# Patient Record
Sex: MALE | Race: WHITE | NOT HISPANIC OR LATINO | Employment: OTHER | URBAN - METROPOLITAN AREA
[De-identification: names, ages, dates, MRNs, and addresses within clinical notes are randomized per-mention and may not be internally consistent; named-entity substitution may affect disease eponyms.]

---

## 2024-05-07 ENCOUNTER — HOSPITAL ENCOUNTER (INPATIENT)
Facility: HOSPITAL | Age: 24
LOS: 3 days | Discharge: OTHER INSTITUTION | DRG: 099 | End: 2024-05-10
Attending: EMERGENCY MEDICINE | Admitting: INTERNAL MEDICINE
Payer: COMMERCIAL

## 2024-05-07 ENCOUNTER — HOSPITAL ENCOUNTER (OUTPATIENT)
Dept: TELEMEDICINE | Facility: HOSPITAL | Age: 24
Discharge: HOME | End: 2024-05-07
Payer: COMMERCIAL

## 2024-05-07 DIAGNOSIS — Z92.82: ICD-10-CM

## 2024-05-07 DIAGNOSIS — R29.898 LEFT ARM WEAKNESS: ICD-10-CM

## 2024-05-07 DIAGNOSIS — I63.9 CEREBROVASCULAR ACCIDENT (CVA), UNSPECIFIED MECHANISM: Primary | ICD-10-CM

## 2024-05-07 DIAGNOSIS — R29.898 LEFT ARM WEAKNESS: Primary | ICD-10-CM

## 2024-05-07 DIAGNOSIS — I63.9 ISCHEMIC STROKE: ICD-10-CM

## 2024-05-07 LAB
ALBUMIN SERPL-MCNC: 3.9 G/DL (ref 3.5–5)
ALBUMIN/GLOB SERPL: 1.1 RATIO (ref 1.1–2)
ALP SERPL-CCNC: 53 UNIT/L (ref 40–150)
ALT SERPL-CCNC: 10 UNIT/L (ref 0–55)
AMPHET UR QL SCN: NEGATIVE
APPEARANCE UR: CLEAR
AST SERPL-CCNC: 12 UNIT/L (ref 5–34)
BACTERIA #/AREA URNS AUTO: ABNORMAL /HPF
BARBITURATE SCN PRESENT UR: NEGATIVE
BASOPHILS # BLD AUTO: 0.01 X10(3)/MCL
BASOPHILS NFR BLD AUTO: 0.1 %
BENZODIAZ UR QL SCN: NEGATIVE
BILIRUB SERPL-MCNC: 0.5 MG/DL
BILIRUB UR QL STRIP.AUTO: NEGATIVE
BUN SERPL-MCNC: 11.7 MG/DL (ref 8.9–20.6)
CALCIUM SERPL-MCNC: 8.7 MG/DL (ref 8.4–10.2)
CANNABINOIDS UR QL SCN: NEGATIVE
CHLORIDE SERPL-SCNC: 105 MMOL/L (ref 98–107)
CHOLEST SERPL-MCNC: 122 MG/DL
CHOLEST/HDLC SERPL: 3 {RATIO} (ref 0–5)
CO2 SERPL-SCNC: 20 MMOL/L (ref 22–29)
COCAINE UR QL SCN: NEGATIVE
COLOR UR AUTO: ABNORMAL
CREAT SERPL-MCNC: 0.96 MG/DL (ref 0.73–1.18)
EOSINOPHIL # BLD AUTO: 0 X10(3)/MCL (ref 0–0.9)
EOSINOPHIL NFR BLD AUTO: 0 %
ERYTHROCYTE [DISTWIDTH] IN BLOOD BY AUTOMATED COUNT: 12.4 % (ref 11.5–17)
ERYTHROCYTE [SEDIMENTATION RATE] IN BLOOD: 4 MM/HR (ref 0–15)
EST. AVERAGE GLUCOSE BLD GHB EST-MCNC: 96.8 MG/DL
FENTANYL UR QL SCN: NEGATIVE
GFR SERPLBLD CREATININE-BSD FMLA CKD-EPI: >60 ML/MIN/1.73/M2
GLOBULIN SER-MCNC: 3.7 GM/DL (ref 2.4–3.5)
GLUCOSE SERPL-MCNC: 129 MG/DL (ref 74–100)
GLUCOSE UR QL STRIP.AUTO: NEGATIVE
HBA1C MFR BLD: 5 %
HCT VFR BLD AUTO: 37.8 % (ref 42–52)
HDLC SERPL-MCNC: 37 MG/DL (ref 35–60)
HGB BLD-MCNC: 12.6 G/DL (ref 14–18)
IMM GRANULOCYTES # BLD AUTO: 0.08 X10(3)/MCL (ref 0–0.04)
IMM GRANULOCYTES NFR BLD AUTO: 0.4 %
KETONES UR QL STRIP.AUTO: ABNORMAL
LACTATE SERPL-SCNC: 3.3 MMOL/L (ref 0.5–2.2)
LDH SERPL-CCNC: 214 U/L (ref 125–220)
LDLC SERPL CALC-MCNC: 75 MG/DL (ref 50–140)
LEUKOCYTE ESTERASE UR QL STRIP.AUTO: NEGATIVE
LYMPHOCYTES # BLD AUTO: 0.51 X10(3)/MCL (ref 0.6–4.6)
LYMPHOCYTES NFR BLD AUTO: 2.8 %
MCH RBC QN AUTO: 28.9 PG (ref 27–31)
MCHC RBC AUTO-ENTMCNC: 33.3 G/DL (ref 33–36)
MCV RBC AUTO: 86.7 FL (ref 80–94)
MDMA UR QL SCN: NEGATIVE
MONOCYTES # BLD AUTO: 0.66 X10(3)/MCL (ref 0.1–1.3)
MONOCYTES NFR BLD AUTO: 3.7 %
NEUTROPHILS # BLD AUTO: 16.81 X10(3)/MCL (ref 2.1–9.2)
NEUTROPHILS NFR BLD AUTO: 93 %
NITRITE UR QL STRIP.AUTO: NEGATIVE
NRBC BLD AUTO-RTO: 0 %
OPIATES UR QL SCN: NEGATIVE
PCP UR QL: NEGATIVE
PH UR STRIP.AUTO: 6 [PH]
PH UR: 6 [PH] (ref 3–11)
PLATELET # BLD AUTO: 252 X10(3)/MCL (ref 130–400)
PMV BLD AUTO: 8.7 FL (ref 7.4–10.4)
POTASSIUM SERPL-SCNC: 4 MMOL/L (ref 3.5–5.1)
PROT SERPL-MCNC: 7.6 GM/DL (ref 6.4–8.3)
PROT UR QL STRIP.AUTO: NEGATIVE
RBC # BLD AUTO: 4.36 X10(6)/MCL (ref 4.7–6.1)
RBC #/AREA URNS AUTO: ABNORMAL /HPF
RBC UR QL AUTO: ABNORMAL
SODIUM SERPL-SCNC: 137 MMOL/L (ref 136–145)
SP GR UR STRIP.AUTO: 1.02 (ref 1–1.03)
SPECIFIC GRAVITY, URINE AUTO (.000) (OHS): 1.02 (ref 1–1.03)
SQUAMOUS #/AREA URNS LPF: ABNORMAL /HPF
TRIGL SERPL-MCNC: 52 MG/DL (ref 34–140)
TSH SERPL-ACNC: 0.4 UIU/ML (ref 0.35–4.94)
UROBILINOGEN UR STRIP-ACNC: 0.2
VLDLC SERPL CALC-MCNC: 10 MG/DL
WBC # SPEC AUTO: 18.07 X10(3)/MCL (ref 4.5–11.5)
WBC #/AREA URNS AUTO: ABNORMAL /HPF

## 2024-05-07 PROCEDURE — 99285 EMERGENCY DEPT VISIT HI MDM: CPT | Mod: 25

## 2024-05-07 PROCEDURE — 83605 ASSAY OF LACTIC ACID: CPT

## 2024-05-07 PROCEDURE — 85025 COMPLETE CBC W/AUTO DIFF WBC: CPT

## 2024-05-07 PROCEDURE — 83615 LACTATE (LD) (LDH) ENZYME: CPT

## 2024-05-07 PROCEDURE — 80307 DRUG TEST PRSMV CHEM ANLYZR: CPT

## 2024-05-07 PROCEDURE — 87040 BLOOD CULTURE FOR BACTERIA: CPT

## 2024-05-07 PROCEDURE — 83036 HEMOGLOBIN GLYCOSYLATED A1C: CPT

## 2024-05-07 PROCEDURE — 63600175 PHARM REV CODE 636 W HCPCS: Performed by: PSYCHIATRY & NEUROLOGY

## 2024-05-07 PROCEDURE — 80053 COMPREHEN METABOLIC PANEL: CPT

## 2024-05-07 PROCEDURE — G0425 INPT/ED TELECONSULT30: HCPCS | Mod: GT,,, | Performed by: PSYCHIATRY & NEUROLOGY

## 2024-05-07 PROCEDURE — 85652 RBC SED RATE AUTOMATED: CPT

## 2024-05-07 PROCEDURE — 80061 LIPID PANEL: CPT

## 2024-05-07 PROCEDURE — 20000000 HC ICU ROOM

## 2024-05-07 PROCEDURE — 99223 1ST HOSP IP/OBS HIGH 75: CPT | Mod: FS,,, | Performed by: PSYCHIATRY & NEUROLOGY

## 2024-05-07 PROCEDURE — 86039 ANTINUCLEAR ANTIBODIES (ANA): CPT

## 2024-05-07 PROCEDURE — 36415 COLL VENOUS BLD VENIPUNCTURE: CPT

## 2024-05-07 PROCEDURE — 81001 URINALYSIS AUTO W/SCOPE: CPT

## 2024-05-07 PROCEDURE — 84443 ASSAY THYROID STIM HORMONE: CPT

## 2024-05-07 RX ORDER — ACETAMINOPHEN 10 MG/ML
1000 INJECTION, SOLUTION INTRAVENOUS ONCE
Status: DISCONTINUED | OUTPATIENT
Start: 2024-05-07 | End: 2024-05-07

## 2024-05-07 RX ORDER — SODIUM CHLORIDE 0.9 % (FLUSH) 0.9 %
10 SYRINGE (ML) INJECTION
Status: DISCONTINUED | OUTPATIENT
Start: 2024-05-07 | End: 2024-05-10 | Stop reason: HOSPADM

## 2024-05-07 RX ORDER — LEVETIRACETAM 10 MG/ML
1000 INJECTION INTRAVASCULAR ONCE
Qty: 100 ML | Refills: 0 | Status: COMPLETED | OUTPATIENT
Start: 2024-05-07 | End: 2024-05-07

## 2024-05-07 RX ORDER — ONDANSETRON HYDROCHLORIDE 2 MG/ML
4 INJECTION, SOLUTION INTRAVENOUS EVERY 6 HOURS PRN
Status: DISCONTINUED | OUTPATIENT
Start: 2024-05-07 | End: 2024-05-10 | Stop reason: HOSPADM

## 2024-05-07 RX ORDER — ACETAMINOPHEN 325 MG/1
650 TABLET ORAL EVERY 6 HOURS PRN
Status: DISCONTINUED | OUTPATIENT
Start: 2024-05-07 | End: 2024-05-10 | Stop reason: HOSPADM

## 2024-05-07 RX ADMIN — LEVETIRACETAM INJECTION 1000 MG: 10 INJECTION INTRAVENOUS at 07:05

## 2024-05-07 NOTE — ED PROVIDER NOTES
Encounter Date: 5/7/2024       History     Chief Complaint   Patient presents with    Cerebrovascular Accident     Tx from [facility] for CVA. L side deficits. LSN 1220 today. GCS 10 on arrival to [facility]. TPA bolus incomplete due to bleeding gums at prior facility. Code stroke called overhead upon patient arrival     Patient is a 23-year-old male transferred from an outlying facility secondary to left upper and left lower extremity weakness.  Patient had CT of the head that was done prior to arrival which showed no acute changes.  Patient was given tPA prior to transfer but the tPA infusion was stopped after patient started having gingival bleeding.  Patient received 37.45 meals of tPA infusion instead of full-dose of 70 mL.  Tele neuro was consulted at the transferring facility.  Stroke alert was called shortly after patient arrival.  Stroke team has been consulted.  CT of the brain done at the prior facility showed no acute changes per Radiology      Review of patient's allergies indicates:  Not on File  No past medical history on file.  No past surgical history on file.  No family history on file.     Review of Systems   Constitutional: Negative.    Cardiovascular: Negative.    Gastrointestinal: Negative.    Genitourinary: Negative.    Musculoskeletal: Negative.    Neurological:  Positive for weakness and headaches.   Psychiatric/Behavioral: Negative.         Physical Exam     Initial Vitals   BP Pulse Resp Temp SpO2   05/07/24 1619 05/07/24 1619 05/07/24 1619 05/07/24 1700 05/07/24 1619   123/62 80 (!) 26 99.6 °F (37.6 °C) 98 %      MAP       --                Physical Exam    Nursing note and vitals reviewed.  Constitutional: He appears well-developed and well-nourished.   Patient is alert, seems somewhat confused but follows commands   Eyes:   Pupils are 4 mm bilaterally and reactive   Neck: Neck supple.   Normal range of motion.  Cardiovascular:  Normal rate, regular rhythm and normal heart sounds.            Pulmonary/Chest: Breath sounds normal. No respiratory distress.   Abdominal: Abdomen is soft. There is no abdominal tenderness.   Musculoskeletal:      Cervical back: Normal range of motion and neck supple.      Comments: No deformity to extremities x4     Neurological: He is alert.   No apparent aphasia, patient has weakness of the left upper and lower extremity, can not lift against gravity in either.  Strength on the right side is within normal.  There is no apparent aphasia   Skin: Skin is warm.         ED Course   Critical Care    Date/Time: 5/7/2024 5:51 PM    Performed by: Jonathan Thomas MD  Authorized by: Jonathan Thomas MD  Direct patient critical care time: 20 minutes  Additional history critical care time: 5 minutes  Ordering / reviewing critical care time: 5 minutes  Documentation critical care time: 10 minutes  Consulting other physicians critical care time: 5 minutes  Consult with family critical care time: 5 minutes  Total critical care time (exclusive of procedural time) : 50 minutes  Critical care time was exclusive of separately billable procedures and treating other patients and teaching time.  Critical care was necessary to treat or prevent imminent or life-threatening deterioration of the following conditions: CNS failure or compromise.  Critical care was time spent personally by me on the following activities: development of treatment plan with patient or surrogate, discussions with consultants, interpretation of cardiac output measurements, evaluation of patient's response to treatment, discussions with primary provider, examination of patient, obtaining history from patient or surrogate, ordering and performing treatments and interventions, ordering and review of laboratory studies, ordering and review of radiographic studies, pulse oximetry, re-evaluation of patient's condition and review of old charts.        Labs Reviewed - No data to display       Imaging Results               CTA Head and Neck (xpd) (Final result)  Result time 05/07/24 17:05:04      Final result by Bear Daniel MD (05/07/24 17:05:04)                   Impression:        Limited examination due to venous contamination and mistiming of the contrast bolus but no evidence of large vessel occlusion seen on this examination.      Electronically signed by: Milo Daniel  Date:    05/07/2024  Time:    17:05               Narrative:    EXAMINATION:  CTA HEAD AND NECK (XPD)    CLINICAL HISTORY:  Stroke/TIA, determine embolic source;    TECHNIQUE:  Non contrast low dose axial images were obtained through the head.  CT angiogram was performed from the level of the sugar to the top of the head following the IV administration 100 cc of Isovue 370 contrast .  Sagittal and coronal reconstructions and maximum intensity projection reconstructions were performed. Arterial stenosis percentages are based on NASCET measurement criteria.  Additional multiplanar reconstructions were performed on post processed imaging.  Automatic exposure control (AEC) is utilized to reduce patient radiation exposure.    COMPARISON:  CT scan dated 05/07/2024    FINDINGS:  Source images: No intracranial mass lesion is seen.  No hemorrhage is seen.  No infarct is seen.  Ventricles and basilar cisterns appear grossly unremarkable.  No cervical mass or lesion is seen.  No abnormal lymphadenopathy seen.  Thyroid appears normal.  Larynx appears normal.  Trachea is midline.  Thoracic inlet appears normal.    Vascular images: Aortic arch is normal in caliber.  There is a 3 vessel arch seen.  The common carotid arteries are widely patent.  No plaque is seen.  No stenosis is seen.  Carotid bulbs appear grossly normal.  Internal carotid arteries are widely patent.  They are seen to level the petrous ridge and clinoid and supraclinoid region.  No stenosis or obstruction is seen.  There is venous contamination is seen throughout the course of the examination due  to mistiming of the contrast bolus.  Visualized portions of the MCAs appear to be patent.  M1 segments, M2 segments and M3 segments appear to be patent.  A1 segments are patent.  Anterior communicating arteries patent.  Anterior cerebral arteries are patent.    Both vertebral arteries are patent.  No obstruction is seen.  Both vertebral arteries perfuse a normal appearing basilar artery.  Posterior cerebral arteries are patent.  Posterior communicating artery is seen on the left side and appears to be patent.  The right posterior communicating artery is not seen.    .                                       CT Brain Perfusion inc Post Processing (In process)                      CT HEAD FOR STROKE (Final result)  Result time 05/07/24 16:42:52      Final result by Romain Silva MD (05/07/24 16:42:52)                   Impression:      No acute intracranial process identified.    Report called to Reena Maynard NP at the time of dictation.      Electronically signed by: Romain Silva  Date:    05/07/2024  Time:    16:42               Narrative:    EXAMINATION:  CT HEAD FOR STROKE    CLINICAL HISTORY:  Neuro deficit, acute, stroke suspected;stroke alert;;    TECHNIQUE:  CT images of the head without IV contrast. Axial, coronal and sagittal images reviewed. Dose length product 979 mGycm. Automatic exposure control, adjustment of mA/kV or iterative reconstruction technique used to limit radiation dose.    COMPARISON:  No relevant comparison studies available at the time of dictation.    FINDINGS:  Extra-axial spaces/ventricular system: Normal for age.    Intracranial hemorrhage: None identified.    Cerebral parenchyma: No acute large vessel territory infarct or mass effect identified.    Vascular system: No hyperdense vessel appreciated.    Cerebellum: Normal.    Sella: Normal.    Included paranasal sinuses and mastoid air cells: Well-aerated.    Visualized orbits: Normal.    Scalp/Calvarium: No depressed skull  fracture.                                       Medications - No data to display  Medical Decision Making  Differential diagnosis:  CVA, status post tPA, hemorrhagic CVA, ischemic CVA    Amount and/or Complexity of Data Reviewed  Discussion of management or test interpretation with external provider(s): Patient received a partial bolus of tPA prior to transfer.  CT of the brain at the transferring facility showed no acute changes.  EKG done at the transferring facility shows normal sinus rhythm with sinus arrhythmia with a heart rate of 95.  Patient arrived in this ER and stroke code was called.  Stroke nurse practitioner responded.  Patient was sent for repeat CT of the head and CTA of the brain.  No acute changes were seen on these per Radiology.  On arrival, patient had no movement of the left upper and lower extremity.  At time of admit, patient is easily moving his left lower extremity but he still will not move his left upper extremity for me.  Patient will be admitted to the ICU secondary to partial dose of tPA.    Risk  Decision regarding hospitalization.               ED Course as of 05/07/24 1756   Tue May 07, 2024   1746 Paged intensivist; Dr. Gareth Mast [LH]   1748 Intensivist on-call, will come to evaluate [KG]   1749 Patient is still not moving his left upper extremity.  Patient moves his left lower extremity without difficulty.  Patient has 4/5 motor strength in the left lower extremity. [KG]   1753 Abnormal labs at the transferring facility [place] 354[address], CMP was within normal [KG]      ED Course User Index  [KG] Jonathan Thomas MD  [LH] Clayton Silva                   Thrombolysis Candidate? Yes, given prior to arrival at outside hospital Delays to Thrombolysis?  Not Applicable        Clinical Impression:  Final diagnoses:  [I63.9] Cerebrovascular accident (CVA), unspecified mechanism (Primary)  [Z92.82] Received tissue plasminogen activator (tPA) less than 24 hours  prior to arrival          ED Disposition Condition    Admit Stable                Jonathan Thomas MD  05/07/24 [id number]       Jonathan Thomas MD  05/07/24 6941

## 2024-05-07 NOTE — NURSING
Nurses Note -- 4 Eyes      5/7/2024   6:35 PM      Skin assessed during: Admit      [x] No Altered Skin Integrity Present    [x]Prevention Measures Documented      [] Yes- Altered Skin Integrity Present or Discovered   [] LDA Added if Not in Epic (Describe Wound)   [] New Altered Skin Integrity was Present on Admit and Documented in LDA   [] Wound Image Taken    Wound Care Consulted? No    Attending Nurse: Sonya Galarza RN/Staff Member:   Tang DE JESUS

## 2024-05-07 NOTE — SUBJECTIVE & OBJECTIVE
HPI:  23 y.o. male without apparent past medical history, brought in due to acute onset L arm and L face weakness as well as HA for a week. Never had similar symptoms before.  Noted to be febrile 100.6  Of note, no reported history of drug abuse or recent neck/head trauma.  No improving.     Images personally reviewed and interpreted:  Study: Head CT  Study Interpretation: no acute abnormality.     Assessment and plan:  Concern for acute R cortical infarct. He is a suitable candidate for TNK and therefore recommended to treat accordingly and get STAT CTA brain and neck searching for LVO.   Complete stroke work up with MRI brain, TTE-bubble, lipid panel, drug screen, as well as fever work up.   Neurology, PT/OT and speech therapy consults.    Lytics recommendation: Recommend IV Tenecteplase 0.25mg/kg IV push (max dose 25mg); If Tenecteplase is not available use Alteplase 0.9mg/kg IV bolus followed by infusion (max dose 90mg)     Additional Recommendations:   Neurological assessment and vital signs (except temperature) every 15 minutes x 2 hours, then every 30 minutes x 6 hours, then every hour x 16 hours..  Frequency of BP assessments may need to be increased if systolic BP stays >= 180 mm Hg or diastolic BP stays >= 105 mm Hg. Administer antihypertensive meds as ordered  Temperature every 4 hours or as required.  Follow hospital protocol for further orders re: post thrombolytic therapy patient management.  No antithrombotics or anticoagulants (including but not limited to: heparin, warfarin, aspirin, clopidigrel, or dipyridamole) for 24 hours, then start antithrombotics as ordered by treating physician    Adapted from the American Heart Association/American Stroke Association (AHA/ASA) and American Association of Neuroscience Nurses (AANN) Guidelines.   Thrombectomy recommendation: Yes  Placement recommendation: pending further studies

## 2024-05-07 NOTE — TELEMEDICINE CONSULT
[facility]  Vascular Neurology  Comprehensive Stroke Center  TeleVascular Neurology Acute Consultation Note        Consult Information  Consults    Consulting Provider: JOE POWERS III   Current Providers  No providers found    Patient Location: [facility] - TELEMEDICINE ED [facility] PATIENT FLOW [facility] Emergency Department    Spoke hospital nurse at bedside with patient assisting consultant.  Patient information was obtained from spouse/SO, past medical records, and primary team.       Stroke Documentation  Acute Stroke Times   Last Known Normal Date: 05/07/24  Last Known Normal Time: 1220  Symptom Onset Date: 05/07/24  Symptom Onset Time: 1220  Stroke Team Called Date: 05/07/24  Stroke Team Called Time: 1341  Stroke Team Arrival Date: 05/07/24  Stroke Team Arrival Time: 1345  CT Interpretation Time: 1357  Thrombolytic Therapy Recommended: Yes  Thrombectomy Recommended:  (Awaiting CTA)  Decision to Treat Time for Tenecteplase: 1400    NIH Scale:  Interval: baseline  1a. Level of Consciousness: 1-->Not alert, but arousable by minor stimulation to obey, answer, or respond  1b. LOC Questions: 0-->Answers both questions correctly  1c. LOC Commands: 0-->Performs both tasks correctly  2. Best Gaze: 0-->Normal  3. Visual: 0-->No visual loss  4. Facial Palsy: 2-->Partial paralysis (total or near-total paralysis of lower face)  5a. Motor Arm, Left: 3-->No effort against gravity, limb falls  5b. Motor Arm, Right: 0-->No drift, limb holds 90 (or 45) degrees for full 10 secs  6a. Motor Leg, Left: 0-->No drift, leg holds 30 degree position for full 5 secs  6b. Motor Leg, Right: 0-->No drift, leg holds 30 degree position for full 5 secs  7. Limb Ataxia: 0-->Absent  8. Sensory: 0-->Normal, no sensory loss  9. Best Language: 0-->No aphasia, normal  10. Dysarthria: 0-->Normal  11. Extinction and Inattention (formerly Neglect): 0-->No abnormality  Total (NIH Stroke Scale): 6      Modified [place]:  Score: 0  [place] Coma Scale: 14   ABCD2 Score:    [id number] Score:    HAS -BLED Score:    ICH Score:    Hunt & Marr Classification:      There were no vitals taken for this visit.    Van Positive    Medical Decision Making  HPI:  23 y.o. male without apparent past medical history, brought in due to acute onset L arm and L face weakness as well as HA for a week. Never had similar symptoms before.  Noted to be febrile 100.6  Of note, no reported history of drug abuse or recent neck/head trauma.  No improving.     Images personally reviewed and interpreted:  Study: Head CT  Study Interpretation: no acute abnormality.     Assessment and plan:  Concern for acute R cortical infarct. He is a suitable candidate for TNK and therefore recommended to treat accordingly and get STAT CTA brain and neck searching for LVO.   Complete stroke work up with MRI brain, TTE-bubble, lipid panel, drug screen, as well as fever work up.   Neurology, PT/OT and speech therapy consults.    Lytics recommendation: Recommend IV Tenecteplase 0.25mg/kg IV push (max dose 25mg); If Tenecteplase is not available use Alteplase 0.9mg/kg IV bolus followed by infusion (max dose 90mg)     Additional Recommendations:   Neurological assessment and vital signs (except temperature) every 15 minutes x 2 hours, then every 30 minutes x 6 hours, then every hour x 16 hours..  Frequency of BP assessments may need to be increased if systolic BP stays >= 180 mm Hg or diastolic BP stays >= 105 mm Hg. Administer antihypertensive meds as ordered  Temperature every 4 hours or as required.  Follow hospital protocol for further orders re: post thrombolytic therapy patient management.  No antithrombotics or anticoagulants (including but not limited to: heparin, warfarin, aspirin, clopidigrel, or dipyridamole) for 24 hours, then start antithrombotics as ordered by treating physician    Adapted from the American Heart Association/American Stroke Association (AHA/ASA) and  American Association of Neuroscience Nurses (AANN) Guidelines.   Thrombectomy recommendation: Yes  Placement recommendation: pending further studies               ROS  Physical Exam  No past medical history on file.  No past surgical history on file.  No family history on file.    Diagnoses  Problem Noted   Left Arm Weakness 5/7/2024       Taco Weber MD      Emergent/Acute neurological consultation requested by spoke provider due to critical concerns for possible cerebrovascular event that could result in permanent loss of neurologic/bodily function, severe disability or death of this patient.  Immediate/timely evaluation by a highly prepared expert is paramount for optimal outcomes  High risk for neurological deterioration if not properly diagnosed  High risk for neurological deterioration if not treated promplty/as soon as possible  Complex diagnostic evaluation may be required (advanced imaging)  High risk treatment options (thrombolytics and/or thrombectomy)    Patient care was coordinated with spoke provider, including but not limted to    Discussing likely diagnosis/etiology of symptoms  Making recommendations for further diagnostic studies  Making recommendations for intravenous thrombolytics or other advanced therapies  Making recommendations for disposition (admission/transfer for higher level of care)

## 2024-05-07 NOTE — CONSULTS
[facility] - Emergency Dept  Neurology  Consult Note      Luis Francisco Traylor is a 23 y.o. male who presented to [facility] on 5/7/2024 with reports of  left facial droop, left sided weakness, upper greater than lower. . Onset of symptoms was  acute . Stroke risk factors include none. Prior stroke history: none.     RN reported that patient only received partial dose of TPA at outside facility. It was stopped due to acute onset of gingival bleeding. He is lethargic upon arrival. Aroused only to noxious stimuli.  NIH 11. I  am also told that he has had complaints of HA's for the past 4 weeks and is being followed by his PCP. No family members present.   Repeat CT head ordered. Notified Dr. Richardson. CTP and CTA head pending.         No past medical history on file.  No past surgical history on file.  No family history on file.      Review of patient's allergies indicates:  Not on File      STROKE DOCUMENTATION   Acute Stroke Times   Last Known Normal Date: 05/07/24  Last Known Normal Time: 1222  Stroke Team Called Date: 05/07/24  Stroke Team Called Time: 1625  Stroke Team Arrival Date: 05/07/24  Stroke Team Arrival Time: 1625  Thrombolytic Therapy Recommended: Yes (received at [facility])    NIH Scale:  1a. Level of Consciousness: 2-->Not alert, requires repeated stimulation to attend, or is obtunded and requires strong or painful stimulation to make movements (not stereotyped)  1b. LOC Questions: 1-->Answers one question correctly  1c. LOC Commands: 0-->Performs both tasks correctly  2. Best Gaze: 0-->Normal  3. Visual: 0-->No visual loss  4. Facial Palsy: 1-->Minor paralysis (flattened nasolabial fold, asymmetry on smiling)  5a. Motor Arm, Left: 3-->No effort against gravity, limb falls  5b. Motor Arm, Right: 0-->No drift, limb holds 90 (or 45) degrees for full 10 secs  6a. Motor Leg, Left: 3-->No effort against gravity, leg falls to bed immediately  6b. Motor Leg, Right: 0-->No drift, leg holds 30 degree position  "for full 5 secs  7. Limb Ataxia: 0-->Absent  8. Sensory: 1-->Mild-to-moderate sensory loss, patient feels pinprick is less sharp or is dull on the affected side, or there is a loss of superficial pain with pinprick, but patient is aware of being touched  9. Best Language: 0-->No aphasia, normal  10. Dysarthria: 0-->Normal  11. Extinction and Inattention (formerly Neglect): 0-->No abnormality  Total (NIH Stroke Scale): 11     Modified [place]    [place] Coma Scale:    ABCD2 Score:    [id number] Score:   HAS -BLED Score:   ICH Score:   Hunt & Marr Classification:         Neurological Exam:   Lethargic, requires noxious stimuli  Left facial droop  Speech questionable dysarthria  LUE, No effort against gravity  LLE, antigravity  RUE antigravity  Intermittently following commands  Refused to open eyes,       Laboratory:  BMP: No results found for: "GLUCOSE", "NA", "K", "CL", "CO2", "BUN", "CREATININE", "CALCIUM"  CBC: No results found for: "WBC", "RBC", "HGB", "HCT", "PLT", "MCV", "MCH", "MCHC"  Lipid Panel: No results found for: "CHOL", "LDLCALC", "HDL", "TRIG"  Coagulation: No results found for: "PT", "INR", "APTT"  Hgb A1C: No results found for: "HGBA1C"  TSH: No results found for: "TSH"    Diagnostic Results  Brain Imaging:   -CTh: negative  Cerebrovascular Imaging:   -CTA h/n: negative 1705  Cardiac Evaluation:   -EKG/Telemetry: pending        Discussed with neurologist on call: 1629  Thrombolysis Candidate?  Received partial TPA at [facility]  -Delays to Thrombolysis?  Not Applicable    Interventional Revascularization Candidate?  Awaiting CTA results  -Delays to Thrombectomy? Yes Patient needed to be stabilized  Discussed with Interventional Neurology at 1711 , recommends Not a candidate for MT. CTA negative for LVO or flow limiting stenosis            PLAN:  S/p TNK    -permissive HTN for now ... SBP less than 180  -q1hr neuro checks  -STAT CTh for any HA or neuro change  -HOB flat, Bedrest, NPO x24 hours post " TNK  -repeat CTh after 24 hours to determine if antiplatelet therapy can be initiated           Thank you for your consult.   Will follow up with patient.       Reena Maynard NP  Neurology  [facility] - Emergency Dept

## 2024-05-07 NOTE — H&P
[facility] - Emergency Dept  Pulmonary Critical Care Note    Patient Name: Luis Traylor  MRN: [id number]  Admission Date: 5/7/2024  Hospital Length of Stay: 0 days  Code Status: Full Code  Attending Provider: Gareth Mast MD  Primary Care Provider: No primary care provider on file.     Subjective:     HPI:   3-year-old male with no significant past medical history presented to [facility] ED on 05/07/2024 as a transfer from [facility] due to concern for stroke.  Around noon today the patient seemed confused to his girlfriend and weak were which she took him to [facility], on presentation he complain of weakness and numbness in his left arm and leg, he has also been having severe headaches for the last 2 weeks that had worsened today with photophobia accompanied by nausea and vomiting, CT scan head did not show any signs of bleeding, tPA was given to the patient, he started having gingival bleeding penitentiary through treatment and had to be halted.  CTA head and neck did not show any large vessel occlusion.  Patient denies any dizziness, double vision, chest pain, shortness breath, abdominal pain, diarrhea or constipation.    Hospital Course/Significant events:  05/07/2024 half dose tPA and admission to the ICU    24 Hour Interval History:  N/A    No past medical history on file.    No past surgical history on file.    Social History     Socioeconomic History    Marital status: Significant Other           Objective:   No current outpatient medications    Current Inpatient Medications        No intake or output data in the 24 hours ending 05/07/24 2951    Review of Systems   Constitutional:  Negative for chills, fever and weight loss.   Eyes:  Positive for photophobia. Negative for blurred vision and double vision.   Respiratory:  Negative for cough and shortness of breath.    Cardiovascular:  Negative for chest pain and leg swelling.   Gastrointestinal:  Positive for nausea and  vomiting. Negative for abdominal pain, constipation and diarrhea.   Genitourinary:  Negative for dysuria and urgency.   Neurological:  Positive for sensory change, weakness and headaches. Negative for dizziness, speech change, seizures and loss of consciousness.        Vital Signs (Most Recent):  Temp: 98.4 °F (36.9 °C) (05/07/24 1708)  Pulse: 84 (05/07/24 1800)  Resp: 15 (05/07/24 1800)  BP: (!) 141/79 (05/07/24 1800)  SpO2: 100 % (05/07/24 1800)  There is no height or weight on file to calculate BMI.  Weight: 77.4 kg (170 lb 10.2 oz) Vital Signs (24h Range):  Temp:  [98.4 °F (36.9 °C)-99.6 °F (37.6 °C)] 98.4 °F (36.9 °C)  Pulse:  [80-97] 84  Resp:  [11-26] 15  SpO2:  [98 %-100 %] 100 %  BP: (123-144)/(62-83) 141/79     Physical Exam  Constitutional:       Comments: Patient was agitated and restless in his bed interfering with physical exam   HENT:      Mouth/Throat:      Tongue: Tongue does not deviate from midline.      Comments: Line ear abrasion on both lips with blood on the tongue  Eyes:      Conjunctiva/sclera: Conjunctivae normal.   Cardiovascular:      Rate and Rhythm: Normal rate and regular rhythm.      Pulses: Normal pulses.      Heart sounds: Normal heart sounds.   Pulmonary:      Effort: Pulmonary effort is normal.      Breath sounds: Normal breath sounds.   Abdominal:      General: Bowel sounds are normal.      Palpations: Abdomen is soft.   Musculoskeletal:      Right lower leg: No edema.      Left lower leg: No edema.   Skin:     General: Skin is warm and dry.      Capillary Refill: Capillary refill takes less than 2 seconds.      Findings: Rash present.      Comments: Mild urticarial rash on neck and chest   Neurological:      Mental Status: He is alert and oriented to person, place, and time.      Cranial Nerves: Cranial nerves 2-12 are intact. No cranial nerve deficit.      Sensory: Sensory deficit present.      Motor: Weakness present.      Comments: Numbness on the left side with weakness 0/5  "on the left upper extremity and 2/5 on the left lower extremity.           Mechanical ventilation support:       Lines/Drains/Airways       Drain  Duration                  Urethral Catheter 05/07/24 0000 16 Fr. <1 day              Peripheral Intravenous Line  Duration                  Peripheral IV - Single Lumen 05/07/24 0000 18 G Left Antecubital <1 day         Peripheral IV - Single Lumen 05/07/24 0000 20 G Right Antecubital <1 day                    Significant Labs:    No results found for: "WBC", "HGB", "HCT", "MCV", "PLT"      BMP  No results found for: "NA", "K", "CL", "CO2", "BUN", "CREATININE", "CALCIUM", "ANIONGAP", "ESTGFRAFRICA", "EGFRNONAA"    ABG  No results for input(s): "PH", "PO2", "PCO2", "HCO3", "BE" in the last 168 hours.        Significant Imaging:  I have reviewed all pertinent imaging within the past 24 hours.        Assessment/Plan:     Assessment  Left-sided weakness and numbness  Ischemic stroke VS seizure VS meningoencephalitis VS conversion disorder  Agitation  Urticarial rash  Nausea and vomiting      Plan  Ordering CBC, CMP, ESR, TSH, KWAME, UDS, UA, echo with saline bubble  Ordering MRI brain  If signs of sepsis and altered mental status, consider lumbar puncture to rule out CNS infection  Q 1 hour neuro checks  Monitor rash, if worsens start dexamethasone  Tylenol p.r.n. for headache, avoid NSAIDs  Avoid sedatives  Zofran p.r.n. for nausea  Consulting PT/OT/ST    DVT Prophylaxis:  SCD  GI Prophylaxis:  None          Pedro Viyera MD  Pulmonary Critical Care Medicine  [facility] - Emergency Dept    "

## 2024-05-07 NOTE — ED NOTES
Spoke to Bennie at [facility]. She states that TPA bolus was given at 1416 followed by infusion at 1429. Infusion stopped at 1504.

## 2024-05-08 LAB
ALBUMIN SERPL-MCNC: 3.9 G/DL (ref 3.5–5)
ALBUMIN/GLOB SERPL: 1.1 RATIO (ref 1.1–2)
ALP SERPL-CCNC: 59 UNIT/L (ref 40–150)
ALT SERPL-CCNC: 9 UNIT/L (ref 0–55)
AST SERPL-CCNC: 11 UNIT/L (ref 5–34)
AV INDEX (PROSTH): 0.63
AV MEAN GRADIENT: 3 MMHG
AV PEAK GRADIENT: 6 MMHG
AV VALVE AREA BY VELOCITY RATIO: 2.37 CM²
AV VALVE AREA: 2.38 CM²
AV VELOCITY RATIO: 0.62
BASOPHILS # BLD AUTO: 0.01 X10(3)/MCL
BASOPHILS NFR BLD AUTO: 0.1 %
BILIRUB SERPL-MCNC: 0.6 MG/DL
BSA FOR ECHO PROCEDURE: 1.97 M2
BUN SERPL-MCNC: 12.4 MG/DL (ref 8.9–20.6)
CALCIUM SERPL-MCNC: 9.5 MG/DL (ref 8.4–10.2)
CHLORIDE SERPL-SCNC: 103 MMOL/L (ref 98–107)
CO2 SERPL-SCNC: 22 MMOL/L (ref 22–29)
CREAT SERPL-MCNC: 0.93 MG/DL (ref 0.73–1.18)
CV ECHO LV RWT: 0.47 CM
DOP CALC AO PEAK VEL: 1.25 M/S
DOP CALC AO VTI: 23 CM
DOP CALC LVOT AREA: 3.8 CM2
DOP CALC LVOT DIAMETER: 2.2 CM
DOP CALC LVOT PEAK VEL: 0.78 M/S
DOP CALC LVOT STROKE VOLUME: 54.71 CM3
DOP CALC MV VTI: 28.9 CM
DOP CALCLVOT PEAK VEL VTI: 14.4 CM
E WAVE DECELERATION TIME: 161 MSEC
E/A RATIO: 2.08
E/E' RATIO: 5.4 M/S
ECHO LV POSTERIOR WALL: 1.2 CM (ref 0.6–1.1)
EOSINOPHIL # BLD AUTO: 0.01 X10(3)/MCL (ref 0–0.9)
EOSINOPHIL NFR BLD AUTO: 0.1 %
ERYTHROCYTE [DISTWIDTH] IN BLOOD BY AUTOMATED COUNT: 12.5 % (ref 11.5–17)
FRACTIONAL SHORTENING: 33 % (ref 28–44)
GFR SERPLBLD CREATININE-BSD FMLA CKD-EPI: >60 ML/MIN/1.73/M2
GLOBULIN SER-MCNC: 3.7 GM/DL (ref 2.4–3.5)
GLUCOSE SERPL-MCNC: 124 MG/DL (ref 74–100)
HCT VFR BLD AUTO: 38.6 % (ref 42–52)
HGB BLD-MCNC: 12.9 G/DL (ref 14–18)
IMM GRANULOCYTES # BLD AUTO: 0.11 X10(3)/MCL (ref 0–0.04)
IMM GRANULOCYTES NFR BLD AUTO: 0.6 %
INR PPP: 1.1
INTERVENTRICULAR SEPTUM: 1 CM (ref 0.6–1.1)
LACTATE SERPL-SCNC: 3.4 MMOL/L (ref 0.5–2.2)
LEFT ATRIUM SIZE: 3.1 CM
LEFT ATRIUM VOLUME INDEX MOD: 23.5 ML/M2
LEFT ATRIUM VOLUME MOD: 46.3 CM3
LEFT INTERNAL DIMENSION IN SYSTOLE: 3.4 CM (ref 2.1–4)
LEFT VENTRICLE DIASTOLIC VOLUME INDEX: 62.94 ML/M2
LEFT VENTRICLE DIASTOLIC VOLUME: 124 ML
LEFT VENTRICLE MASS INDEX: 109 G/M2
LEFT VENTRICLE SYSTOLIC VOLUME INDEX: 24.1 ML/M2
LEFT VENTRICLE SYSTOLIC VOLUME: 47.4 ML
LEFT VENTRICULAR INTERNAL DIMENSION IN DIASTOLE: 5.1 CM (ref 3.5–6)
LEFT VENTRICULAR MASS: 213.9 G
LV LATERAL E/E' RATIO: 4.32 M/S
LV SEPTAL E/E' RATIO: 7.2 M/S
LVOT MG: 1 MMHG
LVOT MV: 0.5 CM/S
LYMPHOCYTES # BLD AUTO: 0.78 X10(3)/MCL (ref 0.6–4.6)
LYMPHOCYTES NFR BLD AUTO: 4.4 %
MCH RBC QN AUTO: 29 PG (ref 27–31)
MCHC RBC AUTO-ENTMCNC: 33.4 G/DL (ref 33–36)
MCV RBC AUTO: 86.7 FL (ref 80–94)
MONOCYTES # BLD AUTO: 1.1 X10(3)/MCL (ref 0.1–1.3)
MONOCYTES NFR BLD AUTO: 6.2 %
MV MEAN GRADIENT: 3 MMHG
MV PEAK A VEL: 0.52 M/S
MV PEAK E VEL: 1.08 M/S
MV PEAK GRADIENT: 6 MMHG
MV STENOSIS PRESSURE HALF TIME: 86 MS
MV VALVE AREA BY CONTINUITY EQUATION: 1.89 CM2
MV VALVE AREA P 1/2 METHOD: 2.56 CM2
NEUTROPHILS # BLD AUTO: 15.69 X10(3)/MCL (ref 2.1–9.2)
NEUTROPHILS NFR BLD AUTO: 88.6 %
NRBC BLD AUTO-RTO: 0 %
OHS CV RV/LV RATIO: 0.49 CM
PISA TR MAX VEL: 1.13 M/S
PLATELET # BLD AUTO: 277 X10(3)/MCL (ref 130–400)
PMV BLD AUTO: 8.8 FL (ref 7.4–10.4)
POTASSIUM SERPL-SCNC: 4 MMOL/L (ref 3.5–5.1)
PROT SERPL-MCNC: 7.6 GM/DL (ref 6.4–8.3)
PROTHROMBIN TIME: 14.3 SECONDS (ref 12.5–14.5)
PV PEAK GRADIENT: 6 MMHG
PV PEAK VELOCITY: 1.24 M/S
RBC # BLD AUTO: 4.45 X10(6)/MCL (ref 4.7–6.1)
RIGHT VENTRICULAR END-DIASTOLIC DIMENSION: 2.5 CM
SODIUM SERPL-SCNC: 137 MMOL/L (ref 136–145)
TDI LATERAL: 0.25 M/S
TDI SEPTAL: 0.15 M/S
TDI: 0.2 M/S
TR MAX PG: 5 MMHG
TRICUSPID ANNULAR PLANE SYSTOLIC EXCURSION: 2.78 CM
WBC # SPEC AUTO: 17.7 X10(3)/MCL (ref 4.5–11.5)
Z-SCORE OF LEFT VENTRICULAR DIMENSION IN END DIASTOLE: -1.05
Z-SCORE OF LEFT VENTRICULAR DIMENSION IN END SYSTOLE: -0.18

## 2024-05-08 PROCEDURE — 85025 COMPLETE CBC W/AUTO DIFF WBC: CPT

## 2024-05-08 PROCEDURE — 25000003 PHARM REV CODE 250: Performed by: INTERNAL MEDICINE

## 2024-05-08 PROCEDURE — 63600175 PHARM REV CODE 636 W HCPCS: Performed by: STUDENT IN AN ORGANIZED HEALTH CARE EDUCATION/TRAINING PROGRAM

## 2024-05-08 PROCEDURE — 63600175 PHARM REV CODE 636 W HCPCS: Performed by: INTERNAL MEDICINE

## 2024-05-08 PROCEDURE — 63600175 PHARM REV CODE 636 W HCPCS

## 2024-05-08 PROCEDURE — 63600175 PHARM REV CODE 636 W HCPCS: Performed by: PSYCHIATRY & NEUROLOGY

## 2024-05-08 PROCEDURE — 87040 BLOOD CULTURE FOR BACTERIA: CPT

## 2024-05-08 PROCEDURE — 36415 COLL VENOUS BLD VENIPUNCTURE: CPT

## 2024-05-08 PROCEDURE — 80053 COMPREHEN METABOLIC PANEL: CPT

## 2024-05-08 PROCEDURE — 85610 PROTHROMBIN TIME: CPT

## 2024-05-08 PROCEDURE — 83605 ASSAY OF LACTIC ACID: CPT

## 2024-05-08 PROCEDURE — 00JU3ZZ INSPECTION OF SPINAL CANAL, PERCUTANEOUS APPROACH: ICD-10-PCS | Performed by: INTERNAL MEDICINE

## 2024-05-08 PROCEDURE — 25000003 PHARM REV CODE 250

## 2024-05-08 PROCEDURE — 20000000 HC ICU ROOM

## 2024-05-08 PROCEDURE — 25000003 PHARM REV CODE 250: Performed by: PSYCHIATRY & NEUROLOGY

## 2024-05-08 PROCEDURE — 99233 SBSQ HOSP IP/OBS HIGH 50: CPT | Mod: FS,,, | Performed by: PSYCHIATRY & NEUROLOGY

## 2024-05-08 RX ORDER — ASPIRIN 300 MG/1
300 SUPPOSITORY RECTAL DAILY
Status: DISCONTINUED | OUTPATIENT
Start: 2024-05-08 | End: 2024-05-09

## 2024-05-08 RX ORDER — LEVETIRACETAM 10 MG/ML
1000 INJECTION INTRAVASCULAR EVERY 12 HOURS
Status: DISCONTINUED | OUTPATIENT
Start: 2024-05-08 | End: 2024-05-10

## 2024-05-08 RX ORDER — ACETAMINOPHEN 10 MG/ML
1000 INJECTION, SOLUTION INTRAVENOUS ONCE
Status: DISCONTINUED | OUTPATIENT
Start: 2024-05-08 | End: 2024-05-09

## 2024-05-08 RX ORDER — NAPROXEN SODIUM 220 MG/1
81 TABLET, FILM COATED ORAL DAILY
Status: DISCONTINUED | OUTPATIENT
Start: 2024-05-08 | End: 2024-05-08

## 2024-05-08 RX ORDER — MIDAZOLAM HYDROCHLORIDE 2 MG/2ML
INJECTION, SOLUTION INTRAMUSCULAR; INTRAVENOUS
Status: COMPLETED
Start: 2024-05-08 | End: 2024-05-08

## 2024-05-08 RX ORDER — MUPIROCIN 20 MG/G
OINTMENT TOPICAL 2 TIMES DAILY
Status: DISCONTINUED | OUTPATIENT
Start: 2024-05-08 | End: 2024-05-10 | Stop reason: HOSPADM

## 2024-05-08 RX ORDER — ACETAMINOPHEN 10 MG/ML
1000 INJECTION, SOLUTION INTRAVENOUS ONCE
Status: COMPLETED | OUTPATIENT
Start: 2024-05-08 | End: 2024-05-08

## 2024-05-08 RX ORDER — DEXMEDETOMIDINE HYDROCHLORIDE 4 UG/ML
0-1.4 INJECTION, SOLUTION INTRAVENOUS CONTINUOUS
Status: DISCONTINUED | OUTPATIENT
Start: 2024-05-08 | End: 2024-05-10 | Stop reason: HOSPADM

## 2024-05-08 RX ORDER — ATORVASTATIN CALCIUM 40 MG/1
40 TABLET, FILM COATED ORAL DAILY
Status: DISCONTINUED | OUTPATIENT
Start: 2024-05-08 | End: 2024-05-08

## 2024-05-08 RX ORDER — SODIUM CHLORIDE, SODIUM LACTATE, POTASSIUM CHLORIDE, CALCIUM CHLORIDE 600; 310; 30; 20 MG/100ML; MG/100ML; MG/100ML; MG/100ML
INJECTION, SOLUTION INTRAVENOUS ONCE
Status: COMPLETED | OUTPATIENT
Start: 2024-05-08 | End: 2024-05-08

## 2024-05-08 RX ORDER — MIDAZOLAM HYDROCHLORIDE 2 MG/2ML
2 INJECTION, SOLUTION INTRAMUSCULAR; INTRAVENOUS ONCE
Status: COMPLETED | OUTPATIENT
Start: 2024-05-08 | End: 2024-05-08

## 2024-05-08 RX ORDER — DEXMEDETOMIDINE HYDROCHLORIDE 4 UG/ML
INJECTION, SOLUTION INTRAVENOUS
Status: DISPENSED
Start: 2024-05-08 | End: 2024-05-08

## 2024-05-08 RX ORDER — MIDAZOLAM HYDROCHLORIDE 2 MG/2ML
2 INJECTION, SOLUTION INTRAMUSCULAR; INTRAVENOUS ONCE AS NEEDED
Status: COMPLETED | OUTPATIENT
Start: 2024-05-08 | End: 2024-05-08

## 2024-05-08 RX ADMIN — MUPIROCIN: 20 OINTMENT TOPICAL at 08:05

## 2024-05-08 RX ADMIN — MIDAZOLAM HYDROCHLORIDE 2 MG: 1 INJECTION, SOLUTION INTRAMUSCULAR; INTRAVENOUS at 05:05

## 2024-05-08 RX ADMIN — SODIUM CHLORIDE, POTASSIUM CHLORIDE, SODIUM LACTATE AND CALCIUM CHLORIDE: 600; 310; 30; 20 INJECTION, SOLUTION INTRAVENOUS at 04:05

## 2024-05-08 RX ADMIN — ASPIRIN 300 MG: 300 SUPPOSITORY RECTAL at 03:05

## 2024-05-08 RX ADMIN — DEXMEDETOMIDINE HYDROCHLORIDE 0.5 MCG/KG/HR: 400 INJECTION INTRAVENOUS at 08:05

## 2024-05-08 RX ADMIN — LEVETIRACETAM 500 MG: 100 INJECTION, SOLUTION INTRAVENOUS at 08:05

## 2024-05-08 RX ADMIN — LEVETIRACETAM INJECTION 1000 MG: 10 INJECTION INTRAVENOUS at 09:05

## 2024-05-08 RX ADMIN — MIDAZOLAM HYDROCHLORIDE 2 MG: 2 INJECTION, SOLUTION INTRAMUSCULAR; INTRAVENOUS at 05:05

## 2024-05-08 RX ADMIN — MUPIROCIN: 20 OINTMENT TOPICAL at 09:05

## 2024-05-08 RX ADMIN — ACETAMINOPHEN 1000 MG: 10 INJECTION, SOLUTION INTRAVENOUS at 02:05

## 2024-05-08 NOTE — NURSING
Nurses Note -- 4 Eyes      5/8/2024   6:52 PM      Skin assessed during: Q Shift Change      [x] No Altered Skin Integrity Present    [x]Prevention Measures Documented      [] Yes- Altered Skin Integrity Present or Discovered   [] LDA Added if Not in Epic (Describe Wound)   [] New Altered Skin Integrity was Present on Admit and Documented in LDA   [] Wound Image Taken    Wound Care Consulted? No    Attending Nurse:  Tone Galarza RN/Staff Member:   SHINE Verdugo

## 2024-05-08 NOTE — PT/OT/SLP PROGRESS
Attempted to visit patient x3 today for bedside swallow evaluation. Patient unavailable due to nursing care, CT Head, and EEG in progress. Will follow up tomorrow as appropriate.

## 2024-05-08 NOTE — ASSESSMENT & PLAN NOTE
Stroke   - presented with left sided weakness  - Stroke RF: none  - Intervention: TPA at OSH on 5/7/2024, MT not indicated  - Etiology: TBD    Stroke workup:  -CTh: negative   -CTA h/n: Limited examination due to venous contamination and mistiming of the contrast bolus but no evidence of large vessel occlusion seen on this examination.   -MRI brain w/wo:    -ECHO:    -LDL:   -TSH: 0.403   -home medications include: none      Plan:  S/p TNK    -permissive HTN for now ... SBP less than 180.   -q1hr neuro checks  -STAT CTh for any HA or neuro change  -HOB flat, Bedrest, NPO x24 hours post TNK  -repeat CTh after 24 hours to determine if antiplatelet therapy can be initiated   -Keppra 500 mg bid  -EEG ordered.  -if CT head negative for HT, will need to add antiplatelet  -will update plan of care following imaging.     Further recommendations per MD.

## 2024-05-08 NOTE — PLAN OF CARE
Problem: Infection  Goal: Absence of Infection Signs and Symptoms  Outcome: Progressing     Problem: Skin Injury Risk Increased  Goal: Skin Health and Integrity  Outcome: Progressing     Problem: Adult Inpatient Plan of Care  Goal: Patient-Specific Goal (Individualized)  Outcome: Not Progressing  Flowsheets (Taken 5/7/2024 2023)  Patient/Family-Specific Goals (Include Timeframe): headache     Problem: Pain Acute  Goal: Optimal Pain Control and Function  Outcome: Not Progressing

## 2024-05-08 NOTE — SUBJECTIVE & OBJECTIVE
Subjective:     Interval History: Rn reports no new issues. Patient is lying in bed in NAD> He is more talkative and alert today. Speech clear, left facial droop, and left upper extremity flaccid. Post thrombolytic CT head pending.     Current Neurological Medications: Keppra 500 mg BID    Current Facility-Administered Medications   Medication Dose Route Frequency Provider Last Rate Last Admin    acetaminophen tablet 650 mg  650 mg Oral Q6H PRN Pedro Vieyra MD        dexmedetomidine (PRECEDEX) 400mcg/100mL 0.9% NaCL infusion  0-1.4 mcg/kg/hr Intravenous Continuous Gareth Mast MD 9.68 mL/hr at 05/08/24 0832 0.5 mcg/kg/hr at 05/08/24 0832    dexmedeTOMIDine in 0.9 % NaCL 400 mcg/100 mL (4 mcg/mL) infusion             levETIRAcetam (Keppra) 500 mg in dextrose 5 % in water (D5W) 100 mL IVPB (MB+)  500 mg Intravenous Q12H Marco Richardson  mL/hr at 05/08/24 0838 500 mg at 05/08/24 0838    mupirocin 2 % ointment   Nasal BID Gareth Mast MD   Given at 05/08/24 0819    ondansetron injection 4 mg  4 mg Intravenous Q6H PRN Pedro Vieyra MD        sodium chloride 0.9% flush 10 mL  10 mL Intravenous PRN Pedro Vieyra MD           Review of Systems  Objective:     Vital Signs (Most Recent):  Temp: 99.6 °F (37.6 °C) (05/08/24 0800)  Pulse: 70 (05/08/24 0700)  Resp: 16 (05/08/24 0700)  BP: 112/69 (05/08/24 0700)  SpO2: 98 % (05/08/24 0700) Vital Signs (24h Range):  Temp:  [98.4 °F (36.9 °C)-100.3 °F (37.9 °C)] 99.6 °F (37.6 °C)  Pulse:  [] 70  Resp:  [11-26] 16  SpO2:  [97 %-100 %] 98 %  BP: (112-146)/(58-98) 112/69     Weight: 77.4 kg (170 lb 10.2 oz)  Body mass index is 23.8 kg/m².     Physical Exam  Constitutional:       Comments: Drowsy but easily arousable   HENT:      Head: Normocephalic and atraumatic.      Nose: Nose normal.      Mouth/Throat:      Mouth: Mucous membranes are dry.   Eyes:      Extraocular Movements: Extraocular movements intact.      Pupils: Pupils are equal, round, and reactive  to light.   Cardiovascular:      Pulses: Normal pulses.   Pulmonary:      Effort: Pulmonary effort is normal.   Abdominal:      Palpations: Abdomen is soft.   Musculoskeletal:         General: Normal range of motion.      Cervical back: Normal range of motion and neck supple.   Skin:     General: Skin is warm and dry.      Capillary Refill: Capillary refill takes less than 2 seconds.   Neurological:      Mental Status: He is oriented to person, place, and time.      Comments: Speech clear  Left facial droop  LUE flaccid  Antigravity in all other extremities     Psychiatric:         Mood and Affect: Mood normal.          NEUROLOGICAL EXAMINATION:     MENTAL STATUS   Oriented to person, place, and time.     CRANIAL NERVES     CN III, IV, VI   Pupils are equal, round, and reactive to light.      Significant Labs: CBC:   Recent Labs   Lab 05/07/24 2243 05/08/24  0338   WBC 18.07* 17.70*   HGB 12.6* 12.9*   HCT 37.8* 38.6*    277     CMP:   Recent Labs   Lab 05/07/24 2243 05/08/24  0338    137   K 4.0 4.0   CO2 20* 22   BUN 11.7 12.4   CREATININE 0.96 0.93   CALCIUM 8.7 9.5   ALBUMIN 3.9 3.9   BILITOT 0.5 0.6   ALKPHOS 53 59   AST 12 11   ALT 10 9       Significant Imaging: I have reviewed all pertinent imaging results/findings within the past 24 hours.

## 2024-05-08 NOTE — PT/OT/SLP PROGRESS
Occupational Therapy      Patient Name:  Luis Traylor   MRN:  [id number]    Patient not seen today secondary to being on bedrest this a.m and receiving an EEG this p.m. Will follow-up as schedule permits.    5/8/2024

## 2024-05-08 NOTE — PROGRESS NOTES
[facility]  Neurology  Progress Note    Patient Name: Luis Traylor  MRN: [id number]  Admission Date: 5/7/2024  Hospital Length of Stay: 1 days  Code Status: Full Code   Attending Provider: Gareth Mast MD  Primary Care Physician: No, Primary Doctor   Principal Problem:<principal problem not specified>    HPI:   No notes on file    Overview/Hospital Course:  No notes on file        Subjective:     Interval History: Rn reports no new issues. Patient is lying in bed in NAD> He is more talkative and alert today. Speech clear, left facial droop, and left upper extremity flaccid. Post thrombolytic CT head pending.     Current Neurological Medications: Keppra 500 mg BID    Current Facility-Administered Medications   Medication Dose Route Frequency Provider Last Rate Last Admin    acetaminophen tablet 650 mg  650 mg Oral Q6H PRN Pedro Vieyra MD        dexmedetomidine (PRECEDEX) 400mcg/100mL 0.9% NaCL infusion  0-1.4 mcg/kg/hr Intravenous Continuous Gareth Mast MD 9.68 mL/hr at 05/08/24 0832 0.5 mcg/kg/hr at 05/08/24 0832    dexmedeTOMIDine in 0.9 % NaCL 400 mcg/100 mL (4 mcg/mL) infusion             levETIRAcetam (Keppra) 500 mg in dextrose 5 % in water (D5W) 100 mL IVPB (MB+)  500 mg Intravenous Q12H Marco Richardson  mL/hr at 05/08/24 0838 500 mg at 05/08/24 0838    mupirocin 2 % ointment   Nasal BID Gareth Mast MD   Given at 05/08/24 0819    ondansetron injection 4 mg  4 mg Intravenous Q6H PRN Pedro Vieyra MD        sodium chloride 0.9% flush 10 mL  10 mL Intravenous PRN Pedro Vieyra MD           Review of Systems  Objective:     Vital Signs (Most Recent):  Temp: 99.6 °F (37.6 °C) (05/08/24 0800)  Pulse: 70 (05/08/24 0700)  Resp: 16 (05/08/24 0700)  BP: 112/69 (05/08/24 0700)  SpO2: 98 % (05/08/24 0700) Vital Signs (24h Range):  Temp:  [98.4 °F (36.9 °C)-100.3 °F (37.9 °C)] 99.6 °F (37.6 °C)  Pulse:  [] 70  Resp:  [11-26] 16  SpO2:  [97 %-100 %] 98  %  BP: (112-146)/(58-98) 112/69     Weight: 77.4 kg (170 lb 10.2 oz)  Body mass index is 23.8 kg/m².     Physical Exam  Constitutional:       Comments: Drowsy but easily arousable   HENT:      Head: Normocephalic and atraumatic.      Nose: Nose normal.      Mouth/Throat:      Mouth: Mucous membranes are dry.   Eyes:      Extraocular Movements: Extraocular movements intact.      Pupils: Pupils are equal, round, and reactive to light.   Cardiovascular:      Pulses: Normal pulses.   Pulmonary:      Effort: Pulmonary effort is normal.   Abdominal:      Palpations: Abdomen is soft.   Musculoskeletal:         General: Normal range of motion.      Cervical back: Normal range of motion and neck supple.   Skin:     General: Skin is warm and dry.      Capillary Refill: Capillary refill takes less than 2 seconds.   Neurological:      Mental Status: He is oriented to person, place, and time.      Comments: Speech clear  Left facial droop  LUE flaccid  Antigravity in all other extremities     Psychiatric:         Mood and Affect: Mood normal.          NEUROLOGICAL EXAMINATION:     MENTAL STATUS   Oriented to person, place, and time.     CRANIAL NERVES     CN III, IV, VI   Pupils are equal, round, and reactive to light.      Significant Labs: CBC:   Recent Labs   Lab 05/07/24 2243 05/08/24  0338   WBC 18.07* 17.70*   HGB 12.6* 12.9*   HCT 37.8* 38.6*    277     CMP:   Recent Labs   Lab 05/07/24 2243 05/08/24  0338    137   K 4.0 4.0   CO2 20* 22   BUN 11.7 12.4   CREATININE 0.96 0.93   CALCIUM 8.7 9.5   ALBUMIN 3.9 3.9   BILITOT 0.5 0.6   ALKPHOS 53 59   AST 12 11   ALT 10 9       Significant Imaging: I have reviewed all pertinent imaging results/findings within the past 24 hours.  Assessment and Plan:     Left arm weakness  Stroke   - presented with left sided weakness  - Stroke RF: none  - Intervention: TPA at OSH on 5/7/2024, MT not indicated  - Etiology: TBD    Stroke workup:  -CTh: negative   -CTA h/n: Limited  examination due to venous contamination and mistiming of the contrast bolus but no evidence of large vessel occlusion seen on this examination.   -MRI brain w/wo:    -ECHO:    -LDL:   -TSH: 0.403   -home medications include: none      Plan:  S/p TNK    -permissive HTN for now ... SBP less than 180.   -q1hr neuro checks  -STAT CTh for any HA or neuro change  -HOB flat, Bedrest, NPO x24 hours post TNK  -repeat CTh after 24 hours to determine if antiplatelet therapy can be initiated   -Keppra 500 mg bid  -EEG ordered.  -if CT head negative for HT, will need to add antiplatelet  -will update plan of care following imaging.     Further recommendations per MD.        VTE Risk Mitigation (From admission, onward)           Ordered     IP VTE HIGH RISK PATIENT  Once         05/07/24 1823     Place sequential compression device  Until discontinued         05/07/24 1823                    Reena Maynard NP  Neurology  [facility] - [address] [place] [address]

## 2024-05-08 NOTE — PLAN OF CARE
05/08/24 1504   Discharge Assessment   Assessment Type Discharge Planning Assessment   Confirmed/corrected address, phone number and insurance Yes   Confirmed Demographics Contacted registration to update  (Pt's new address: [address] [place] [address])   Source of Information family  (mother, Jennifer and laura Mariia Rowland)   Communicated DAVID with patient/caregiver Date not available/Unable to determine   Reason For Admission stroke   People in Home significant other  (Pt lives with his Mariia sanchez in a single story home with a thresh hold step to enter the home)   Facility Arrived From: [facility]   Do you expect to return to your current living situation? Yes   Do you have help at home or someone to help you manage your care at home? Yes   Who are your caregiver(s) and their phone number(s)? Pt's amandarolf Mariia Rowland will be able to assist pt upon dc   Prior to hospitilization cognitive status: Unable to Assess   Current cognitive status: Unable to Assess   Walking or Climbing Stairs Difficulty no   Dressing/Bathing Difficulty no   Home Layout Able to live on 1st floor   Equipment Currently Used at Home none   Readmission within 30 days? No   Patient currently being followed by outpatient case management? No   Do you currently have service(s) that help you manage your care at home? No   Do you take prescription medications? Yes   Do you have prescription coverage? Yes   Coverage [employer]   Who is going to help you get home at discharge? pt's motherjennifer or Dwayne sanchezsanjay Rowland   How do you get to doctors appointments? car, drives self   Are you on dialysis? No   Discharge Plan A Rehab   Discharge Plan B Home Health   DME Needed Upon Discharge  other (see comments)  (TBD)   Discharge Plan discussed with: Spouse/sig other;Parent(s)   Name(s) and Number(s) amandaMariia bansal Kashif ([phone]) or pt's motherJennifer ([phone])   Transition of Care Barriers None   Housing Stability   In the last 12  months, was there a time when you were not able to pay the mortgage or rent on time? N   Transportation Needs   Has the lack of transportation kept you from medical appointments, meetings, work or from getting things needed for daily living? No   Food Insecurity   Within the past 12 months, you worried that your food would run out before you got the money to buy more. Never true   OTHER   Name(s) of People in Home laura, Mariia Rowland     Pt's PCP is Dawna Craven who is located at the [facility]. Pt's  are his mother, Jennifer ([phone]) and his fiance, Mariia Rowland ([phone]). Pt has never had HH services. Pt uses the [facility] pharmacy. 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 Spoke with pt's mother and fiance about rehab since pt was very independent prior to hospital stay. Wait for therapy recommendations. CM to follow

## 2024-05-09 LAB
ALBUMIN SERPL-MCNC: 3.7 G/DL (ref 3.5–5)
ALBUMIN/GLOB SERPL: 1 RATIO (ref 1.1–2)
ALP SERPL-CCNC: 54 UNIT/L (ref 40–150)
ALT SERPL-CCNC: 9 UNIT/L (ref 0–55)
ANA SER QL HEP2 SUBST: NORMAL
APPEARANCE CSF: CLEAR
AST SERPL-CCNC: 13 UNIT/L (ref 5–34)
BASOPHILS # BLD AUTO: 0.02 X10(3)/MCL
BASOPHILS NFR BLD AUTO: 0.1 %
BILIRUB SERPL-MCNC: 0.6 MG/DL
BUN SERPL-MCNC: 14.7 MG/DL (ref 8.9–20.6)
C GATTII+NEOFOR DNA CSF QL NAA+NON-PROBE: NOT DETECTED
CALCIUM SERPL-MCNC: 9.1 MG/DL (ref 8.4–10.2)
CHLORIDE SERPL-SCNC: 105 MMOL/L (ref 98–107)
CMV DNA CSF QL NAA+NON-PROBE: NOT DETECTED
CO2 SERPL-SCNC: 19 MMOL/L (ref 22–29)
COLOR CSF: COLORLESS
CREAT SERPL-MCNC: 0.89 MG/DL (ref 0.73–1.18)
CRYPTOC AG CSF QL IA.RAPID: NEGATIVE
CRYPTOC AG SER QL IA.RAPID: NEGATIVE
E COLI K1 DNA CSF QL NAA+NON-PROBE: NOT DETECTED
EOSINOPHIL # BLD AUTO: 0 X10(3)/MCL (ref 0–0.9)
EOSINOPHIL MANUAL CSF (OHS): 1 %
EOSINOPHIL NFR BLD AUTO: 0 %
ERYTHROCYTE [DISTWIDTH] IN BLOOD BY AUTOMATED COUNT: 12.3 % (ref 11.5–17)
EV RNA CSF QL NAA+NON-PROBE: NOT DETECTED
GFR SERPLBLD CREATININE-BSD FMLA CKD-EPI: >60 ML/MIN/1.73/M2
GLOBULIN SER-MCNC: 3.7 GM/DL (ref 2.4–3.5)
GLUCOSE CSF-MCNC: 55 MG/DL (ref 40–70)
GLUCOSE SERPL-MCNC: 98 MG/DL (ref 74–100)
GP B STREP DNA CSF QL NAA+NON-PROBE: NOT DETECTED
HAEM INFLU DNA CSF QL NAA+NON-PROBE: NOT DETECTED
HCT VFR BLD AUTO: 42.2 % (ref 42–52)
HGB BLD-MCNC: 13.3 G/DL (ref 14–18)
HHV6 DNA CSF QL NAA+NON-PROBE: NOT DETECTED
HSV1 DNA CSF QL NAA+NON-PROBE: NOT DETECTED
HSV2 DNA CSF QL NAA+NON-PROBE: NOT DETECTED
IMM GRANULOCYTES # BLD AUTO: 0.05 X10(3)/MCL (ref 0–0.04)
IMM GRANULOCYTES NFR BLD AUTO: 0.4 %
INDIA INK PREP CSF: NEGATIVE
L MONOCYTOG DNA CSF QL NAA+NON-PROBE: NOT DETECTED
LYMPHOCYTE MANUAL CSF (OHS): 62 %
LYMPHOCYTES # BLD AUTO: 1.4 X10(3)/MCL (ref 0.6–4.6)
LYMPHOCYTES NFR BLD AUTO: 10.1 %
MCH RBC QN AUTO: 28.8 PG (ref 27–31)
MCHC RBC AUTO-ENTMCNC: 31.5 G/DL (ref 33–36)
MCV RBC AUTO: 91.3 FL (ref 80–94)
MONOCYTE MANUAL CSF (OHS): 10 %
MONOCYTES # BLD AUTO: 1.25 X10(3)/MCL (ref 0.1–1.3)
MONOCYTES NFR BLD AUTO: 9 %
N MEN DNA CSF QL NAA+NON-PROBE: NOT DETECTED
NEUTROPHILS # BLD AUTO: 11.16 X10(3)/MCL (ref 2.1–9.2)
NEUTROPHILS MAN CSF (OHS): 27 %
NEUTROPHILS NFR BLD AUTO: 80.4 %
NRBC BLD AUTO-RTO: 0 %
PARECHOVIRUS A RNA CSF QL NAA+NON-PROBE: NOT DETECTED
PLATELET # BLD AUTO: 258 X10(3)/MCL (ref 130–400)
PMV BLD AUTO: 9 FL (ref 7.4–10.4)
POTASSIUM SERPL-SCNC: 4.2 MMOL/L (ref 3.5–5.1)
PROT CSF-MCNC: 31.3 MG/DL (ref 15–45)
PROT SERPL-MCNC: 7.4 GM/DL (ref 6.4–8.3)
RBC # BLD AUTO: 4.62 X10(6)/MCL (ref 4.7–6.1)
RBC # CSF MANUAL: 2 /UL
S PNEUM DNA CSF QL NAA+NON-PROBE: NOT DETECTED
SODIUM SERPL-SCNC: 133 MMOL/L (ref 136–145)
T PALLIDUM AB SER QL: NONREACTIVE
VZV DNA CSF QL NAA+NON-PROBE: NOT DETECTED
WBC # CSF MANUAL: 400 /UL (ref 0–5)
WBC # SPEC AUTO: 13.88 X10(3)/MCL (ref 4.5–11.5)

## 2024-05-09 PROCEDURE — 89051 BODY FLUID CELL COUNT: CPT

## 2024-05-09 PROCEDURE — 86592 SYPHILIS TEST NON-TREP QUAL: CPT

## 2024-05-09 PROCEDURE — 82945 GLUCOSE OTHER FLUID: CPT

## 2024-05-09 PROCEDURE — 86780 TREPONEMA PALLIDUM: CPT | Performed by: STUDENT IN AN ORGANIZED HEALTH CARE EDUCATION/TRAINING PROGRAM

## 2024-05-09 PROCEDURE — 87899 AGENT NOS ASSAY W/OPTIC: CPT | Performed by: STUDENT IN AN ORGANIZED HEALTH CARE EDUCATION/TRAINING PROGRAM

## 2024-05-09 PROCEDURE — 20000000 HC ICU ROOM

## 2024-05-09 PROCEDURE — 86255 FLUORESCENT ANTIBODY SCREEN: CPT | Performed by: STUDENT IN AN ORGANIZED HEALTH CARE EDUCATION/TRAINING PROGRAM

## 2024-05-09 PROCEDURE — 63600175 PHARM REV CODE 636 W HCPCS

## 2024-05-09 PROCEDURE — 85025 COMPLETE CBC W/AUTO DIFF WBC: CPT

## 2024-05-09 PROCEDURE — 86255 FLUORESCENT ANTIBODY SCREEN: CPT

## 2024-05-09 PROCEDURE — 92610 EVALUATE SWALLOWING FUNCTION: CPT

## 2024-05-09 PROCEDURE — 99233 SBSQ HOSP IP/OBS HIGH 50: CPT | Mod: ,,, | Performed by: PSYCHIATRY & NEUROLOGY

## 2024-05-09 PROCEDURE — 80053 COMPREHEN METABOLIC PANEL: CPT

## 2024-05-09 PROCEDURE — 87899 AGENT NOS ASSAY W/OPTIC: CPT

## 2024-05-09 PROCEDURE — 86788 WEST NILE VIRUS AB IGM: CPT

## 2024-05-09 PROCEDURE — 87070 CULTURE OTHR SPECIMN AEROBIC: CPT

## 2024-05-09 PROCEDURE — 84157 ASSAY OF PROTEIN OTHER: CPT

## 2024-05-09 PROCEDURE — 009U3ZX DRAINAGE OF SPINAL CANAL, PERCUTANEOUS APPROACH, DIAGNOSTIC: ICD-10-PCS | Performed by: INTERNAL MEDICINE

## 2024-05-09 PROCEDURE — 97166 OT EVAL MOD COMPLEX 45 MIN: CPT

## 2024-05-09 PROCEDURE — 36415 COLL VENOUS BLD VENIPUNCTURE: CPT | Performed by: STUDENT IN AN ORGANIZED HEALTH CARE EDUCATION/TRAINING PROGRAM

## 2024-05-09 PROCEDURE — 86618 LYME DISEASE ANTIBODY: CPT

## 2024-05-09 PROCEDURE — 87102 FUNGUS ISOLATION CULTURE: CPT

## 2024-05-09 PROCEDURE — 63600175 PHARM REV CODE 636 W HCPCS: Performed by: STUDENT IN AN ORGANIZED HEALTH CARE EDUCATION/TRAINING PROGRAM

## 2024-05-09 PROCEDURE — 25000003 PHARM REV CODE 250

## 2024-05-09 PROCEDURE — 97162 PT EVAL MOD COMPLEX 30 MIN: CPT

## 2024-05-09 PROCEDURE — 25000003 PHARM REV CODE 250: Performed by: STUDENT IN AN ORGANIZED HEALTH CARE EDUCATION/TRAINING PROGRAM

## 2024-05-09 PROCEDURE — 36415 COLL VENOUS BLD VENIPUNCTURE: CPT

## 2024-05-09 PROCEDURE — 87210 SMEAR WET MOUNT SALINE/INK: CPT

## 2024-05-09 PROCEDURE — 87483 CNS DNA AMP PROBE TYPE 12-25: CPT | Performed by: INTERNAL MEDICINE

## 2024-05-09 PROCEDURE — 86696 HERPES SIMPLEX TYPE 2 TEST: CPT | Performed by: STUDENT IN AN ORGANIZED HEALTH CARE EDUCATION/TRAINING PROGRAM

## 2024-05-09 PROCEDURE — 87389 HIV-1 AG W/HIV-1&-2 AB AG IA: CPT | Performed by: STUDENT IN AN ORGANIZED HEALTH CARE EDUCATION/TRAINING PROGRAM

## 2024-05-09 RX ORDER — LIDOCAINE HYDROCHLORIDE 10 MG/ML
10 INJECTION, SOLUTION EPIDURAL; INFILTRATION; INTRACAUDAL; PERINEURAL ONCE
Status: DISCONTINUED | OUTPATIENT
Start: 2024-05-09 | End: 2024-05-10 | Stop reason: HOSPADM

## 2024-05-09 RX ORDER — NAPROXEN SODIUM 220 MG/1
81 TABLET, FILM COATED ORAL DAILY
Status: DISCONTINUED | OUTPATIENT
Start: 2024-05-09 | End: 2024-05-10 | Stop reason: HOSPADM

## 2024-05-09 RX ORDER — VALACYCLOVIR HYDROCHLORIDE 500 MG/1
1000 TABLET, FILM COATED ORAL 3 TIMES DAILY
Status: DISCONTINUED | OUTPATIENT
Start: 2024-05-09 | End: 2024-05-10

## 2024-05-09 RX ADMIN — VALACYCLOVIR 1000 MG: 500 TABLET, FILM COATED ORAL at 10:05

## 2024-05-09 RX ADMIN — LEVETIRACETAM INJECTION 1000 MG: 10 INJECTION INTRAVENOUS at 08:05

## 2024-05-09 RX ADMIN — LEVETIRACETAM INJECTION 1000 MG: 10 INJECTION INTRAVENOUS at 09:05

## 2024-05-09 RX ADMIN — ACETAMINOPHEN 650 MG: 325 TABLET, FILM COATED ORAL at 05:05

## 2024-05-09 RX ADMIN — MUPIROCIN: 20 OINTMENT TOPICAL at 09:05

## 2024-05-09 RX ADMIN — ASPIRIN 81 MG CHEWABLE TABLET 81 MG: 81 TABLET CHEWABLE at 10:05

## 2024-05-09 RX ADMIN — VALACYCLOVIR 1000 MG: 500 TABLET, FILM COATED ORAL at 09:05

## 2024-05-09 RX ADMIN — SODIUM CHLORIDE, POTASSIUM CHLORIDE, SODIUM LACTATE AND CALCIUM CHLORIDE 1000 ML: 600; 310; 30; 20 INJECTION, SOLUTION INTRAVENOUS at 10:05

## 2024-05-09 RX ADMIN — VALACYCLOVIR 1000 MG: 500 TABLET, FILM COATED ORAL at 03:05

## 2024-05-09 NOTE — SUBJECTIVE & OBJECTIVE
Subjective:     Interval History:   Nursing reports LP was attempted last night but unsuccessful, MRI brain also attempted but patient was uncooperative    Current Neurological Medications:     Current Facility-Administered Medications   Medication Dose Route Frequency Provider Last Rate Last Admin    acetaminophen tablet 650 mg  650 mg Oral Q6H PRN Pedro Vieyra MD        aspirin chewable tablet 81 mg  81 mg Oral Daily Natividad Tenorio MD        dexmedetomidine (PRECEDEX) 400mcg/100mL 0.9% NaCL infusion  0-1.4 mcg/kg/hr Intravenous Continuous Gareth Mast MD 9.68 mL/hr at 05/08/24 0832 0.5 mcg/kg/hr at 05/08/24 0832    lactated ringers bolus 1,000 mL  1,000 mL Intravenous Once Natividad Tenorio MD        levETIRAcetam in NaCl (iso-os) IVPB 1,000 mg  1,000 mg Intravenous Q12H Sienna Rodney FNJESUS   Stopped at 05/09/24 0903    mupirocin 2 % ointment   Nasal BID Gareth Mast MD   Given at 05/08/24 2121    ondansetron injection 4 mg  4 mg Intravenous Q6H PRN Pedro Vieyra MD        sodium chloride 0.9% flush 10 mL  10 mL Intravenous PRN Pedro Vieyra MD           Review of Systems  Objective:     Vital Signs (Most Recent):  Temp: 99.6 °F (37.6 °C) (05/09/24 0800)  Pulse: (!) 53 (05/09/24 0800)  Resp: 17 (05/09/24 0800)  BP: (!) 100/59 (05/09/24 0800)  SpO2: 97 % (05/09/24 0800) Vital Signs (24h Range):  Temp:  [98.8 °F (37.1 °C)-100.3 °F (37.9 °C)] 99.6 °F (37.6 °C)  Pulse:  [51-99] 53  Resp:  [12-22] 17  SpO2:  [95 %-100 %] 97 %  BP: (100-143)/(57-95) 100/59     Weight: 77.4 kg (170 lb 10.2 oz)  Body mass index is 23.8 kg/m².     Physical Exam        Constitutional:       Comments: Drowsy, on precedex   HENT:      Head: Normocephalic and atraumatic.      Nose: Nose normal.      Mouth/Throat:      Mouth: Mucous membranes are dry.   Eyes:      Extraocular Movements: Extraocular movements intact.      Pupils: Pupils are equal, round, and reactive to light.   Cardiovascular:      Pulses: Normal  pulses.   Pulmonary:      Effort: Pulmonary effort is normal.   Abdominal:      Palpations: Abdomen is soft.   Musculoskeletal:         General: Normal range of motion.      Cervical back: Normal range of motion and neck supple.   Skin:     General: Skin is warm and dry.      Capillary Refill: Capillary refill takes less than 2 seconds.   Neurological:      Mental Status: He is oriented to person, place, and time.      Comments: Speech clear  Left facial droop  LUE flaccid  Antigravity in all other extremities     Psychiatric:         Mood and Affect: Mood normal.            NEUROLOGICAL EXAMINATION:      MENTAL STATUS   Oriented to person, place, and time.      CRANIAL NERVES      CN III, IV, VI   Pupils are equal, round, and reactive to light.  Significant Labs:   Recent Lab Results         05/09/24  0340   05/08/24  1543   05/08/24  1106        Albumin/Globulin Ratio 1.0           Albumin 3.7           ALP 54           ALT 9           Ao peak bismark     1.25       Ao VTI     23.00       AST 13           AV valve area     2.38       HEATHER by Velocity Ratio     2.37       AV mean gradient     3       AV index (prosthetic)     0.63       AV peak gradient     6       AV Velocity Ratio     0.62       Baso # 0.02           Basophil % 0.1           BILIRUBIN TOTAL 0.6           BSA     1.97       BUN 14.7           Calcium 9.1           Chloride 105           CO2 19           Creatinine 0.89           Left Ventricle Relative Wall Thickness     0.47       E/A ratio     2.08       E/E' ratio     5.40       eGFR >60           Eos # 0.00           Eos % 0.0           E wave deceleration time     161.00       FS     33       Globulin, Total 3.7           Glucose 98           Hematocrit 42.2           Hemoglobin 13.3           Immature Grans (Abs) 0.05           Immature Granulocytes 0.4           INR   1.1         IVSd     1.00       LA size     3.10       LA volume     46.30       LA Volume Index (Mod)     23.5       LVOT area      3.8       LV LATERAL E/E' RATIO     4.32       LV SEPTAL E/E' RATIO     7.20       LV EDV BP     124.00       LV Diastolic Volume Index     62.94       LVIDd     5.10       LVIDs     3.40       LV mass     213.90       LV Mass Index     109       Left Ventricular Outflow Tract Mean Gradient     1.00       Left Ventricular Outflow Tract Mean Velocity     0.50       LVOT diameter     2.20       LVOT peak liam     0.78       LVOT stroke volume     54.71       LVOT peak VTI     14.40       LV ESV BP     47.40       LV Systolic Volume Index     24.1       Lymph # 1.40           LYMPH % 10.1           MCH 28.8           MCHC 31.5           MCV 91.3           Mean e'     0.20       Mono # 1.25           Mono % 9.0           MPV 9.0           MV valve area p 1/2 method     2.56       MV valve area by continuity eq     1.89       MV mean gradient     3       MV peak gradient     6       MV Peak A Liam     0.52       MV Peak E Liam     1.08       MV stenosis pressure 1/2 time     86.00       MV VTI     28.9       Neut # 11.16           Neut % 80.4           nRBC 0.0           Platelet Count 258           Potassium 4.2           PROTEIN TOTAL 7.4           PT   14.3         PV peak gradient     6       PV PEAK VELOCITY     1.24       Posterior Wall     1.20       RBC 4.62           RDW 12.3           RV/LV Ratio     0.49       RVDD     2.50       Sodium 133           TAPSE     2.78       TDI SEPTAL     0.15       TDI LATERAL     0.25       Triscuspid Valve Regurgitation Peak Gradient     5       TR Max Liam     1.13       WBC 13.88           ZLVIDD     -1.05       ZLVIDS     -0.18               Significant Imaging: I have reviewed all pertinent imaging results/findings within the past 24 hours.

## 2024-05-09 NOTE — PT/OT/SLP EVAL
Physical Therapy Evaluation    Patient Name:  Luis Traylor   MRN:  [id number]    Recommendations:     Discharge therapy intensity: High Intensity Therapy   Discharge Equipment Recommendations: bath bench   Barriers to discharge: Impaired mobility and Ongoing medical needs    Assessment:     Luis Traylor is a 23 y.o. male admitted with a medical diagnosis of L weakness, s/p TPA, concern for seizure vs. Meningoencephalitis, vs intracranial mass.  He presents with the following impairments/functional limitations: weakness, impaired endurance, impaired self care skills, impaired functional mobility, gait instability, impaired balance, decreased upper extremity function, decreased coordination, decreased safety awareness.     At baseline, pt lives with his fiance and 6 month old son. He works on oil field equipment and automobiles. Currently, the pt demonstrates LUE weakness, impaired LUE and LLE coordination, impaired balance, and possible L sided inattention. At this time, pt would benefit from high intensity therapy at d/c in a neuro specialty if possible. Will continue to monitor progress.     Rehab Prognosis: Good; patient would benefit from acute skilled PT services to address these deficits and reach maximum level of function.    Recent Surgery: * No surgery found *      Plan:     During this hospitalization, patient would benefit from acute PT services 6 x/week to address the identified rehab impairments via gait training, therapeutic activities, therapeutic exercises and progress toward the following goals:    Plan of Care Expires:  06/09/24    Subjective     Chief Complaint: wanting to get OOB  Patient/Family Comments/goals: to go home  Pain/Comfort:  Pain Rating 1: 0/10    Patients cultural, spiritual, Jehovah's witness conflicts given the current situation: no    Living Environment:  Lives with s/o and baby. One step to enter. S/o does not work.   Prior to admission, patients level of function was  independent and working.  Equipment used at home: none.  DME owned (not currently used): none.  Upon discharge, patient will have assistance from s/o.    Objective:     Communicated with nurse prior to session.  Patient found supine with pulse ox (continuous), telemetry, blood pressure cuff, bed alarm, bowen catheter, peripheral IV  upon PT entry to room.    General Precautions: Standard, fall  Orthopedic Precautions:N/A   Braces: N/A  Respiratory Status: Room air  Blood Pressure: 113/75 at rest, 119/62 after ambulation      Exams:  Cognitive Exam:  Patient is oriented to Person and Time  Gross Motor Coordination:  impaired LUE and LLE  Sensation:    -       Intact  RLE ROM: WNL  RLE Strength: WNL  LLE ROM: WNL  LLE Strength: WNL  Skin integrity: Visible skin intact      Functional Mobility:  Bed Mobility:     Scooting: contact guard assistance  Supine to Sit: minimum assistance  Transfers:     Sit to Stand:  contact guard assistance with no AD  Gait: 100ft x 3 with HHA and Min-Mod A for balance and navigation. Pt tends to get too close to obstacles on L side of environment. Pt also with small STEVE and incoordinated L step.   Balance: Min A for dynamic sitting and standing balance.      AM-PAC 6 CLICK MOBILITY  Total Score:16       Treatment & Education:      Patient and family were provided with verbal education education regarding PT role/goals/POC, fall prevention, and safety awareness.  Understanding was verbalized.     Patient left supine with all lines intact, call button in reach, and bed alarm on.    GOALS:   Multidisciplinary Problems       Physical Therapy Goals          Problem: Physical Therapy    Goal Priority Disciplines Outcome Goal Variances Interventions   Physical Therapy Goal     PT, PT/OT Progressing     Description: Goals to be met by: 2024     Patient will increase functional independence with mobility by performin. Supine to sit with Stand-by Assistance  2. Sit to stand transfer with  Stand-by Assistance  3. Gait  x 200 feet with Stand-by Assistance using No Assistive Device.   4. Ascend/descend 3 stair with right Handrails Stand-by Assistance using No Assistive Device.                          History:     No past medical history on file.    No past surgical history on file.    Time Tracking:     PT Received On: 05/09/24  PT Start Time: 0929     PT Stop Time: 0957  PT Total Time (min): 28 min     Billable Minutes: Evaluation 28      05/09/2024

## 2024-05-09 NOTE — PLAN OF CARE
Problem: Physical Therapy  Goal: Physical Therapy Goal  Description: Goals to be met by: 2024     Patient will increase functional independence with mobility by performin. Supine to sit with Stand-by Assistance  2. Sit to stand transfer with Stand-by Assistance  3. Gait  x 200 feet with Stand-by Assistance using No Assistive Device.   4. Ascend/descend 3 stair with right Handrails Stand-by Assistance using No Assistive Device.     Outcome: Progressing

## 2024-05-09 NOTE — PROGRESS NOTES
[facility] - ICU  Pulmonary Critical Care Note    Patient Name: Luis Traylor  MRN: [id number]  Admission Date: 5/7/2024  Hospital Length of Stay: 2 days  Code Status: Full Code  Attending Provider: Gareth Mast MD  Primary Care Provider: Avril, Primary Doctor     Subjective:     HPI:   3-year-old male with no significant past medical history presented to [facility] ED on 05/07/2024 as a transfer from [facility] due to concern for stroke.  Around noon today the patient seemed confused to his girlfriend and weak were which she took him to [facility], on presentation he complain of weakness and numbness in his left arm and leg, he has also been having severe headaches for the last 2 weeks that had worsened today with photophobia accompanied by nausea and vomiting, CT scan head did not show any signs of bleeding, tPA was given to the patient, he started having gingival bleeding USP through treatment and had to be halted.  CTA head and neck did not show any large vessel occlusion.  Patient denies any dizziness, double vision, chest pain, shortness breath, abdominal pain, diarrhea or constipation.    Hospital Course/Significant events:  05/07/2024 half dose tPA and admission to the ICU    24 Hour Interval History:  NAEO. Pt afebrile overnight, VS remain stable. L sided weakness slightly improved mostly in lower extremity- still has left facial droop as well. Attempted MRI and LP yesterday, unsuccessful due to pt being unable to sit still despite the use of precedex and a dose of midazolam. Passed speech for bite sized diet, will start today.    No past medical history on file.    No past surgical history on file.    Social History     Socioeconomic History    Marital status: Significant Other     Social Determinants of Health     Food Insecurity: Unknown (5/8/2024)    Hunger Vital Sign     Worried About Running Out of Food in the Last Year: Never true   Transportation Needs: No  Transportation Needs (5/8/2024)    TRANSPORTATION NEEDS     Transportation : No           Objective:   No current outpatient medications    Current Inpatient Medications   acetaminophen  1,000 mg Intravenous Once    aspirin  300 mg Rectal Daily    levETIRAcetam (Keppra) IV (PEDS and ADULTS)  1,000 mg Intravenous Q12H    mupirocin   Nasal BID           Intake/Output Summary (Last 24 hours) at 5/9/2024 0916  Last data filed at 5/9/2024 0600  Gross per 24 hour   Intake --   Output 1735 ml   Net -1735 ml       Review of Systems   Constitutional:  Negative for chills, fever and weight loss.   Eyes:  Positive for photophobia. Negative for blurred vision and double vision.   Respiratory:  Negative for cough and shortness of breath.    Cardiovascular:  Negative for chest pain and leg swelling.   Gastrointestinal:  Negative for abdominal pain, constipation, diarrhea, nausea and vomiting.   Genitourinary:  Negative for dysuria and urgency.   Neurological:  Positive for sensory change, weakness and headaches. Negative for dizziness, speech change, seizures and loss of consciousness.        Vital Signs (Most Recent):  Temp: 99.6 °F (37.6 °C) (05/09/24 0800)  Pulse: (!) 53 (05/09/24 0800)  Resp: 17 (05/09/24 0800)  BP: (!) 100/59 (05/09/24 0800)  SpO2: 97 % (05/09/24 0800)  Body mass index is 23.8 kg/m².  Weight: 77.4 kg (170 lb 10.2 oz) Vital Signs (24h Range):  Temp:  [98.8 °F (37.1 °C)-100.3 °F (37.9 °C)] 99.6 °F (37.6 °C)  Pulse:  [51-99] 53  Resp:  [12-22] 17  SpO2:  [95 %-100 %] 97 %  BP: (100-143)/(57-95) 100/59     Physical Exam  Constitutional:       Comments: Drowsy on minimal sedation, cooperative  In no distress   HENT:      Head: Normocephalic and atraumatic.      Right Ear: External ear normal.      Left Ear: External ear normal.      Nose: Nose normal.      Mouth/Throat:      Mouth: Mucous membranes are dry.      Tongue: Tongue does not deviate from midline.      Comments: Line ear abrasion on both lips with blood  on the tongue  Eyes:      Extraocular Movements: Extraocular movements intact.      Conjunctiva/sclera: Conjunctivae normal.      Pupils: Pupils are equal, round, and reactive to light.   Cardiovascular:      Rate and Rhythm: Regular rhythm. Bradycardia present.      Pulses: Normal pulses.      Heart sounds: Normal heart sounds. No murmur heard.     No friction rub. No gallop.   Pulmonary:      Effort: Pulmonary effort is normal. No respiratory distress.      Breath sounds: Normal breath sounds. No wheezing.   Abdominal:      General: Abdomen is flat. Bowel sounds are normal. There is no distension.      Palpations: Abdomen is soft.      Tenderness: There is no abdominal tenderness.   Musculoskeletal:      Right lower leg: No edema.      Left lower leg: No edema.   Skin:     General: Skin is warm and dry.      Capillary Refill: Capillary refill takes less than 2 seconds.      Findings: Rash present.      Comments: Mild urticarial rash on neck and chest   Neurological:      Mental Status: He is alert and oriented to person, place, and time.      Cranial Nerves: Cranial nerves 2-12 are intact. No cranial nerve deficit.      Sensory: Sensory deficit present.      Motor: Weakness present.      Comments: Numbness on the left side with weakness 1/5 on the left upper extremity and 3/5 on the left lower extremity           Mechanical ventilation support:       Lines/Drains/Airways       Drain  Duration                  Urethral Catheter 05/08/24 1630 16 Fr. <1 day              Peripheral Intravenous Line  Duration                  Peripheral IV - Single Lumen 05/07/24 0000 20 G Right Antecubital 2 days                    Significant Labs:    Lab Results   Component Value Date    WBC 13.88 (H) 05/09/2024    HGB 13.3 (L) 05/09/2024    HCT 42.2 05/09/2024    MCV 91.3 05/09/2024     05/09/2024         BMP  Lab Results   Component Value Date     (L) 05/09/2024    K 4.2 05/09/2024    CO2 19 (L) 05/09/2024    BUN 14.7  "05/09/2024    CREATININE 0.89 05/09/2024    CALCIUM 9.1 05/09/2024       ABG  No results for input(s): "PH", "PO2", "PCO2", "HCO3", "BE" in the last 168 hours.        Significant Imaging:  I have reviewed all pertinent imaging within the past 24 hours.        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 West Nile virus        Plan  Ordering CBC, CMP, ESR, TSH, KWAME, UDS, UA, echo with saline bubble  CT head and CTA head/neck unremarkable  MRI w/wo contrast pending- will need sedation  Will attempt LP again today to rule out infectious etiology  Start valacyclovir 1g TID PO as IV acyclovir restricted to confirmed HSV  Low suspicion for bacterial or fungal etiology, will await CSF studies prior to starting therapy  Okay to move neuro checks to q4h  Neurology consulted, appreciate recs  Monitor rash, if worsens start dexamethasone  Tylenol p.r.n. for headache, avoid NSAIDs  Zofran p.r.n. for nausea  PT/OT/ST consulted, appreciate assistance    DVT Prophylaxis:  SCD  GI Prophylaxis:  None          Natividad Tenorio MD  [employer] IM PGY III  Pulmonary Critical Care Medicine  [facility] - Emergency Dept    "

## 2024-05-09 NOTE — PT/OT/SLP EVAL
"[facility]  Speech Language Pathology Department  Clinical Swallow Evaluation    Patient Name:  Luis Traylor   MRN:  [id number]    Recommendations     General recommendations:  dysphagia therapy and Speech/Language and Cognitive Evaluation  Diet texture/consistency recommendations:  Soft & Bite-sized solids (IDDSI 6) and thin liquids (IDDSI 0)  Medications: per patient preference  Swallow strategies/precautions: small bites/sips, slow rate, upright for PO intake, assist with feeding as needed, and feed only when fully alert  Precautions: Standard,      History     Luis Traylor is a 23 y.o. male presenting to [facility] ED on 05/07/2024 as a transfer from [facility] due to concern for stroke. CT scan head did not show any signs of bleeding and tPA was given. Per Neurology note, "Etiology - low suspicion for an acute infarct. Possible seizure vs intracranial mass vs meningoencephalitis."    No past medical history on file.  No past surgical history on file.    Home diet texture/consistency: Regular and thin liquids  Current method of nutrition: NPO    Imaging   No results found for this or any previous visit.    No results found for this or any previous visit.    No results found for this or any previous visit.    Subjective     Patient  inconsistent alertness .    Spiritual/Cultural/Advent Beliefs/Practices that affect care: no    Pain/Comfort: Pain Rating 1: 0/10    Objective     ORAL MUSCULATURE  Dentition: own teeth  Secretion Management: adequate  Facial Movement: WFL  Buccal Strength & Mobility: WFL  Mandibular Strength & Mobility: WFL  Oral Labial Strength & Mobility: WFL  Lingual Strength & Mobility: WFL  Vocal Quality: adequate    Consistency Fed By Oral Symptoms Pharyngeal Symptoms   Thin liquid by straw SLP None None   Puree SLP None None   Chewable solid SLP Prolonged bolus formation/mastication  Oral residue None     Assessment     No signs/symptoms of " aspiration. Initiate soft and bite sized diet. ST to follow up for solid trials and cognitive-linguistic evaluation.    Goals     Multidisciplinary Problems       SLP Goals          Problem: SLP    Goal Priority Disciplines Outcome   SLP Goal     SLP    Description: LTG: Patient will tolerate safest and least restrictive PO diet without signs/symptoms of aspiration.    STG: Tolerate 1/2 meal of regular solids and thin liquids without signs/symptoms of aspiration.                     Education     Patient provided with verbal education regarding ST POC.  Understanding was verbalized, however additional teaching warranted.    Plan     SLP Follow-Up:  Yes   Patient to be seen:  5 x/week   Plan of Care expires:  05/23/24  Plan of Care reviewed with:  patient     Time Tracking     SLP Treatment Date:   05/09/24  Speech Start Time:  0900  Speech Stop Time:  0920     Speech Total Time (min):  20 min    Billable minutes:  Swallow and Oral Function Evaluation, 20 minutes     05/09/2024

## 2024-05-09 NOTE — PT/OT/SLP EVAL
Occupational Therapy  Evaluation    Name: Luis Traylor  MRN: [id number]  Admitting Diagnosis: CVA w/u  Recent Surgery: * No surgery found *      Recommendations:     Discharge therapy intensity: High Intensity Therapy   Discharge Equipment Recommendations:  bath bench  Barriers to discharge:  Other (Comment) (ongoing medical needs)    Assessment:     Luis Traylor is a 23 y.o. male with a medical diagnosis of L weakness, s/p TPA, concern for seizures vs meningoencephalitis vs intracranial mass. He is A&Ox4 and tolerated OT evaluation well. He presents lethargic and required increased time for participation. He presents with LUE weakness and impaired coordination. Noted some L sided inattention and he required cues to incorporate LUE throughout session. Peripheral visual deficits noted, however pt able to track past midline R/L. He presents with the following performance deficits affecting function: weakness, impaired endurance, impaired self care skills, impaired functional mobility, gait instability, impaired balance, decreased upper extremity function, decreased coordination, decreased safety awareness, visual deficits. He required min A for bed mobility and functional mobility within room using rolling walker. Recommend high intensity therapy upon d/c to maximize functional independence.     Rehab Prognosis: Good; patient would benefit from acute skilled OT services to address these deficits and reach maximum level of function.       Plan:     Patient to be seen 5 x/week to address the above listed problems via self-care/home management, therapeutic activities, therapeutic exercises  Plan of Care Expires: 06/09/24  Plan of Care Reviewed with: patient, significant other    Subjective     Chief Complaint: fatigue   Patient/Family Comments/goals: to return home    Occupational Profile:  Living Environment: Pt reports living with his fiance and 6 month old son in a single level home with a threshold  entrance. Pt reports having a tub/shower combo with no equipment.   Previous level of function: Pt reports being independent with ADLs prior.   Roles and Routines: Father, works with oil field equipment and automobiles   Equipment Used at Home: none  Assistance upon Discharge: pt will have assist from family upon d/c.     Pain/Comfort:  Pain Rating 1: 0/10    Patients cultural, spiritual, Advent conflicts given the current situation: no    Objective:     OT communicated with RN prior to session.      Patient was found HOB elevated with pulse ox (continuous), telemetry, blood pressure cuff, bed alarm, bowen catheter, peripheral IV upon OT entry to room.    General Precautions: Standard, fall  Orthopedic Precautions: N/A  Braces: N/A    Vital Signs: Blood Pressure: 124/73  Respiratory Status: on room air    Bed Mobility:    Patient completed Supine to Sit with minimum assistance  Patient completed Sit to Supine with minimum assistance    Functional Mobility/Transfers:  Patient completed Sit <> Stand Transfer with minimum assistance  with  hand-held assist   Patient completed Toilet Transfer Step Transfer technique with minimum assistance with  hand-held assist    Activities of Daily Living:  Toileting: minimum assistance to complete toilet transfer with hand-held assist.     AMPA 6 Click ADL:  AMPAC Total Score: 18    Functional Cognition:  Affect: Lethargic  Orientation: oriented to Person, Place, Time, and Situation  Safety Awareness: Impaired.      Visual Perceptual Skills:  Peripheral vision deficits noted; L side inattention.     Upper Extremity Function:  Range of Motion/Manual Muscle Test    LUE  ROM Limitations  5 4+ 4 4- 3+ 3 3- 2+ 2 2- 1 0   Shoulder Flexion  []   []   []   []   [x]  []  []  []  []  []  []  []    Shoulder Abduction  []   []   []   []   [x]  []  []  []  []  []  []  []    Shoulder Extension  []   []   []   []   [x]  []  []  []  []  []  []  []    Elbow Flexion  []   []   []   []   [x]  []   []  []  []  []  []  []    Elbow Extension  []   []   []   []   [x]  []  []  []  []  []  []  []    Wrist Flexion  []   []   []   []   []  []  []  []  []  [x]  []  []    Wrist Extension  []   []   []   []   []  []  []  []  []  []  [x]  []    Finger Flexion  []   []   []   []   []  []  []  []  []  [x]  []  []    Finger Extension  [] [] [] [] [] [] [] [] [] [] [x] []   Thumb Opposition  [] [] [] [] [] [] [] [] [] [] [x] []     RUE  ROM Limitations 5 4+ 4 4- 3+ 3 3- 2+ 2 2- 1 0   Shoulder Flexion  []   []   []   []   [x]  []  []  []  []  []  []  []    Shoulder Abduction  []   []   []   []   [x]  []  []  []  []  []  []  []    Shoulder Extension  []   []   []   []   [x]  []  []  []  []  []  []  []    Elbow Flexion  []   []   []   [x]   []  []  []  []  []  []  []  []    Elbow Extension  []   []   []   [x]   []  []  []  []  []  []  []  []    Wrist Flexion  []   []   []   [x]   []  []  []  []  []  []  []  []    Wrist Extension  []   []   []   [x]   []  []  []  []  []  []  []  []    Finger Flexion  []   []   []   [x]   []  []  []  []  []  []  []  []    Finger Extension  [] [] [] [x] [] [] [] [] [] [] [] []   Thumb Opposition  [] [] [] [x] [] [] [] [] [] [] [] []     Coordination  RUE:  Finger to thumb opposition:WFL  LUE:  Finger to thumb opposition:Impaired.        Sensation  RUE:  intact  LUE:  Light touch: Impaired.      Balance:   Static sitting balance: WFL  Dynamic sitting balance:WFL  Static standing balance:Anne Marie  Dynamic standing balance:Anne Marie    Therapeutic Positioning  Risk for acquired pressure injuries is decreased due to ability to get to BSC/toilet with assist.    OT interventions performed during the course of today's session:   Positioning recommendations were communicated to care team     Skin assessment: all bony prominences were assessed    Findings:  Visible skin intact.     OT recommendations for therapeutic positioning throughout hospitalization:   Follow [facility] Pressure Injury Prevention Protocol  Geomat  recommended for sacral protection while UIC    Patient Education:  Patient and significant other were provided with verbal education education regarding OT role/goals/POC, fall prevention, safety awareness, Discharge/DME recommendations, and pressure ulcer prevention.  Understanding was verbalized.     Patient left HOB elevated with all lines intact, call button in reach, bed alarm on, RN notified, and significant other present.    GOALS:   Multidisciplinary Problems       Occupational Therapy Goals          Problem: Occupational Therapy    Goal Priority Disciplines Outcome Interventions   Occupational Therapy Goal     OT, PT/OT Progressing    Description: LTG: Pt will perform basic ADLs and ADL transfers with [place] using LRAD by discharge.    STG: to be met by 6/9/24    Pt will complete grooming standing at sink with LRAD with SBA.  Pt will complete UB dressing with SBA.  Pt will complete LB dressing with SBA using LRAD.  Pt will complete toileting with SBA using LRAD.  Pt will complete functional mobility to/from toilet and toilet transfer with SBA using LRAD.                        History:     No past medical history on file.    No past surgical history on file.    Time Tracking:     OT Date of Treatment: 05/09/24  OT Start Time: 1408  OT Stop Time: 1438  OT Total Time (min): 30 min    Billable Minutes:Evaluation Moderate Complexity.     5/9/2024

## 2024-05-09 NOTE — PLAN OF CARE
Problem: Infection  Goal: Absence of Infection Signs and Symptoms  Outcome: Progressing     Problem: Adult Inpatient Plan of Care  Goal: Plan of Care Review  Outcome: Progressing  Goal: Patient-Specific Goal (Individualized)  Outcome: Progressing  Goal: Absence of Hospital-Acquired Illness or Injury  Outcome: Progressing  Goal: Optimal Comfort and Wellbeing  Outcome: Progressing  Goal: Readiness for Transition of Care  Outcome: Progressing     Problem: Pain Acute  Goal: Optimal Pain Control and Function  Outcome: Progressing     Problem: Skin Injury Risk Increased  Goal: Skin Health and Integrity  Outcome: Progressing     Problem: Fall Injury Risk  Goal: Absence of Fall and Fall-Related Injury  Outcome: Progressing

## 2024-05-09 NOTE — PROGRESS NOTES
[facility] - [facility] ICU  Pulmonary Critical Care Note    Patient Name: Luis Traylor  MRN: [id number]  Admission Date: 5/7/2024  Hospital Length of Stay: 2 days  Code Status: Full Code  Attending Provider: Gareth Mast MD  Primary Care Provider: Avril, Primary Doctor     Subjective:     HPI:       Hospital Course/Significant events:      24 Hour Interval History:      No past medical history on file.    No past surgical history on file.    Social History     Socioeconomic History    Marital status: Significant Other     Social Determinants of Health     Food Insecurity: Unknown (5/8/2024)    Hunger Vital Sign     Worried About Running Out of Food in the Last Year: Never true   Transportation Needs: No Transportation Needs (5/8/2024)    TRANSPORTATION NEEDS     Transportation : No           Objective:   No current outpatient medications    Current Inpatient Medications   acetaminophen  1,000 mg Intravenous Once    aspirin  300 mg Rectal Daily    levETIRAcetam (Keppra) IV (PEDS and ADULTS)  1,000 mg Intravenous Q12H    mupirocin   Nasal BID           Intake/Output Summary (Last 24 hours) at 5/9/2024 0830  Last data filed at 5/9/2024 0600  Gross per 24 hour   Intake --   Output 1735 ml   Net -1735 ml       ROS     Vital Signs (Most Recent):  Temp: 99.6 °F (37.6 °C) (05/09/24 0800)  Pulse: (!) 53 (05/09/24 0800)  Resp: 17 (05/09/24 0800)  BP: (!) 100/59 (05/09/24 0800)  SpO2: 97 % (05/09/24 0800)  Body mass index is 23.8 kg/m².  Weight: 77.4 kg (170 lb 10.2 oz) Vital Signs (24h Range):  Temp:  [98.8 °F (37.1 °C)-100.3 °F (37.9 °C)] 99.6 °F (37.6 °C)  Pulse:  [51-99] 53  Resp:  [10-22] 17  SpO2:  [95 %-100 %] 97 %  BP: (100-143)/(57-95) 100/59     Physical Exam      Mechanical ventilation support:       Lines/Drains/Airways       Drain  Duration                  Urethral Catheter 05/08/24 1630 16 Fr. <1 day              Peripheral Intravenous Line  Duration                  Peripheral IV - Single  "Lumen 05/07/24 0000 20 G Right Antecubital 2 days                    Significant Labs:    Lab Results   Component Value Date    WBC 13.88 (H) 05/09/2024    HGB 13.3 (L) 05/09/2024    HCT 42.2 05/09/2024    MCV 91.3 05/09/2024     05/09/2024         BMP  Lab Results   Component Value Date     (L) 05/09/2024    K 4.2 05/09/2024    CO2 19 (L) 05/09/2024    BUN 14.7 05/09/2024    CREATININE 0.89 05/09/2024    CALCIUM 9.1 05/09/2024       ABG  No results for input(s): "PH", "PO2", "PCO2", "HCO3", "BE" in the last 168 hours.        Significant Imaging:  I have reviewed all pertinent imaging within the past 24 hours.        Assessment/Plan:     Assessment        Plan      DVT Prophylaxis:  GI Prophylaxis:     Greater than 30 minutes of critical care was time spent personally by me on the following activities: development of treatment plan with patient or surrogate and bedside caregivers, discussions with consultants, evaluation of patient's response to treatment, examination of patient, ordering and performing treatments and interventions, ordering and review of laboratory studies, ordering and review of radiographic studies, pulse oximetry, re-evaluation of patient's condition.  This critical care time did not overlap with that of any other provider or involve time for any procedures.     Ean Davila MD  Pulmonary Critical Care Medicine  [facility] - [facility] ICU    "

## 2024-05-09 NOTE — PROGRESS NOTES
[facility]  Neurology  Progress Note    Patient Name: Luis Traylor  MRN: [id number]  Admission Date: 5/7/2024  Hospital Length of Stay: 2 days  Code Status: Full Code   Attending Provider: Gareth Mast MD  Primary Care Physician: Avril, Primary Doctor   Principal Problem:<principal problem not specified>      Subjective:     Interval History:   Nursing reports LP was attempted last night but unsuccessful, MRI brain also attempted but patient was uncooperative    Current Neurological Medications:     Current Facility-Administered Medications   Medication Dose Route Frequency Provider Last Rate Last Admin    acetaminophen tablet 650 mg  650 mg Oral Q6H PRN Pedro Vieyra MD        aspirin chewable tablet 81 mg  81 mg Oral Daily Natividad Tenorio MD        dexmedetomidine (PRECEDEX) 400mcg/100mL 0.9% NaCL infusion  0-1.4 mcg/kg/hr Intravenous Continuous Gareth Mast MD 9.68 mL/hr at 05/08/24 0832 0.5 mcg/kg/hr at 05/08/24 0832    lactated ringers bolus 1,000 mL  1,000 mL Intravenous Once Natividad Tenorio MD        levETIRAcetam in NaCl (iso-os) IVPB 1,000 mg  1,000 mg Intravenous Q12H Sienna Rodney FNP   Stopped at 05/09/24 0903    mupirocin 2 % ointment   Nasal BID Gareth Mast MD   Given at 05/08/24 2121    ondansetron injection 4 mg  4 mg Intravenous Q6H PRN Pedro Vieyra MD        sodium chloride 0.9% flush 10 mL  10 mL Intravenous PRN Pedro Vieyra MD           Review of Systems  Objective:     Vital Signs (Most Recent):  Temp: 99.6 °F (37.6 °C) (05/09/24 0800)  Pulse: (!) 53 (05/09/24 0800)  Resp: 17 (05/09/24 0800)  BP: (!) 100/59 (05/09/24 0800)  SpO2: 97 % (05/09/24 0800) Vital Signs (24h Range):  Temp:  [98.8 °F (37.1 °C)-100.3 °F (37.9 °C)] 99.6 °F (37.6 °C)  Pulse:  [51-99] 53  Resp:  [12-22] 17  SpO2:  [95 %-100 %] 97 %  BP: (100-143)/(57-95) 100/59     Weight: 77.4 kg (170 lb 10.2 oz)  Body mass index is 23.8 kg/m².     Physical  Exam  Constitutional:       Comments: Drowsy, on precedex   HENT:      Head: Normocephalic and atraumatic.      Nose: Nose normal.      Mouth/Throat:      Mouth: Mucous membranes are dry.   Eyes:      Extraocular Movements: Extraocular movements intact.      Pupils: Pupils are equal, round, and reactive to light.   Cardiovascular:      Pulses: Normal pulses.   Pulmonary:      Effort: Pulmonary effort is normal.   Abdominal:      Palpations: Abdomen is soft.   Musculoskeletal:         General: Normal range of motion.      Cervical back: Normal range of motion and neck supple.   Skin:     General: Skin is warm and dry.      Capillary Refill: Capillary refill takes less than 2 seconds.   Neurological:      Mental Status: He is oriented to person, place, and time.      Comments: Speech clear  Left facial droop  LUE flaccid  Antigravity in all other extremities     Psychiatric:         Mood and Affect: Mood normal.            NEUROLOGICAL EXAMINATION:      MENTAL STATUS   Oriented to person, place, and time.      CRANIAL NERVES      CN III, IV, VI   Pupils are equal, round, and reactive to light.  Significant Labs:   Recent Lab Results         05/09/24  0340   05/08/24  1543   05/08/24  1106        Albumin/Globulin Ratio 1.0           Albumin 3.7           ALP 54           ALT 9           Ao peak bismark     1.25       Ao VTI     23.00       AST 13           AV valve area     2.38       HEATHER by Velocity Ratio     2.37       AV mean gradient     3       AV index (prosthetic)     0.63       AV peak gradient     6       AV Velocity Ratio     0.62       Baso # 0.02           Basophil % 0.1           BILIRUBIN TOTAL 0.6           BSA     1.97       BUN 14.7           Calcium 9.1           Chloride 105           CO2 19           Creatinine 0.89           Left Ventricle Relative Wall Thickness     0.47       E/A ratio     2.08       E/E' ratio     5.40       eGFR >60           Eos # 0.00           Eos % 0.0           E wave  deceleration time     161.00       FS     33       Globulin, Total 3.7           Glucose 98           Hematocrit 42.2           Hemoglobin 13.3           Immature Grans (Abs) 0.05           Immature Granulocytes 0.4           INR   1.1         IVSd     1.00       LA size     3.10       LA volume     46.30       LA Volume Index (Mod)     23.5       LVOT area     3.8       LV LATERAL E/E' RATIO     4.32       LV SEPTAL E/E' RATIO     7.20       LV EDV BP     124.00       LV Diastolic Volume Index     62.94       LVIDd     5.10       LVIDs     3.40       LV mass     213.90       LV Mass Index     109       Left Ventricular Outflow Tract Mean Gradient     1.00       Left Ventricular Outflow Tract Mean Velocity     0.50       LVOT diameter     2.20       LVOT peak liam     0.78       LVOT stroke volume     54.71       LVOT peak VTI     14.40       LV ESV BP     47.40       LV Systolic Volume Index     24.1       Lymph # 1.40           LYMPH % 10.1           MCH 28.8           MCHC 31.5           MCV 91.3           Mean e'     0.20       Mono # 1.25           Mono % 9.0           MPV 9.0           MV valve area p 1/2 method     2.56       MV valve area by continuity eq     1.89       MV mean gradient     3       MV peak gradient     6       MV Peak A Liam     0.52       MV Peak E Liam     1.08       MV stenosis pressure 1/2 time     86.00       MV VTI     28.9       Neut # 11.16           Neut % 80.4           nRBC 0.0           Platelet Count 258           Potassium 4.2           PROTEIN TOTAL 7.4           PT   14.3         PV peak gradient     6       PV PEAK VELOCITY     1.24       Posterior Wall     1.20       RBC 4.62           RDW 12.3           RV/LV Ratio     0.49       RVDD     2.50       Sodium 133           TAPSE     2.78       TDI SEPTAL     0.15       TDI LATERAL     0.25       Triscuspid Valve Regurgitation Peak Gradient     5       TR Max Liam     1.13       WBC 13.88           ZLVIDD     -1.05       ZLVIDS      -0.18               Significant Imaging: I have reviewed all pertinent imaging results/findings within the past 24 hours.  Assessment and Plan:     Left arm weakness  - presented with left sided weakness and confusion on 5/7 at 1220  - Stroke RF: none  - Intervention: TPA at OSH on 5/7/2024, MT not indicated  - Etiology: TBD    Etiology - low suspicion for an acute infarct. Possible seizure vs intracranial mass vs meningoencephalitis.     Stroke workup:  -CTh: negative   -CTA h/n: Limited examination due to venous contamination and mistiming of the contrast bolus but no evidence of large vessel occlusion seen on this examination.   -TSH: 0.403   -home medications include: none      Plan:  S/p TNK  -MRI brain w/ w/o contrast when able  -LP   -continue frequent neuro checks, notify neurology of any change/decline  -EEG completed yesterday, awaiting read, could consider continuous/extended EEG if there is evidence of seizure  -continue Keppra 500 mg BID     Further recommendations per MD.        VTE Risk Mitigation (From admission, onward)           Ordered     IP VTE HIGH RISK PATIENT  Once         05/07/24 1823     Place sequential compression device  Until discontinued         05/07/24 1823                    Nemo Maldonado NP  Neurology  [facility] - [address] [place] [address] ICU    I have seen/examined the patient.  NP was scribe.  I agree with the findings unless outlined below:    Phong Hinojosa MD  [facility]     Pt with subacute delcine over 1 month with HA, fever and stroke-like weakness; received TNK yesterday  Currently sedated  Awaiting a lot of LP results    Ddx is large and includes autoimmune encephalitis

## 2024-05-09 NOTE — PROCEDURES
"Luis Traylor is a 23 y.o. male patient.    Temp: 100.3 °F (37.9 °C) (05/08/24 1600)  Pulse: 67 (05/08/24 1945)  Resp: 18 (05/08/24 1945)  BP: (!) 130/95 (05/08/24 1900)  SpO2: 100 % (05/08/24 1945)  Weight: 77.4 kg (170 lb 10.2 oz) (05/07/24 1847)  Height: 5' 11" (180.3 cm) (05/07/24 1847)       Lumbar Puncture    Date/Time: 5/8/2024 8:50 PM  Location procedure was performed: [address] INTENSIVE CARE UNIT    Performed by: Pedro Vieyra MD  Authorized by: Pedro Vieyra MD  Consent Done: Yes  Indications: evaluation for infection    Anesthesia:  Local Anesthetic: lidocaine 1% without epinephrine    Patient sedated: no  Description of findings: bloody drainage   Preparation: Patient was prepped and draped in the usual sterile fashion.  Lumbar space: L3-L4 interspace  Patient's position: sitting  Needle gauge: 20  Needle type: spinal needle - Quincke tip  Needle length: 5.0 in  Number of attempts: 2  Fluid appearance: bloody  Post-procedure: site cleaned, pressure dressing applied and adhesive bandage applied  Complications: No  Estimated blood loss (mL): 3  Specimens: No  Implants: No  Patient tolerance: Patient tolerated the procedure well with no immediate complications  Comments: 2 attempts were unsuccessful, patient was incooperative due to MARCELLO Vieyra MD  Internal Medicine - PGY-1  5/8/2024    "

## 2024-05-09 NOTE — NURSING
Nurses Note -- 4 Eyes      5/9/2024   6:52 AM      Skin assessed during: Q Shift Change      [x] No Altered Skin Integrity Present    [x]Prevention Measures Documented      [] Yes- Altered Skin Integrity Present or Discovered   [] LDA Added if Not in Epic (Describe Wound)   [] New Altered Skin Integrity was Present on Admit and Documented in LDA   [] Wound Image Taken    Wound Care Consulted? No    Attending Nurse:  SUSANNA Paul     Second RN/Staff Member:  SUSANNA Aponte

## 2024-05-09 NOTE — ASSESSMENT & PLAN NOTE
- presented with left sided weakness and confusion on 5/7 at 1220  - Stroke RF: none  - Intervention: TPA at OSH on 5/7/2024, MT not indicated  - Etiology: TBD    Etiology - low suspicion for an acute infarct. Differentials remain wide - seizure vs intracranial mass vs meningoencephalitis.     Stroke workup:  -CTh: negative   -CTA h/n: Limited examination due to venous contamination and mistiming of the contrast bolus but no evidence of large vessel occlusion seen on this examination.   -TSH: 0.403   -home medications include: none      Plan:  S/p TNK  -MRI brain w/ w/o contrast when able  -LP   -continue frequent neuro checks, notify neurology of any change/decline     Further recommendations per MD.               Plan:  - MRI brain with and without contrast.   - consider LP after MRI if negative for mass  - EEG  - continue keppra 500 mg bid     Stroke team will follow for MRI.

## 2024-05-09 NOTE — PROCEDURES
"Luis Traylor is a 23 y.o. male patient.    Temp: 99.6 °F (37.6 °C) (05/09/24 0800)  Pulse: (!) 53 (05/09/24 0800)  Resp: 17 (05/09/24 0800)  BP: (!) 100/59 (05/09/24 0800)  SpO2: 97 % (05/09/24 0800)  Weight: 77.4 kg (170 lb 10.2 oz) (05/07/24 1847)  Height: 5' 11" (180.3 cm) (05/07/24 1847)       Lumbar Puncture    Date/Time: 5/9/2024 11:13 AM  Location procedure was performed: [facility] CRITICAL CARE    Performed by: Gareth Mast MD  Authorized by: Gareth Mast MD  Pre-operative diagnosis:  Altered mental status  Post-operative diagnosis: Altered mental status  Consent Done: Yes  Indications: evaluation for infection and evaluation for altered mental status  Anesthesia: local infiltration    Anesthesia:  Local Anesthetic: lidocaine 1% without epinephrine  Anesthetic total: 4 mL    Patient sedated: no  Preparation: Patient was prepped and draped in the usual sterile fashion.  Lumbar space: L4-L5 interspace  Patient's position: sitting  Needle gauge: 20  Needle type: spinal needle - Quincke tip  Needle length: 5.0 in  Number of attempts: 1  Opening pressure: 43 cm H2O  Fluid appearance: clear  Tubes of fluid: 4  Total volume: 18 ml  Post-procedure: site cleaned and adhesive bandage applied  Complications: No  Specimens: No  Implants: No  Patient tolerance: Patient tolerated the procedure well with no immediate complications          5/9/2024    "

## 2024-05-09 NOTE — PLAN OF CARE
Problem: Occupational Therapy  Goal: Occupational Therapy Goal  Description: LTG: Pt will perform basic ADLs and ADL transfers with [place] using LRAD by discharge.    STG: to be met by 6/9/24    Pt will complete grooming standing at sink with LRAD with SBA.  Pt will complete UB dressing with SBA.  Pt will complete LB dressing with SBA using LRAD.  Pt will complete toileting with SBA using LRAD.  Pt will complete functional mobility to/from toilet and toilet transfer with SBA using LRAD.   Outcome: Progressing

## 2024-05-10 ENCOUNTER — HOSPITAL ENCOUNTER (INPATIENT)
Facility: HOSPITAL | Age: 24
LOS: 3 days | Discharge: HOME | DRG: 098 | End: 2024-05-13
Attending: INTERNAL MEDICINE | Admitting: STUDENT IN AN ORGANIZED HEALTH CARE EDUCATION/TRAINING PROGRAM
Payer: COMMERCIAL

## 2024-05-10 ENCOUNTER — ANESTHESIA (OUTPATIENT)
Dept: SURGERY | Facility: HOSPITAL | Age: 24
DRG: 099 | End: 2024-05-10
Payer: COMMERCIAL

## 2024-05-10 ENCOUNTER — ANESTHESIA EVENT (OUTPATIENT)
Dept: SURGERY | Facility: HOSPITAL | Age: 24
DRG: 099 | End: 2024-05-10
Payer: COMMERCIAL

## 2024-05-10 VITALS
TEMPERATURE: 100 F | RESPIRATION RATE: 18 BRPM | HEIGHT: 71 IN | DIASTOLIC BLOOD PRESSURE: 68 MMHG | SYSTOLIC BLOOD PRESSURE: 121 MMHG | HEART RATE: 76 BPM | WEIGHT: 170.63 LBS | OXYGEN SATURATION: 96 % | BODY MASS INDEX: 23.89 KG/M2

## 2024-05-10 DIAGNOSIS — R07.9 CHEST PAIN: ICD-10-CM

## 2024-05-10 DIAGNOSIS — G93.40 ENCEPHALOPATHY: ICD-10-CM

## 2024-05-10 DIAGNOSIS — G04.90 ENCEPHALITIS: Primary | ICD-10-CM

## 2024-05-10 LAB
ALBUMIN SERPL-MCNC: 3.8 G/DL (ref 3.5–5)
ALBUMIN/GLOB SERPL: 1.1 RATIO (ref 1.1–2)
ALP SERPL-CCNC: 49 UNIT/L (ref 40–150)
ALT SERPL-CCNC: 14 UNIT/L (ref 0–55)
AST SERPL-CCNC: 16 UNIT/L (ref 5–34)
BASOPHILS # BLD AUTO: 0.04 X10(3)/MCL
BASOPHILS NFR BLD AUTO: 0.3 %
BILIRUB SERPL-MCNC: 0.6 MG/DL
BUN SERPL-MCNC: 19.6 MG/DL (ref 8.9–20.6)
CALCIUM SERPL-MCNC: 8.6 MG/DL (ref 8.4–10.2)
CHLORIDE SERPL-SCNC: 108 MMOL/L (ref 98–107)
CO2 SERPL-SCNC: 19 MMOL/L (ref 22–29)
CREAT SERPL-MCNC: 0.89 MG/DL (ref 0.73–1.18)
CRP SERPL-MCNC: 17.6 MG/L
EOSINOPHIL # BLD AUTO: 0.01 X10(3)/MCL (ref 0–0.9)
EOSINOPHIL NFR BLD AUTO: 0.1 %
ERYTHROCYTE [DISTWIDTH] IN BLOOD BY AUTOMATED COUNT: 12.1 % (ref 11.5–17)
ERYTHROCYTE [SEDIMENTATION RATE] IN BLOOD: 11 MM/HR (ref 0–15)
GFR SERPLBLD CREATININE-BSD FMLA CKD-EPI: >60 ML/MIN/1.73/M2
GLOBULIN SER-MCNC: 3.4 GM/DL (ref 2.4–3.5)
GLUCOSE SERPL-MCNC: 99 MG/DL (ref 74–100)
HCT VFR BLD AUTO: 42.2 % (ref 42–52)
HGB BLD-MCNC: 14.1 G/DL (ref 14–18)
HIV 1+2 AB+HIV1 P24 AG SERPL QL IA: NONREACTIVE
IMM GRANULOCYTES # BLD AUTO: 0.05 X10(3)/MCL (ref 0–0.04)
IMM GRANULOCYTES NFR BLD AUTO: 0.4 %
LYMPHOCYTES # BLD AUTO: 1.98 X10(3)/MCL (ref 0.6–4.6)
LYMPHOCYTES NFR BLD AUTO: 14 %
MCH RBC QN AUTO: 28.3 PG (ref 27–31)
MCHC RBC AUTO-ENTMCNC: 33.4 G/DL (ref 33–36)
MCV RBC AUTO: 84.7 FL (ref 80–94)
MONOCYTES # BLD AUTO: 1.1 X10(3)/MCL (ref 0.1–1.3)
MONOCYTES NFR BLD AUTO: 7.8 %
NEUTROPHILS # BLD AUTO: 10.99 X10(3)/MCL (ref 2.1–9.2)
NEUTROPHILS NFR BLD AUTO: 77.4 %
NRBC BLD AUTO-RTO: 0 %
PLATELET # BLD AUTO: 303 X10(3)/MCL (ref 130–400)
PMV BLD AUTO: 8.5 FL (ref 7.4–10.4)
POTASSIUM SERPL-SCNC: 3.9 MMOL/L (ref 3.5–5.1)
PROT SERPL-MCNC: 7.2 GM/DL (ref 6.4–8.3)
RBC # BLD AUTO: 4.98 X10(6)/MCL (ref 4.7–6.1)
SODIUM SERPL-SCNC: 136 MMOL/L (ref 136–145)
WBC # SPEC AUTO: 14.17 X10(3)/MCL (ref 4.5–11.5)

## 2024-05-10 PROCEDURE — A9577 INJ MULTIHANCE: HCPCS | Performed by: INTERNAL MEDICINE

## 2024-05-10 PROCEDURE — 63600175 PHARM REV CODE 636 W HCPCS

## 2024-05-10 PROCEDURE — 36415 COLL VENOUS BLD VENIPUNCTURE: CPT

## 2024-05-10 PROCEDURE — 80053 COMPREHEN METABOLIC PANEL: CPT

## 2024-05-10 PROCEDURE — 87799 DETECT AGENT NOS DNA QUANT: CPT | Performed by: STUDENT IN AN ORGANIZED HEALTH CARE EDUCATION/TRAINING PROGRAM

## 2024-05-10 PROCEDURE — C9254 INJECTION, LACOSAMIDE: HCPCS

## 2024-05-10 PROCEDURE — 85652 RBC SED RATE AUTOMATED: CPT

## 2024-05-10 PROCEDURE — 84540 ASSAY OF URINE/UREA-N: CPT

## 2024-05-10 PROCEDURE — 97535 SELF CARE MNGMENT TRAINING: CPT | Mod: CO

## 2024-05-10 PROCEDURE — 37000008 HC ANESTHESIA 1ST 15 MINUTES: Performed by: ANESTHESIOLOGY

## 2024-05-10 PROCEDURE — D9220A PRA ANESTHESIA: Mod: CRNA,,,

## 2024-05-10 PROCEDURE — 25000003 PHARM REV CODE 250

## 2024-05-10 PROCEDURE — 86140 C-REACTIVE PROTEIN: CPT

## 2024-05-10 PROCEDURE — 86036 ANCA SCREEN EACH ANTIBODY: CPT

## 2024-05-10 PROCEDURE — 37000009 HC ANESTHESIA EA ADD 15 MINS: Performed by: ANESTHESIOLOGY

## 2024-05-10 PROCEDURE — 25000003 PHARM REV CODE 250: Performed by: STUDENT IN AN ORGANIZED HEALTH CARE EDUCATION/TRAINING PROGRAM

## 2024-05-10 PROCEDURE — 86666 EHRLICHIA ANTIBODY: CPT

## 2024-05-10 PROCEDURE — 85025 COMPLETE CBC W/AUTO DIFF WBC: CPT

## 2024-05-10 PROCEDURE — 25500020 PHARM REV CODE 255: Performed by: INTERNAL MEDICINE

## 2024-05-10 PROCEDURE — 82595 ASSAY OF CRYOGLOBULIN: CPT | Performed by: STUDENT IN AN ORGANIZED HEALTH CARE EDUCATION/TRAINING PROGRAM

## 2024-05-10 PROCEDURE — 11000001 HC ACUTE MED/SURG PRIVATE ROOM

## 2024-05-10 PROCEDURE — D9220A PRA ANESTHESIA: Mod: ANES,,, | Performed by: ANESTHESIOLOGY

## 2024-05-10 PROCEDURE — 86757 RICKETTSIA ANTIBODY: CPT

## 2024-05-10 PROCEDURE — 99232 SBSQ HOSP IP/OBS MODERATE 35: CPT | Mod: ,,, | Performed by: PSYCHIATRY & NEUROLOGY

## 2024-05-10 RX ORDER — SODIUM CHLORIDE, SODIUM GLUCONATE, SODIUM ACETATE, POTASSIUM CHLORIDE AND MAGNESIUM CHLORIDE 30; 37; 368; 526; 502 MG/100ML; MG/100ML; MG/100ML; MG/100ML; MG/100ML
INJECTION, SOLUTION INTRAVENOUS CONTINUOUS
Status: CANCELLED | OUTPATIENT
Start: 2024-05-10 | End: 2024-06-09

## 2024-05-10 RX ORDER — LIDOCAINE HYDROCHLORIDE 20 MG/ML
INJECTION INTRAVENOUS
Status: DISCONTINUED | OUTPATIENT
Start: 2024-05-10 | End: 2024-05-10

## 2024-05-10 RX ORDER — VALACYCLOVIR HYDROCHLORIDE 500 MG/1
1000 TABLET, FILM COATED ORAL 3 TIMES DAILY
Status: DISCONTINUED | OUTPATIENT
Start: 2024-05-10 | End: 2024-05-10 | Stop reason: HOSPADM

## 2024-05-10 RX ORDER — LEVETIRACETAM 15 MG/ML
1500 INJECTION INTRAVASCULAR EVERY 12 HOURS
Status: DISCONTINUED | OUTPATIENT
Start: 2024-05-10 | End: 2024-05-10 | Stop reason: HOSPADM

## 2024-05-10 RX ORDER — LORAZEPAM 2 MG/ML
2 INJECTION INTRAMUSCULAR ONCE
Status: COMPLETED | OUTPATIENT
Start: 2024-05-10 | End: 2024-05-10

## 2024-05-10 RX ORDER — PROCHLORPERAZINE EDISYLATE 5 MG/ML
5 INJECTION INTRAMUSCULAR; INTRAVENOUS EVERY 30 MIN PRN
Status: DISCONTINUED | OUTPATIENT
Start: 2024-05-10 | End: 2024-05-10 | Stop reason: HOSPADM

## 2024-05-10 RX ORDER — DEXAMETHASONE SODIUM PHOSPHATE 4 MG/ML
INJECTION, SOLUTION INTRA-ARTICULAR; INTRALESIONAL; INTRAMUSCULAR; INTRAVENOUS; SOFT TISSUE
Status: DISCONTINUED | OUTPATIENT
Start: 2024-05-10 | End: 2024-05-10

## 2024-05-10 RX ORDER — DIPHENHYDRAMINE HYDROCHLORIDE 50 MG/ML
25 INJECTION INTRAMUSCULAR; INTRAVENOUS EVERY 6 HOURS PRN
Status: DISCONTINUED | OUTPATIENT
Start: 2024-05-10 | End: 2024-05-10 | Stop reason: HOSPADM

## 2024-05-10 RX ORDER — IPRATROPIUM BROMIDE AND ALBUTEROL SULFATE 2.5; .5 MG/3ML; MG/3ML
3 SOLUTION RESPIRATORY (INHALATION)
Status: DISCONTINUED | OUTPATIENT
Start: 2024-05-10 | End: 2024-05-10 | Stop reason: HOSPADM

## 2024-05-10 RX ORDER — ONDANSETRON HYDROCHLORIDE 2 MG/ML
4 INJECTION, SOLUTION INTRAVENOUS DAILY PRN
Status: DISCONTINUED | OUTPATIENT
Start: 2024-05-10 | End: 2024-05-10 | Stop reason: HOSPADM

## 2024-05-10 RX ORDER — LORAZEPAM 2 MG/ML
INJECTION INTRAMUSCULAR
Status: COMPLETED
Start: 2024-05-10 | End: 2024-05-10

## 2024-05-10 RX ORDER — ONDANSETRON HYDROCHLORIDE 2 MG/ML
INJECTION, SOLUTION INTRAVENOUS
Status: DISCONTINUED | OUTPATIENT
Start: 2024-05-10 | End: 2024-05-10

## 2024-05-10 RX ORDER — PROPOFOL 10 MG/ML
INJECTION, EMULSION INTRAVENOUS
Status: DISCONTINUED | OUTPATIENT
Start: 2024-05-10 | End: 2024-05-10

## 2024-05-10 RX ORDER — LIDOCAINE HYDROCHLORIDE 10 MG/ML
1 INJECTION, SOLUTION EPIDURAL; INFILTRATION; INTRACAUDAL; PERINEURAL ONCE
Status: CANCELLED | OUTPATIENT
Start: 2024-05-10 | End: 2024-05-10

## 2024-05-10 RX ADMIN — ASPIRIN 81 MG CHEWABLE TABLET 81 MG: 81 TABLET CHEWABLE at 09:05

## 2024-05-10 RX ADMIN — IOHEXOL 50 ML: 350 INJECTION, SOLUTION INTRAVENOUS at 10:05

## 2024-05-10 RX ADMIN — LIDOCAINE HYDROCHLORIDE 80 MG: 20 INJECTION INTRAVENOUS at 05:05

## 2024-05-10 RX ADMIN — LORAZEPAM 2 MG: 2 INJECTION INTRAMUSCULAR; INTRAVENOUS at 12:05

## 2024-05-10 RX ADMIN — LORAZEPAM 2 MG: 2 INJECTION INTRAMUSCULAR at 12:05

## 2024-05-10 RX ADMIN — GADOBENATE DIMEGLUMINE 15 ML: 529 INJECTION, SOLUTION INTRAVENOUS at 06:05

## 2024-05-10 RX ADMIN — VALACYCLOVIR 1000 MG: 500 TABLET, FILM COATED ORAL at 08:05

## 2024-05-10 RX ADMIN — ACETAMINOPHEN 650 MG: 325 TABLET, FILM COATED ORAL at 12:05

## 2024-05-10 RX ADMIN — DOXYCYCLINE 100 MG: 100 INJECTION, POWDER, LYOPHILIZED, FOR SOLUTION INTRAVENOUS at 01:05

## 2024-05-10 RX ADMIN — ACETAMINOPHEN 650 MG: 325 TABLET, FILM COATED ORAL at 11:05

## 2024-05-10 RX ADMIN — MUPIROCIN: 20 OINTMENT TOPICAL at 08:05

## 2024-05-10 RX ADMIN — ONDANSETRON 4 MG: 2 INJECTION INTRAMUSCULAR; INTRAVENOUS at 06:05

## 2024-05-10 RX ADMIN — PROPOFOL 200 MG: 10 INJECTION, EMULSION INTRAVENOUS at 05:05

## 2024-05-10 RX ADMIN — LACOSAMIDE 100 MG: 10 INJECTION INTRAVENOUS at 03:05

## 2024-05-10 RX ADMIN — LEVETIRACETAM INJECTION 1000 MG: 10 INJECTION INTRAVENOUS at 10:05

## 2024-05-10 RX ADMIN — DEXAMETHASONE SODIUM PHOSPHATE 4 MG: 4 INJECTION, SOLUTION INTRA-ARTICULAR; INTRALESIONAL; INTRAMUSCULAR; INTRAVENOUS; SOFT TISSUE at 05:05

## 2024-05-10 RX ADMIN — VALACYCLOVIR 1000 MG: 500 TABLET, FILM COATED ORAL at 09:05

## 2024-05-10 RX ADMIN — SODIUM CHLORIDE, SODIUM GLUCONATE, SODIUM ACETATE, POTASSIUM CHLORIDE AND MAGNESIUM CHLORIDE: 526; 502; 368; 37; 30 INJECTION, SOLUTION INTRAVENOUS at 05:05

## 2024-05-10 NOTE — PROGRESS NOTES
[address]  Neurology  Progress Note    Patient Name: Luis Traylor  MRN: [id number]  Admission Date: 5/7/2024  Hospital Length of Stay: 3 days  Code Status: Full Code   Attending Provider: Gareth Mast MD  Primary Care Physician: No, Primary Doctor   Principal Problem:<principal problem not specified>    HPI:   No notes on file    Overview/Hospital Course:  No notes on file        Subjective:     Interval History:   Pt noted to have another seizure episode this AM, described as blank staring to the left, unresponsive, rigidity, w/o tonic clonic jerks, and pt reported he was able to hear everything going on around him but unable to respond.   Otherwise, neurologic exam remains unchanged and no significant events overnight.     Current Neurological Medications:     Current Facility-Administered Medications   Medication Dose Route Frequency Provider Last Rate Last Admin    acetaminophen tablet 650 mg  650 mg Oral Q6H PRN Pedro Vieyra MD   650 mg at 05/10/24 0047    aspirin chewable tablet 81 mg  81 mg Oral Daily Natividad Tenorio MD   81 mg at 05/10/24 0954    dexmedetomidine (PRECEDEX) 400mcg/100mL 0.9% NaCL infusion  0-1.4 mcg/kg/hr Intravenous Continuous Gareth Mast MD 10 mL/hr at 05/09/24 2255 0.517 mcg/kg/hr at 05/09/24 2255    levETIRAcetam in NaCl (iso-os) IVPB 1,500 mg  1,500 mg Intravenous Q12H Sienna Rodney FNP        LIDOcaine (PF) 10 mg/ml (1%) injection 100 mg  10 mL Intradermal Once Natividad Tenorio MD        mupirocin 2 % ointment   Nasal BID Gareth Mast MD   Given at 05/09/24 0900    ondansetron injection 4 mg  4 mg Intravenous Q6H PRN Pedro Vieyra MD        sodium chloride 0.9% flush 10 mL  10 mL Intravenous PRN Pedro Vieyra MD        valACYclovir tablet 1,000 mg  1,000 mg Oral TID Natividad Tenorio MD   1,000 mg at 05/10/24 0954       Review of Systems  A 14pt ros was reviewed & is negative unless o/w documented in the  hpi    Objective:     Vital Signs (Most Recent):  Temp: 99.1 °F (37.3 °C) (05/10/24 0400)  Pulse: 66 (05/10/24 0900)  Resp: (!) 9 (05/10/24 0900)  BP: (!) 147/93 (05/10/24 0900)  SpO2: 100 % (05/10/24 0900) Vital Signs (24h Range):  Temp:  [98.9 °F (37.2 °C)-101 °F (38.3 °C)] 99.1 °F (37.3 °C)  Pulse:  [50-79] 66  Resp:  [9-22] 9  SpO2:  [94 %-100 %] 100 %  BP: (103-147)/(45-93) 147/93     Weight: 77.4 kg (170 lb 10.2 oz)  Body mass index is 23.8 kg/m².     Physical Exam   GENERAL: NAD, calm, cooperative, appropriate, awake/alert  MENTAL STATUS: Oriented x4, follows commands reliably  SPEECH/LANGUAGE: Clear, coherent  CN:  EOMI gaze conjugate, visual fields intact  PERRLA  Motor: LUE 4/5, RUE/BLE 5/5  Cerebellar: No tremor or dysmetria  Sensory: Normal to tactile stim/vibration  DTRs: Normal [+2]  Gait: not observed         Significant Labs:   Recent Lab Results         05/10/24  0352   05/09/24  1754   05/09/24  1114        Albumin/Globulin Ratio 1.1           Albumin 3.8           ALP 49           ALT 14           Appear CSF     Clear       AST 16           Baso # 0.04           Basophil % 0.3           BILIRUBIN TOTAL 0.6           BUN 19.6           Calcium 8.6           Chloride 108           CO2 19           COLOR CSF     Colorless       Creatinine 0.89           Crypto Ag, CSF     Negative       Cryptococcus neoformans/gattii     Not Detected       CSF CULTURE     No Growth At 24 Hours  [P]       Cytomegalovirus (CMV)     Not Detected       eGFR >60           Enterovirus (EV)     Not Detected       Eos # 0.01           Eosinophil %     1       Eos % 0.1           Escherichia coli K1     Not Detected       Globulin, Total 3.4           Glucose 99           Glucose CSF     55       GRAM STAIN     Moderate WBC observed  [P]            No bacteria seen  [P]       Haemophilus influenzae     Not Detected       Hematocrit 42.2           Hemoglobin 14.1           HSV-1     Not Detected       HSV-2     Not Detected        HIV   Nonreactive         Human Herpesvirus 6 (HHV-6)     Not Detected       Human Parechovirus (HPEV)     Not Detected       Immature Grans (Abs) 0.05           Immature Granulocytes 0.4           SERGE INK     Negative       Listeria monocytogenes     Not Detected       Lymph # 1.98           LYMPH % 14.0           Lymphocyte %     62       MCH 28.3           MCHC 33.4           MCV 84.7           Mono # 1.10           Mono % 7.8           Monocyte %     10       MPV 8.5           Neisseria meningitidis     Not Detected       Neut # 10.99           Neut % 77.4           Neutrophils %     27       nRBC 0.0           Platelet Count 303           Potassium 3.9           Protein CSF      31.3       PROTEIN TOTAL 7.2           RBC 4.98           RBC, CSF     2       RDW 12.1           Sodium 136           Streptococcus agalactiae (Group B)     Not Detected       Streptococcus pneumoniae     Not Detected       Syphilis Antibody   Nonreactive         Varicella zoster Virus (VZV)     Not Detected       WBC, CSF     400       WBC 14.17                    [P] - Preliminary Result               Significant Imaging: I have reviewed all pertinent imaging results/findings within the past 24 hours.  Assessment and Plan:     Left arm weakness  - presented with left sided weakness and confusion on 5/7 at 1220  - Stroke RF: none  - Intervention: TPA at OSH on 5/7/2024, MT not indicated  - Etiology: TBD  -CSF WBC: 400    Etiology - low suspicion for an acute infarct. Differentials remain wide - seizure vs intracranial mass vs meningoencephalitis.     Stroke workup:  -CTh: negative   -CTA h/n: Limited examination due to venous contamination and mistiming of the contrast bolus but no evidence of large vessel occlusion seen on this examination.   -TSH: 0.403   -home medications include: none    Plan:  S/p TNK  -MRI brain w/ w/o pending  -keppra increased from 1000 mg to 1500 mg BID  -continue frequent neuro checks, notify neurology  of any change/decline     Further recommendations per MD.      VTE Risk Mitigation (From admission, onward)           Ordered     IP VTE HIGH RISK PATIENT  Once         05/07/24 1823     Place sequential compression device  Until discontinued         05/07/24 1823                    Sienna Rodney [facility]  Inpatient Neurology  [facility] - [address] [place] [address]    I have seen/examined the patient.  NP was scribe.  I agree with the findings unless outlined below:    Phong Hinojosa MD  [facility]     Had a brief sz right before we walked in  [facility]  Awaiting mri and CSF studies to finalize; on acyclovir; csf wbc 400

## 2024-05-10 NOTE — PT/OT/SLP PROGRESS
[facility]  Speech Language Pathology Department  Diet Tolerance Follow-up    Patient Name:  Luis Traylor   MRN:  [id number]    Recommendations     General recommendations:  dysphagia therapy and Speech/Language and Cognitive Evaluation  Diet texture/consistency recommendations:  Soft & Bite-sized solids (IDDSI 6) and thin liquids (IDDSI 0)  Medications: per patient preference  Swallow strategies/precautions: small bites/sips, slow rate, upright for PO intake, assist with feeding as needed, and feed only when fully alert  Precautions: Standard,      Diet Tolerance     Nursing reports no difficulty regarding diet tolerance.    Plan     SLP Follow-Up:  Yes    Patient to be seen:  5 x/week   Plan of Care expires:  05/23/24  Plan of Care reviewed with: RN    Time Tracking     SLP Treatment Date:  05/10/2024

## 2024-05-10 NOTE — PROVIDER TRANSFER
(Physician in Lead of Transfers)  Outside Transfer Acceptance Note / Regional Referral Center    Upon patient arrival to floor, please send SecureChat to [facility] or call extension [phone] (if no answer, do NOT leave a callback number after the beep, rather please send a SecureChat to [facility]), for Hospital Medicine admit team assignment and for additional admit orders for the patient.  Do not page the attending physician associated with the patient on arrival (this physician may not be on duty at the time of arrival).  Rather, always send a SecureChat to [facility] or call [phone] to reach the triage physician for orders and team assignment.    Referring facility: [facility]   Referring provider: ATUL RICK  Accepting facility: [facility]  Accepting provider: MICHELLE ESPOSITO  Reason for transfer: Higher Level of Care   Transfer diagnosis: Possible encephalitis  Transfer specialty requested: Neurology  Transfer level: NUMBER 1-5: 2  Bed type requested: stepdown  Isolation status: No active isolations   Admission class or status: IP- Inpatient      Narrative     23-year-old male initially presented to an outside hospital on May 7 with acute onset of left-sided weakness along with headache for a week.  He was febrile.  There was concern for an acute right cortical infarct on Vascular Neurology tele-consultation.  Patient received thrombolytics and was subsequently transferred to [facility].  He only received a partial dose of thrombolytic due to onset of gingival bleeding.  On arrival to the [place] emergency department he was lethargic and aroused only to noxious stimuli.  It was noted that he had severe headaches for 2 weeks that worsened on the day of presentation with photophobia accompanied by nausea and vomiting.  Imaging of the brain did not show evidence of acute bleeding or large vessel occlusion.  Left-sided  weakness persisted.  He was admitted to the ICU for further treatment.  On May 8 he was more alert and talkative.  MRI brain and lumbar puncture were attempted on May 8, but they were unsuccessful because the patient could not sit still (despite Precedex and Versed).  He had lumbar puncture on May 9 that had 400 white blood cells but normal glucose and protein.  Hospital course was complicated by episodes of focal seizure-like activity where he had staring spells and was not interactive. He had an episode on the morning of May 10 described as staring to the left, unresponsiveness, and rigidity without tonic clonic jerking.  Patient reported he was able to hear what was going on around him but was unable to respond.  Mentation returns to baseline after the events, but he has waxing and waning of the episodes of unresponsiveness.  Keppra dosing was increased during his stay.  Current medications include low-dose aspirin, doxycycline/valacyclovir, Vimpat, and Keppra.  Blood cultures from May 7 and May 8 have no growth at 48 hours.  West Nile virus testing is still pending.  Multiple serologies are pending to include encephalopathy autoimmune evaluation, spotted fever antibodies, Ehrlichia antibodies, and Histoplasma antigen.  With the episodes of encephalopathy and abnormal lumbar puncture, they are requesting transfer to Hospital Medicine at [facility] for further treatment (to likely include MRI brain with sedation).  He will need Neurology evaluation and potentially extended EEG.  Currently he is not on any continuous infusions, and he is awake and alert with no gross focal motor deficits.  Referring team initially spoke with Neurocritical Care at [facility], and patient was felt to be stable for step-down bed currently    May 10:  White blood cells 14.17, hemoglobin 14.1, hematocrit 42.2, platelets 303, sodium 136, potassium 3.9, chloride 108, CO2 19, BUN 19.6, creatinine 0.89, glucose 99, AST 14, ALT  16  -CT head with and without contrast had no acute abnormality seen.    May 9: CSF had 400 white blood cells (27% neutrophils/62% lymphocytes/10% monocytes), 2 RBC, negative Alea ink, glucose 55, protein 31.3, ME panel all negative cryptococcal antigen negative, CSF culture with no growth at 12:00 p.m.  -serum cryptococcal antigen was negative, syphilis antibody was nonreactive, HIV nonreactive    May 8: INR 1.1  -CT head without contrast had no acute abnormality seen    May 7:  White blood cells 18.07, hemoglobin 12.6, platelets 252, sedimentation rate 4, lactic acid 3.3 (repeat 3.4, ALT 10, creatinine 0.96, bicarbonate 20, potassium 4, TSH 0.403  -Urinalysis with trace ketones 1+ blood, urine drug screen negative  -antinuclear antibody was less than 1:80  -CTA head and neck was limited due to venous contamination in Ms. Timing of the contrast bolus but no evidence of large vessel occlusion seen on this examination.    Objective     Vitals: Temp: (!) 101.1 °F (38.4 °C) (05/10/24 1115)  Pulse: (!) 59 (05/10/24 1500)  Resp: 17 (05/10/24 1500)  BP: 125/62 (05/10/24 1500)  SpO2: 97 % (05/10/24 1500)  Recent Labs: CBC:   Recent Labs   Lab 05/09/24  0340 05/10/24  0352   WBC 13.88* 14.17*   HGB 13.3* 14.1   HCT 42.2 42.2    303     CMP:   Recent Labs   Lab 05/09/24  0340 05/10/24  0352   * 136   K 4.2 3.9   CO2 19* 19*   BUN 14.7 19.6   CREATININE 0.89 0.89   CALCIUM 9.1 8.6   ALBUMIN 3.7 3.8   BILITOT 0.6 0.6   ALKPHOS 54 49   AST 13 16   ALT 9 14         Instructions    Admit to Hospital Medicine  Consult Neurology      JOSE Srivastava MD  Hospital Medicine Staff  Cell: [phone]

## 2024-05-10 NOTE — PT/OT/SLP PROGRESS
Pt wanted to rest this AM. Followed up this PM for PT tx, and pt is out of room to scans. Will follow up as able.

## 2024-05-10 NOTE — LETTER
Luis Traylor was seen and treated in our hospital from 5/7/2024 through 5/13/2024.    He may return to work on Monday, May 20, 2024. He will need permission to attend his follow-up doctors' appointments as needed.     If you have any questions or concerns, please don't hesitate to call.        Sincerely,          Ruslan Robison MD

## 2024-05-10 NOTE — PT/OT/SLP PROGRESS
Occupational Therapy   Treatment    Name: Luis Traylor  MRN: [id number]  Admitting Diagnosis:  CVA       Recommendations:     Recommended therapy intensity at discharge: High Intensity Therapy (Pending Progress)   Discharge Equipment Recommendations:  bath bench  Barriers to discharge:       Assessment:     Luis Traylor is a 23 y.o. male with a medical diagnosis of CVA.  He presents with increased activity tolerance and endurance, CGA for balance during session, RN in room during session, pt. Standing at sink, pt. Presenting with absent gaze, unresponsive to name however alert with much cueing after a few seconds, able to perform grooming task, pt. Is a fall risk recommending high intensity therapy pending progress. Performance deficits affecting function are weakness, impaired endurance, impaired self care skills, impaired functional mobility, gait instability, impaired balance, decreased upper extremity function, decreased coordination, decreased safety awareness, visual deficits.     Rehab Prognosis:  Good; patient would benefit from acute skilled OT services to address these deficits and reach maximum level of function.       Plan:     Patient to be seen 5 x/week to address the above listed problems via self-care/home management, therapeutic activities, therapeutic exercises  Plan of Care Expires: 06/09/24  Plan of Care Reviewed with: patient    Subjective     Pain/Comfort:       Objective:     Communicated with: RN prior to session.  Patient found HOB elevated with   upon OT entry to room.    General Precautions: Standard, fall    Orthopedic Precautions:N/A  Braces: N/A  Respiratory Status: Room air  Vital Signs: Blood Pressure: 135/67     Occupational Performance:   (Bed Mobility-CGA)  (Sitting balance-SBA)  (Sit to stand- CGA)  Pt. Ambulating from EOB to sink HHA with cueing required for safety.  Pt. Standing at sink while performing grooming task (brushing teeth) cueing required to use L UE to open  grooming items and containers, performing excursion using R UE.  Pt. Ambulating back to bed, left with all needs within reach.      Therapeutic Positioning    OT interventions performed during the course of today's session in an effort to prevent and/or reduce acquired pressure injuries:   Therapeutic positioning was provided at the conclusion of session to offload all bony prominences for the prevention and/or reduction of pressure injuries        [facility] 6 Click ADL:      Patient Education:  Patient provided with verbal education education regarding fall prevention and safety awareness.  Additional teaching is warranted.      Patient left HOB elevated with all lines intact and call button in reach.    GOALS:   Multidisciplinary Problems       Occupational Therapy Goals          Problem: Occupational Therapy    Goal Priority Disciplines Outcome Interventions   Occupational Therapy Goal     OT, PT/OT Progressing    Description: LTG: Pt will perform basic ADLs and ADL transfers with Hall using LRAD by discharge.    STG: to be met by 6/9/24    Pt will complete grooming standing at sink with LRAD with SBA.  Pt will complete UB dressing with SBA.  Pt will complete LB dressing with SBA using LRAD.  Pt will complete toileting with SBA using LRAD.  Pt will complete functional mobility to/from toilet and toilet transfer with SBA using LRAD.                        Time Tracking:     OT Date of Treatment: 05/10/24  OT Start Time: 0748  OT Stop Time: 0811  OT Total Time (min): 23 min    Billable Minutes:Self Care/Home Management 2    OT/REID: REID     Number of REID visits since last OT visit: 1    5/10/2024

## 2024-05-10 NOTE — NURSING
Nurses Note -- 4 Eyes      5/10/2024   6:21 AM      Skin assessed during: Daily Assessment      [x] No Altered Skin Integrity Present    [x]Prevention Measures Documented      [] Yes- Altered Skin Integrity Present or Discovered   [] LDA Added if Not in Epic (Describe Wound)   [] New Altered Skin Integrity was Present on Admit and Documented in LDA   [] Wound Image Taken    Wound Care Consulted? No    Attending Nurse:  Brad Galarza RN/Staff Member:  Gurjit MULLINS

## 2024-05-10 NOTE — PROGRESS NOTES
[facility] - ICU  Pulmonary Critical Care Note    Patient Name: Luis Traylor  MRN: [id number]  Admission Date: 5/7/2024  Hospital Length of Stay: 3 days  Code Status: Full Code  Attending Provider: Gareth Mast MD  Primary Care Provider: Avril, Primary Doctor     Subjective:     HPI:   3-year-old male with no significant past medical history presented to [facility] ED on 05/07/2024 as a transfer from [facility] due to concern for stroke.  Around noon today the patient seemed confused to his girlfriend and weak were which she took him to [facility], on presentation he complain of weakness and numbness in his left arm and leg, he has also been having severe headaches for the last 2 weeks that had worsened today with photophobia accompanied by nausea and vomiting, CT scan head did not show any signs of bleeding, tPA was given to the patient, he started having gingival bleeding detention through treatment and had to be halted.  CTA head and neck did not show any large vessel occlusion.  Patient denies any dizziness, double vision, chest pain, shortness breath, abdominal pain, diarrhea or constipation.    Hospital Course/Significant events:  05/07/2024 half dose tPA and admission to the ICU    24 Hour Interval History:  Pt afebrile overnight, the nurse witnessed focal seizures like staring spells, Neurology was informed.  LP performed yesterday showed leukocytosis with lymphocyte predominance concerning for viral infection, ME panel negative, pending West Nile workup, MR brain not done yet.    No past medical history on file.    No past surgical history on file.    Social History     Socioeconomic History    Marital status: Significant Other     Social Determinants of Health     Food Insecurity: Unknown (5/8/2024)    Hunger Vital Sign     Worried About Running Out of Food in the Last Year: Never true   Transportation Needs: No Transportation Needs (5/8/2024)    TRANSPORTATION NEEDS      Transportation : No           Objective:   No current outpatient medications    Current Inpatient Medications   aspirin  81 mg Oral Daily    levETIRAcetam (Keppra) IV (PEDS and ADULTS)  1,000 mg Intravenous Q12H    LIDOcaine (PF) 10 mg/ml (1%)  10 mL Intradermal Once    mupirocin   Nasal BID    valACYclovir  1,000 mg Oral TID           Intake/Output Summary (Last 24 hours) at 5/10/2024 1001  Last data filed at 5/10/2024 0630  Gross per 24 hour   Intake 719.47 ml   Output 1200 ml   Net -480.53 ml       Review of Systems   Constitutional:  Negative for chills, fever and weight loss.   Eyes:  Positive for photophobia. Negative for blurred vision and double vision.   Respiratory:  Negative for cough and shortness of breath.    Cardiovascular:  Negative for chest pain and leg swelling.   Gastrointestinal:  Negative for abdominal pain, constipation, diarrhea, nausea and vomiting.   Genitourinary:  Negative for dysuria and urgency.   Neurological:  Positive for sensory change, weakness and headaches. Negative for dizziness, speech change, seizures and loss of consciousness.        Vital Signs (Most Recent):  Temp: 99.1 °F (37.3 °C) (05/10/24 0400)  Pulse: 66 (05/10/24 0900)  Resp: (!) 9 (05/10/24 0900)  BP: (!) 147/93 (05/10/24 0900)  SpO2: 100 % (05/10/24 0900)  Body mass index is 23.8 kg/m².  Weight: 77.4 kg (170 lb 10.2 oz) Vital Signs (24h Range):  Temp:  [98.9 °F (37.2 °C)-101 °F (38.3 °C)] 99.1 °F (37.3 °C)  Pulse:  [50-79] 66  Resp:  [9-22] 9  SpO2:  [94 %-100 %] 100 %  BP: (103-147)/(45-93) 147/93     Physical Exam  Constitutional:       Comments: Drowsy on minimal sedation, cooperative  In no distress   HENT:      Head: Normocephalic and atraumatic.      Right Ear: External ear normal.      Left Ear: External ear normal.      Nose: Nose normal.      Mouth/Throat:      Mouth: Mucous membranes are dry.      Tongue: Tongue does not deviate from midline.      Comments: Line ear abrasion on both lips with blood on the  tongue  Eyes:      Extraocular Movements: Extraocular movements intact.      Conjunctiva/sclera: Conjunctivae normal.      Pupils: Pupils are equal, round, and reactive to light.   Cardiovascular:      Rate and Rhythm: Regular rhythm. Bradycardia present.      Pulses: Normal pulses.      Heart sounds: Normal heart sounds. No murmur heard.     No friction rub. No gallop.   Pulmonary:      Effort: Pulmonary effort is normal. No respiratory distress.      Breath sounds: Normal breath sounds. No wheezing.   Abdominal:      General: Abdomen is flat. Bowel sounds are normal. There is no distension.      Palpations: Abdomen is soft.      Tenderness: There is no abdominal tenderness.   Musculoskeletal:      Right lower leg: No edema.      Left lower leg: No edema.   Skin:     General: Skin is warm and dry.      Capillary Refill: Capillary refill takes less than 2 seconds.      Findings: Rash present.      Comments: Mild urticarial rash on neck and chest   Neurological:      Mental Status: He is alert and oriented to person, place, and time.      Cranial Nerves: Cranial nerves 2-12 are intact. No cranial nerve deficit.      Sensory: Sensory deficit present.      Motor: Weakness present.      Comments: Numbness on the left side with weakness 1/5 on the left upper extremity and 3/5 on the left lower extremity           Mechanical ventilation support:       Lines/Drains/Airways       Drain  Duration                  Urethral Catheter 05/08/24 1630 16 Fr. 1 day              Peripheral Intravenous Line  Duration                  Peripheral IV - Single Lumen 05/07/24 0000 20 G Right Antecubital 3 days                    Significant Labs:    Lab Results   Component Value Date    WBC 14.17 (H) 05/10/2024    HGB 14.1 05/10/2024    HCT 42.2 05/10/2024    MCV 84.7 05/10/2024     05/10/2024         BMP  Lab Results   Component Value Date     05/10/2024    K 3.9 05/10/2024    CO2 19 (L) 05/10/2024    BUN 19.6 05/10/2024     "CREATININE 0.89 05/10/2024    CALCIUM 8.6 05/10/2024       ABG  No results for input(s): "PH", "PO2", "PCO2", "HCO3", "BE" in the last 168 hours.        Significant Imaging:  I have reviewed all pertinent imaging within the past 24 hours.        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 West Nile virus  Possible complex migraine        Plan  CT head and CTA head/neck unremarkable  MRI w/wo contrast pending- will need sedation  LP consistent with viral infection  Continue valacyclovir 1g TID PO as IV acyclovir restricted to confirmed HSV  Continue neuro checks to q4h  Neurology consulted, appreciate recs  Tylenol p.r.n. for headache, avoid NSAIDs  Zofran p.r.n. for nausea  PT/OT/ST consulted, appreciate assistance  Ordering CT head venogram to rule out cavernous sinus  We will monitor for more right and if stable downgrade    DVT Prophylaxis:  SCD  GI Prophylaxis:  None          Pedro Vieyra MD  [employer] IM PGY III  Pulmonary Critical Care Medicine  [facility] - Emergency Dept    "

## 2024-05-10 NOTE — ANESTHESIA PROCEDURE NOTES
Intubation    Date/Time: 5/10/2024 5:22 PM    Performed by: Svetlana Grullon CRNA  Authorized by: Chris De Guzman MD    Intubation:     Induction:  Intravenous    Intubated:  Postinduction    Mask Ventilation:  Easy mask    Attempts:  1    Attempted By:  CRNA    Difficult Airway Encountered?: No      Airway Device:  Supraglottic airway/LMA    Airway Device Size:  4.0    Style/Cuff Inflation:  Cuffed (inflated to minimal occlusive pressure)    Secured at:  The lips    Placement Verified By:  Capnometry    Complicating Factors:  None    Findings Post-Intubation:  BS equal bilateral and atraumatic/condition of teeth unchanged

## 2024-05-10 NOTE — SUBJECTIVE & OBJECTIVE
Subjective:     Interval History:   Pt noted to have another seizure episode this AM, described as blank staring to the left, unresponsive, rigidity, w/o tonic clonic jerks, and pt reported he was able to hear everything going on around him but unable to respond.   Otherwise, neurologic exam remains unchanged and no significant events overnight.     Current Neurological Medications:     Current Facility-Administered Medications   Medication Dose Route Frequency Provider Last Rate Last Admin    acetaminophen tablet 650 mg  650 mg Oral Q6H PRN Pedro Vieyra MD   650 mg at 05/10/24 0047    aspirin chewable tablet 81 mg  81 mg Oral Daily Natividad Tenorio MD   81 mg at 05/10/24 0954    dexmedetomidine (PRECEDEX) 400mcg/100mL 0.9% NaCL infusion  0-1.4 mcg/kg/hr Intravenous Continuous Gareth Mast MD 10 mL/hr at 05/09/24 2255 0.517 mcg/kg/hr at 05/09/24 2255    levETIRAcetam in NaCl (iso-os) IVPB 1,500 mg  1,500 mg Intravenous Q12H Sienna Rodney FNP        LIDOcaine (PF) 10 mg/ml (1%) injection 100 mg  10 mL Intradermal Once Natividad Tenorio MD        mupirocin 2 % ointment   Nasal BID Gareth Mast MD   Given at 05/09/24 0900    ondansetron injection 4 mg  4 mg Intravenous Q6H PRN Pedro Vieyra MD        sodium chloride 0.9% flush 10 mL  10 mL Intravenous PRN Pedro Vieyra MD        valACYclovir tablet 1,000 mg  1,000 mg Oral TID Natviidad Tenorio MD   1,000 mg at 05/10/24 0954       Review of Systems  A 14pt ros was reviewed & is negative unless o/w documented in the hpi    Objective:     Vital Signs (Most Recent):  Temp: 99.1 °F (37.3 °C) (05/10/24 0400)  Pulse: 66 (05/10/24 0900)  Resp: (!) 9 (05/10/24 0900)  BP: (!) 147/93 (05/10/24 0900)  SpO2: 100 % (05/10/24 0900) Vital Signs (24h Range):  Temp:  [98.9 °F (37.2 °C)-101 °F (38.3 °C)] 99.1 °F (37.3 °C)  Pulse:  [50-79] 66  Resp:  [9-22] 9  SpO2:  [94 %-100 %] 100 %  BP: (103-147)/(45-93) 147/93     Weight: 77.4 kg (170 lb 10.2 oz)  Body  mass index is 23.8 kg/m².     Physical Exam   GENERAL: NAD, calm, cooperative, appropriate, awake/alert  MENTAL STATUS: Oriented x4, follows commands reliably  SPEECH/LANGUAGE: Clear, coherent  CN:  EOMI gaze conjugate, visual fields intact  PERRLA  Motor: LUE 4/5, RUE/BLE 5/5  Cerebellar: No tremor or dysmetria  Sensory: Normal to tactile stim/vibration  DTRs: Normal [+2]  Gait: not observed         Significant Labs:   Recent Lab Results         05/10/24  0352   05/09/24  1754   05/09/24  1114        Albumin/Globulin Ratio 1.1           Albumin 3.8           ALP 49           ALT 14           Appear CSF     Clear       AST 16           Baso # 0.04           Basophil % 0.3           BILIRUBIN TOTAL 0.6           BUN 19.6           Calcium 8.6           Chloride 108           CO2 19           COLOR CSF     Colorless       Creatinine 0.89           Crypto Ag, CSF     Negative       Cryptococcus neoformans/gattii     Not Detected       CSF CULTURE     No Growth At 24 Hours  [P]       Cytomegalovirus (CMV)     Not Detected       eGFR >60           Enterovirus (EV)     Not Detected       Eos # 0.01           Eosinophil %     1       Eos % 0.1           Escherichia coli K1     Not Detected       Globulin, Total 3.4           Glucose 99           Glucose CSF     55       GRAM STAIN     Moderate WBC observed  [P]            No bacteria seen  [P]       Haemophilus influenzae     Not Detected       Hematocrit 42.2           Hemoglobin 14.1           HSV-1     Not Detected       HSV-2     Not Detected       HIV   Nonreactive         Human Herpesvirus 6 (HHV-6)     Not Detected       Human Parechovirus (HPEV)     Not Detected       Immature Grans (Abs) 0.05           Immature Granulocytes 0.4           SERGE INK     Negative       Listeria monocytogenes     Not Detected       Lymph # 1.98           LYMPH % 14.0           Lymphocyte %     62       MCH 28.3           MCHC 33.4           MCV 84.7           Mono # 1.10            Mono % 7.8           Monocyte %     10       MPV 8.5           Neisseria meningitidis     Not Detected       Neut # 10.99           Neut % 77.4           Neutrophils %     27       nRBC 0.0           Platelet Count 303           Potassium 3.9           Protein CSF      31.3       PROTEIN TOTAL 7.2           RBC 4.98           RBC, CSF     2       RDW 12.1           Sodium 136           Streptococcus agalactiae (Group B)     Not Detected       Streptococcus pneumoniae     Not Detected       Syphilis Antibody   Nonreactive         Varicella zoster Virus (VZV)     Not Detected       WBC, CSF     400       WBC 14.17                    [P] - Preliminary Result               Significant Imaging: I have reviewed all pertinent imaging results/findings within the past 24 hours.

## 2024-05-10 NOTE — ANESTHESIA PREPROCEDURE EVALUATION
05/10/2024  Luis Traylor is a 23 y.o., male.    Pre-op Diagnosis: Left arm weakness [R29.898]    Procedure(s):  MRI (Magnetic Resonance Imagine)   Assessment and Plan:    Left arm weakness  - presented with left sided weakness and confusion on 5/7 at 1220  - Stroke RF: none  - Intervention: TPA at OSH on 5/7/2024, MT not indicated  - Etiology: TBD  -CSF WBC: 400     Etiology - low suspicion for an acute infarct. Differentials remain wide - seizure vs intracranial mass vs meningoencephalitis.      Stroke workup:  -CTh: negative   -CTA h/n: Limited examination due to venous contamination and mistiming of the contrast bolus but no evidence of large vessel occlusion seen on this examination.   -TSH: 0.403   -home medications include: none     Plan:  S/p TNK  -MRI brain w/ w/o pending  -keppra increased from 1000 mg to 1500 mg BID  -continue frequent neuro checks, notify neurology of any change/decline    Review of patient's allergies indicates:  Not on File    No current outpatient medications        No past medical history on file.    No past surgical history on file.   Recent Labs   Lab 05/08/24  0338 05/09/24  0340 05/10/24  0352   WBC 17.70* 13.88* 14.17*   RBC 4.45* 4.62* 4.98   HGB 12.9* 13.3* 14.1   HCT 38.6* 42.2 42.2   MCV 86.7 91.3 84.7   MCH 29.0 28.8 28.3   MCHC 33.4 31.5* 33.4   RDW 12.5 12.3 12.1    258 303   MPV 8.8 9.0 8.5       Recent Labs   Lab 05/08/24  0338 05/09/24  0340 05/10/24  0352    133* 136   K 4.0 4.2 3.9   CO2 22 19* 19*   BUN 12.4 14.7 19.6   CREATININE 0.93 0.89 0.89   CALCIUM 9.5 9.1 8.6   ALBUMIN 3.9 3.7 3.8   ALKPHOS 59 54 49   ALT 9 9 14   AST 11 13 16   BILITOT 0.6 0.6 0.6       Recent Labs   Lab 05/08/24  1543   INR 1.1        Pre-op Assessment    I have reviewed the Patient Summary Reports.    I have reviewed the NPO Status.   I have reviewed the  Medications.     Review of Systems  Anesthesia Hx:  No problems with previous Anesthesia             Denies Family Hx of Anesthesia complications.    Denies Personal Hx of Anesthesia complications.                    Social:  Non-Smoker       Cardiovascular:  Exercise tolerance: good          Denies Angina.   Denies Orthopnea.  Denies PND.    Denies CASTELLANO.    Functional Capacity good / => 4 METS                         Musculoskeletal:  Musculoskeletal Normal                Neurological:  Denies TIA.  Denies CVA.   Headaches     Headaches, confusion, possible focal seizures;  Neuro workup in progress                            Psych:  Psychiatric Normal                    Physical Exam  General: Well nourished, Alert and Oriented    Airway:  Mallampati: III   Mouth Opening: Normal  TM Distance: Normal  Tongue: Normal  Neck ROM: Normal ROM    Dental:  Intact    Chest/Lungs:  Clear to auscultation    Heart:  Rate: Normal  Rhythm: Regular Rhythm  No pretibial edema  No carotid bruits      Anesthesia Plan  Type of Anesthesia, risks & benefits discussed:    Anesthesia Type: Gen Supraglottic Airway  Intra-op Monitoring Plan: Standard ASA Monitors  Post Op Pain Control Plan: multimodal analgesia  Induction:  IV  Airway Plan: Direct, Post-Induction  Informed Consent: Informed consent signed with the Patient and all parties understand the risks and agree with anesthesia plan.  All questions answered. Patient consented to blood products? No  ASA Score: 3  Day of Surgery Review of History & Physical: H&P Update referred to the surgeon/provider.    Ready For Surgery From Anesthesia Perspective.     .

## 2024-05-10 NOTE — NURSING
Pt brought down to MRI with myself, SUSANNA Bailey and patient's parents. Met with anesthesia in MRI in order to get consents signed for general anesthesia with patient's parents. Plan is to go to PACU afterwards. CT of chest/abdomen/pelvis canceled.

## 2024-05-10 NOTE — TRANSFER OF CARE
"Anesthesia Transfer of Care Note    Patient: Luis Traylor    Procedure(s) Performed: Procedure(s) (LRB):  MRI (Magnetic Resonance Imagine) (N/A)    Patient location: PACU    Anesthesia Type: general    Transport from OR: Transported from OR on room air with adequate spontaneous ventilation    Post pain: adequate analgesia    Post assessment: no apparent anesthetic complications and tolerated procedure well    Post vital signs: stable    Level of consciousness: sedated    Nausea/Vomiting: no nausea/vomiting    Complications: none    Transfer of care protocol was followed      Last vitals: Visit Vitals  BP (!) 111/58 (BP Location: Right arm, Patient Position: Lying)   Pulse 81   Temp 36.7 °C (98.1 °F)   Resp 20   Ht 5' 11" (1.803 m)   Wt 77.4 kg (170 lb 10.2 oz)   SpO2 97%   BMI 23.80 kg/m²     "

## 2024-05-10 NOTE — NURSING
Nurses Note -- 4 Eyes      5/9/2024   7:02 PM      Skin assessed during: Q Shift Change      [x] No Altered Skin Integrity Present    [x]Prevention Measures Documented      [] Yes- Altered Skin Integrity Present or Discovered   [] LDA Added if Not in Epic (Describe Wound)   [] New Altered Skin Integrity was Present on Admit and Documented in LDA   [] Wound Image Taken    Wound Care Consulted? No    Attending Nurse:  Tone Galarza RN/Staff Member:   Sabra

## 2024-05-10 NOTE — NURSING
This AM around 0800, patient was working with OT. Pt was awake, alert, a/o4 standing up at the sink and brushing his teeth. While attempting to get patient to use his left arm as well which has been having much more movement than the previous couple of days patient appeared to have absence type seizure activtiy. Pt began staring off to the left, not responding. Pt remained standing and did not have any changes in vital signs during the episode that lasted for about 10-15 seconds. Pt then continued to respond like he was prior to the episode without any recollection of what had happened.  Pt then had another episode about 1.5 hours later with similar symptoms. Dr. Hinojosa and Dr. Katz aware. Seizure meds adjusted. CT head repeated. Attempted MRI again, but after 2mg of ativan patient may have had an absence type seizure on the table, because while on the way down to MRI patient was at baseline a/ox4, cooperative, joking, smiling and after about 5 minutes on the table he began to stop answering/cooperating. No change in vitals. After returning to ICU and ICU physicians notified, decision made to attempt to transfer patient and to get MRI done here with anesthesia if able to do it before transfer. Pt received small amount of water and medications at 0900 and 1100. Ate small piece of hashbrown at 0900 as well. Anesthesia aware; will need to wait before attempting MRI due to recent PO intake. Still waiting for transfer information.

## 2024-05-11 PROBLEM — D72.9: Status: ACTIVE | Noted: 2024-05-11

## 2024-05-11 LAB
ALBUMIN SERPL BCP-MCNC: 3.8 G/DL (ref 3.5–5.2)
ALP SERPL-CCNC: 59 U/L (ref 55–135)
ALT SERPL W/O P-5'-P-CCNC: 28 U/L (ref 10–44)
ANION GAP SERPL CALC-SCNC: 12 MMOL/L (ref 8–16)
AST SERPL-CCNC: 17 U/L (ref 10–40)
BASOPHILS # BLD AUTO: 0.03 K/UL (ref 0–0.2)
BASOPHILS NFR BLD: 0.2 % (ref 0–1.9)
BILIRUB SERPL-MCNC: 0.5 MG/DL (ref 0.1–1)
BUN SERPL-MCNC: 15 MG/DL (ref 6–20)
C-ANCA TITR SER IF: NEGATIVE {TITER}
CALCIUM SERPL-MCNC: 9.4 MG/DL (ref 8.7–10.5)
CHLORIDE SERPL-SCNC: 104 MMOL/L (ref 95–110)
CO2 SERPL-SCNC: 20 MMOL/L (ref 23–29)
CREAT SERPL-MCNC: 1 MG/DL (ref 0.5–1.4)
DIFFERENTIAL METHOD BLD: ABNORMAL
EOSINOPHIL # BLD AUTO: 0 K/UL (ref 0–0.5)
EOSINOPHIL NFR BLD: 0.1 % (ref 0–8)
ERYTHROCYTE [DISTWIDTH] IN BLOOD BY AUTOMATED COUNT: 12 % (ref 11.5–14.5)
EST. GFR  (NO RACE VARIABLE): >60 ML/MIN/1.73 M^2
GLUCOSE SERPL-MCNC: 81 MG/DL (ref 70–110)
HCT VFR BLD AUTO: 45.3 % (ref 40–54)
HGB BLD-MCNC: 15 G/DL (ref 14–18)
HSV1 IGG SERPL QL IA: NEGATIVE
HSV2 IGG SERPL QL IA: POSITIVE
IMM GRANULOCYTES # BLD AUTO: 0.04 K/UL (ref 0–0.04)
IMM GRANULOCYTES NFR BLD AUTO: 0.3 % (ref 0–0.5)
LYMPHOCYTES # BLD AUTO: 2 K/UL (ref 1–4.8)
LYMPHOCYTES NFR BLD: 15.1 % (ref 18–48)
MAGNESIUM SERPL-MCNC: 2.1 MG/DL (ref 1.6–2.6)
MCH RBC QN AUTO: 28.4 PG (ref 27–31)
MCHC RBC AUTO-ENTMCNC: 33.1 G/DL (ref 32–36)
MCV RBC AUTO: 86 FL (ref 82–98)
MONOCYTES # BLD AUTO: 1 K/UL (ref 0.3–1)
MONOCYTES NFR BLD: 7.2 % (ref 4–15)
NEUTROPHILS # BLD AUTO: 10.4 K/UL (ref 1.8–7.7)
NEUTROPHILS NFR BLD: 77.1 % (ref 38–73)
NRBC BLD-RTO: 0 /100 WBC
P-ANCA SER QL IF: NEGATIVE
PHOSPHATE SERPL-MCNC: 3.4 MG/DL (ref 2.7–4.5)
PLATELET # BLD AUTO: 377 K/UL (ref 150–450)
PMV BLD AUTO: 8.8 FL (ref 9.2–12.9)
POCT GLUCOSE: 83 MG/DL (ref 70–110)
POTASSIUM SERPL-SCNC: 3.8 MMOL/L (ref 3.5–5.1)
PROT SERPL-MCNC: 7.7 G/DL (ref 6–8.4)
RBC # BLD AUTO: 5.29 M/UL (ref 4.6–6.2)
SODIUM SERPL-SCNC: 136 MMOL/L (ref 136–145)
WBC # BLD AUTO: 13.5 K/UL (ref 3.9–12.7)

## 2024-05-11 PROCEDURE — 97530 THERAPEUTIC ACTIVITIES: CPT

## 2024-05-11 PROCEDURE — 97162 PT EVAL MOD COMPLEX 30 MIN: CPT

## 2024-05-11 PROCEDURE — 97166 OT EVAL MOD COMPLEX 45 MIN: CPT

## 2024-05-11 PROCEDURE — 85025 COMPLETE CBC W/AUTO DIFF WBC: CPT

## 2024-05-11 PROCEDURE — 84100 ASSAY OF PHOSPHORUS: CPT

## 2024-05-11 PROCEDURE — 11000001 HC ACUTE MED/SURG PRIVATE ROOM

## 2024-05-11 PROCEDURE — 83735 ASSAY OF MAGNESIUM: CPT

## 2024-05-11 PROCEDURE — 25000003 PHARM REV CODE 250

## 2024-05-11 PROCEDURE — 36415 COLL VENOUS BLD VENIPUNCTURE: CPT

## 2024-05-11 PROCEDURE — 63600175 PHARM REV CODE 636 W HCPCS

## 2024-05-11 PROCEDURE — 97116 GAIT TRAINING THERAPY: CPT

## 2024-05-11 PROCEDURE — 80053 COMPREHEN METABOLIC PANEL: CPT

## 2024-05-11 PROCEDURE — 99223 1ST HOSP IP/OBS HIGH 75: CPT | Mod: ,,, | Performed by: INTERNAL MEDICINE

## 2024-05-11 RX ORDER — IBUPROFEN 200 MG
16 TABLET ORAL
Status: DISCONTINUED | OUTPATIENT
Start: 2024-05-11 | End: 2024-05-13 | Stop reason: HOSPADM

## 2024-05-11 RX ORDER — LEVETIRACETAM 750 MG/1
1500 TABLET ORAL 2 TIMES DAILY
Status: DISCONTINUED | OUTPATIENT
Start: 2024-05-11 | End: 2024-05-11

## 2024-05-11 RX ORDER — NALOXONE HCL 0.4 MG/ML
0.02 VIAL (ML) INJECTION
Status: DISCONTINUED | OUTPATIENT
Start: 2024-05-11 | End: 2024-05-13 | Stop reason: HOSPADM

## 2024-05-11 RX ORDER — ENOXAPARIN SODIUM 100 MG/ML
40 INJECTION SUBCUTANEOUS EVERY 24 HOURS
Status: DISCONTINUED | OUTPATIENT
Start: 2024-05-11 | End: 2024-05-13 | Stop reason: HOSPADM

## 2024-05-11 RX ORDER — GLUCAGON 1 MG
1 KIT INJECTION
Status: DISCONTINUED | OUTPATIENT
Start: 2024-05-11 | End: 2024-05-13 | Stop reason: HOSPADM

## 2024-05-11 RX ORDER — LACOSAMIDE 100 MG/1
100 TABLET ORAL EVERY 12 HOURS
Status: DISCONTINUED | OUTPATIENT
Start: 2024-05-11 | End: 2024-05-11

## 2024-05-11 RX ORDER — SODIUM CHLORIDE 0.9 % (FLUSH) 0.9 %
10 SYRINGE (ML) INJECTION EVERY 12 HOURS PRN
Status: DISCONTINUED | OUTPATIENT
Start: 2024-05-11 | End: 2024-05-13 | Stop reason: HOSPADM

## 2024-05-11 RX ORDER — IBUPROFEN 200 MG
24 TABLET ORAL
Status: DISCONTINUED | OUTPATIENT
Start: 2024-05-11 | End: 2024-05-13 | Stop reason: HOSPADM

## 2024-05-11 RX ORDER — VALACYCLOVIR HYDROCHLORIDE 500 MG/1
1000 TABLET, FILM COATED ORAL 3 TIMES DAILY
Status: DISCONTINUED | OUTPATIENT
Start: 2024-05-11 | End: 2024-05-13

## 2024-05-11 RX ORDER — ASPIRIN 81 MG/1
81 TABLET ORAL DAILY
Status: DISCONTINUED | OUTPATIENT
Start: 2024-05-11 | End: 2024-05-13

## 2024-05-11 RX ORDER — ACETAMINOPHEN 325 MG/1
650 TABLET ORAL EVERY 4 HOURS PRN
Status: DISCONTINUED | OUTPATIENT
Start: 2024-05-11 | End: 2024-05-13 | Stop reason: HOSPADM

## 2024-05-11 RX ADMIN — ASPIRIN 81 MG: 81 TABLET, COATED ORAL at 09:05

## 2024-05-11 RX ADMIN — ENOXAPARIN SODIUM 40 MG: 40 INJECTION SUBCUTANEOUS at 06:05

## 2024-05-11 RX ADMIN — LEVETIRACETAM 1500 MG: 750 TABLET, FILM COATED ORAL at 01:05

## 2024-05-11 RX ADMIN — DOXYCYCLINE 100 MG: 100 INJECTION, POWDER, LYOPHILIZED, FOR SOLUTION INTRAVENOUS at 01:05

## 2024-05-11 RX ADMIN — VALACYCLOVIR HYDROCHLORIDE 1000 MG: 500 TABLET, FILM COATED ORAL at 09:05

## 2024-05-11 RX ADMIN — VALACYCLOVIR HYDROCHLORIDE 1000 MG: 500 TABLET, FILM COATED ORAL at 08:05

## 2024-05-11 RX ADMIN — LEVETIRACETAM 1500 MG: 750 TABLET, FILM COATED ORAL at 09:05

## 2024-05-11 RX ADMIN — VALACYCLOVIR HYDROCHLORIDE 1000 MG: 500 TABLET, FILM COATED ORAL at 03:05

## 2024-05-11 RX ADMIN — LACOSAMIDE 100 MG: 100 TABLET, FILM COATED ORAL at 09:05

## 2024-05-11 RX ADMIN — LACOSAMIDE 100 MG: 100 TABLET, FILM COATED ORAL at 12:05

## 2024-05-11 NOTE — DISCHARGE SUMMARY
[facility] Discharge Summary    Admitting Physician: Gareth Mast MD  Attending Physician: vAril att. providers found  Date of Admit: 5/7/2024  Date of Discharge: 5/11/2024    Condition: Stable  DISPOSITION: Discharged to Other Facility          Discharge Diagnoses     Patient Active Problem List   Diagnosis    Left arm weakness    Encephalitis    Encephalopathy       Principal Problem:  Encephalitis    Consultants and Procedures     Consultants:  IP CONSULT TO NEUROLOGY    Procedures:   Procedure(s) (LRB):  MRI (Magnetic Resonance Imagine) (N/A)     Brief Admission History      23-year-old male with no significant past medical history presented to [facility] ED on 05/07/2024 as a transfer from [facility] due to concern for stroke.  Around noon today the patient seemed confused to his girlfriend and weak were which she took him to [facility], on presentation he complain of weakness and numbness in his left arm and leg, he has also been having severe headaches for the last 2 weeks that had worsened today with photophobia accompanied by nausea and vomiting, CT scan head did not show any signs of bleeding, tPA was given to the patient, he started having gingival bleeding long-term through treatment and had to be halted.  CTA head and neck did not show any large vessel occlusion.  Patient denies any dizziness, double vision, chest pain, shortness breath, abdominal pain, diarrhea or constipation.    Hospital Course with Pertinent Findings     Left sided weakness improved over the course of admission, mentation continued to wax and wane, CSF was obtained and showed leukocytosis with lymphocyte predominance. Patient was started on acyclovir, mentation staid the same, with episodes of absence seizures and postictal state afterwards.  The decision was made to transfer the patient to a facility with neurocrtical care for encaphalitis.    Discharge physical exam:  Vitals  BP: 121/68  Temp: 99.9 °F  "(37.7 °C)  Temp Source: Axillary  Pulse: 76  Resp: 18  SpO2: 96 %  Height: 5' 11" (180.3 cm)  Weight: 77.4 kg (170 lb 10.2 oz)    Physical Exam  Cardiovascular:      Rate and Rhythm: Regular rhythm.      Pulses: Normal pulses.      Heart sounds: Normal heart sounds.   Pulmonary:      Effort: Pulmonary effort is normal.      Breath sounds: Normal breath sounds.   Abdominal:      General: Bowel sounds are normal.      Palpations: Abdomen is soft.   Musculoskeletal:      Cervical back: Normal range of motion and neck supple.   Neurological:      Mental Status: He is alert. He is disoriented.      Motor: Weakness present.         TIME SPENT ON DISCHARGE: 60 minutes    Discharge Medications        Medication List      You have not been prescribed any medications.         Discharge Information:     SAMUEL Vieyra MD  Internal Medicine - PGY-1          "

## 2024-05-11 NOTE — NURSING
Nurses Note -- 4 Eyes      5/10/2024   7:22 PM      Skin assessed during: Q Shift Change      [x] No Altered Skin Integrity Present    [x]Prevention Measures Documented      [] Yes- Altered Skin Integrity Present or Discovered   [] LDA Added if Not in Epic (Describe Wound)   [] New Altered Skin Integrity was Present on Admit and Documented in LDA   [] Wound Image Taken    Wound Care Consulted? No    Attending Nurse:  Tone Galarza RN/Staff Member:  Leo

## 2024-05-11 NOTE — ASSESSMENT & PLAN NOTE
Patient w/ initially severe left-sided weakness however weakness improved to 4/5 upon arrival to [facility]. Will consult PT/OT. Additionally with his weakness and difficulty ambulating a bowen catheter was placed at OSH. If he is able to ambulate then will remove bowen.    -- PT/OT consulted  -- Remove bowen as tolerated

## 2024-05-11 NOTE — PT/OT/SLP EVAL
"Physical Therapy Evaluation and Discharge Note    Patient Name:  Luis Traylor   MRN:  [id number]    Recommendations:     Discharge Recommendations: No Therapy Indicated  Discharge Equipment Recommendations: none   Barriers to discharge: None    Assessment:     Luis Traylor is a 23 y.o. male admitted with a medical diagnosis of Encephalopathy. At this time, patient is functioning at their prior level of function and does not require further acute PT services. Patient performed all mobility and transfers with no assistance, no new acute deficits noted.   Patient is safe to ambulate with nursing or significant other, encouraged to sit up in chair when able.         Recent Surgery: * No surgery found *      Plan:     During this hospitalization, patient does not require further acute PT services.  Please re-consult if situation changes.      Subjective     Chief Complaint: not stated  Patient/Family Comments/goals: "I feel good"   Pain/Comfort:  Pain Rating 1: 0/10  Pain Rating Post-Intervention 1: 0/10    Patients cultural, spiritual, Confucianist conflicts given the current situation: no    Living Environment:  Patient lives with his significant other and 7m old son in [facility] with threshold to enter. Tub shower.  Prior to admission, patients level of function was independent.  Equipment used at home: none.  DME owned (not currently used): none.  Upon discharge, patient will have assistance from significant other.    Objective:     Communicated with RN prior to session.  Patient found HOB elevated with bowen catheter upon PT entry to room. SO in room.    General Precautions: Standard, fall    Orthopedic Precautions:N/A   Braces: N/A  Respiratory Status: Room air    Exams:  Cognitive Exam:  Patient is oriented to Person, Place, Time, and Situation  Sensation:    -       Intact  RLE ROM: WNL  RLE Strength: WNL  LLE ROM: WNL  LLE Strength: WNL  MMT: Knee extension, ankle DF/PF      Functional Mobility:  Bed " Mobility:     Scooting: independence  Supine to Sit: stand by assistance  Transfers:     Sit to Stand:  contact guard assistance with no AD, increased time  Gait: patient ambulated 250' total with CGA to supervision, no AD  No gait deviations noted  Supervision for straight path ambulation  CGA for ambulating backwards, eyes closed, stepping over object, visual scanning vertical and horizontal during ambulation, ambulating quickly, ambulating with tandem step, no balance deficits noted during higher level ambulation challenges.   Balance:   Sitting static: independent  Sitting dynamic: independent  Standing static: supervision  Standing dynamic: supervision      AM-PAC 6 CLICK MOBILITY  Total Score:24       Treatment and Education:  Education: patient educated on POC and discharge from therapy, safety with mobility upon return home, discharge recommendations and equipment recommendations. Patient verbalized understanding of all topics.  Educated patient on functional gait assessment with explanation on each item and translation to daily ambulation tasks, highlighted areas areas of strength, explanation that patient is not a falls risk at this time based on performance. Patient verbalized understanding.      AM-PAC 6 CLICK MOBILITY  Total Score:24     Patient left sitting edge of bed with all lines intact, call button in reach, and significant other present.    GOALS:   Multidisciplinary Problems       Physical Therapy Goals       Not on file              Multidisciplinary Problems (Resolved)          Problem: Physical Therapy    Goal Priority Disciplines Outcome Goal Variances Interventions   Physical Therapy Goal   (Resolved)     PT, PT/OT Met     Description: Goals to be met by: 24     Patient will increase functional independence with mobility by performin. Supine to sit with Stand-by Assistance  2. Gait  x 150 feet with Supervision using No Assistive Device.                          History:     No  past medical history on file.    No past surgical history on file.    Time Tracking:     PT Received On: 05/11/24  PT Start Time: 0915     PT Stop Time: 0938  PT Total Time (min): 23 min     Billable Minutes: Evaluation 15 minutes and Gait Training 8 minutes      05/11/2024

## 2024-05-11 NOTE — PT/OT/SLP EVAL
"Occupational Therapy   Evaluation and Discharge Note    Name: Luis Traylor  MRN: [id number]  Admitting Diagnosis: Encephalopathy  Recent Surgery: * No surgery found *      Recommendations:     Discharge Recommendations: No Therapy Indicated  Discharge Equipment Recommendations: none  Barriers to discharge:  None    Assessment:     Luis Traylor is a 23 y.o. male with a medical diagnosis of Encephalopathy. At this time, patient is functioning at their prior level of function and does not require further acute OT services.     Plan:     During this hospitalization, patient does not require further acute OT services.  Please re-consult if situation changes.    Plan of Care Reviewed with: patient, significant other    Subjective     Chief Complaint: discomfort with catheter  Patient/Family Comments/goals: "please don't pull that thing [bowen] out"    Occupational Profile:  Living Environment: Patient lives in a single level home  with his significant other and 7 month old son. There is a threshold to enter. Bathroom setup consists of a tub/shower combo with no AD.    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  Patient found HOB elevated with bowen catheter upon OT entry to room.    General Precautions: Standard, fall  Orthopedic Precautions: N/A  Braces: N/A  Respiratory Status: Room air     Occupational Performance:    Bed Mobility:    Patient completed Scooting/Bridging with independence  Patient completed Supine to Sit with independence    Functional Mobility/Transfers:  Patient completed Sit <> Stand Transfer with contact guard assistance  with  no assistive device   Functional Mobility: Pt " engaging in functional mobility to simulate household/community distances approx 250' with higher level balance challenges added with SBA-supervision and utilizing no AD in order to maximize functional activity tolerance and standing balance required for engagement in occupations of choice.      Activities of Daily Living:  Grooming: stand by assistance to complete oral hygiene in stance at sink  Upper Body Dressing: independence to don gown as robe  Lower Body Dressing: independence to adjust socks    Cognitive/Visual Perceptual:  Cognitive/Psychosocial Skills:     -       Oriented to: Person, Place, Time, and Situation   -       Follows Commands/attention:Follows one-step commands  -       Communication: clear/fluent  -       Safety awareness/insight to disability: impaired   Visual/Perceptual:      -Intact visual fields, tracking, left sided attention    Physical Exam:  Sensation:    -       Intact  Dominant hand:    -       Right  Upper Extremity Range of Motion:     -       Right Upper Extremity: WNL  -       Left Upper Extremity: WNL  Upper Extremity Strength:    -       Right Upper Extremity: WNL  -       Left Upper Extremity: WNL   Strength:    -       Right Upper Extremity: WNL  -       Left Upper Extremity: WNL  Fine Motor Coordination:    -       Intact  Left hand, finger to nose, Right hand, finger to nose, Left hand thumb/finger opposition skills, Right hand thumb/finger opposition skills, Left hand, manipulation of objects, Right hand, manipulation of objects, RLE heel shin, and LLE heel shin    AMPAC 6 Click ADL:  AMPAC Total Score: 22    Treatment & Education:  Pt educated on the following:  - role of OT and OT POC, including DC from OT  - use of call light to request for assistance with all functional mobility to ensure safety during hospital stay  - importance of continued mobilization  - Safe transfer techniques and proper body mechanics for fall prevention and improved independence with  functional transfers   - Importance of OOB activities to increase endurance and tolerance for increased participation in daily ADLs.    - All pt questions within OT scope of practice addressed, pt verbalized understanding.     Patient left sitting edge of bed with all lines intact, call button in reach, RN notified, and significant other present    GOALS:   Multidisciplinary Problems       Occupational Therapy Goals       Not on file              Multidisciplinary Problems (Resolved)          Problem: Occupational Therapy    Goal Priority Disciplines Outcome Interventions   Occupational Therapy Goal   (Resolved)     OT, PT/OT Met    Description: Patient will increase functional independence with ADLs by performing:    UE Dressing with [place].  LE Dressing with [place].  Grooming while standing at sink with Stand-by Assistance.  Supine to sit with [place].                         History:     No past medical history on file.    No past surgical history on file.    Time Tracking:     OT Date of Treatment: 05/11/24  OT Start Time: 0915  OT Stop Time: 0938  OT Total Time (min): 23 min    Billable Minutes:Evaluation 13  Self Care/Home Management 10    5/11/2024

## 2024-05-11 NOTE — SUBJECTIVE & OBJECTIVE
No past medical history on file.    No past surgical history on file.    Review of patient's allergies indicates:  Not on File    Current Facility-Administered Medications on File Prior to Encounter   Medication    [COMPLETED] gadobenate dimeglumine (MULTIHANCE) injection 15 mL    [COMPLETED] iohexoL (OMNIPAQUE 350) injection 50 mL    [COMPLETED] LORazepam injection 2 mg    [DISCONTINUED] acetaminophen tablet 650 mg    [DISCONTINUED] acyclovir 750 mg in dextrose 5 % (D5W) 250 mL IVPB    [DISCONTINUED] albuterol-ipratropium 2.5 mg-0.5 mg/3 mL nebulizer solution 3 mL    [DISCONTINUED] aspirin chewable tablet 81 mg    [DISCONTINUED] dexmedetomidine (PRECEDEX) 400mcg/100mL 0.9% NaCL infusion    [DISCONTINUED] diphenhydrAMINE injection 25 mg    [DISCONTINUED] doxycycline 100 mg in dextrose 5 % in water (D5W) 100 mL IVPB (MB+)    [DISCONTINUED] lacosamide (VIMPAT) 100 mg in sodium chloride 0.9% 100 mL IVPB    [DISCONTINUED] levETIRAcetam in NaCl (iso-os) IVPB 1,000 mg    [DISCONTINUED] levETIRAcetam in NaCl (iso-os) IVPB 1,500 mg    [DISCONTINUED] LIDOcaine (PF) 10 mg/ml (1%) injection 100 mg    [DISCONTINUED] mupirocin 2 % ointment    [DISCONTINUED] ondansetron injection 4 mg    [DISCONTINUED] ondansetron injection 4 mg    [DISCONTINUED] prochlorperazine injection Soln 5 mg    [DISCONTINUED] sodium chloride 0.9% flush 10 mL    [DISCONTINUED] valACYclovir tablet 1,000 mg    [DISCONTINUED] valACYclovir tablet 1,000 mg     No current outpatient medications on file prior to encounter.     Family History    None       Tobacco Use    Smoking status: Not on file    Smokeless tobacco: Not on file   Substance and Sexual Activity    Alcohol use: Not on file    Drug use: Not on file    Sexual activity: Not on file     Review of Systems   Constitutional:  Positive for activity change. Negative for chills, fatigue and fever.   HENT:  Negative for congestion, rhinorrhea and sore throat.    Eyes:  Negative for pain and visual  disturbance.   Respiratory:  Negative for shortness of breath and wheezing.    Cardiovascular:  Negative for chest pain, palpitations and leg swelling.   Gastrointestinal:  Negative for abdominal distention, abdominal pain, constipation, diarrhea, nausea and vomiting.   Genitourinary:  Negative for dysuria.   Musculoskeletal:  Positive for gait problem. Negative for neck pain and neck stiffness.   Skin:  Negative for rash and wound.   Neurological:  Positive for weakness, numbness and headaches. Negative for dizziness, facial asymmetry and speech difficulty.   Psychiatric/Behavioral:  Negative for agitation, behavioral problems and confusion. The patient is not nervous/anxious.      Objective:     Vital Signs (Most Recent):  Temp: 98.4 °F (36.9 °C) (05/10/24 2342)  Pulse: 76 (05/10/24 2342)  Resp: 18 (05/10/24 2342)  BP: 118/66 (05/10/24 2342)  SpO2: 98 % (05/10/24 2342) Vital Signs (24h Range):  Temp:  [97.9 °F (36.6 °C)-101.1 °F (38.4 °C)] 98.4 °F (36.9 °C)  Pulse:  [50-93] 76  Resp:  [9-22] 18  SpO2:  [95 %-100 %] 98 %  BP: (109-147)/(58-93) 118/66        There is no height or weight on file to calculate BMI.     Physical Exam  Constitutional:       General: He is not in acute distress.     Appearance: Normal appearance.   HENT:      Head: Normocephalic and atraumatic.      Right Ear: External ear normal.      Left Ear: External ear normal.      Nose: No congestion or rhinorrhea.      Mouth/Throat:      Mouth: Mucous membranes are moist.      Pharynx: Oropharynx is clear.   Eyes:      General: No scleral icterus.     Extraocular Movements: Extraocular movements intact.      Conjunctiva/sclera: Conjunctivae normal.   Cardiovascular:      Rate and Rhythm: Normal rate and regular rhythm.      Pulses: Normal pulses.      Heart sounds: Normal heart sounds. No murmur heard.  Pulmonary:      Effort: Pulmonary effort is normal.      Breath sounds: Normal breath sounds. No wheezing or rales.   Abdominal:      General:  Abdomen is flat. Bowel sounds are normal. There is no distension.      Palpations: Abdomen is soft.      Tenderness: There is no abdominal tenderness.   Musculoskeletal:         General: No swelling.      Right lower leg: No edema.      Left lower leg: No edema.   Skin:     General: Skin is warm and dry.      Findings: No rash.   Neurological:      Mental Status: He is alert and oriented to person, place, and time. Mental status is at baseline.      Motor: Weakness present.      Comments: Strength 5/5 right-sided, 4/5 left upper and lower extremity  Sensation intact   Psychiatric:         Mood and Affect: Mood normal.         Behavior: Behavior normal.        Significant Labs: All pertinent labs within the past 24 hours have been reviewed.  CBC:   Recent Labs   Lab 05/09/24  0340 05/10/24  0352   WBC 13.88* 14.17*   HGB 13.3* 14.1   HCT 42.2 42.2    303     CMP:   Recent Labs   Lab 05/09/24  0340 05/10/24  0352   * 136   K 4.2 3.9   CO2 19* 19*   BUN 14.7 19.6   CREATININE 0.89 0.89   CALCIUM 9.1 8.6   ALBUMIN 3.7 3.8   BILITOT 0.6 0.6   ALKPHOS 54 49   AST 13 16   ALT 9 14       Significant Imaging: I have reviewed all pertinent imaging results/findings within the past 24 hours.

## 2024-05-11 NOTE — ANESTHESIA POSTPROCEDURE EVALUATION
Anesthesia Post Evaluation    Patient: Luis Traylor    Procedure(s) Performed: Procedure(s) (LRB):  MRI (Magnetic Resonance Imagine) (N/A)    Final Anesthesia Type: general      Patient location during evaluation: PACU  Patient participation: No - Unable to Participate, Coma/Other Inability to Communicate  Level of consciousness: obtunded/minimal responses and awake  Post-procedure vital signs: reviewed and stable  Pain management: adequate  Airway patency: patent    PONV status at discharge: No PONV  Anesthetic complications: no      Cardiovascular status: hemodynamically stable  Respiratory status: spontaneous ventilation, unassisted and nasal cannula    Comments: Neuro status unchanged from preop              Vitals Value Taken Time   /60 05/10/24 2001   Temp 37.7 °C (99.9 °F) 05/10/24 1948   Pulse 91 05/10/24 2018   Resp 19 05/10/24 2018   SpO2 98 % 05/10/24 2018   Vitals shown include unfiled device data.      Event Time   Out of Recovery 05/10/2024 18:40:00         Pain/Warren Score: Pain Rating Prior to Med Admin: 7 (5/10/2024 11:15 AM)  Warren Score: 9 (5/10/2024  7:48 PM)

## 2024-05-11 NOTE — NURSING
Nurses Note -- 4 Eyes      5/10/2024   7:22 PM      Skin assessed during: Q Shift Change      [x] No Altered Skin Integrity Present    [x]Prevention Measures Documented      [] Yes- Altered Skin Integrity Present or Discovered   [] LDA Added if Not in Epic (Describe Wound)   [] New Altered Skin Integrity was Present on Admit and Documented in LDA   [] Wound Image Taken    Wound Care Consulted? No    Attending Nurse:  Tone Galarza RN/Staff Member:  Danielle

## 2024-05-11 NOTE — PLAN OF CARE
Problem: Physical Therapy  Goal: Physical Therapy Goal  Description: Goals to be met by: 24     Patient will increase functional independence with mobility by performin. Supine to sit with Stand-by Assistance  2. Gait  x 150 feet with Supervision using No Assistive Device.     Outcome: Met

## 2024-05-11 NOTE — ASSESSMENT & PLAN NOTE
Luis Traylor is a 23 year old male who presents with stabbing headache for 2 weeks, new left sided weakness, right retro-orbital pain, rhinorrhea and tearing. LP performed May 9th prior to transfer with 400 WBC (lymphocytic predominance) and normal protein and glucose (additional CSF work up negative). The patient was confused at this time and required ICU transfer. On May 10th there was a staring episode concerning for seizure and he was started on antiepileptics, keppra and vimpat. He was treated with valacyclovir and doxycycline. Upon transfer to [facility], the patient is doing much better. He is alert and oriented and there are no focal deficits on exam. 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 cluster headache.    Recommendations:   - Discontinue EEG order given that patient is back to baseline and do not suspect seizures at this time  - Likewise ok to discontinue antiepileptic medications, I.e keppra and vimpat  - Continued treatment of infection per primary and ID, doxycycline and valacyclovir; pending additional viral studies  - If headache were to return consider trial of oxygen per nasal cannula for autonomic cephalgia  - Similarly, consider trial of 75 mg indomethacin   - No additional neurodiagnostics   - Signing off; reach out with additional questions

## 2024-05-11 NOTE — PLAN OF CARE
Problem: Adult Inpatient Plan of Care  Goal: Plan of Care Review  Outcome: Progressing  Goal: Optimal Comfort and Wellbeing  Outcome: Progressing     Problem: Fall Injury Risk  Goal: Absence of Fall and Fall-Related Injury  Outcome: Progressing

## 2024-05-11 NOTE — SUBJECTIVE & OBJECTIVE
No past medical history on file.    No past surgical history on file.    Review of patient's allergies indicates:  Not on File    Medications:  No medications prior to admission.     Antibiotics (From admission, onward)      Start     Stop Route Frequency Ordered    05/11/24 0100  doxycycline 100 mg in dextrose 5 % in water (D5W) 100 mL IVPB (MB+)         -- IV Every 12 hours (non-standard times) 05/11/24 0001          Antifungals (From admission, onward)      None          Antivirals (From admission, onward)          Stop Route Frequency     valACYclovir         -- Oral 3 times daily               There is no immunization history on file for this patient.    Family History    None       Social History     Socioeconomic History    Marital status: Significant Other     Social Determinants of Health     Financial Resource Strain: Low Risk  (5/11/2024)    Overall Financial Resource Strain (CARDIA)     Difficulty of Paying Living Expenses: Not hard at all   Food Insecurity: No Food Insecurity (5/11/2024)    Hunger Vital Sign     Worried About Running Out of Food in the Last Year: Never true     Ran Out of Food in the Last Year: Never true   Transportation Needs: No Transportation Needs (5/11/2024)    TRANSPORTATION NEEDS     Transportation : No   Physical Activity: Sufficiently Active (5/11/2024)    Exercise Vital Sign     Days of Exercise per Week: 5 days     Minutes of Exercise per Session: 60 min   Stress: No Stress Concern Present (5/11/2024)    Citizen of [place] [employer] - Occupational Stress Questionnaire     Feeling of Stress : Only a little   Housing Stability: Low Risk  (5/11/2024)    Housing Stability Vital Sign     Unable to Pay for Housing in the Last Year: No     Homeless in the Last Year: No     Review of Systems   Constitutional:  Positive for fatigue. Negative for chills and fever.   HENT:  Negative for trouble swallowing.    Respiratory:  Negative for cough and shortness of breath.     Gastrointestinal:  Negative for abdominal pain, blood in stool, diarrhea and vomiting.   Genitourinary:  Negative for dysuria and hematuria.   Musculoskeletal:  Negative for arthralgias, joint swelling and neck stiffness.   Neurological:  Positive for weakness and headaches. Negative for syncope.   Psychiatric/Behavioral:  Negative for confusion.      Objective:     Vital Signs (Most Recent):  Temp: 97.7 °F (36.5 °C) (05/11/24 0759)  Pulse: 95 (05/11/24 0759)  Resp: 18 (05/11/24 0759)  BP: 122/66 (05/11/24 0759)  SpO2: 97 % (05/11/24 0759) Vital Signs (24h Range):  Temp:  [97.7 °F (36.5 °C)-99.9 °F (37.7 °C)] 97.7 °F (36.5 °C)  Pulse:  [59-95] 95  Resp:  [13-20] 18  SpO2:  [96 %-99 %] 97 %  BP: (109-127)/(58-74) 122/66     Weight: 77.4 kg (170 lb 10.2 oz)  Body mass index is 23.8 kg/m².    Estimated Creatinine Clearance: 122.4 mL/min (based on SCr of 1 mg/dL).     Physical Exam  Constitutional:       Appearance: Normal appearance.   HENT:      Head: Normocephalic and atraumatic.      Nose: Nose normal.      Mouth/Throat:      Mouth: Mucous membranes are moist.      Pharynx: Oropharynx is clear.   Eyes:      Conjunctiva/sclera: Conjunctivae normal.   Cardiovascular:      Rate and Rhythm: Normal rate and regular rhythm.      Pulses: Normal pulses.      Heart sounds: Normal heart sounds.   Pulmonary:      Effort: Pulmonary effort is normal.      Breath sounds: Normal breath sounds.   Abdominal:      General: Bowel sounds are normal.      Palpations: Abdomen is soft.      Tenderness: There is no guarding or rebound.   Musculoskeletal:         General: No deformity.      Cervical back: Neck supple.   Skin:     General: Skin is warm and dry.      Coloration: Skin is not jaundiced.      Findings: No rash.   Neurological:      Mental Status: He is alert and oriented to person, place, and time. Mental status is at baseline.      Comments: CN II-XII grossly intact  Gross motor and sensation all extremities intact             Significant Labs:   Microbiology Results (last 7 days)       ** No results found for the last 168 hours. **          Recent Lab Results         05/11/24  0628   05/11/24  0606   05/10/24  1522        Albumin 3.8           ALP 59           ALT 28           Anion Gap 12           AST 17           Baso # 0.03           Basophil % 0.2           BILIRUBIN TOTAL 0.5  Comment: For infants and newborns, interpretation of results should be based  on gestational age, weight and in agreement with clinical  observations.    Premature Infant recommended reference ranges:  Up to 24 hours.............<8.0 mg/dL  Up to 48 hours............<12.0 mg/dL  3-5 days..................<15.0 mg/dL  6-29 days.................<15.0 mg/dL             BUN 15           Calcium 9.4           Chloride 104           CO2 20           Creatinine 1.0           CRP     17.60       Differential Method Automated           eGFR >60.0           Eos # 0.0           Eos % 0.1           Glucose 81           Gran # (ANC) 10.4           Gran % 77.1           Hematocrit 45.3           Hemoglobin 15.0           Immature Grans (Abs) 0.04  Comment: Mild elevation in immature granulocytes is non specific and   can be seen in a variety of conditions including stress response,   acute inflammation, trauma and pregnancy. Correlation with other   laboratory and clinical findings is essential.             Immature Granulocytes 0.3           Lymph # 2.0           Lymph % 15.1           Magnesium  2.1           MCH 28.4           MCHC 33.1           MCV 86           Mono # 1.0           Mono % 7.2           MPV 8.8           nRBC 0           Phosphorus Level 3.4           Platelet Count 377           POCT Glucose   83         Potassium 3.8           PROTEIN TOTAL 7.7           RBC 5.29           RDW 12.0           Sed Rate     11       Sodium 136           WBC 13.50                   Significant Imaging: I have reviewed all pertinent imaging results/findings within the  past 24 hours.

## 2024-05-11 NOTE — PROGRESS NOTES
Nurses Note -- 4 Eyes      5/11/2024   3:22 AM      Skin assessed during: Admit      [x] No Altered Skin Integrity Present    [x]Prevention Measures Documented      [] Yes- Altered Skin Integrity Present or Discovered   [] LDA Added if Not in Epic (Describe Wound)   [] New Altered Skin Integrity was Present on Admit and Documented in LDA   [] Wound Image Taken    Wound Care Consulted? No    Attending Nurse:  Eleonora Galarza RN/Staff Member:  Kristy

## 2024-05-11 NOTE — HPI
23 year old male with new onset encephalopathy transferred from OSH to [facility] for further evaluation. Symptoms started with a few weeks of headaches then sudden worsening with L sided weakness, short brief episodes of unresponsiveness without asynchronous motor jerking. Upon further history the patient states he lives in [place], [place] on a farm and occasionally helps take care of several animals including chickens and donkeys, used to be more involved but lately has been busy with work, works in the oil industry, which involves dealing with machinery, being outdoors and operating cranes as well as  work on various instruments.     Denies exotic hobbies such as hunting, fishing, going into caves or camping overnight. Denies known mosquito or insect bites, denies exotic pets, eating undercooked foods, consuming well or city water (consumed only bottled water), using alexander-pot, or sick contacts around him. Denies otorrhea, new focal deficits other than left sided arm weakness, difficulty swallowing, diarrhea, dysuria, rhinorrhea, congestion, productive cough, odontogenic pain, recent procedures prior to admission, and states he received all his childhood vaccines for school. He denies any known history of immunosuppressive states.   States his main symptom was retro-orbital right sided headache and tearing, ongoing for 3-4 weeks prior to sudden worsening which manifested as increased severity of the headache along with LUE weakness and some pain. States after being admitted and receiving medications as well as post LP his headache seems to have improved a lot. Currently LUE weakness also imrpovment, no gross focal deficits on exam noted. He has not gone swimming in any bodies of fresh water. ID was consulted for 23 yoM w/ encephalopathy transferred from [place] w/ jessieeric doxy and acyclovir. LP w/ 400 WBC. Extensive infectious workup at OSH negative thus far. Consult for antibiotics recs

## 2024-05-11 NOTE — NURSING
Report called to [facility] neuro step down unit. Report given to SUSANNA Zepeda.  Transport already put in by [facility] transfer center. MRI scheduled with anesthesia for 1700; OK to forgo MRI and transfer patient if transport arrives prior to MRI being done.

## 2024-05-11 NOTE — ASSESSMENT & PLAN NOTE
Luis Traylor is a 23 year old male who presents with stabbing headache for 2 weeks, new left sided weakness, right retro-orbital pain, rhinorrhea and tearing. LP performed May 9th prior to transfer with 400 WBC (lymphocytic predominance) and normal protein and glucose (additional CSF work up negative). The patient was confused at this time and required ICU transfer. On May 10th there was a staring episode concerning for seizure and he was started on antiepileptics, keppra and vimpat. He was treated with valacyclovir and doxycycline. Upon transfer to [facility], the patient is doing much better. He is alert and oriented and there are no focal deficits on exam. 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 cluster headache.    Recommendations:   - Discontinue EEG order given that patient is back to baseline and do not suspect seizures at this time  - Likewise ok to discontinue antiepileptic medications, I.e keppra and vimpat  - Continued treatment of infection per primary and ID, doxycycline and valacyclovir  - If headache were to return consider trial of oxygen per nasal cannula for autonomic cephalgia  - Similarly, consider trial of 75 mg indomethacin   - No additional neurodiagnostics   - Signing off; reach out with additional questions

## 2024-05-11 NOTE — ASSESSMENT & PLAN NOTE
23 year old male with subacute R sided headaches + tearing, LUE weakness admitted after sudden worsening of symptoms. S/p TPA at OSH on 5/7/2024     Lumbar puncture 5/09 with 400 WBC, 27& neutrophils, 62% lymphocytes, negative ME panel and Crypto CSF. CSF cx with no growth. Symptoms now appear to have improved significantly.     No atypical risk factors besides farm animal exposure (chickens among others), and possible exposure to mosquitos. Atypical for fungal infection (histo and blasto) to last this long without it worsening since he has not received antifungal therapy. Ddx favors more viral etiology vs inflammatory aseptic meningitis. HIV and syphilis studies negative.     E. Chaffeensis and E ewingii can cause fever, rash leukopenia and thrombocytopenia. Also not c/w clinical picture. Symptoms are really not consistent with relapsing fever from Borrelia. Absence of rash argues against RMSF. [place] is not endemic for heartland virus.     HSV would have improved in an immunocompetent host, and west nile cannot be ruled out with a PCR, needs IgM serum or CSF. Enterovirus was negative on ME panel.       Recommendation    -Follow up west Nile virus antibodies, autoimmune encephalitis panel, spotted fever antibodies, Ehrlichia antibodies, and Histoplasma antigen pending    -A repeat LP would only be warranted if his symptoms worsen/ neuropsychiatric changes noted.     -Can stop doxycycline once serologies return negative. Low suspicion for tick-borne illness. This would leave either a viral etiology which would not  or inflammatory etiology,.

## 2024-05-11 NOTE — PLAN OF CARE
[facility] (Hospital)  Initial Discharge Assessment       Primary Care Provider: Terrence Craven NP    Admission Diagnosis: Encephalopathy [G93.40]    Admission Date: 5/10/2024  Expected Discharge Date: 5/15/2024    Transition of Care Barriers: None    Payor: CIGNA / Plan: CIGNA OPEN ACCESS PLUS / Product Type: Commercial /     Extended Emergency Contact Information  Primary Emergency Contact: Mariia Rowland  Mobile Phone: [phone]  Relation: Significant other  Secondary Emergency Contact: Jennifer Polk  Mobile Phone: [phone]  Relation: Mother  Preferred language: English   needed? No    Discharge Plan A: Rehab  Discharge Plan B: Home Health    No Pharmacies Listed    Initial Assessment (most recent)       Adult Discharge Assessment - 05/11/24 0959          Discharge Assessment    Assessment Type Discharge Planning Assessment     Confirmed/corrected address, phone number and insurance Yes     Confirmed Demographics Correct on Facesheet     Source of Information patient;family;health record     Does patient/caregiver understand observation status Yes     Communicated DAVID with patient/caregiver Yes     Reason For Admission Encephalopathy     People in Home significant other;child(christina), dependent     Facility Arrived From: [facility]     Do you expect to return to your current living situation? Yes     Do you have help at home or someone to help you manage your care at home? Yes     Prior to hospitilization cognitive status: Alert/Oriented     Current cognitive status: Alert/Oriented     Equipment Currently Used at Home none     Patient currently being followed by outpatient case management? No     Do you currently have service(s) that help you manage your care at home? No     Do you have prescription coverage? Yes     Coverage Payor: CIGNA - CIGNA OPEN ACCESS PLUS -     Do you have any problems affording any of your prescribed medications? No     Is the patient taking  medications as prescribed? yes     How do you get to doctors appointments? car, drives self;family or friend will provide     Are you on dialysis? No     Do you take coumadin? No     Discharge Plan A Rehab     Discharge Plan B Home Health     DME Needed Upon Discharge  other (see comments)   TBD    Discharge Plan discussed with: Spouse/sig other;Patient     Transition of Care Barriers None        Physical Activity    On average, how many days per week do you engage in moderate to strenuous exercise (like a brisk walk)? 5 days     On average, how many minutes do you engage in exercise at this level? 60 min        Financial Resource Strain    How hard is it for you to pay for the very basics like food, housing, medical care, and heating? Not hard at all        Housing Stability    In the last 12 months, was there a time when you were not able to pay the mortgage or rent on time? No     At any time in the past 12 months, were you homeless or living in a shelter (including now)? No        Transportation Needs    Has the lack of transportation kept you from medical appointments, meetings, work or from getting things needed for daily living? No        Food Insecurity    Within the past 12 months, you worried that your food would run out before you got the money to buy more. Never true     Within the past 12 months, the food you bought just didn't last and you didn't have money to get more. Never true        Stress    Do you feel stress - tense, restless, nervous, or anxious, or unable to sleep at night because your mind is troubled all the time - these days? Only a little        Social Isolation    How often do you feel lonely or isolated from those around you?  Never        Utilities    In the past 12 months has the electric, gas, oil, or water company threatened to shut off services in your home? No        Health Literacy    How often do you need to have someone help you when you read instructions, pamphlets, or other  written material from your doctor or pharmacy? Never                   Spoke to pt. Pt lives at home with his fiance and 6 month old son. Post hospital  stay fiance and mother will be pt support person and pt. has transportation at d/c with family. There have been no hospitalizations within the last 30 days per pt . Verified pt PCP and preferred pharmacy. Pt stated not on Coumadin and is not receiving dialysis. All questions answered regarding case management/ discharge planning , pt verbalized understanding. Discharge booklet with SW contact information given to pt.     Discharge Plan A and Plan B have been determined by review of patient's clinical status, future medical and therapeutic needs, and coverage/benefits for post-acute care in coordination with multidisciplinary team members.      CARLOS Morales  [facility] CM  [phone]

## 2024-05-11 NOTE — HPI
The patient is a 23 year old male with no previous medical history who presents to [facility] as a transfer from [facility] for undifferentiated encephalopathy, concern for encephalitis. The patient initially presented to [place] ED on May 7th w/ acute onset left-sided weakness in the setting of right-sided retro-orbital, stabbing headaches w/ associated rhinorrhea and tearing. He received half-dose thrombolytics (due to onset of gingival bleeding) and was subsequently transferred to [facility]. On arrival to the [place] emergency department he was lethargic and aroused only to noxious stimuli.  It was noted that he had severe headaches for 2 weeks that worsened on the day of presentation with photophobia accompanied by nausea and vomiting.  Imaging of the brain did not show evidence of acute bleeding or large vessel occlusion.  Left-sided weakness persisted.  He was admitted to the ICU for further treatment. The following day he was more alert and talkative. He had lumbar puncture on May 9 that had 400 white blood cells w/ lymphocytic predominance but normal glucose and protein.  Hospital course was complicated by episodes of focal seizure-like activity where he had staring spells and was not interactive. He had an episode on the morning of May 10 described as staring to the left, unresponsiveness, and rigidity without tonic clonic jerking.  Patient reported he was able to hear what was going on around him but was unable to respond.  Mentation returns to baseline after the events, but he has waxing and waning episodes of unresponsiveness.  Keppra dosing was increased during his stay. Upon transfer to [facility] medications include low-dose aspirin, doxycycline/valacyclovir, Vimpat, and Keppra.  Blood cultures from May 7 and May 8 are NGTD. Extensive encephalopathy panel sent, including: West Nile virus, autoimmune encephalitis pane, spotted fever antibodies, Ehrlichia antibodies, and Histoplasma  antigen. Prior to transfer to [facility] he underwent MRI brain with contrast which was unremarkable. On 5/10 evening he was transferred to [facility] for higher level of care and neurology evaluation.    S/p LP on May 9th: CSF had 400 white blood cells (27% neutrophils/62% lymphocytes/10% monocytes), 2 RBC, negative Alea ink, glucose 55, protein 31.3, ME panel all negative cryptococcal antigen negative, CSF culture with no growth.  -serum cryptococcal antigen was negative, syphilis antibody was nonreactive, HIV nonreactive     Upon arrival to [facility] he is hemodynamically stable, afebrile saturating 98% on room air. The patient's fiance is at bedside to contribute to history. They deny recent travel further than [place]. They do spend a significant amount of time outdoors on their 4-kaur w/ ticks/mosquitos, etc. 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 They have a 7 month old son named Rigoberto.

## 2024-05-11 NOTE — HPI
Luis Traylor is a 23 year old male who presents from [facility] with encephalopathy. He initially presented to [place] ED May 7th with left sided weakness, right retro orbital pain, stabbing headaches, and rhinorrhea with tearing. He received half dose of thrombolytics but stopped due to gingival bleeding. At [place] he was only responding to painful stimuli. Of note, he had headaches for 2 weeks with exacerbation on day of admission. Neuroimaging did not reveal sign of acute infarct or bleed. He was stepped up to ICU and the next day reported to be more alert. Lumbar puncture performed May 9th with 400 wbc (lymphocytic predominance) but with normal glucose and protein. On the morning May 10th there was an episode of staring to the left, unable to respond, and increased tone without jerking. He was able to hear people talking to him but could not answer. He continued to have fluctuating episodes while admitted but would soon return to baseline. He was started on a number of medication sincluding keppra, vimpat, valacyclovir, doxycycline, and aspirin (per  note). Of note, additional CSF studies including ME panel and culture was negative.

## 2024-05-11 NOTE — ASSESSMENT & PLAN NOTE
23 year old male with no past medical history presented to [place] ED w/ acute onset left-sided weakness concerning for acute stroke. He received half-dose of thrombolytics (stopped due to gingival bleeding) prior to transfer for [facility] where his course was concerning for episodes of inattention, staring, unable to control his gaze but able to comprehend his environment. He had persistent left-sided weakness which is gradually improving. Differential broad initially and extensive workup sent; significant positives include LP w/ 400 WBC w/ lymphocytic predominance concerning for viral encephalitis. Head imaging including brain MRI w/ contrast on 5/10 without significant findings. Overall his picture is concerning for viral encephalitis, autoimmune encephalitis, seizure disorder, atypical migraine. Upon transfer to [facility] he was started on Keppra and lacosamide, valacyclovir, doxycycline, and aspirin. Transferred to [facility] for higher level of care and neurology evaluation. Will continue present management, consult neurology for additional workup as indicated and consult ID for input regarding likelihood of viral encephalitis.    Lab workup:  Blood:  KWAME < 1:80  Urine Drug Screen negative  HSV 1 & 2 IgG - pending  HIV 1/2 4th Gen - negative  Syphilis antibody w/ reflex PCR - nonreactive  Erlichia Antibody panel - pending  Histoplasma antigen - pending  Spotted Fever Group Antibodies - pending  ANCA - pending  MPO/PR3 Antibodies - pending  Encephalopathy Autoimmune Eval Panel - pending  EBV DNA Quant - Pending     CSF:   (27% neutrophils, 62% lymphocytes, 10% monocytes, 1% eosinophils)  RBC 2  Protein 31.3  Glucose 55  VDRL - pending  Lyme disease serology - pending  Paraneoplastic Autoantibody Evaluation - pending  Meningitis/Encephalitis Panel - Negative  Includes: E. Coli, H. influenzae, Listeria, Neisseria meningitidis, Group B Strep, Strep pneumoniae, CMV, enterovirus, HSV-1, HSV-2, HHV-6, Human  parechovirus, VZV, cryptococcus neoformans.  West Nile Virus PCR - pending  West Nile Virus Antibodies - pending  NMDA-R Ab IgG - pending      -- Neurology consulted; appreciate recs  -- ID consulted; appreciate recs  -- Continue IV Valacyclovir and Doxycycline for now  -- Continue Keppra 1,500 mg BID and Lacosamide 100 mg BID  -- Continue asa 81 mg daily  -- Continuous EEG  -- Seizure precautions

## 2024-05-11 NOTE — H&P
[facility] - Neurosurgery ([facility]  History & Physical    Patient Name: Luis Traylor  MRN: [id number]  Patient Class: IP- Inpatient  Admission Date: 5/10/2024  Attending Physician: Brii Farrar MD   Primary Care Provider: Avril Primary Doctor    Patient information was obtained from patient, spouse/SO, and past medical records.     Subjective:     Principal Problem:Encephalopathy    Chief Complaint: No chief complaint on file.       HPI: The patient is a 23 year old male with no previous medical history who presents to [facility] as a transfer from [facility] for undifferentiated encephalopathy, concern for encephalitis. The patient initially presented to [place] ED on May 7th w/ acute onset left-sided weakness in the setting of right-sided retro-orbital, stabbing headaches w/ associated rhinorrhea and tearing. He received half-dose thrombolytics (due to onset of gingival bleeding) and was subsequently transferred to [facility]. On arrival to the [place] emergency department he was lethargic and aroused only to noxious stimuli.  It was noted that he had severe headaches for 2 weeks that worsened on the day of presentation with photophobia accompanied by nausea and vomiting.  Imaging of the brain did not show evidence of acute bleeding or large vessel occlusion.  Left-sided weakness persisted.  He was admitted to the ICU for further treatment. The following day he was more alert and talkative. He had lumbar puncture on May 9 that had 400 white blood cells w/ lymphocytic predominance but normal glucose and protein.  Hospital course was complicated by episodes of focal seizure-like activity where he had staring spells and was not interactive. He had an episode on the morning of May 10 described as staring to the left, unresponsiveness, and rigidity without tonic clonic jerking.  Patient reported he was able to hear what was going on around him but was unable to  respond.  Mentation returns to baseline after the events, but he has waxing and waning episodes of unresponsiveness.  Keppra dosing was increased during his stay. Upon transfer to [facility] medications include low-dose aspirin, doxycycline/valacyclovir, Vimpat, and Keppra.  Blood cultures from May 7 and May 8 are NGTD. Extensive encephalopathy panel sent, including: West Nile virus, autoimmune encephalitis pane, spotted fever antibodies, Ehrlichia antibodies, and Histoplasma antigen. Prior to transfer to [facility] he underwent MRI brain with contrast which was unremarkable. On 5/10 evening he was transferred to [facility] for higher level of care and neurology evaluation.    S/p LP on May 9th: CSF had 400 white blood cells (27% neutrophils/62% lymphocytes/10% monocytes), 2 RBC, negative Alea ink, glucose 55, protein 31.3, ME panel all negative cryptococcal antigen negative, CSF culture with no growth.  -serum cryptococcal antigen was negative, syphilis antibody was nonreactive, HIV nonreactive     Upon arrival to [facility] he is hemodynamically stable, afebrile saturating 98% on room air. The patient's fiance is at bedside to contribute to history. They deny recent travel further than [place]. They do spend a significant amount of time outdoors on their 4-kaur w/ ticks/mosquitos, etc. 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 They have a 7 month old son named Rigoberto.    No past medical history on file.    No past surgical history on file.    Review of patient's allergies indicates:  Not on File    Current Facility-Administered Medications on File Prior to Encounter   Medication    [COMPLETED] gadobenate dimeglumine (MULTIHANCE) injection 15 mL    [COMPLETED] iohexoL (OMNIPAQUE 350) injection 50 mL    [COMPLETED] LORazepam injection 2 mg    [DISCONTINUED] acetaminophen tablet 650 mg    [DISCONTINUED] acyclovir 750 mg in dextrose 5 % (D5W) 250 mL IVPB    [DISCONTINUED] albuterol-ipratropium 2.5 mg-0.5 mg/3 mL  nebulizer solution 3 mL    [DISCONTINUED] aspirin chewable tablet 81 mg    [DISCONTINUED] dexmedetomidine (PRECEDEX) 400mcg/100mL 0.9% NaCL infusion    [DISCONTINUED] diphenhydrAMINE injection 25 mg    [DISCONTINUED] doxycycline 100 mg in dextrose 5 % in water (D5W) 100 mL IVPB (MB+)    [DISCONTINUED] lacosamide (VIMPAT) 100 mg in sodium chloride 0.9% 100 mL IVPB    [DISCONTINUED] levETIRAcetam in NaCl (iso-os) IVPB 1,000 mg    [DISCONTINUED] levETIRAcetam in NaCl (iso-os) IVPB 1,500 mg    [DISCONTINUED] LIDOcaine (PF) 10 mg/ml (1%) injection 100 mg    [DISCONTINUED] mupirocin 2 % ointment    [DISCONTINUED] ondansetron injection 4 mg    [DISCONTINUED] ondansetron injection 4 mg    [DISCONTINUED] prochlorperazine injection Soln 5 mg    [DISCONTINUED] sodium chloride 0.9% flush 10 mL    [DISCONTINUED] valACYclovir tablet 1,000 mg    [DISCONTINUED] valACYclovir tablet 1,000 mg     No current outpatient medications on file prior to encounter.     Family History    None       Tobacco Use    Smoking status: Not on file    Smokeless tobacco: Not on file   Substance and Sexual Activity    Alcohol use: Not on file    Drug use: Not on file    Sexual activity: Not on file     Review of Systems   Constitutional:  Positive for activity change. Negative for chills, fatigue and fever.   HENT:  Negative for congestion, rhinorrhea and sore throat.    Eyes:  Negative for pain and visual disturbance.   Respiratory:  Negative for shortness of breath and wheezing.    Cardiovascular:  Negative for chest pain, palpitations and leg swelling.   Gastrointestinal:  Negative for abdominal distention, abdominal pain, constipation, diarrhea, nausea and vomiting.   Genitourinary:  Negative for dysuria.   Musculoskeletal:  Positive for gait problem. Negative for neck pain and neck stiffness.   Skin:  Negative for rash and wound.   Neurological:  Positive for weakness, numbness and headaches. Negative for dizziness, facial asymmetry and speech  difficulty.   Psychiatric/Behavioral:  Negative for agitation, behavioral problems and confusion. The patient is not nervous/anxious.      Objective:     Vital Signs (Most Recent):  Temp: 98.4 °F (36.9 °C) (05/10/24 2342)  Pulse: 76 (05/10/24 2342)  Resp: 18 (05/10/24 2342)  BP: 118/66 (05/10/24 2342)  SpO2: 98 % (05/10/24 2342) Vital Signs (24h Range):  Temp:  [97.9 °F (36.6 °C)-101.1 °F (38.4 °C)] 98.4 °F (36.9 °C)  Pulse:  [50-93] 76  Resp:  [9-22] 18  SpO2:  [95 %-100 %] 98 %  BP: (109-147)/(58-93) 118/66        There is no height or weight on file to calculate BMI.     Physical Exam  Constitutional:       General: He is not in acute distress.     Appearance: Normal appearance.   HENT:      Head: Normocephalic and atraumatic.      Right Ear: External ear normal.      Left Ear: External ear normal.      Nose: No congestion or rhinorrhea.      Mouth/Throat:      Mouth: Mucous membranes are moist.      Pharynx: Oropharynx is clear.   Eyes:      General: No scleral icterus.     Extraocular Movements: Extraocular movements intact.      Conjunctiva/sclera: Conjunctivae normal.   Cardiovascular:      Rate and Rhythm: Normal rate and regular rhythm.      Pulses: Normal pulses.      Heart sounds: Normal heart sounds. No murmur heard.  Pulmonary:      Effort: Pulmonary effort is normal.      Breath sounds: Normal breath sounds. No wheezing or rales.   Abdominal:      General: Abdomen is flat. Bowel sounds are normal. There is no distension.      Palpations: Abdomen is soft.      Tenderness: There is no abdominal tenderness.   Musculoskeletal:         General: No swelling.      Right lower leg: No edema.      Left lower leg: No edema.   Skin:     General: Skin is warm and dry.      Findings: No rash.   Neurological:      Mental Status: He is alert and oriented to person, place, and time. Mental status is at baseline.      Motor: Weakness present.      Comments: Strength 5/5 right-sided, 4/5 left upper and lower  extremity  Sensation intact   Psychiatric:         Mood and Affect: Mood normal.         Behavior: Behavior normal.        Significant Labs: All pertinent labs within the past 24 hours have been reviewed.  CBC:   Recent Labs   Lab 05/09/24  0340 05/10/24  0352   WBC 13.88* 14.17*   HGB 13.3* 14.1   HCT 42.2 42.2    303     CMP:   Recent Labs   Lab 05/09/24  0340 05/10/24  0352   * 136   K 4.2 3.9   CO2 19* 19*   BUN 14.7 19.6   CREATININE 0.89 0.89   CALCIUM 9.1 8.6   ALBUMIN 3.7 3.8   BILITOT 0.6 0.6   ALKPHOS 54 49   AST 13 16   ALT 9 14       Significant Imaging: I have reviewed all pertinent imaging results/findings within the past 24 hours.  Assessment/Plan:     Encephalopathy  23 year old male with no past medical history presented to [place] ED w/ acute onset left-sided weakness concerning for acute stroke. He received half-dose of thrombolytics (stopped due to gingival bleeding) prior to transfer for [facility] where his course was concerning for episodes of inattention, staring, unable to control his gaze but able to comprehend his environment. He had persistent left-sided weakness which is gradually improving. Differential broad initially and extensive workup sent; significant positives include LP w/ 400 WBC w/ lymphocytic predominance concerning for viral encephalitis. Head imaging including brain MRI w/ contrast on 5/10 without significant findings. Overall his picture is concerning for viral encephalitis, autoimmune encephalitis, seizure disorder, atypical migraine. Upon transfer to [facility] he was started on Keppra and lacosamide, valacyclovir, doxycycline, and aspirin. Transferred to [facility] for higher level of care and neurology evaluation. Will continue present management, consult neurology for additional workup as indicated and consult ID for input regarding likelihood of viral encephalitis.    Lab workup:  Blood:  KWAME < 1:80  Urine Drug Screen negative  HSV 1 & 2 IgG - pending  HIV 1/2  4th Gen - negative  Syphilis antibody w/ reflex PCR - nonreactive  Erlichia Antibody panel - pending  Histoplasma antigen - pending  Spotted Fever Group Antibodies - pending  ANCA - pending  MPO/PR3 Antibodies - pending  Encephalopathy Autoimmune Eval Panel - pending  EBV DNA Quant - Pending     CSF:   (27% neutrophils, 62% lymphocytes, 10% monocytes, 1% eosinophils)  RBC 2  Protein 31.3  Glucose 55  VDRL - pending  Lyme disease serology - pending  Paraneoplastic Autoantibody Evaluation - pending  Meningitis/Encephalitis Panel - Negative  Includes: E. Coli, H. influenzae, Listeria, Neisseria meningitidis, Group B Strep, Strep pneumoniae, CMV, enterovirus, HSV-1, HSV-2, HHV-6, Human parechovirus, VZV, cryptococcus neoformans.  West Nile Virus PCR - pending  West Nile Virus Antibodies - pending  NMDA-R Ab IgG - pending      -- Neurology consulted; appreciate recs  -- ID consulted; appreciate recs  -- Continue IV Valacyclovir and Doxycycline for now  -- Continue Keppra 1,500 mg BID and Lacosamide 100 mg BID  -- Continue asa 81 mg daily  -- Continuous EEG  -- Seizure precautions    Left arm weakness  Patient w/ initially severe left-sided weakness however weakness improved to 4/5 upon arrival to [facility]. Will consult PT/OT. Additionally with his weakness and difficulty ambulating a bowen catheter was placed at OSH. If he is able to ambulate then will remove bowen.    -- PT/OT consulted  -- Remove bowen as tolerated      VTE Risk Mitigation (From admission, onward)           Ordered     enoxaparin injection 40 mg  Daily         05/11/24 0007     IP VTE HIGH RISK PATIENT  Once         05/11/24 0007     Place sequential compression device  Until discontinued         05/11/24 0007                         Jad Juarez MD  Department of Hospital Medicine  [facility] - Neurosurgery ([facility])

## 2024-05-11 NOTE — CONSULTS
[facility] Rivera - Neurosurgery ([facility])  Infectious Disease  Consult Note    Patient Name: Luis Traylor  MRN: [id number]  Admission Date: 5/10/2024  Hospital Length of Stay: 1 days  Attending Physician: Brii Farrar MD  Primary Care Provider: Terrence Craven NP     Isolation Status: No active isolations    Patient information was obtained from patient and ER records.      Inpatient consult to Infectious Diseases  Consult performed by: Sebastien Castillo MD  Consult ordered by: Jad Juarez MD        Assessment/Plan:     Oncology  Pleocytosis  23 year old male with subacute R sided headaches + tearing, LUE weakness admitted after sudden worsening of symptoms. S/p TPA at OSH on 5/7/2024     Lumbar puncture 5/09 with 400 WBC, 27& neutrophils, 62% lymphocytes, negative ME panel and Crypto CSF. CSF cx with no growth. Symptoms now appear to have improved significantly.     No atypical risk factors besides farm animal exposure (chickens among others), and possible exposure to mosquitos. Atypical for fungal infection (histo and blasto) to last this long without it worsening since he has not received antifungal therapy. Ddx favors more viral etiology vs inflammatory aseptic meningitis. HIV and syphilis studies negative.     E. Chaffeensis and E ewingii can cause fever, rash leukopenia and thrombocytopenia. Also not c/w clinical picture. Symptoms are really not consistent with relapsing fever from Borrelia. Absence of rash argues against RMSF. [place] is not endemic for heartland virus.     HSV would have improved in an immunocompetent host, and west nile cannot be ruled out with a PCR, needs IgM serum or CSF. Enterovirus was negative on ME panel.       Recommendation    -Follow up west Nile virus antibodies, autoimmune encephalitis panel, spotted fever antibodies, Ehrlichia antibodies, and Histoplasma antigen pending    -A repeat LP would only be warranted if his symptoms worsen/ neuropsychiatric changes noted.     -Can  stop doxycycline once serologies return negative. Low suspicion for tick-borne illness. This would leave either a viral etiology which would not  or inflammatory etiology,.           Thank you for your consult. I will follow-up with patient. Please contact us if you have any additional questions.    Sebastien Castillo MD  Infectious Disease  [facility] - Neurosurgery (Hospital)    Subjective:     Principal Problem: Encephalopathy    HPI: 23 year old male with new onset encephalopathy transferred from [facility] to [facility] for further evaluation. Symptoms started with a few weeks of headaches then sudden worsening with L sided weakness, short brief episodes of unresponsiveness without asynchronous motor jerking. 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     Denies exotic hobbies such as hunting, fishing, going into caves or camping overnight. Denies known mosquito or insect bites, denies exotic pets, eating undercooked foods, consuming well or city water (consumed only bottled water), using alexander-pot, or sick contacts around him. Denies otorrhea, new focal deficits other than left sided arm weakness, difficulty swallowing, diarrhea, dysuria, rhinorrhea, congestion, productive cough, odontogenic pain, recent procedures prior to admission, and states he received all his childhood vaccines for school. He denies any known history of immunosuppressive states.   States his main symptom was retro-orbital right sided headache and tearing, ongoing for 3-4 weeks prior to sudden worsening which manifested as increased severity of the headache along with LUE weakness and some pain. States after  being admitted and receiving medications as well as post LP his headache seems to have improved a lot. Currently LUE weakness also imrpovment, no gross focal deficits on exam noted. He has not gone swimming in any bodies of fresh water. ID was consulted for 23 yoM w/ encephalopathy transferred from [facility] w/ emperic doxy and acyclovir. LP w/ 400 WBC. Extensive infectious workup at OSH negative thus far. Consult for antibiotics recs    No past medical history on file.    No past surgical history on file.    Review of patient's allergies indicates:  Not on File    Medications:  No medications prior to admission.     Antibiotics (From admission, onward)      Start     Stop Route Frequency Ordered    05/11/24 0100  doxycycline 100 mg in dextrose 5 % in water (D5W) 100 mL IVPB (MB+)         -- IV Every 12 hours (non-standard times) 05/11/24 0001          Antifungals (From admission, onward)      None          Antivirals (From admission, onward)          Stop Route Frequency     valACYclovir         -- Oral 3 times daily               There is no immunization history on file for this patient.    Family History    None       Social History     Socioeconomic History    Marital status: Significant Other     Social Determinants of Health     Financial Resource Strain: Low Risk  (5/11/2024)    Overall Financial Resource Strain (CARDIA)     Difficulty of Paying Living Expenses: Not hard at all   Food Insecurity: No Food Insecurity (5/11/2024)    Hunger Vital Sign     Worried About Running Out of Food in the Last Year: Never true     Ran Out of Food in the Last Year: Never true   Transportation Needs: No Transportation Needs (5/11/2024)    TRANSPORTATION NEEDS     Transportation : No   Physical Activity: Sufficiently Active (5/11/2024)    Exercise Vital Sign     Days of Exercise per Week: 5 days     Minutes of Exercise per Session: 60 min   Stress: No Stress Concern Present (5/11/2024)    [employer] [place] [employer] - Occupational Stress Questionnaire     Feeling of Stress : Only a little   Housing Stability: Low Risk  (5/11/2024)    Housing Stability Vital Sign     Unable to Pay for Housing in the Last Year: No     Homeless in the Last Year: No     Review of Systems   Constitutional:  Positive for fatigue. Negative for chills and fever.   HENT:  Negative for trouble swallowing.    Respiratory:  Negative for cough and shortness of breath.    Gastrointestinal:  Negative for abdominal pain, blood in stool, diarrhea and vomiting.   Genitourinary:  Negative for dysuria and hematuria.   Musculoskeletal:  Negative for arthralgias, joint swelling and neck stiffness.   Neurological:  Positive for weakness and headaches. Negative for syncope.   Psychiatric/Behavioral:  Negative for confusion.      Objective:     Vital Signs (Most Recent):  Temp: 97.7 °F (36.5 °C) (05/11/24 0759)  Pulse: 95 (05/11/24 0759)  Resp: 18 (05/11/24 0759)  BP: 122/66 (05/11/24 0759)  SpO2: 97 % (05/11/24 0759) Vital Signs (24h Range):  Temp:  [97.7 °F (36.5 °C)-99.9 °F (37.7 °C)] 97.7 °F (36.5 °C)  Pulse:  [59-95] 95  Resp:  [13-20] 18  SpO2:  [96 %-99 %] 97 %  BP: (109-127)/(58-74) 122/66     Weight: 77.4 kg (170 lb 10.2 oz)  Body mass index is 23.8 kg/m².    Estimated Creatinine Clearance: 122.4 mL/min (based on SCr of 1 mg/dL).     Physical Exam  Constitutional:       Appearance: Normal appearance.   HENT:      Head: Normocephalic and atraumatic.      Nose: Nose normal.      Mouth/Throat:      Mouth: Mucous membranes are moist.      Pharynx: Oropharynx is clear.   Eyes:      Conjunctiva/sclera: Conjunctivae normal.   Cardiovascular:      Rate and Rhythm: Normal rate and regular rhythm.      Pulses: Normal pulses.      Heart sounds: Normal heart sounds.   Pulmonary:      Effort: Pulmonary effort is normal.      Breath sounds: Normal breath sounds.   Abdominal:      General: Bowel sounds are normal.      Palpations: Abdomen is soft.      Tenderness:  There is no guarding or rebound.   Musculoskeletal:         General: No deformity.      Cervical back: Neck supple.   Skin:     General: Skin is warm and dry.      Coloration: Skin is not jaundiced.      Findings: No rash.   Neurological:      Mental Status: He is alert and oriented to person, place, and time. Mental status is at baseline.      Comments: CN II-XII grossly intact  Gross motor and sensation all extremities intact            Significant Labs:   Microbiology Results (last 7 days)       ** No results found for the last 168 hours. **          Recent Lab Results         05/11/24  0628   05/11/24  0606   05/10/24  1522        Albumin 3.8           ALP 59           ALT 28           Anion Gap 12           AST 17           Baso # 0.03           Basophil % 0.2           BILIRUBIN TOTAL 0.5  Comment: For infants and newborns, interpretation of results should be based  on gestational age, weight and in agreement with clinical  observations.    Premature Infant recommended reference ranges:  Up to 24 hours.............<8.0 mg/dL  Up to 48 hours............<12.0 mg/dL  3-5 days..................<15.0 mg/dL  6-29 days.................<15.0 mg/dL             BUN 15           Calcium 9.4           Chloride 104           CO2 20           Creatinine 1.0           CRP     17.60       Differential Method Automated           eGFR >60.0           Eos # 0.0           Eos % 0.1           Glucose 81           Gran # (ANC) 10.4           Gran % 77.1           Hematocrit 45.3           Hemoglobin 15.0           Immature Grans (Abs) 0.04  Comment: Mild elevation in immature granulocytes is non specific and   can be seen in a variety of conditions including stress response,   acute inflammation, trauma and pregnancy. Correlation with other   laboratory and clinical findings is essential.             Immature Granulocytes 0.3           Lymph # 2.0           Lymph % 15.1           Magnesium  2.1           MCH 28.4           MCHC  33.1           MCV 86           Mono # 1.0           Mono % 7.2           MPV 8.8           nRBC 0           Phosphorus Level 3.4           Platelet Count 377           POCT Glucose   83         Potassium 3.8           PROTEIN TOTAL 7.7           RBC 5.29           RDW 12.0           Sed Rate     11       Sodium 136           WBC 13.50                   Significant Imaging: I have reviewed all pertinent imaging results/findings within the past 24 hours.

## 2024-05-11 NOTE — PLAN OF CARE
OT eval completed, POC established and met. Pt is not in need of skilled acute OT at this time, DC OT. Please re-consult if functional status changes.    Problem: Occupational Therapy  Goal: Occupational Therapy Goal  Description: Patient will increase functional independence with ADLs by performing:    UE Dressing with [place].  LE Dressing with [place].  Grooming while standing at sink with Stand-by Assistance.  Supine to sit with [place].    Outcome: Met

## 2024-05-11 NOTE — CONSULTS
[facility] - Neurosurgery ([facility])  Neurology  Consult Note    Patient Name: Luis Traylor  MRN: [id number]  Admission Date: 5/10/2024  Hospital Length of Stay: 1 days  Code Status: Full Code   Attending Provider: Brii Farrar MD   Consulting Provider: Angel Luke MD  Primary Care Physician: Terrence Craven NP  Principal Problem:Encephalopathy    Consults   Subjective:     Chief Complaint:  Encephalopathy, headache, meningitis      HPI:   Luis Traylor is a 23 year old male who presents from [facility] with encephalopathy. He initially presented to [place] ED May 7th with left sided weakness, right retro orbital pain, stabbing headaches, and rhinorrhea with tearing. He received half dose of thrombolytics but stopped due to gingival bleeding. At [place] he was only responding to painful stimuli. Of note, he had headaches for 2 weeks with exacerbation on day of admission. Neuroimaging did not reveal sign of acute infarct or bleed. He was stepped up to ICU and the next day reported to be more alert. Lumbar puncture performed May 9th with 400 wbc (lymphocytic predominance) but with normal glucose and protein. On the morning May 10th there was an episode of staring to the left, unable to respond, and increased tone without jerking. He was able to hear people talking to him but could not answer. He continued to have fluctuating episodes while admitted but would soon return to baseline. He was started on a number of medication sincluding keppra, vimpat, valacyclovir, doxycycline, and aspirin (per  note). Of note, additional CSF studies including ME panel and culture was negative.        No past medical history on file.    No past surgical history on file.    Review of patient's allergies indicates:  Not on File    Current Neurological Medications: see below    Current Facility-Administered Medications on File Prior to Encounter   Medication    [COMPLETED] gadobenate dimeglumine (MULTIHANCE)  injection 15 mL    [DISCONTINUED] acetaminophen tablet 650 mg    [DISCONTINUED] albuterol-ipratropium 2.5 mg-0.5 mg/3 mL nebulizer solution 3 mL    [DISCONTINUED] aspirin chewable tablet 81 mg    [DISCONTINUED] dexmedetomidine (PRECEDEX) 400mcg/100mL 0.9% NaCL infusion    [DISCONTINUED] diphenhydrAMINE injection 25 mg    [DISCONTINUED] doxycycline 100 mg in dextrose 5 % in water (D5W) 100 mL IVPB (MB+)    [DISCONTINUED] lacosamide (VIMPAT) 100 mg in sodium chloride 0.9% 100 mL IVPB    [DISCONTINUED] levETIRAcetam in NaCl (iso-os) IVPB 1,500 mg    [DISCONTINUED] LIDOcaine (PF) 10 mg/ml (1%) injection 100 mg    [DISCONTINUED] mupirocin 2 % ointment    [DISCONTINUED] ondansetron injection 4 mg    [DISCONTINUED] ondansetron injection 4 mg    [DISCONTINUED] prochlorperazine injection Soln 5 mg    [DISCONTINUED] sodium chloride 0.9% flush 10 mL    [DISCONTINUED] valACYclovir tablet 1,000 mg     No current outpatient medications on file prior to encounter.     Family History    None       Tobacco Use    Smoking status: Not on file    Smokeless tobacco: Not on file   Substance and Sexual Activity    Alcohol use: Not on file    Drug use: Not on file    Sexual activity: Not on file     Review of Systems   Constitutional:  Negative for chills and fever.   HENT:  Negative for rhinorrhea and sneezing.    Respiratory:  Negative for cough and shortness of breath.    Cardiovascular:  Negative for chest pain and leg swelling.   Gastrointestinal:  Negative for abdominal pain and nausea.   Genitourinary:  Negative for dysuria and hematuria.   Musculoskeletal:  Negative for arthralgias and myalgias.   Neurological:  Positive for headaches. Negative for tremors.   Psychiatric/Behavioral:  Negative for agitation and confusion.      Objective:     Vital Signs (Most Recent):  Temp: 97.7 °F (36.5 °C) (05/11/24 0759)  Pulse: 95 (05/11/24 0759)  Resp: 18 (05/11/24 0759)  BP: 122/66 (05/11/24 0759)  SpO2: 97 % (05/11/24 0759) Vital Signs (24h  Range):  Temp:  [97.7 °F (36.5 °C)-99.9 °F (37.7 °C)] 97.7 °F (36.5 °C)  Pulse:  [59-95] 95  Resp:  [16-20] 18  SpO2:  [96 %-99 %] 97 %  BP: (109-127)/(58-74) 122/66     Weight: 77.4 kg (170 lb 10.2 oz)  Body mass index is 23.8 kg/m².     Physical Exam  Vitals reviewed.   Constitutional:       General: He is not in acute distress.  HENT:      Head: Normocephalic and atraumatic.      Nose: Nose normal. No rhinorrhea.   Eyes:      Extraocular Movements: Extraocular movements intact.      Conjunctiva/sclera: Conjunctivae normal.   Cardiovascular:      Rate and Rhythm: Normal rate and regular rhythm.   Pulmonary:      Effort: Pulmonary effort is normal. No respiratory distress.   Abdominal:      General: There is no distension.      Palpations: Abdomen is soft.   Musculoskeletal:      Right lower leg: No edema.      Left lower leg: No edema.   Skin:     General: Skin is warm and dry.      Capillary Refill: Capillary refill takes less than 2 seconds.   Neurological:      Mental Status: He is alert and oriented to person, place, and time.      Cranial Nerves: Cranial nerves 2-12 are intact.      Coordination: Finger-Nose-Finger Test normal.      Deep Tendon Reflexes:      Reflex Scores:       Tricep reflexes are 2+ on the right side and 2+ on the left side.       Bicep reflexes are 2+ on the right side and 2+ on the left side.       Brachioradialis reflexes are 2+ on the right side and 2+ on the left side.       Patellar reflexes are 2+ on the right side and 2+ on the left side.         NEUROLOGICAL EXAMINATION:     MENTAL STATUS   Oriented to person, place, and time.     CRANIAL NERVES   Cranial nerves II through XII intact.     MOTOR EXAM   Overall muscle tone: normal    Strength   Right deltoid: 5/5  Left deltoid: 5/5  Right biceps: 5/5  Left biceps: 5/5  Right triceps: 5/5  Left triceps: 5/5  Right anterior tibial: 5/5  Left anterior tibial: 5/5  Right posterior tibial: 5/5  Left posterior tibial: 5/5  Right peroneal:  5/5  Left peroneal: 5/5    REFLEXES     Reflexes   Right brachioradialis: 2+  Left brachioradialis: 2+  Right biceps: 2+  Left biceps: 2+  Right triceps: 2+  Left triceps: 2+  Right patellar: 2+  Left patellar: 2+    SENSORY EXAM   Light touch normal.     GAIT AND COORDINATION      Coordination   Finger to nose coordination: normal      Significant Labs: CBC:   Recent Labs   Lab 05/10/24  0352 05/11/24 0628   WBC 14.17* 13.50*   HGB 14.1 15.0   HCT 42.2 45.3    377     CMP:   Recent Labs   Lab 05/10/24  0352 05/11/24 0628   GLU  --  81    136   K 3.9 3.8   CL  --  104   CO2 19* 20*   BUN 19.6 15   CREATININE 0.89 1.0   CALCIUM 8.6 9.4   MG  --  2.1   PROT  --  7.7   ALBUMIN 3.8 3.8   BILITOT 0.6 0.5   ALKPHOS 49 59   AST 16 17   ALT 14 28   ANIONGAP  --  12       Significant Imaging: I have reviewed all pertinent imaging results/findings within the past 24 hours.  Assessment and Plan:     * Encephalopathy  Luis Traylor is a 23 year old male who presents with stabbing headache for 2 weeks, new left sided weakness, right retro-orbital pain, rhinorrhea and tearing. LP performed May 9th prior to transfer with 400 WBC (lymphocytic predominance) and normal protein and glucose (additional CSF work up negative). The patient was confused at this time and required ICU transfer. On May 10th there was a staring episode concerning for seizure and he was started on antiepileptics, keppra and vimpat. He was treated with valacyclovir and doxycycline. Upon transfer to [facility] main, the patient is doing much better. He is alert and oriented and there are no focal deficits on exam. 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 cluster headache.    Recommendations:   - Discontinue EEG order given that patient is back to baseline and do not suspect seizures at  this time  - Likewise ok to discontinue antiepileptic medications, I.e keppra and vimpat  - Continued treatment of infection per primary and ID, doxycycline and valacyclovir; pending additional viral studies  - If headache were to return consider trial of oxygen per nasal cannula for autonomic cephalgia  - Similarly, consider trial of 75 mg indomethacin   - No additional neurodiagnostics   - Signing off; reach out with additional questions        VTE Risk Mitigation (From admission, onward)           Ordered     enoxaparin injection 40 mg  Daily         05/11/24 0007     IP VTE HIGH RISK PATIENT  Once         05/11/24 0007     Place sequential compression device  Until discontinued         05/11/24 0007                    Thank you for your consult. I will sign off. Please contact us if you have any additional questions.    Angel Luke MD  Neurology  [facility] - Neurosurgery ([facility])

## 2024-05-11 NOTE — SUBJECTIVE & OBJECTIVE
No past medical history on file.    No past surgical history on file.    Review of patient's allergies indicates:  Not on File    Current Neurological Medications: see below    Current Facility-Administered Medications on File Prior to Encounter   Medication    [COMPLETED] gadobenate dimeglumine (MULTIHANCE) injection 15 mL    [DISCONTINUED] acetaminophen tablet 650 mg    [DISCONTINUED] albuterol-ipratropium 2.5 mg-0.5 mg/3 mL nebulizer solution 3 mL    [DISCONTINUED] aspirin chewable tablet 81 mg    [DISCONTINUED] dexmedetomidine (PRECEDEX) 400mcg/100mL 0.9% NaCL infusion    [DISCONTINUED] diphenhydrAMINE injection 25 mg    [DISCONTINUED] doxycycline 100 mg in dextrose 5 % in water (D5W) 100 mL IVPB (MB+)    [DISCONTINUED] lacosamide (VIMPAT) 100 mg in sodium chloride 0.9% 100 mL IVPB    [DISCONTINUED] levETIRAcetam in NaCl (iso-os) IVPB 1,500 mg    [DISCONTINUED] LIDOcaine (PF) 10 mg/ml (1%) injection 100 mg    [DISCONTINUED] mupirocin 2 % ointment    [DISCONTINUED] ondansetron injection 4 mg    [DISCONTINUED] ondansetron injection 4 mg    [DISCONTINUED] prochlorperazine injection Soln 5 mg    [DISCONTINUED] sodium chloride 0.9% flush 10 mL    [DISCONTINUED] valACYclovir tablet 1,000 mg     No current outpatient medications on file prior to encounter.     Family History    None       Tobacco Use    Smoking status: Not on file    Smokeless tobacco: Not on file   Substance and Sexual Activity    Alcohol use: Not on file    Drug use: Not on file    Sexual activity: Not on file     Review of Systems   Constitutional:  Negative for chills and fever.   HENT:  Negative for rhinorrhea and sneezing.    Respiratory:  Negative for cough and shortness of breath.    Cardiovascular:  Negative for chest pain and leg swelling.   Gastrointestinal:  Negative for abdominal pain and nausea.   Genitourinary:  Negative for dysuria and hematuria.   Musculoskeletal:  Negative for arthralgias and myalgias.   Neurological:  Positive for  headaches. Negative for tremors.   Psychiatric/Behavioral:  Negative for agitation and confusion.      Objective:     Vital Signs (Most Recent):  Temp: 97.7 °F (36.5 °C) (05/11/24 0759)  Pulse: 95 (05/11/24 0759)  Resp: 18 (05/11/24 0759)  BP: 122/66 (05/11/24 0759)  SpO2: 97 % (05/11/24 0759) Vital Signs (24h Range):  Temp:  [97.7 °F (36.5 °C)-99.9 °F (37.7 °C)] 97.7 °F (36.5 °C)  Pulse:  [59-95] 95  Resp:  [16-20] 18  SpO2:  [96 %-99 %] 97 %  BP: (109-127)/(58-74) 122/66     Weight: 77.4 kg (170 lb 10.2 oz)  Body mass index is 23.8 kg/m².     Physical Exam  Vitals reviewed.   Constitutional:       General: He is not in acute distress.  HENT:      Head: Normocephalic and atraumatic.      Nose: Nose normal. No rhinorrhea.   Eyes:      Extraocular Movements: Extraocular movements intact.      Conjunctiva/sclera: Conjunctivae normal.   Cardiovascular:      Rate and Rhythm: Normal rate and regular rhythm.   Pulmonary:      Effort: Pulmonary effort is normal. No respiratory distress.   Abdominal:      General: There is no distension.      Palpations: Abdomen is soft.   Musculoskeletal:      Right lower leg: No edema.      Left lower leg: No edema.   Skin:     General: Skin is warm and dry.      Capillary Refill: Capillary refill takes less than 2 seconds.   Neurological:      Mental Status: He is alert and oriented to person, place, and time.      Cranial Nerves: Cranial nerves 2-12 are intact.      Coordination: Finger-Nose-Finger Test normal.      Deep Tendon Reflexes:      Reflex Scores:       Tricep reflexes are 2+ on the right side and 2+ on the left side.       Bicep reflexes are 2+ on the right side and 2+ on the left side.       Brachioradialis reflexes are 2+ on the right side and 2+ on the left side.       Patellar reflexes are 2+ on the right side and 2+ on the left side.         NEUROLOGICAL EXAMINATION:     MENTAL STATUS   Oriented to person, place, and time.     CRANIAL NERVES   Cranial nerves II through  XII intact.     MOTOR EXAM   Overall muscle tone: normal    Strength   Right deltoid: 5/5  Left deltoid: 5/5  Right biceps: 5/5  Left biceps: 5/5  Right triceps: 5/5  Left triceps: 5/5  Right anterior tibial: 5/5  Left anterior tibial: 5/5  Right posterior tibial: 5/5  Left posterior tibial: 5/5  Right peroneal: 5/5  Left peroneal: 5/5    REFLEXES     Reflexes   Right brachioradialis: 2+  Left brachioradialis: 2+  Right biceps: 2+  Left biceps: 2+  Right triceps: 2+  Left triceps: 2+  Right patellar: 2+  Left patellar: 2+    SENSORY EXAM   Light touch normal.     GAIT AND COORDINATION      Coordination   Finger to nose coordination: normal      Significant Labs: CBC:   Recent Labs   Lab 05/10/24  0352 05/11/24  0628   WBC 14.17* 13.50*   HGB 14.1 15.0   HCT 42.2 45.3    377     CMP:   Recent Labs   Lab 05/10/24  0352 05/11/24 0628   GLU  --  81    136   K 3.9 3.8   CL  --  104   CO2 19* 20*   BUN 19.6 15   CREATININE 0.89 1.0   CALCIUM 8.6 9.4   MG  --  2.1   PROT  --  7.7   ALBUMIN 3.8 3.8   BILITOT 0.6 0.5   ALKPHOS 49 59   AST 16 17   ALT 14 28   ANIONGAP  --  12       Significant Imaging: I have reviewed all pertinent imaging results/findings within the past 24 hours.

## 2024-05-12 PROBLEM — G04.90 ENCEPHALITIS: Status: ACTIVE | Noted: 2024-05-10

## 2024-05-12 LAB
A PHAGOCYTOPH IGG TITR SER IF: NORMAL TITER
ALBUMIN SERPL BCP-MCNC: 3.9 G/DL (ref 3.5–5.2)
ALP SERPL-CCNC: 56 U/L (ref 55–135)
ALT SERPL W/O P-5'-P-CCNC: 20 U/L (ref 10–44)
ANION GAP SERPL CALC-SCNC: 11 MMOL/L (ref 8–16)
AST SERPL-CCNC: 12 U/L (ref 10–40)
BASOPHILS # BLD AUTO: 0.06 K/UL (ref 0–0.2)
BASOPHILS NFR BLD: 0.4 % (ref 0–1.9)
BILIRUB SERPL-MCNC: 0.5 MG/DL (ref 0.1–1)
BUN SERPL-MCNC: 17 MG/DL (ref 6–20)
CALCIUM SERPL-MCNC: 9.4 MG/DL (ref 8.7–10.5)
CHLORIDE SERPL-SCNC: 107 MMOL/L (ref 95–110)
CO2 SERPL-SCNC: 19 MMOL/L (ref 23–29)
CREAT SERPL-MCNC: 1 MG/DL (ref 0.5–1.4)
DIFFERENTIAL METHOD BLD: ABNORMAL
E CHAFFEENSIS IGG TITR SER IF: NORMAL TITER
EOSINOPHIL # BLD AUTO: 0 K/UL (ref 0–0.5)
EOSINOPHIL NFR BLD: 0.1 % (ref 0–8)
ERYTHROCYTE [DISTWIDTH] IN BLOOD BY AUTOMATED COUNT: 12.2 % (ref 11.5–14.5)
EST. GFR  (NO RACE VARIABLE): >60 ML/MIN/1.73 M^2
GLUCOSE SERPL-MCNC: 121 MG/DL (ref 70–110)
HCT VFR BLD AUTO: 43.2 % (ref 40–54)
HGB BLD-MCNC: 14.9 G/DL (ref 14–18)
IMM GRANULOCYTES # BLD AUTO: 0.05 K/UL (ref 0–0.04)
IMM GRANULOCYTES NFR BLD AUTO: 0.3 % (ref 0–0.5)
LACTATE SERPL-SCNC: 2 MMOL/L (ref 0.5–2.2)
LYMPHOCYTES # BLD AUTO: 1.7 K/UL (ref 1–4.8)
LYMPHOCYTES NFR BLD: 11.3 % (ref 18–48)
MAGNESIUM SERPL-MCNC: 1.9 MG/DL (ref 1.6–2.6)
MCH RBC QN AUTO: 29.1 PG (ref 27–31)
MCHC RBC AUTO-ENTMCNC: 34.5 G/DL (ref 32–36)
MCV RBC AUTO: 84 FL (ref 82–98)
MONOCYTES # BLD AUTO: 1.1 K/UL (ref 0.3–1)
MONOCYTES NFR BLD: 7.4 % (ref 4–15)
NEUTROPHILS # BLD AUTO: 12 K/UL (ref 1.8–7.7)
NEUTROPHILS NFR BLD: 80.5 % (ref 38–73)
NMDAR IGG TITR CSF IF: NORMAL {TITER}
NRBC BLD-RTO: 0 /100 WBC
PHOSPHATE SERPL-MCNC: 2.8 MG/DL (ref 2.7–4.5)
PLATELET # BLD AUTO: 388 K/UL (ref 150–450)
PMV BLD AUTO: 8.8 FL (ref 9.2–12.9)
POTASSIUM SERPL-SCNC: 4 MMOL/L (ref 3.5–5.1)
PROT SERPL-MCNC: 7.7 G/DL (ref 6–8.4)
RBC # BLD AUTO: 5.12 M/UL (ref 4.6–6.2)
RICK SF IGG TITR SER IF: NORMAL {TITER}
RICK SF IGM TITR SER IF: NORMAL {TITER}
SODIUM SERPL-SCNC: 137 MMOL/L (ref 136–145)
WBC # BLD AUTO: 14.86 K/UL (ref 3.9–12.7)

## 2024-05-12 PROCEDURE — 83735 ASSAY OF MAGNESIUM: CPT

## 2024-05-12 PROCEDURE — 86789 WEST NILE VIRUS ANTIBODY: CPT

## 2024-05-12 PROCEDURE — 11000001 HC ACUTE MED/SURG PRIVATE ROOM

## 2024-05-12 PROCEDURE — 25500020 PHARM REV CODE 255: Performed by: STUDENT IN AN ORGANIZED HEALTH CARE EDUCATION/TRAINING PROGRAM

## 2024-05-12 PROCEDURE — 99233 SBSQ HOSP IP/OBS HIGH 50: CPT | Mod: ,,, | Performed by: INTERNAL MEDICINE

## 2024-05-12 PROCEDURE — 80053 COMPREHEN METABOLIC PANEL: CPT

## 2024-05-12 PROCEDURE — 99223 1ST HOSP IP/OBS HIGH 75: CPT | Mod: ,,, | Performed by: PSYCHIATRY & NEUROLOGY

## 2024-05-12 PROCEDURE — 84100 ASSAY OF PHOSPHORUS: CPT

## 2024-05-12 PROCEDURE — 83605 ASSAY OF LACTIC ACID: CPT | Performed by: STUDENT IN AN ORGANIZED HEALTH CARE EDUCATION/TRAINING PROGRAM

## 2024-05-12 PROCEDURE — 36415 COLL VENOUS BLD VENIPUNCTURE: CPT

## 2024-05-12 PROCEDURE — 85025 COMPLETE CBC W/AUTO DIFF WBC: CPT

## 2024-05-12 PROCEDURE — 25000003 PHARM REV CODE 250

## 2024-05-12 RX ORDER — LORAZEPAM 2 MG/ML
2 INJECTION INTRAMUSCULAR
Status: DISCONTINUED | OUTPATIENT
Start: 2024-05-12 | End: 2024-05-13 | Stop reason: HOSPADM

## 2024-05-12 RX ADMIN — VALACYCLOVIR HYDROCHLORIDE 1000 MG: 500 TABLET, FILM COATED ORAL at 09:05

## 2024-05-12 RX ADMIN — ASPIRIN 81 MG: 81 TABLET, COATED ORAL at 08:05

## 2024-05-12 RX ADMIN — IOHEXOL 75 ML: 350 INJECTION, SOLUTION INTRAVENOUS at 03:05

## 2024-05-12 RX ADMIN — DOXYCYCLINE 100 MG: 100 INJECTION, POWDER, LYOPHILIZED, FOR SOLUTION INTRAVENOUS at 12:05

## 2024-05-12 RX ADMIN — VALACYCLOVIR HYDROCHLORIDE 1000 MG: 500 TABLET, FILM COATED ORAL at 03:05

## 2024-05-12 RX ADMIN — DOXYCYCLINE 100 MG: 100 INJECTION, POWDER, LYOPHILIZED, FOR SOLUTION INTRAVENOUS at 01:05

## 2024-05-12 RX ADMIN — VALACYCLOVIR HYDROCHLORIDE 1000 MG: 500 TABLET, FILM COATED ORAL at 08:05

## 2024-05-12 NOTE — SUBJECTIVE & OBJECTIVE
Interval History: NAEON.     Review of Systems   Neurological:  Positive for headaches (mild, bifrontal). Negative for seizures, facial asymmetry, speech difficulty and weakness.   All other systems reviewed and are negative.    Objective:     Vital Signs (Most Recent):  Temp: 98.3 °F (36.8 °C) (05/12/24 1153)  Pulse: 95 (05/12/24 1153)  Resp: 18 (05/12/24 1153)  BP: 120/69 (05/12/24 1153)  SpO2: 99 % (05/12/24 1153) Vital Signs (24h Range):  Temp:  [98.1 °F (36.7 °C)-98.5 °F (36.9 °C)] 98.3 °F (36.8 °C)  Pulse:  [] 95  Resp:  [16-18] 18  SpO2:  [97 %-100 %] 99 %  BP: (119-123)/(67-73) 120/69     Weight: 77.4 kg (170 lb 10.2 oz)  Body mass index is 23.8 kg/m².  No intake or output data in the 24 hours ending 05/12/24 1203      Physical Exam  Vitals and nursing note reviewed.   Constitutional:       Appearance: Normal appearance. He is normal weight.   HENT:      Head: Normocephalic and atraumatic.   Cardiovascular:      Rate and Rhythm: Normal rate and regular rhythm.   Pulmonary:      Effort: Pulmonary effort is normal. No respiratory distress.   Abdominal:      General: Abdomen is flat. There is no distension.      Palpations: Abdomen is soft.      Tenderness: There is no abdominal tenderness.   Musculoskeletal:      Right lower leg: No edema.      Left lower leg: No edema.   Skin:     General: Skin is warm.   Neurological:      General: No focal deficit present.      Mental Status: He is alert and oriented to person, place, and time. Mental status is at baseline.      Cranial Nerves: No cranial nerve deficit.      Motor: No weakness.             Significant Labs: All pertinent labs within the past 24 hours have been reviewed.    Significant Imaging: I have reviewed all pertinent imaging results/findings within the past 24 hours.

## 2024-05-12 NOTE — PROGRESS NOTES
[facility] ([facility])  Infectious Disease  Progress Note    Patient Name: Luis Traylor  MRN: [id number]  Admission Date: 5/10/2024  Length of Stay: 2 days  Attending Physician: Brii Farrar MD  Primary Care Provider: Terrence Craven NP    Isolation Status: No active isolations  Assessment/Plan:      ID  * Encephalitis  I have reviewed hospital notes from   service and other specialty providers. I have also reviewed CBC, CMP/BMP,  cultures and imaging with my interpretation as documented.     Encephalitis based on headache, changes in mentation, seizures, and CSF findings. Infectious work up so far unrevealing however some studies still in process. Suspect viral vs inflammatory process.  Can continue doxycycline and valacyclovir for now.  If tick borne associated infections panels are negative then stop doxycycline.   Would recommend discontinuing valacyclovir.  Discussed management plan with the staff and/or members from  service.      Oncology  Pleocytosis  23 year old male with subacute R sided headaches + tearing, LUE weakness admitted after sudden worsening of symptoms. S/p TPA at OSH on 5/7/2024     Lumbar puncture 5/09 with 400 WBC, 27& neutrophils, 62% lymphocytes, negative ME panel and Crypto CSF. CSF cx with no growth. Symptoms now appear to have improved significantly.     No atypical risk factors besides farm animal exposure (chickens among others), and possible exposure to mosquitos. Atypical for fungal infection (histo and blasto) to last this long without it worsening since he has not received antifungal therapy. Ddx favors more viral etiology vs inflammatory aseptic meningitis. HIV and syphilis studies negative.     E. Chaffeensis and E ewingii can cause fever, rash leukopenia and thrombocytopenia. Also not c/w clinical picture. Symptoms are really not consistent with relapsing fever from Borrelia. Absence of rash argues against RMSF. [place] is not endemic for heartland virus.      HSV would have improved in an immunocompetent host, and west nile cannot be ruled out with a PCR, needs IgM serum or CSF. Enterovirus was negative on ME panel.       Recommendation    -Follow up west Nile virus antibodies, autoimmune encephalitis panel, spotted fever antibodies, Ehrlichia antibodies, and Histoplasma antigen pending    -A repeat LP would only be warranted if his symptoms worsen/ neuropsychiatric changes noted.     -Can stop doxycycline once serologies return negative. Low suspicion for tick-borne illness. This would leave either a viral etiology which would not  or inflammatory etiology,.           Anticipated Disposition: home    Thank you for your consult. I will follow-up with patient. Please contact us if you have any additional questions.    Ghazal Casillas MD  Infectious Disease  [facility] - Neurosurgery ([facility])    50 minutes of total time spent on the encounter, which includes face to face time and non-face to face time preparing to see the patient (eg, review of tests), obtaining and/or reviewing separately obtained history, documenting clinical information in the electronic or other health record, independently interpreting results (not separately reported) and communicating results to the patient/family/caregiver, or care coordination (not separately reported).     Subjective:     Principal Problem:Encephalitis    HPI: 23 year old male with new onset encephalopathy [place] [facility] [place] [facility] [place]. Symptoms started with a few weeks of headaches then sudden worsening with L sided weakness, short brief episodes of unresponsiveness without asynchronous motor jerking. Upon further history the patient states he lives in [place], [place] on a farm and occasionally helps take care of several animals including chickens and donkeys, used to be more involved but lately has been busy with work, works in the oil industry, which involves dealing with machinery,  "being outdoors and operating cranes as well as  work on various instruments.     Denies exotic hobbies such as hunting, fishing, going into caves or camping overnight. Denies known mosquito or insect bites, denies exotic pets, eating undercooked foods, consuming well or city water (consumed only bottled water), using alexander-pot, or sick contacts around him. Denies otorrhea, new focal deficits other than left sided arm weakness, difficulty swallowing, diarrhea, dysuria, rhinorrhea, congestion, productive cough, odontogenic pain, recent procedures prior to admission, and states he received all his childhood vaccines for school. He denies any known history of immunosuppressive states.   States his main symptom was retro-orbital right sided headache and tearing, ongoing for 3-4 weeks prior to sudden worsening which manifested as increased severity of the headache along with LUE weakness and some pain. States after being admitted and receiving medications as well as post LP his headache seems to have improved a lot. Currently LUE weakness also imrpovment, no gross focal deficits on exam noted. He has not gone swimming in any bodies of fresh water. ID was consulted for 23 yoM w/ encephalopathy transferred from [facility] w/ jessieeric doxy and acyclovir. LP w/ 400 WBC. Extensive infectious workup at OSH negative thus far. Consult for antibiotics recs  Interval History: "Fine". No acute events overnight. Back to baseline.    Review of Systems   Constitutional:  Negative for chills, fatigue and fever.   HENT:  Negative for ear pain, mouth sores, nosebleeds, postnasal drip, rhinorrhea, sinus pressure, sore throat, tinnitus, trouble swallowing and voice change.    Eyes:  Negative for photophobia, pain, redness and visual disturbance.   Respiratory:  Negative for apnea, cough, chest tightness, shortness of breath and wheezing.    Cardiovascular:  Negative for chest pain, palpitations and leg swelling. "   Gastrointestinal:  Negative for abdominal pain, blood in stool, constipation, diarrhea, nausea and vomiting.   Endocrine: Negative for cold intolerance, heat intolerance, polydipsia and polyuria.   Genitourinary:  Negative for decreased urine volume, difficulty urinating, dysuria, flank pain, frequency, genital sores, hematuria, penile discharge, penile pain, penile swelling, scrotal swelling, testicular pain and urgency.   Musculoskeletal:  Negative for arthralgias, back pain, joint swelling, myalgias and neck pain.   Skin:  Negative for color change and rash.   Allergic/Immunologic: Negative for environmental allergies and food allergies.   Neurological:  Negative for dizziness, seizures, syncope, weakness, light-headedness, numbness and headaches.   Hematological:  Negative for adenopathy. Does not bruise/bleed easily.   Psychiatric/Behavioral:  Negative for agitation, confusion, decreased concentration, hallucinations, self-injury, sleep disturbance and suicidal ideas. The patient is not nervous/anxious.      Objective:     Vital Signs (Most Recent):  Temp: 98.5 °F (36.9 °C) (05/12/24 0808)  Pulse: 83 (05/12/24 0808)  Resp: 16 (05/12/24 0808)  BP: 119/73 (05/12/24 0808)  SpO2: 100 % (05/12/24 0808) Vital Signs (24h Range):  Temp:  [98.1 °F (36.7 °C)-98.5 °F (36.9 °C)] 98.5 °F (36.9 °C)  Pulse:  [] 83  Resp:  [16-18] 16  SpO2:  [97 %-100 %] 100 %  BP: (119-123)/(67-73) 119/73     Weight: 77.4 kg (170 lb 10.2 oz)  Body mass index is 23.8 kg/m².    Estimated Creatinine Clearance: 122.4 mL/min (based on SCr of 1 mg/dL).     Physical Exam  Vitals and nursing note reviewed.   Constitutional:       Appearance: He is well-developed.   HENT:      Head: Normocephalic and atraumatic.   Eyes:      General: No scleral icterus.        Right eye: No discharge.         Left eye: No discharge.      Conjunctiva/sclera: Conjunctivae normal.   Pulmonary:      Effort: Pulmonary effort is normal.   Musculoskeletal:          "General: Normal range of motion.   Skin:     General: Skin is warm and dry.   Neurological:      Mental Status: He is alert and oriented to person, place, and time.   Psychiatric:         Behavior: Behavior normal.         Thought Content: Thought content normal.         Judgment: Judgment normal.          Significant Labs: C4 Count: No results for input(s): "C4" in the last 48 hours.  CBC:   Recent Labs   Lab 05/11/24  0628 05/12/24  0609   WBC 13.50* 14.86*   HGB 15.0 14.9   HCT 45.3 43.2    388     CSF:   Recent Labs   Lab 05/09/24  1114   CSFCULTURE No Growth At 72 Hours     Microbiology Results (last 7 days)       ** No results found for the last 168 hours. **            Significant Imaging: I have reviewed all pertinent imaging results/findings within the past 24 hours.    "

## 2024-05-12 NOTE — ASSESSMENT & PLAN NOTE
Pleocytosis     23 year old male with no past medical history presented to [facility] ED w/ acute onset left-sided weakness concerning for acute stroke. He received half-dose of thrombolytics (stopped due to gingival bleeding) prior to transfer for [facility] where his course was concerning for episodes of inattention, staring, unable to control his gaze but able to comprehend his environment. He had persistent left-sided weakness which is gradually improving. Differential broad initially and extensive workup sent; significant positives include LP w/ 400 WBC w/ lymphocytic predominance concerning for viral encephalitis. Head imaging including brain MRI w/ contrast on 5/10 without significant findings. Overall his picture is concerning for viral encephalitis, autoimmune encephalitis, seizure disorder, atypical migraine. Upon transfer to [facility] he was started on Keppra and lacosamide, valacyclovir, doxycycline, and aspirin. Transferred to [facility] for higher level of care and neurology evaluation. Will continue present management, consult neurology for additional workup as indicated and consult ID for input regarding likelihood of viral encephalitis.    Lab workup:  Blood:  KWAME < 1:80  Urine Drug Screen negative  HSV 1 & 2 IgG - type 1 negative; type 2 positive  HIV 1/2 4th Gen - negative  Syphilis antibody w/ reflex PCR - nonreactive  Erlichia Antibody panel - pending  Histoplasma antigen - pending  Spotted Fever Group Antibodies - pending  ANCA - negative  MPO/PR3 Antibodies - pending  Encephalopathy Autoimmune Eval Panel - pending  EBV DNA Quant - pending  WNV IgG and IgM - pending     CSF:   (27% neutrophils, 62% lymphocytes, 10% monocytes, 1% eosinophils)  RBC 2  Protein 31.3  Glucose 55  VDRL - pending  Lyme disease serology - pending  Paraneoplastic Autoantibody Evaluation - pending  Meningitis/Encephalitis Panel - Negative  Includes: E. Coli, H. influenzae, Listeria, Neisseria meningitidis, Group B Strep,  "Strep pneumoniae, CMV, enterovirus, HSV-1, HSV-2, HHV-6, Human parechovirus, VZV, cryptococcus neoformans.  West Nile Virus PCR - pending  West Nile Virus Antibodies - pending  NMDA-R Ab IgG - pending      - Neurology signed-off  "Presentation suggests viral/aseptic meningitis with concurrent trigeminal autonomic cephalgia, I.e SUNCT (short lasting unilateral neuralgiform headache attacks with conjunctival injection and tearing) vs. cluster headache"  If headache were to return consider trial of oxygen per nasal cannula for autonomic cephalgia; can also consider trial of 75 mg indomethacin   - ID consulted; appreciate recs  "Aseptic meningitis vs encephalitis. Likely viral vs inflammatory"  If confirmed to be due to WNV then would discontinue doxycycline and valacyclovir. Treatment is supportive.   - Continue doxycycline 100 mg IV q12h  - Continue Valtrex 1000 mg po TID  - Continue ASA 81 mg po daily  - F/u pending labs  - D/C Keppra and Lacosamide, per Neurology recs  - D/C EEG  - Seizure precautions  "

## 2024-05-12 NOTE — ASSESSMENT & PLAN NOTE
I have reviewed hospital notes from   service and other specialty providers. I have also reviewed CBC, CMP/BMP,  cultures and imaging with my interpretation as documented.     Encephalitis based on headache, changes in mentation, seizures, and CSF findings. Infectious work up so far unrevealing however some studies still in process. Suspect viral vs inflammatory process.  Can continue doxycycline and valacyclovir for now.  If tick borne associated infections panels are negative then stop doxycycline.   Would recommend discontinuing valacyclovir.  Discussed management plan with the staff and/or members from  service.

## 2024-05-12 NOTE — ASSESSMENT & PLAN NOTE
RESOLVED    Patient w/ initially severe left-sided weakness however weakness improved to 4/5 upon arrival to [facility]. Will consult PT/OT. Additionally with his weakness and difficulty ambulating a bowen catheter was placed at OSH. If he is able to ambulate then will remove bowen.    - PT/OT signed-ff - patient is at his baseline level of functioning; no post-discharge placement necessary  - Bowen removed 5/11

## 2024-05-12 NOTE — SUBJECTIVE & OBJECTIVE
"Interval History: "Fine". No acute events overnight. Back to baseline.    Review of Systems   Constitutional:  Negative for chills, fatigue and fever.   HENT:  Negative for ear pain, mouth sores, nosebleeds, postnasal drip, rhinorrhea, sinus pressure, sore throat, tinnitus, trouble swallowing and voice change.    Eyes:  Negative for photophobia, pain, redness and visual disturbance.   Respiratory:  Negative for apnea, cough, chest tightness, shortness of breath and wheezing.    Cardiovascular:  Negative for chest pain, palpitations and leg swelling.   Gastrointestinal:  Negative for abdominal pain, blood in stool, constipation, diarrhea, nausea and vomiting.   Endocrine: Negative for cold intolerance, heat intolerance, polydipsia and polyuria.   Genitourinary:  Negative for decreased urine volume, difficulty urinating, dysuria, flank pain, frequency, genital sores, hematuria, penile discharge, penile pain, penile swelling, scrotal swelling, testicular pain and urgency.   Musculoskeletal:  Negative for arthralgias, back pain, joint swelling, myalgias and neck pain.   Skin:  Negative for color change and rash.   Allergic/Immunologic: Negative for environmental allergies and food allergies.   Neurological:  Negative for dizziness, seizures, syncope, weakness, light-headedness, numbness and headaches.   Hematological:  Negative for adenopathy. Does not bruise/bleed easily.   Psychiatric/Behavioral:  Negative for agitation, confusion, decreased concentration, hallucinations, self-injury, sleep disturbance and suicidal ideas. The patient is not nervous/anxious.      Objective:     Vital Signs (Most Recent):  Temp: 98.5 °F (36.9 °C) (05/12/24 0808)  Pulse: 83 (05/12/24 0808)  Resp: 16 (05/12/24 0808)  BP: 119/73 (05/12/24 0808)  SpO2: 100 % (05/12/24 0808) Vital Signs (24h Range):  Temp:  [98.1 °F (36.7 °C)-98.5 °F (36.9 °C)] 98.5 °F (36.9 °C)  Pulse:  [] 83  Resp:  [16-18] 16  SpO2:  [97 %-100 %] 100 %  BP: " "(119-123)/(67-73) 119/73     Weight: 77.4 kg (170 lb 10.2 oz)  Body mass index is 23.8 kg/m².    Estimated Creatinine Clearance: 122.4 mL/min (based on SCr of 1 mg/dL).     Physical Exam  Vitals and nursing note reviewed.   Constitutional:       Appearance: He is well-developed.   HENT:      Head: Normocephalic and atraumatic.   Eyes:      General: No scleral icterus.        Right eye: No discharge.         Left eye: No discharge.      Conjunctiva/sclera: Conjunctivae normal.   Pulmonary:      Effort: Pulmonary effort is normal.   Musculoskeletal:         General: Normal range of motion.   Skin:     General: Skin is warm and dry.   Neurological:      Mental Status: He is alert and oriented to person, place, and time.   Psychiatric:         Behavior: Behavior normal.         Thought Content: Thought content normal.         Judgment: Judgment normal.          Significant Labs: C4 Count: No results for input(s): "C4" in the last 48 hours.  CBC:   Recent Labs   Lab 05/11/24  0628 05/12/24  0609   WBC 13.50* 14.86*   HGB 15.0 14.9   HCT 45.3 43.2    388     CSF:   Recent Labs   Lab 05/09/24  1114   CSFCULTURE No Growth At 72 Hours     Microbiology Results (last 7 days)       ** No results found for the last 168 hours. **            Significant Imaging: I have reviewed all pertinent imaging results/findings within the past 24 hours.  "

## 2024-05-12 NOTE — HOSPITAL COURSE
Admitted to Hospital Medicine, ID and Neurology were consulted. Both consultants concerned for viral or aseptic meningitis. Antiepileptics and EEG discontinued by Neurology. PT/OT assessed and signed off. ID discontinued valtrex and doxycycline. His presentation was likely viral or aseptic meningitis which has now resolved with supportive care. At this time, patient is medically stable for discharge with follow-up with PCP and Neurology.

## 2024-05-13 VITALS
TEMPERATURE: 98 F | DIASTOLIC BLOOD PRESSURE: 71 MMHG | RESPIRATION RATE: 18 BRPM | HEIGHT: 71 IN | SYSTOLIC BLOOD PRESSURE: 120 MMHG | BODY MASS INDEX: 23.89 KG/M2 | HEART RATE: 94 BPM | OXYGEN SATURATION: 99 % | WEIGHT: 170.63 LBS

## 2024-05-13 PROBLEM — D72.9: Status: RESOLVED | Noted: 2024-05-11 | Resolved: 2024-05-13

## 2024-05-13 PROBLEM — E87.1 HYPONATREMIA: Status: RESOLVED | Noted: 2024-05-13 | Resolved: 2024-05-13

## 2024-05-13 PROBLEM — E87.1 HYPONATREMIA: Status: ACTIVE | Noted: 2024-05-13

## 2024-05-13 PROBLEM — G04.90 ENCEPHALITIS: Status: RESOLVED | Noted: 2024-05-10 | Resolved: 2024-05-13

## 2024-05-13 PROBLEM — G83.30 MONOPARESIS: Status: RESOLVED | Noted: 2024-05-07 | Resolved: 2024-05-13

## 2024-05-13 PROBLEM — G83.30 MONOPARESIS: Status: ACTIVE | Noted: 2024-05-07

## 2024-05-13 LAB
ANION GAP SERPL CALC-SCNC: 9 MMOL/L (ref 8–16)
B AFZ+BURG+GARI IGG CSF QL IA: NEGATIVE
B AFZ+BURG+GARI IGG SER QL IA: NORMAL
BACTERIA BLD CULT: NORMAL
BACTERIA BLD CULT: NORMAL
BASOPHILS # BLD AUTO: 0.05 K/UL (ref 0–0.2)
BASOPHILS NFR BLD: 0.4 % (ref 0–1.9)
BUN SERPL-MCNC: 14 MG/DL (ref 6–20)
CALCIUM SERPL-MCNC: 9.4 MG/DL (ref 8.7–10.5)
CHLORIDE SERPL-SCNC: 107 MMOL/L (ref 95–110)
CO2 SERPL-SCNC: 20 MMOL/L (ref 23–29)
CREAT SERPL-MCNC: 1.1 MG/DL (ref 0.5–1.4)
DIFFERENTIAL METHOD BLD: ABNORMAL
EBV DNA SERPL NAA+PROBE-ACNC: NORMAL IU/ML
EOSINOPHIL # BLD AUTO: 0 K/UL (ref 0–0.5)
EOSINOPHIL NFR BLD: 0.3 % (ref 0–8)
ERYTHROCYTE [DISTWIDTH] IN BLOOD BY AUTOMATED COUNT: 12.5 % (ref 11.5–14.5)
EST. GFR  (NO RACE VARIABLE): >60 ML/MIN/1.73 M^2
GLUCOSE SERPL-MCNC: 105 MG/DL (ref 70–110)
HCT VFR BLD AUTO: 42.9 % (ref 40–54)
HGB BLD-MCNC: 14.8 G/DL (ref 14–18)
IMM GRANULOCYTES # BLD AUTO: 0.08 K/UL (ref 0–0.04)
IMM GRANULOCYTES NFR BLD AUTO: 0.6 % (ref 0–0.5)
IMMUNOLOGIST REVIEW: NORMAL
IMMUNOLOGIST REVIEW: NORMAL
LYMPHOCYTES # BLD AUTO: 2.2 K/UL (ref 1–4.8)
LYMPHOCYTES NFR BLD: 16.5 % (ref 18–48)
M HISTOPLASMA/BLASTOMYCES AG RESULT: NOT DETECTED
M HISTOPLASMA/BLASTOMYCES AG VALUE: NOT DETECTED NG/ML
MAGNESIUM SERPL-MCNC: 1.9 MG/DL (ref 1.6–2.6)
MCH RBC QN AUTO: 29 PG (ref 27–31)
MCHC RBC AUTO-ENTMCNC: 34.5 G/DL (ref 32–36)
MCV RBC AUTO: 84 FL (ref 82–98)
MONOCYTES # BLD AUTO: 1.1 K/UL (ref 0.3–1)
MONOCYTES NFR BLD: 8.4 % (ref 4–15)
NEUTROPHILS # BLD AUTO: 9.9 K/UL (ref 1.8–7.7)
NEUTROPHILS NFR BLD: 73.8 % (ref 38–73)
NRBC BLD-RTO: 0 /100 WBC
PHOSPHATE SERPL-MCNC: 4.1 MG/DL (ref 2.7–4.5)
PLATELET # BLD AUTO: 388 K/UL (ref 150–450)
PMV BLD AUTO: 8.6 FL (ref 9.2–12.9)
POTASSIUM SERPL-SCNC: 4.3 MMOL/L (ref 3.5–5.1)
PSYCHE PATHOLOGY RESULT: NORMAL
RBC # BLD AUTO: 5.1 M/UL (ref 4.6–6.2)
SODIUM SERPL-SCNC: 136 MMOL/L (ref 136–145)
VDRL CSF QL: NEGATIVE
WBC # BLD AUTO: 13.39 K/UL (ref 3.9–12.7)
WNV IGG CSF QL IA: NEGATIVE
WNV IGM CSF QL IA: NEGATIVE

## 2024-05-13 PROCEDURE — 85025 COMPLETE CBC W/AUTO DIFF WBC: CPT

## 2024-05-13 PROCEDURE — 25000003 PHARM REV CODE 250

## 2024-05-13 PROCEDURE — 36415 COLL VENOUS BLD VENIPUNCTURE: CPT

## 2024-05-13 PROCEDURE — 80048 BASIC METABOLIC PNL TOTAL CA: CPT | Performed by: STUDENT IN AN ORGANIZED HEALTH CARE EDUCATION/TRAINING PROGRAM

## 2024-05-13 PROCEDURE — 99232 SBSQ HOSP IP/OBS MODERATE 35: CPT | Mod: ,,, | Performed by: INTERNAL MEDICINE

## 2024-05-13 PROCEDURE — 84100 ASSAY OF PHOSPHORUS: CPT

## 2024-05-13 PROCEDURE — 83735 ASSAY OF MAGNESIUM: CPT

## 2024-05-13 RX ORDER — DOXYCYCLINE HYCLATE 100 MG
100 TABLET ORAL EVERY 12 HOURS
Status: CANCELLED | OUTPATIENT
Start: 2024-05-13

## 2024-05-13 RX ADMIN — DOXYCYCLINE 100 MG: 100 INJECTION, POWDER, LYOPHILIZED, FOR SOLUTION INTRAVENOUS at 01:05

## 2024-05-13 RX ADMIN — ASPIRIN 81 MG: 81 TABLET, COATED ORAL at 08:05

## 2024-05-13 RX ADMIN — VALACYCLOVIR HYDROCHLORIDE 1000 MG: 500 TABLET, FILM COATED ORAL at 08:05

## 2024-05-13 NOTE — PLAN OF CARE
[facility] (Hospital)  Discharge Final Note    Primary Care Provider: Terrence Craven NP    Expected Discharge Date: 5/13/2024    Final Discharge Note (most recent)       Final Note - 05/13/24 1521          Final Note    Assessment Type Final Discharge Note     Anticipated Discharge Disposition Home or Self Care     What phone number can be called within the next 1-3 days to see how you are doing after discharge? [phone]     Hospital Resources/Appts/Education Provided Provided patient/caregiver with written discharge plan information;Provided education on problems/symptoms using teachback;Appointments scheduled and added to AVS                     Important Message from Medicare             Contact Info       [facility] - Neurology   Specialty: Neurology    [address] [id number]       Next Steps: Schedule an appointment as soon as possible for a visit on 7/15/2024    Instructions: Neurology appointment at 8:15 am.    Terrence Craven NP   Specialty: Family Medicine   Relationship: PCP - General    [address]  [place] [address]   Phone: [phone]       Next Steps: Follow up    Instructions: Patient will make the follow-up appointment.            Patient discharged home with family. Follow up appointments added to AVS. Family provided transportation home.

## 2024-05-13 NOTE — ASSESSMENT & PLAN NOTE
Pleocytosis     23 year old male with no past medical history presented to [facility] ED w/ acute onset left-sided weakness concerning for acute stroke. He received half-dose of thrombolytics (stopped due to gingival bleeding) prior to transfer for [facility] where his course was concerning for episodes of inattention, staring, unable to control his gaze but able to comprehend his environment. He had persistent left-sided weakness which is gradually improving. Differential broad initially and extensive workup sent; significant positives include LP w/ 400 WBC w/ lymphocytic predominance concerning for viral encephalitis. Head imaging including brain MRI w/ contrast on 5/10 without significant findings. Overall his picture is concerning for viral encephalitis, autoimmune encephalitis, seizure disorder, atypical migraine. Upon transfer to [facility] he was started on Keppra and lacosamide, valacyclovir, doxycycline, and aspirin. Transferred to [facility] for higher level of care and neurology evaluation. Will continue present management, consult neurology for additional workup as indicated and consult ID for input regarding likelihood of viral encephalitis.    Lab workup:  Blood:  KWAME < 1:80  Urine Drug Screen negative  HSV 1 & 2 IgG - type 1 negative; type 2 positive  HIV 1/2 4th Gen - negative  Syphilis antibody w/ reflex PCR - nonreactive  Erlichia Antibody panel - negative  Histoplasma antigen - pending  Spotted Fever Group Antibodies - negative  ANCA - negative  MPO/PR3 Antibodies - pending  Encephalopathy Autoimmune Eval Panel - pending  EBV DNA Quant - pending  WNV IgG and IgM - pending     CSF:   (27% neutrophils, 62% lymphocytes, 10% monocytes, 1% eosinophils)  RBC 2  Protein 31.3  Glucose 55  VDRL - pending  Lyme disease serology - pending  Paraneoplastic Autoantibody Evaluation - pending  Meningitis/Encephalitis Panel - Negative  Includes: E. Coli, H. influenzae, Listeria, Neisseria meningitidis, Group B Strep,  "Strep pneumoniae, CMV, enterovirus, HSV-1, HSV-2, HHV-6, Human parechovirus, VZV, cryptococcus neoformans.  West Nile Virus PCR - pending  West Nile Virus Antibodies - pending  NMDA-R Ab IgG - negative      - Neurology signed-off  "Presentation suggests viral/aseptic meningitis with concurrent trigeminal autonomic cephalgia, I.e SUNCT (short lasting unilateral neuralgiform headache attacks with conjunctival injection and tearing) vs. cluster headache"  If headache were to return consider trial of oxygen per nasal cannula for autonomic cephalgia; can also consider trial of 75 mg indomethacin   - ID consulted; appreciate recs  "Aseptic meningitis vs encephalitis. Likely viral vs inflammatory"  If confirmed to be due to WNV then would discontinue doxycycline and valacyclovir. Treatment is supportive.   - Continue doxycycline 100 mg IV q12h  - Continue Valtrex 1000 mg po TID  - Continue ASA 81 mg po daily  - F/u pending labs  - Keppra and Lacosamide D/C on 5/11 per Neurology recs  - Seizure precautions  "

## 2024-05-13 NOTE — ASSESSMENT & PLAN NOTE
RESOLVED    Patient w/ initially severe left-sided weakness however weakness improved to 4/5 upon [place] [facility] Will consult PT/OT. Additionally with his weakness and difficulty ambulating a bowen catheter was placed at OSH. If he is able to ambulate then will remove bowen.    - PT/OT signed-off - patient is at his baseline level of functioning; no post-discharge placement necessary  - Bowen removed 5/11

## 2024-05-13 NOTE — ASSESSMENT & PLAN NOTE
23 year old male with subacute R sided headaches + tearing, LUE weakness admitted after sudden worsening of symptoms. S/p TPA at OSH on 5/7/2024     Lumbar puncture 5/09 with 400 WBC, 27& neutrophils, 62% lymphocytes, negative ME panel and Crypto CSF. CSF cx with no growth. Symptoms now appear to have improved significantly.     No atypical risk factors besides farm animal exposure (chickens among others), and possible exposure to mosquitos. Atypical for fungal infection (histo and blasto) to last this long without it worsening since he has not received antifungal therapy. Ddx favors more viral etiology vs inflammatory aseptic meningitis. HIV and syphilis studies negative.     E. Chaffeensis and E ewingii can cause fever, rash leukopenia and thrombocytopenia. Also not c/w clinical picture. Symptoms are really not consistent with tick-borne infectious syndromes and moreover, testing has returned negative. West nile cannot be ruled out with a PCR, needs IgM serum (pending), CSF IgM negative. Enterovirus was negative on ME panel.       Recommendation    -Can stop doxycycline and valacyclovir given clinical improvement and no evidence of tick-borne or known viral etiologies. Will follow up west nile virus IgM serum, treatment is supportive and given clinical improvement, this would not . Most likely this was viral vs inflammatory etiology that may have resolved by now.

## 2024-05-13 NOTE — CARE UPDATE
I have reviewed the chart of Luistammi Singh Layton who is hospitalized for the following:    Active Hospital Problems    Diagnosis    *Encephalitis    Hyponatremia    Pleocytosis    Monoparesis        Mónica Aldridge NP  Unit Based [id number]

## 2024-05-13 NOTE — SUBJECTIVE & OBJECTIVE
Interval History: YOLANDA    Review of Systems   Constitutional:  Negative for chills and fever.   HENT:  Negative for trouble swallowing.    Respiratory:  Negative for cough and shortness of breath.    Gastrointestinal:  Negative for abdominal pain, blood in stool, diarrhea and vomiting.   Genitourinary:  Negative for dysuria and hematuria.   Musculoskeletal:  Negative for arthralgias, joint swelling and neck stiffness.   Neurological:  Negative for syncope.   Psychiatric/Behavioral:  Negative for confusion.        Objective:     Vital Signs (Most Recent):  Temp: 98.1 °F (36.7 °C) (05/13/24 1224)  Pulse: 94 (05/13/24 1224)  Resp: 18 (05/13/24 1224)  BP: 120/71 (05/13/24 1224)  SpO2: 99 % (05/13/24 1224) Vital Signs (24h Range):  Temp:  [98 °F (36.7 °C)-98.5 °F (36.9 °C)] 98.1 °F (36.7 °C)  Pulse:  [83-96] 94  Resp:  [18] 18  SpO2:  [97 %-100 %] 99 %  BP: (113-131)/(71-82) 120/71     Weight: 77.4 kg (170 lb 10.2 oz)  Body mass index is 23.8 kg/m².    Estimated Creatinine Clearance: 111.2 mL/min (based on SCr of 1.1 mg/dL).     Physical Exam   Constitutional:       Appearance: Normal appearance.   HENT:      Head: Normocephalic and atraumatic.      Nose: Nose normal.      Mouth/Throat:      Mouth: Mucous membranes are moist.      Pharynx: Oropharynx is clear.   Eyes:      Conjunctiva/sclera: Conjunctivae normal.   Cardiovascular:      Rate and Rhythm: Normal rate and regular rhythm.      Pulses: Normal pulses.      Heart sounds: Normal heart sounds.   Pulmonary:      Effort: Pulmonary effort is normal.      Breath sounds: Normal breath sounds.   Abdominal:      General: Bowel sounds are normal.      Palpations: Abdomen is soft.      Tenderness: There is no guarding or rebound.   Musculoskeletal:         General: No deformity.      Cervical back: Neck supple.   Skin:     General: Skin is warm and dry.      Coloration: Skin is not jaundiced.      Findings: No rash.   Neurological:      Mental Status: He is alert and  oriented to person, place, and time. Mental status is at baseline.      Comments: CN II-XII grossly intact  Gross motor and sensation all extremities intact  Significant Labs:   Microbiology Results (last 7 days)       ** No results found for the last 168 hours. **          All pertinent labs within the past 24 hours have been reviewed.  Recent Lab Results         05/13/24  0443        Anion Gap 9       Baso # 0.05       Basophil % 0.4       BUN 14       Calcium 9.4       Chloride 107       CO2 20       Creatinine 1.1       Differential Method Automated       eGFR >60.0       Eos # 0.0       Eos % 0.3       Glucose 105       Gran # (ANC) 9.9       Gran % 73.8       Hematocrit 42.9       Hemoglobin 14.8       Immature Grans (Abs) 0.08  Comment: Mild elevation in immature granulocytes is non specific and   can be seen in a variety of conditions including stress response,   acute inflammation, trauma and pregnancy. Correlation with other   laboratory and clinical findings is essential.         Immature Granulocytes 0.6       Lymph # 2.2       Lymph % 16.5       Magnesium  1.9       MCH 29.0       MCHC 34.5       MCV 84       Mono # 1.1       Mono % 8.4       MPV 8.6       nRBC 0       Phosphorus Level 4.1       Platelet Count 388       Potassium 4.3       RBC 5.10       RDW 12.5       Sodium 136       WBC 13.39               Significant Imaging: I have reviewed all pertinent imaging results/findings within the past 24 hours.

## 2024-05-13 NOTE — PROGRESS NOTES
[facility] ([facility])  Infectious Disease  Progress Note    Patient Name: Luis Traylor  MRN: [id number]  Admission Date: 5/10/2024  Length of Stay: 3 days  Attending Physician: No att. providers found  Primary Care Provider: Terrence Craven, NP    Isolation Status: No active isolations  Assessment/Plan:      Oncology  Pleocytosis-resolved as of 5/13/2024  23 year old male with subacute R sided headaches + tearing, LUE weakness admitted after sudden worsening of symptoms. S/p TPA at OSH on 5/7/2024     Lumbar puncture 5/09 with 400 WBC, 27& neutrophils, 62% lymphocytes, negative ME panel and Crypto CSF. CSF cx with no growth. Symptoms now appear to have improved significantly.     No atypical risk factors besides farm animal exposure (chickens among others), and possible exposure to mosquitos. Atypical for fungal infection (histo and blasto) to last this long without it worsening since he has not received antifungal therapy. Ddx favors more viral etiology vs inflammatory aseptic meningitis. HIV and syphilis studies negative.     E. Chaffeensis and E ewingii can cause fever, rash leukopenia and thrombocytopenia. Also not c/w clinical picture. Symptoms are really not consistent with tick-borne infectious syndromes and moreover, testing has returned negative. West nile cannot be ruled out with a PCR, needs IgM serum (pending), CSF IgM negative. Enterovirus was negative on ME panel.       Recommendation    -Can stop doxycycline and valacyclovir given clinical improvement and no evidence of tick-borne or known viral etiologies. Will follow up west nile virus IgM serum, treatment is supportive and given clinical improvement, this would not . Most likely this was viral vs inflammatory etiology that may have resolved by now.           Anticipated Disposition: TBD    Thank you for your consult. I will sign off. Please contact us if you have any additional questions.    Sebastien Castillo,  MD  Infectious Disease  [facility] ([facility])    Subjective:     Principal Problem:Encephalitis    HPI: 23 year old male with new onset encephalopathy transferred from [facility] to [facility] [place] evaluation. Symptoms started with a few weeks of headaches then sudden worsening with L sided weakness, short brief episodes of unresponsiveness without asynchronous motor jerking. 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     Denies exotic hobbies such as hunting, fishing, going into caves or camping overnight. Denies known mosquito or insect bites, denies exotic pets, eating undercooked foods, consuming well or city water (consumed only bottled water), using alexander-pot, or sick contacts around him. Denies otorrhea, new focal deficits other than left sided arm weakness, difficulty swallowing, diarrhea, dysuria, rhinorrhea, congestion, productive cough, odontogenic pain, recent procedures prior to admission, and states he received all his childhood vaccines for school. He denies any known history of immunosuppressive states.   States his main symptom was retro-orbital right sided headache and tearing, ongoing for 3-4 weeks prior to sudden worsening which manifested as increased severity of the headache along with LUE weakness and some pain. States after being admitted and receiving medications as well as post LP his headache seems to have improved a lot. Currently LUE weakness also imrpovment, no gross focal deficits on exam noted. He has not gone swimming in any bodies of fresh water. ID was consulted for 23 yoM w/ encephalopathy transferred from [facility]/ arvin vargas and  acyclovir. LP w/ 400 WBC. Extensive infectious workup at OSH negative thus far. Consult for antibiotics recs  Interval History: YOLANDA    Review of Systems   Constitutional:  Negative for chills and fever.   HENT:  Negative for trouble swallowing.    Respiratory:  Negative for cough and shortness of breath.    Gastrointestinal:  Negative for abdominal pain, blood in stool, diarrhea and vomiting.   Genitourinary:  Negative for dysuria and hematuria.   Musculoskeletal:  Negative for arthralgias, joint swelling and neck stiffness.   Neurological:  Negative for syncope.   Psychiatric/Behavioral:  Negative for confusion.        Objective:     Vital Signs (Most Recent):  Temp: 98.1 °F (36.7 °C) (05/13/24 1224)  Pulse: 94 (05/13/24 1224)  Resp: 18 (05/13/24 1224)  BP: 120/71 (05/13/24 1224)  SpO2: 99 % (05/13/24 1224) Vital Signs (24h Range):  Temp:  [98 °F (36.7 °C)-98.5 °F (36.9 °C)] 98.1 °F (36.7 °C)  Pulse:  [83-96] 94  Resp:  [18] 18  SpO2:  [97 %-100 %] 99 %  BP: (113-131)/(71-82) 120/71     Weight: 77.4 kg (170 lb 10.2 oz)  Body mass index is 23.8 kg/m².    Estimated Creatinine Clearance: 111.2 mL/min (based on SCr of 1.1 mg/dL).     Physical Exam   Constitutional:       Appearance: Normal appearance.   HENT:      Head: Normocephalic and atraumatic.      Nose: Nose normal.      Mouth/Throat:      Mouth: Mucous membranes are moist.      Pharynx: Oropharynx is clear.   Eyes:      Conjunctiva/sclera: Conjunctivae normal.   Cardiovascular:      Rate and Rhythm: Normal rate and regular rhythm.      Pulses: Normal pulses.      Heart sounds: Normal heart sounds.   Pulmonary:      Effort: Pulmonary effort is normal.      Breath sounds: Normal breath sounds.   Abdominal:      General: Bowel sounds are normal.      Palpations: Abdomen is soft.      Tenderness: There is no guarding or rebound.   Musculoskeletal:         General: No deformity.      Cervical back: Neck supple.   Skin:     General: Skin is warm and dry.       Coloration: Skin is not jaundiced.      Findings: No rash.   Neurological:      Mental Status: He is alert and oriented to person, place, and time. Mental status is at baseline.      Comments: CN II-XII grossly intact  Gross motor and sensation all extremities intact  Significant Labs:   Microbiology Results (last 7 days)       ** No results found for the last 168 hours. **          All pertinent labs within the past 24 hours have been reviewed.  Recent Lab Results         05/13/24  0443        Anion Gap 9       Baso # 0.05       Basophil % 0.4       BUN 14       Calcium 9.4       Chloride 107       CO2 20       Creatinine 1.1       Differential Method Automated       eGFR >60.0       Eos # 0.0       Eos % 0.3       Glucose 105       Gran # (ANC) 9.9       Gran % 73.8       Hematocrit 42.9       Hemoglobin 14.8       Immature Grans (Abs) 0.08  Comment: Mild elevation in immature granulocytes is non specific and   can be seen in a variety of conditions including stress response,   acute inflammation, trauma and pregnancy. Correlation with other   laboratory and clinical findings is essential.         Immature Granulocytes 0.6       Lymph # 2.2       Lymph % 16.5       Magnesium  1.9       MCH 29.0       MCHC 34.5       MCV 84       Mono # 1.1       Mono % 8.4       MPV 8.6       nRBC 0       Phosphorus Level 4.1       Platelet Count 388       Potassium 4.3       RBC 5.10       RDW 12.5       Sodium 136       WBC 13.39               Significant Imaging: I have reviewed all pertinent imaging results/findings within the past 24 hours.

## 2024-05-13 NOTE — DISCHARGE SUMMARY
[facility] ([facility])  [facility] Medicine  Discharge Summary      Patient Name: Luis Traylor  MRN: [id number]  TWILA: [id number]  Patient Class: IP- Inpatient  Admission Date: 5/10/2024  Hospital Length of Stay: 3 days  Discharge Date and Time:  05/13/2024 1:56 PM  Attending Physician: Brii Farrar MD   Discharging Provider: Ruslan Robison MD  Primary Care Provider: Terrence Craven NP  [facility] Medicine Team: [facility] 4  Primary Care Team: [place] [id number]    HPI:   The patient is a 23 year old male with no previous medical history who presents to [facility] as a transfer from [facility] for undifferentiated encephalopathy, concern for encephalitis. The patient initially presented to [place] ED on May 7th w/ acute onset left-sided weakness in the setting of right-sided retro-orbital, stabbing headaches w/ associated rhinorrhea and tearing. He received half-dose thrombolytics (due to onset of gingival bleeding) and was subsequently transferred to [facility]. On arrival to the [place] emergency department he was lethargic and aroused only to noxious stimuli.  It was noted that he had severe headaches for 2 weeks that worsened on the day of presentation with photophobia accompanied by nausea and vomiting.  Imaging of the brain did not show evidence of acute bleeding or large vessel occlusion.  Left-sided weakness persisted.  He was admitted to the ICU for further treatment. The following day he was more alert and talkative. He had lumbar puncture on May 9 that had 400 white blood cells w/ lymphocytic predominance but normal glucose and protein.  Hospital course was complicated by episodes of focal seizure-like activity where he had staring spells and was not interactive. He had an episode on the morning of May 10 described as staring to the left, unresponsiveness, and rigidity without tonic clonic jerking.  Patient reported he was able to hear what was going on around him  but was unable to respond.  Mentation returns to baseline after the events, but he has waxing and waning episodes of unresponsiveness.  Keppra dosing was increased during his stay. Upon transfer to [facility] medications include low-dose aspirin, doxycycline/valacyclovir, Vimpat, and Keppra.  Blood cultures from May 7 and May 8 are NGTD. Extensive encephalopathy panel sent, including: West Nile virus, autoimmune encephalitis pane, spotted fever antibodies, Ehrlichia antibodies, and Histoplasma antigen. Prior to transfer to [facility] he underwent MRI brain with contrast which was unremarkable. On 5/10 evening he was transferred to [facility] for higher level of care and neurology evaluation.    S/p LP on May 9th: CSF had 400 white blood cells (27% neutrophils/62% lymphocytes/10% monocytes), 2 RBC, negative Alea ink, glucose 55, protein 31.3, ME panel all negative cryptococcal antigen negative, CSF culture with no growth.  -serum cryptococcal antigen was negative, syphilis antibody was nonreactive, HIV nonreactive     Upon arrival to [facility] he is hemodynamically stable, afebrile saturating 98% on room air. The patient's fiance is at bedside to contribute to history. They deny recent travel further than [place]. They do spend a significant amount of time outdoors on their 4-kaur w/ ticks/mosquitos, etc. 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 They have a 7 month old son named Rigoberto.    * No surgery found *      Hospital Course:   Admitted to Hospital Medicine, ID and Neurology were consulted. Both consultants concerned for viral or aseptic meningitis. Antiepileptics and EEG discontinued by Neurology. PT/OT assessed and signed off. ID discontinued valtrex and doxycycline. His presentation was likely viral or aseptic meningitis which has now resolved with supportive care. At this time, patient is medically stable for discharge with follow-up with PCP and Neurology.      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 Schedule an appointment as soon as possible for a visit in 1 month(s).    Specialty: Neurology  Contact information:  [address] [place] [phone]             Terrence Craven NP. Schedule an appointment as soon as possible for a visit in 1 week(s).    Specialty: Family Medicine  Contact information:  [facility] [place] [address] [phone]                           Patient Instructions:      Ambulatory referral/consult to Neurology   Standing Status: Future   Referral Priority: Routine Referral Type: Consultation   Referral Reason: Specialty Services Required   Requested Specialty: Neurology   Number of Visits Requested: 1       Significant Diagnostic Studies: Labs: All labs within the past 24 hours have been reviewed    Pending Diagnostic Studies:       Procedure Component Value Units Date/Time    West Nile antibodies, IgG and IgM [[id number]] Collected: 05/12/24 0743    Order Status: Sent Lab Status: In process Updated: 05/12/24 0801    Specimen: Blood            Medications:  Reconciled Home Medications:      Medication List      You have not been prescribed  any medications.         Indwelling Lines/Drains at time of discharge:   Lines/Drains/Airways       None                   Time spent on the discharge of patient: 35 minutes         Ruslan Robison MD  Department of Hospital Medicine  [facility] - Neurosurgery ([facility])

## 2024-05-13 NOTE — PROGRESS NOTES
[facility] - Neurosurgery ([facility])  [facility] Medicine  Progress Note    Patient Name: Luis Traylor  MRN: [id number]  Patient Class: IP- Inpatient   Admission Date: 5/10/2024  Length of Stay: 3 days  Attending Physician: Brii Farrar MD  Primary Care Provider: Terrence Craven NP        Subjective:     Principal Problem:Encephalitis        HPI:  The patient is a 23 year old male with no previous medical history who presents to [facility] [place] a transfer from [facility] for undifferentiated encephalopathy, concern for encephalitis. The patient initially presented to [facility] ED on May 7th w/ acute onset left-sided weakness in the setting of right-sided retro-orbital, stabbing headaches w/ associated rhinorrhea and tearing. He received half-dose thrombolytics (due to onset of gingival bleeding) and was subsequently transferred to [facility]. On arrival to the [place] emergency department he was lethargic and aroused only to noxious stimuli.  It was noted that he had severe headaches for 2 weeks that worsened on the day of presentation with photophobia accompanied by nausea and vomiting.  Imaging of the brain did not show evidence of acute bleeding or large vessel occlusion.  Left-sided weakness persisted.  He was admitted to the ICU for further treatment. The following day he was more alert and talkative. He had lumbar puncture on May 9 that had 400 white blood cells w/ lymphocytic predominance but normal glucose and protein.  Hospital course was complicated by episodes of focal seizure-like activity where he had staring spells and was not interactive. He had an episode on the morning of May 10 described as staring to the left, unresponsiveness, and rigidity without tonic clonic jerking.  Patient reported he was able to hear what was going on around him but was unable to respond.  Mentation returns to baseline after the events, but he has waxing and waning episodes of unresponsiveness.   Keppra dosing was increased during his stay. Upon transfer to [facility] medications include low-dose aspirin, doxycycline/valacyclovir, Vimpat, and Keppra.  Blood cultures from May 7 and May 8 are NGTD. Extensive encephalopathy panel sent, including: West Nile virus, autoimmune encephalitis pane, spotted fever antibodies, Ehrlichia antibodies, and Histoplasma antigen. Prior to transfer to [facility] he underwent MRI brain with contrast which was unremarkable. On 5/10 evening he was transferred to [facility] for higher level of care and neurology evaluation.    S/p LP on May 9th: CSF had 400 white blood cells (27% neutrophils/62% lymphocytes/10% monocytes), 2 RBC, negative Alea ink, glucose 55, protein 31.3, ME panel all negative cryptococcal antigen negative, CSF culture with no growth.  -serum cryptococcal antigen was negative, syphilis antibody was nonreactive, HIV nonreactive     Upon arrival to [facility] he is hemodynamically stable, afebrile saturating 98% on room air. The patient's fiance is at bedside to contribute to history. They deny recent travel further than [place]. They do spend a significant amount of time outdoors on their [place]-kaur w/ ticks/mosquitos, etc. 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 They have a 7 month old son named Riogberto.    Overview/Hospital Course:  Admitted to Hospital Medicine, ID and Neurology were consulted. Both consultants concerned for viral or aseptic meningitis. Antiepileptics and EEG discontinued by Neurology. PT/OT assessed and signed off. Will continue Valtrex and doxycycline while we await results.     Interval History: NAEON.     Review of Systems   Neurological:  Negative for facial asymmetry, weakness and headaches.   All other systems reviewed and are negative.    Objective:     Vital Signs (Most Recent):  Temp: 98.5 °F (36.9 °C) (05/13/24 0726)  Pulse: 96 (05/13/24 0726)  Resp: 18 (05/13/24 0726)  BP: 131/76 (05/13/24 0726)  SpO2: 97 % (05/13/24 0726) Vital  Signs (24h Range):  Temp:  [98 °F (36.7 °C)-98.5 °F (36.9 °C)] 98.5 °F (36.9 °C)  Pulse:  [83-96] 96  Resp:  [18] 18  SpO2:  [97 %-100 %] 97 %  BP: (113-131)/(69-82) 131/76     Weight: 77.4 kg (170 lb 10.2 oz)  Body mass index is 23.8 kg/m².  No intake or output data in the 24 hours ending 05/13/24 0837      Physical Exam  Vitals and nursing note reviewed.   Constitutional:       Appearance: Normal appearance. He is normal weight.   HENT:      Head: Normocephalic and atraumatic.   Cardiovascular:      Rate and Rhythm: Normal rate and regular rhythm.      Heart sounds: Normal heart sounds.   Pulmonary:      Effort: Pulmonary effort is normal. No respiratory distress.      Breath sounds: Normal breath sounds.   Abdominal:      General: Abdomen is flat. There is no distension.      Palpations: Abdomen is soft.      Tenderness: There is no abdominal tenderness.   Musculoskeletal:         General: Normal range of motion.      Right lower leg: No edema.      Left lower leg: No edema.   Skin:     General: Skin is warm and dry.   Neurological:      General: No focal deficit present.      Mental Status: He is alert and oriented to person, place, and time. Mental status is at baseline.      Cranial Nerves: No cranial nerve deficit.      Sensory: No sensory deficit.      Motor: No weakness.   Psychiatric:         Mood and Affect: Mood normal.         Behavior: Behavior normal.         Thought Content: Thought content normal.         Judgment: Judgment normal.             Significant Labs: All pertinent labs within the past 24 hours have been reviewed.    Significant Imaging: I have reviewed all pertinent imaging results/findings within the past 24 hours.    Assessment/Plan:      * Encephalitis  Pleocytosis     23 year old male with no past medical history presented to [place] ED w/ acute onset left-sided weakness concerning for acute stroke. He received half-dose of thrombolytics (stopped due to gingival bleeding) prior to  transfer for [facility] where his course was concerning for episodes of inattention, staring, unable to control his gaze but able to comprehend his environment. He had persistent left-sided weakness which is gradually improving. Differential broad initially and extensive workup sent; significant positives include LP w/ 400 WBC w/ lymphocytic predominance concerning for viral encephalitis. Head imaging including brain MRI w/ contrast on 5/10 without significant findings. Overall his picture is concerning for viral encephalitis, autoimmune encephalitis, seizure disorder, atypical migraine. [place] [facility] [place] started on Keppra and lacosamide, valacyclovir, doxycycline, and aspirin. [place] [facility] [place] higher level of care and neurology evaluation. Will continue present management, consult neurology for additional workup as indicated and consult ID for input regarding likelihood of viral encephalitis.    Lab workup:  Blood:  KWAME < 1:80  Urine Drug Screen negative  HSV 1 & 2 IgG - type 1 negative; type 2 positive  HIV 1/2 4th Gen - negative  Syphilis antibody w/ reflex PCR - nonreactive  Erlichia Antibody panel - negative  Histoplasma antigen - negative  Spotted Fever Group Antibodies - negative  ANCA - negative  MPO/PR3 Antibodies - pending  Encephalopathy Autoimmune Eval Panel - pending  EBV DNA Quant - pending  WNV IgG and IgM - pending     CSF:   (27% neutrophils, 62% lymphocytes, 10% monocytes, 1% eosinophils)  RBC 2  Protein 31.3  Glucose 55  VDRL - pending  Lyme disease serology - negative  Paraneoplastic Autoantibody Evaluation - pending  Meningitis/Encephalitis Panel - Negative  Includes: E. Coli, H. influenzae, Listeria, Neisseria meningitidis, Group B Strep, Strep pneumoniae, CMV, enterovirus, HSV-1, HSV-2, HHV-6, Human parechovirus, VZV, cryptococcus neoformans.  West Nile Virus PCR - pending  West Nile Virus Antibodies - negative  NMDA-R Ab IgG - negative      - Neurology  "signed-off  "Presentation suggests viral/aseptic meningitis with concurrent trigeminal autonomic cephalgia, I.e SUNCT (short lasting unilateral neuralgiform headache attacks with conjunctival injection and tearing) vs. cluster headache"  If headache were to return consider trial of oxygen per nasal cannula for autonomic cephalgia; can also consider trial of 75 mg indomethacin   - ID consulted; appreciate recs  "Aseptic meningitis vs encephalitis. Likely viral vs inflammatory"  If confirmed to be due to WNV then would discontinue doxycycline and valacyclovir. Treatment is supportive.   - Continue doxycycline 100 mg IV q12h  - Continue Valtrex 1000 mg po TID  - D/C ASA 81 mg po daily  - F/u pending labs  - Keppra and Lacosamide D/C on 5/11 per Neurology recs  - Seizure precautions    Monoparesis  RESOLVED    Patient w/ initially severe left-sided weakness however weakness improved to 4/5 upon arrival to [facility]. Will consult PT/OT. Additionally with his weakness and difficulty ambulating a bowen catheter was placed at OSH. If he is able to ambulate then will remove bowen.    - PT/OT signed-off - patient is at his baseline level of functioning; no post-discharge placement necessary  - Bowen removed 5/11    Pleocytosis  See "Encephalopathy"      VTE Risk Mitigation (From admission, onward)           Ordered     enoxaparin injection 40 mg  Daily         05/11/24 0007     IP VTE HIGH RISK PATIENT  Once         05/11/24 0007     Place sequential compression device  Until discontinued         05/11/24 0007                    Discharge Planning   DAVID: 5/14/2024     Code Status: Full Code   Is the patient medically ready for discharge?:     Reason for patient still in hospital (select all that apply): Consult recommendations  Discharge Plan A: Rehab          Ruslan Robison MD  Department of Hospital Medicine   [facility] - Neurosurgery ([facility])    "

## 2024-05-13 NOTE — DISCHARGE INSTRUCTIONS
You came to the hospital because of altered mentation, weakness, and facial droop. We think your symptoms were caused by viral/aseptic meningitis, which has now resolved. If your symptoms should recur or worsen, please go to the nearest Emergency Department.     Please call centralized scheduling at [facility] at [phone] to schedule appointment with:  Neurology

## 2024-05-13 NOTE — SUBJECTIVE & OBJECTIVE
Interval History: NAEON.     Review of Systems   Neurological:  Negative for facial asymmetry, weakness and headaches.   All other systems reviewed and are negative.    Objective:     Vital Signs (Most Recent):  Temp: 98.5 °F (36.9 °C) (05/13/24 0726)  Pulse: 96 (05/13/24 0726)  Resp: 18 (05/13/24 0726)  BP: 131/76 (05/13/24 0726)  SpO2: 97 % (05/13/24 0726) Vital Signs (24h Range):  Temp:  [98 °F (36.7 °C)-98.5 °F (36.9 °C)] 98.5 °F (36.9 °C)  Pulse:  [83-96] 96  Resp:  [18] 18  SpO2:  [97 %-100 %] 97 %  BP: (113-131)/(69-82) 131/76     Weight: 77.4 kg (170 lb 10.2 oz)  Body mass index is 23.8 kg/m².  No intake or output data in the 24 hours ending 05/13/24 0837      Physical Exam  Vitals and nursing note reviewed.   Constitutional:       Appearance: Normal appearance. He is normal weight.   HENT:      Head: Normocephalic and atraumatic.   Cardiovascular:      Rate and Rhythm: Normal rate and regular rhythm.      Heart sounds: Normal heart sounds.   Pulmonary:      Effort: Pulmonary effort is normal. No respiratory distress.      Breath sounds: Normal breath sounds.   Abdominal:      General: Abdomen is flat. There is no distension.      Palpations: Abdomen is soft.      Tenderness: There is no abdominal tenderness.   Musculoskeletal:         General: Normal range of motion.      Right lower leg: No edema.      Left lower leg: No edema.   Skin:     General: Skin is warm and dry.   Neurological:      General: No focal deficit present.      Mental Status: He is alert and oriented to person, place, and time. Mental status is at baseline.      Cranial Nerves: No cranial nerve deficit.      Sensory: No sensory deficit.      Motor: No weakness.   Psychiatric:         Mood and Affect: Mood normal.         Behavior: Behavior normal.         Thought Content: Thought content normal.         Judgment: Judgment normal.             Significant Labs: All pertinent labs within the past 24 hours have been reviewed.    Significant  PA sent to faxed to Medical Management    Imaging: I have reviewed all pertinent imaging results/findings within the past 24 hours.

## 2024-05-13 NOTE — PLAN OF CARE
Patient will have to call to make the follow-up appointment.        Richard Thakur [facility]  Case Management  [phone]

## 2024-05-13 NOTE — PLAN OF CARE
Neurology appointment on 7/15/24 at 8:30 am.      Richard Thakur [facility]  Case Management  [phone]

## 2024-05-13 NOTE — ASSESSMENT & PLAN NOTE
Pleocytosis     23 year old male with no past medical history presented to [place] ED w/ acute onset left-sided weakness concerning for acute stroke. He received half-dose of thrombolytics (stopped due to gingival bleeding) prior to transfer for [facility] where his course was concerning for episodes of inattention, staring, unable to control his gaze but able to comprehend his environment. He had persistent left-sided weakness which is gradually improving. Differential broad initially and extensive workup sent; significant positives include LP w/ 400 WBC w/ lymphocytic predominance concerning for viral encephalitis. Head imaging including brain MRI w/ contrast on 5/10 without significant findings. Overall his picture is concerning for viral encephalitis, autoimmune encephalitis, seizure disorder, atypical migraine. Upon transfer to [facility] he was started on Keppra and lacosamide, valacyclovir, doxycycline, and aspirin. Transferred to [facility] for higher level of care and neurology evaluation. Will continue present management, consult neurology for additional workup as indicated and consult ID for input regarding likelihood of viral encephalitis.    Lab workup:  Blood:  KWAME < 1:80  Urine Drug Screen negative  HSV 1 & 2 IgG - type 1 negative; type 2 positive  HIV 1/2 4th Gen - negative  Syphilis antibody w/ reflex PCR - nonreactive  Erlichia Antibody panel - negative  Histoplasma antigen - negative  Spotted Fever Group Antibodies - negative  ANCA - negative  MPO/PR3 Antibodies - pending  Encephalopathy Autoimmune Eval Panel - pending  EBV DNA Quant - pending  WNV IgG and IgM - pending     CSF:   (27% neutrophils, 62% lymphocytes, 10% monocytes, 1% eosinophils)  RBC 2  Protein 31.3  Glucose 55  VDRL - pending  Lyme disease serology - negative  Paraneoplastic Autoantibody Evaluation - pending  Meningitis/Encephalitis Panel - Negative  Includes: E. Coli, H. influenzae, Listeria, Neisseria meningitidis, Group B  "Strep, Strep pneumoniae, CMV, enterovirus, HSV-1, HSV-2, HHV-6, Human parechovirus, VZV, cryptococcus neoformans.  West Nile Virus PCR - pending  West Nile Virus Antibodies - negative  NMDA-R Ab IgG - negative      - Neurology signed-off  "Presentation suggests viral/aseptic meningitis with concurrent trigeminal autonomic cephalgia, I.e SUNCT (short lasting unilateral neuralgiform headache attacks with conjunctival injection and tearing) vs. cluster headache"  If headache were to return consider trial of oxygen per nasal cannula for autonomic cephalgia; can also consider trial of 75 mg indomethacin   - ID consulted; appreciate recs  "Aseptic meningitis vs encephalitis. Likely viral vs inflammatory"  If confirmed to be due to WNV then would discontinue doxycycline and valacyclovir. Treatment is supportive.   - Continue doxycycline 100 mg IV q12h  - Continue Valtrex 1000 mg po TID  - D/C ASA 81 mg po daily  - F/u pending labs  - Keppra and Lacosamide D/C on 5/11 per Neurology recs  - Seizure precautions  "

## 2024-05-14 LAB
BACTERIA CSF CULT: NORMAL
GBM QUANT (OHS): <1.5 U/ML
GRAM STN SPEC: NORMAL
GRAM STN SPEC: NORMAL
MPO QUANT (OLG): <0.2 U/ML
PR3 QUANT (OHS): <0.6 U/ML

## 2024-05-15 ENCOUNTER — HOSPITAL ENCOUNTER (INPATIENT)
Facility: HOSPITAL | Age: 24
LOS: 8 days | Discharge: HOME | DRG: 059 | End: 2024-05-25
Attending: EMERGENCY MEDICINE | Admitting: INTERNAL MEDICINE
Payer: OTHER GOVERNMENT

## 2024-05-15 DIAGNOSIS — G93.2 ELEVATED INTRACRANIAL PRESSURE: ICD-10-CM

## 2024-05-15 DIAGNOSIS — G04.90 ENCEPHALITIS: ICD-10-CM

## 2024-05-15 DIAGNOSIS — R56.9 SEIZURES: ICD-10-CM

## 2024-05-15 DIAGNOSIS — D72.829 LEUKOCYTOSIS, UNSPECIFIED TYPE: ICD-10-CM

## 2024-05-15 DIAGNOSIS — G08 DURAL VENOUS SINUS THROMBOSIS: ICD-10-CM

## 2024-05-15 DIAGNOSIS — G45.9 TIA (TRANSIENT ISCHEMIC ATTACK): Primary | ICD-10-CM

## 2024-05-15 DIAGNOSIS — R56.9 SEIZURE-LIKE ACTIVITY: ICD-10-CM

## 2024-05-15 DIAGNOSIS — R29.818 OTHER SYMPTOMS AND SIGNS INVOLVING THE NERVOUS SYSTEM: ICD-10-CM

## 2024-05-15 PROBLEM — R51.9 HEADACHE: Status: ACTIVE | Noted: 2024-05-15

## 2024-05-15 LAB
ABS NEUT (OLG): 22.37 X10(3)/MCL (ref 2.1–9.2)
ALBUMIN SERPL-MCNC: 4.3 G/DL (ref 3.5–5)
ALBUMIN/GLOB SERPL: 1.2 RATIO (ref 1.1–2)
ALP SERPL-CCNC: 62 UNIT/L (ref 40–150)
ALT SERPL-CCNC: 17 UNIT/L (ref 0–55)
ANISOCYTOSIS BLD QL SMEAR: SLIGHT
AST SERPL-CCNC: 17 UNIT/L (ref 5–34)
B PERT.PT PRMT NPH QL NAA+NON-PROBE: NOT DETECTED
BILIRUB SERPL-MCNC: 0.4 MG/DL
BUN SERPL-MCNC: 12.8 MG/DL (ref 8.9–20.6)
C PNEUM DNA NPH QL NAA+NON-PROBE: NOT DETECTED
CALCIUM SERPL-MCNC: 9.4 MG/DL (ref 8.4–10.2)
CHLORIDE SERPL-SCNC: 106 MMOL/L (ref 98–107)
CO2 SERPL-SCNC: 19 MMOL/L (ref 22–29)
CREAT SERPL-MCNC: 0.98 MG/DL (ref 0.73–1.18)
ERYTHROCYTE [DISTWIDTH] IN BLOOD BY AUTOMATED COUNT: 12.4 % (ref 11.5–17)
GFR SERPLBLD CREATININE-BSD FMLA CKD-EPI: >60 ML/MIN/1.73/M2
GLOBULIN SER-MCNC: 3.7 GM/DL (ref 2.4–3.5)
GLUCOSE SERPL-MCNC: 101 MG/DL (ref 74–100)
HADV DNA NPH QL NAA+NON-PROBE: NOT DETECTED
HCOV 229E RNA NPH QL NAA+NON-PROBE: NOT DETECTED
HCOV HKU1 RNA NPH QL NAA+NON-PROBE: NOT DETECTED
HCOV NL63 RNA NPH QL NAA+NON-PROBE: NOT DETECTED
HCOV OC43 RNA NPH QL NAA+NON-PROBE: NOT DETECTED
HCT VFR BLD AUTO: 44 % (ref 42–52)
HGB BLD-MCNC: 14.7 G/DL (ref 14–18)
HMPV RNA NPH QL NAA+NON-PROBE: NOT DETECTED
HPIV1 RNA NPH QL NAA+NON-PROBE: NOT DETECTED
HPIV2 RNA NPH QL NAA+NON-PROBE: NOT DETECTED
HPIV3 RNA NPH QL NAA+NON-PROBE: NOT DETECTED
HPIV4 RNA NPH QL NAA+NON-PROBE: NOT DETECTED
INSTRUMENT WBC (OLG): 24.05 X10(3)/MCL
LYMPHOCYTES NFR BLD MANUAL: 1.44 X10(3)/MCL
LYMPHOCYTES NFR BLD MANUAL: 6 %
M PNEUMO DNA NPH QL NAA+NON-PROBE: NOT DETECTED
MCH RBC QN AUTO: 28.7 PG (ref 27–31)
MCHC RBC AUTO-ENTMCNC: 33.4 G/DL (ref 33–36)
MCV RBC AUTO: 85.9 FL (ref 80–94)
MICROCYTES BLD QL SMEAR: SLIGHT
MYELOCYTES NFR BLD MANUAL: 1 %
NEUTROPHILS NFR BLD MANUAL: 93 %
NRBC BLD AUTO-RTO: 0 %
PLATELET # BLD AUTO: 395 X10(3)/MCL (ref 130–400)
PLATELET # BLD EST: NORMAL 10*3/UL
PMV BLD AUTO: 8.4 FL (ref 7.4–10.4)
POTASSIUM SERPL-SCNC: 4.5 MMOL/L (ref 3.5–5.1)
PROT SERPL-MCNC: 8 GM/DL (ref 6.4–8.3)
RBC # BLD AUTO: 5.12 X10(6)/MCL (ref 4.7–6.1)
RBC MORPH BLD: ABNORMAL
RSV RNA NPH QL NAA+NON-PROBE: NOT DETECTED
RV+EV RNA NPH QL NAA+NON-PROBE: NOT DETECTED
SODIUM SERPL-SCNC: 136 MMOL/L (ref 136–145)
WBC # SPEC AUTO: 24.05 X10(3)/MCL (ref 4.5–11.5)
WEST NILE VIRUS INTERPRETATION: NORMAL
WNV IGG SER QL IA: NEGATIVE
WNV IGM SER QL IA: NEGATIVE

## 2024-05-15 PROCEDURE — 80053 COMPREHEN METABOLIC PANEL: CPT | Performed by: NURSE PRACTITIONER

## 2024-05-15 PROCEDURE — 82962 GLUCOSE BLOOD TEST: CPT

## 2024-05-15 PROCEDURE — 85027 COMPLETE CBC AUTOMATED: CPT | Performed by: NURSE PRACTITIONER

## 2024-05-15 PROCEDURE — G0378 HOSPITAL OBSERVATION PER HR: HCPCS

## 2024-05-15 PROCEDURE — 99285 EMERGENCY DEPT VISIT HI MDM: CPT | Mod: 25

## 2024-05-15 PROCEDURE — 87040 BLOOD CULTURE FOR BACTERIA: CPT | Performed by: NURSE PRACTITIONER

## 2024-05-15 PROCEDURE — 87798 DETECT AGENT NOS DNA AMP: CPT | Performed by: NURSE PRACTITIONER

## 2024-05-15 RX ORDER — ACETAMINOPHEN 325 MG/1
650 TABLET ORAL EVERY 8 HOURS PRN
Status: DISCONTINUED | OUTPATIENT
Start: 2024-05-15 | End: 2024-05-25 | Stop reason: HOSPADM

## 2024-05-15 RX ORDER — SODIUM CHLORIDE 0.9 % (FLUSH) 0.9 %
10 SYRINGE (ML) INJECTION
Status: DISCONTINUED | OUTPATIENT
Start: 2024-05-15 | End: 2024-05-25 | Stop reason: HOSPADM

## 2024-05-15 RX ORDER — TALC
6 POWDER (GRAM) TOPICAL NIGHTLY PRN
Status: DISCONTINUED | OUTPATIENT
Start: 2024-05-15 | End: 2024-05-25 | Stop reason: HOSPADM

## 2024-05-15 NOTE — FIRST PROVIDER EVALUATION
Medical screening examination initiated.  I have conducted a focused provider triage encounter, findings are as follows:    Brief history of present illness:  Patient states headache. States that he was discharged from the hospital x 2 days ago after being admitted here and then transferred to [place]. States diagnosed with encephalitis.     There were no vitals filed for this visit.    Pertinent physical exam:  Awake, alert, ambulatory      Brief workup plan:  Labs    Preliminary workup initiated; this workup will be continued and followed by the physician or advanced practice provider that is assigned to the patient when roomed.

## 2024-05-15 NOTE — ED PROVIDER NOTES
Encounter Date: 5/15/2024       History     Chief Complaint   Patient presents with    Headache     Headache and slurred speech that has since resolved that began just PTA that worsens when supine and a fever x1 of 100.6 also just PTA, no meds taken. Pt was recently admitted for encephalitis vs viral meningitis. Work-up was inconclusive and pt was ultimately discharged w/ follow-up appt w/ neurology, ID. CBG- 89     See MDM    The history is provided by the patient. No  was used.     Review of patient's allergies indicates:  No Known Allergies  Past Medical History:   Diagnosis Date    Viral or Aseptic meningitis 05/07/2024     Past Surgical History:   Procedure Laterality Date    MAGNETIC RESONANCE IMAGING N/A 5/10/2024    Procedure: MRI (Magnetic Resonance Imagine);  Surgeon: Araceli Bergman MD;  Location: [facility];  Service: Anesthesiology;  Laterality: N/A;     No family history on file.  Social History     Tobacco Use    Smoking status: Never    Smokeless tobacco: Never   Substance Use Topics    Alcohol use: Not Currently    Drug use: Never     Review of Systems   Constitutional:  Positive for fever.   Respiratory:  Negative for cough and shortness of breath.    Cardiovascular:  Negative for chest pain.   Gastrointestinal:  Negative for abdominal pain.   Genitourinary:  Negative for difficulty urinating and dysuria.   Musculoskeletal:  Negative for gait problem.   Skin:  Negative for color change.   Neurological:  Positive for speech difficulty and headaches. Negative for dizziness.   Psychiatric/Behavioral:  Negative for hallucinations and suicidal ideas.    All other systems reviewed and are negative.      Physical Exam     Initial Vitals [05/15/24 1821]   BP Pulse Resp Temp SpO2   115/73 81 19 98.1 °F (36.7 °C) 100 %      MAP       --         Physical Exam    Nursing note and vitals reviewed.  Constitutional: He appears well-developed and well-nourished.   HENT:   Head: Normocephalic.    Eyes: EOM are normal.   Neck: Neck supple.   Normal range of motion.  Cardiovascular:  Normal rate, regular rhythm, normal heart sounds and intact distal pulses.           Pulmonary/Chest: Breath sounds normal.   Abdominal: Abdomen is soft. Bowel sounds are normal.   Musculoskeletal:         General: Normal range of motion.      Cervical back: Normal range of motion and neck supple.     Neurological: He is alert and oriented to person, place, and time. He has normal strength.   Skin: Skin is warm and dry. Capillary refill takes less than 2 seconds.   Psychiatric: He has a normal mood and affect. His behavior is normal. Judgment and thought content normal.         ED Course   Procedures  Labs Reviewed   COMPREHENSIVE METABOLIC PANEL - Abnormal; Notable for the following components:       Result Value    CO2 19 (*)     Glucose 101 (*)     Globulin 3.7 (*)     All other components within normal limits   CBC WITH DIFFERENTIAL - Abnormal; Notable for the following components:    WBC 24.05 (*)     All other components within normal limits   MANUAL DIFFERENTIAL - Abnormal; Notable for the following components:    Myelocytes % 1 (*)     Neutrophils Abs 22.3665 (*)     RBC Morph Abnormal (*)     Anisocytosis Slight (*)     Microcytosis Slight (*)     All other components within normal limits   BLOOD CULTURE [facility]   BLOOD CULTURE [facility]   CBC W/ AUTO DIFFERENTIAL    Narrative:     The following orders were created for panel order CBC Auto Differential.  Procedure                               Abnormality         Status                     ---------                               -----------         ------                     CBC with Differential[[id number]]       Abnormal            Final result               Manual Differential[[id number]]         Abnormal            Final result                 Please view results for these tests on the individual orders.   RESPIRATORY PANEL          Imaging Results              CT Head  Without Contrast (Final result)  Result time 05/15/24 19:28:00      Final result by Rohan Bowman MD (05/15/24 19:28:00)                   Impression:      No acute intracranial findings.      Electronically signed by: Rohan Bowman  Date:    05/15/2024  Time:    19:28               Narrative:    EXAMINATION:  CT HEAD WITHOUT CONTRAST    CLINICAL HISTORY:  headache, fever, exphressive aphasia;    TECHNIQUE:  CT imaging of the head performed from the skull base to the vertex without intravenous contrast. DLP 1014 mGycm. Automatic exposure control, adjustment of mA/kV or iterative reconstruction technique was used to reduce radiation.    COMPARISON:  10 May 2024    FINDINGS:  There is no acute cortical infarct, hemorrhage or mass lesion.  The ventricles are normal in size.    Visualized paranasal sinuses and mastoid air cells are clear.                                       Medications - No data to display  Medical Decision Making  Historian:  Patient.  Patient is a 23-year-old male  that presents with headache, fever, expressive aphasia that has been present prior to arrival. Associated symptoms nothing. Surrounding information is patient was admitted to the hospital approximately 4-5 days ago with viral meningitis versus encephalitis.  He went to [place] for follow-up, and had an episode of fever, headache, and expressive aphasia Exacerbated by nothing. Relieved by nothing. Patient treatment prior to arrival none. Risk factors include none. Other history pertaining to this complaint nothing.   Assessment:  See physical exam.  DD:  Viral meningitis, encephalitis, seizure, TIA  ED Course: History was obtained.  Physical was performed.  Patient presented to the emergency room with headache, expressive aphasia and fever.  He did have a essentially negative workup for meningitis and encephalitis.  He has no meningeal signs.  He is afebrile in the ER.  He he also may have had seizures.  I will put him in the  hospital for leukocytosis, TIA versus seizure, and to have him observe for a period of time. Medical or surgical consults:  Hospital Medicine. Social determinants that affect healthcare:  None.       Amount and/or Complexity of Data Reviewed  Independent Historian:      Details: Significant other provided additional information related to present illness  External Data Reviewed:      Details: No outside records at this time  Labs: ordered.     Details: None leukocytosis  Radiology: ordered.     Details: CT head showed no acute findings  Discussion of management or test interpretation with external provider(s): Discussed this case with Dr. Izaguirre, [facility], and he accepts admission.    Risk  Decision regarding hospitalization.              Attending Attestation:     Physician Attestation Statement for NP/PA:   I personally made/approved the management plan and take responsibility for the patient management.    Other NP/PA Attestation Additions:    History of Present Illness: See ed course for attestation               ED Course as of 05/15/24 2033   Wed May 15, 2024   1955 I personally made/approved the management plan for this patient and take responsibility for the patient management. I reviewed the JUJU Lauchner's documentation, agree with the JUJU's assessment, and I had face to face time with the patient. See my independent MDM below.     My MDM:  ED assessment:  23-year-old male who was recently admitted for an extensive workup including possible seizure, stroke, meningitis or encephalitis I was inconclusive presenting with a headache that is since resolved, left-sided facial droop and expressive aphasia with repetitive motions for brief period of time.  He was not discharged on any medications and actually went to [facility] today to get an EEG however they do not do it because he was acting normally.  He reports rhinorrhea and congestion.  He was not had any neck pain or stiffness and has no current  headache or fever.  In the hospital he was on antiepileptics but they were discontinued  Pertinent vitals/exam findings:  Vital signs stable  Normocephalic, atraumatic  Cranial nerves 2-12 intact  No nuchal rigidity and negative Kernig's and Brudzinski's  Regular rate, lungs clear to auscultation bilaterally  Ambulatory with no focal weakness  DDx:  Seizure, TIA, CVA, viral URI  Review of labs/results:  Leukocytosis with mild metabolic acidosis otherwise unremarkable  My independent EKG/radiology interpretation:  Unremarkable  Discussion of management with other providers:  A BP  ED management:  Admit  ED disposition/plan:  Admit  Critical Care: none      [unfilled]    Mag Durán MD  Emergency Medicine        [BS]   1957 Leukocytosis likely demargination due to stress [BS]   2030 He has no meningeal signs, main concern given symptosm would be tia vs atypical migraine vs seizure [BS]      ED Course User Index  [BS] Mag Durán MD                             Clinical Impression:  Final diagnoses:  [G45.9] TIA (transient ischemic attack) (Primary)  [D72.829] Leukocytosis, unspecified type          ED Disposition Condition    Observation Stable                Asa Mendoza FNP  05/15/24 2034       Mag Durán MD  05/15/24 2038

## 2024-05-16 PROBLEM — G45.9 TIA (TRANSIENT ISCHEMIC ATTACK): Status: ACTIVE | Noted: 2024-05-16

## 2024-05-16 PROBLEM — R56.9 SEIZURE-LIKE ACTIVITY: Status: ACTIVE | Noted: 2024-05-16

## 2024-05-16 LAB
ALBUMIN SERPL-MCNC: 3.8 G/DL (ref 3.5–5)
ALBUMIN/GLOB SERPL: 1.2 RATIO (ref 1.1–2)
ALP SERPL-CCNC: 54 UNIT/L (ref 40–150)
ALT SERPL-CCNC: 14 UNIT/L (ref 0–55)
AMPHET UR QL SCN: NEGATIVE
AST SERPL-CCNC: 11 UNIT/L (ref 5–34)
BARBITURATE SCN PRESENT UR: NEGATIVE
BASOPHILS # BLD AUTO: 0.04 X10(3)/MCL
BASOPHILS NFR BLD AUTO: 0.2 %
BENZODIAZ UR QL SCN: NEGATIVE
BILIRUB SERPL-MCNC: 0.4 MG/DL
BUN SERPL-MCNC: 12.2 MG/DL (ref 8.9–20.6)
CALCIUM SERPL-MCNC: 8.8 MG/DL (ref 8.4–10.2)
CANNABINOIDS UR QL SCN: NEGATIVE
CHLORIDE SERPL-SCNC: 105 MMOL/L (ref 98–107)
CO2 SERPL-SCNC: 24 MMOL/L (ref 22–29)
COCAINE UR QL SCN: NEGATIVE
CREAT SERPL-MCNC: 0.97 MG/DL (ref 0.73–1.18)
CRP SERPL-MCNC: 11.5 MG/L
EOSINOPHIL # BLD AUTO: 0.08 X10(3)/MCL (ref 0–0.9)
EOSINOPHIL NFR BLD AUTO: 0.5 %
ERYTHROCYTE [DISTWIDTH] IN BLOOD BY AUTOMATED COUNT: 12.7 % (ref 11.5–17)
ERYTHROCYTE [SEDIMENTATION RATE] IN BLOOD: 14 MM/HR (ref 0–15)
FENTANYL UR QL SCN: NEGATIVE
GFR SERPLBLD CREATININE-BSD FMLA CKD-EPI: >60 ML/MIN/1.73/M2
GLOBULIN SER-MCNC: 3.2 GM/DL (ref 2.4–3.5)
GLUCOSE SERPL-MCNC: 98 MG/DL (ref 74–100)
HCT VFR BLD AUTO: 39.4 % (ref 42–52)
HGB BLD-MCNC: 13.2 G/DL (ref 14–18)
IMM GRANULOCYTES # BLD AUTO: 0.09 X10(3)/MCL (ref 0–0.04)
IMM GRANULOCYTES NFR BLD AUTO: 0.5 %
INR PPP: 1
LYMPHOCYTES # BLD AUTO: 2.8 X10(3)/MCL (ref 0.6–4.6)
LYMPHOCYTES NFR BLD AUTO: 15.8 %
MCH RBC QN AUTO: 28.3 PG (ref 27–31)
MCHC RBC AUTO-ENTMCNC: 33.5 G/DL (ref 33–36)
MCV RBC AUTO: 84.5 FL (ref 80–94)
MDMA UR QL SCN: NEGATIVE
MONOCYTES # BLD AUTO: 1.14 X10(3)/MCL (ref 0.1–1.3)
MONOCYTES NFR BLD AUTO: 6.4 %
NEUTROPHILS # BLD AUTO: 13.56 X10(3)/MCL (ref 2.1–9.2)
NEUTROPHILS NFR BLD AUTO: 76.6 %
NRBC BLD AUTO-RTO: 0 %
OPIATES UR QL SCN: NEGATIVE
PCP UR QL: NEGATIVE
PH UR: 6.5 [PH] (ref 3–11)
PLATELET # BLD AUTO: 358 X10(3)/MCL (ref 130–400)
PMV BLD AUTO: 8.6 FL (ref 7.4–10.4)
POCT GLUCOSE: 89 MG/DL (ref 70–110)
POTASSIUM SERPL-SCNC: 4 MMOL/L (ref 3.5–5.1)
PROT SERPL-MCNC: 7 GM/DL (ref 6.4–8.3)
PROTHROMBIN TIME: 13.1 SECONDS (ref 12.5–14.5)
RBC # BLD AUTO: 4.66 X10(6)/MCL (ref 4.7–6.1)
SODIUM SERPL-SCNC: 137 MMOL/L (ref 136–145)
SPECIFIC GRAVITY, URINE AUTO (.000) (OHS): 1.01 (ref 1–1.03)
WBC # SPEC AUTO: 17.71 X10(3)/MCL (ref 4.5–11.5)

## 2024-05-16 PROCEDURE — 80053 COMPREHEN METABOLIC PANEL: CPT | Performed by: INTERNAL MEDICINE

## 2024-05-16 PROCEDURE — 36415 COLL VENOUS BLD VENIPUNCTURE: CPT | Performed by: INTERNAL MEDICINE

## 2024-05-16 PROCEDURE — 86611 BARTONELLA ANTIBODY: CPT | Performed by: INTERNAL MEDICINE

## 2024-05-16 PROCEDURE — 25000003 PHARM REV CODE 250: Performed by: GENERAL PRACTICE

## 2024-05-16 PROCEDURE — 85610 PROTHROMBIN TIME: CPT | Performed by: INTERNAL MEDICINE

## 2024-05-16 PROCEDURE — G0378 HOSPITAL OBSERVATION PER HR: HCPCS

## 2024-05-16 PROCEDURE — 85025 COMPLETE CBC W/AUTO DIFF WBC: CPT | Performed by: INTERNAL MEDICINE

## 2024-05-16 PROCEDURE — 25000003 PHARM REV CODE 250: Performed by: INTERNAL MEDICINE

## 2024-05-16 PROCEDURE — 99223 1ST HOSP IP/OBS HIGH 75: CPT | Mod: FS,,, | Performed by: PSYCHIATRY & NEUROLOGY

## 2024-05-16 PROCEDURE — 25000003 PHARM REV CODE 250

## 2024-05-16 PROCEDURE — 99214 OFFICE O/P EST MOD 30 MIN: CPT | Mod: ,,, | Performed by: GENERAL PRACTICE

## 2024-05-16 PROCEDURE — 25000003 PHARM REV CODE 250: Performed by: PSYCHIATRY & NEUROLOGY

## 2024-05-16 PROCEDURE — 95819 EEG AWAKE AND ASLEEP: CPT

## 2024-05-16 PROCEDURE — 86140 C-REACTIVE PROTEIN: CPT | Performed by: INTERNAL MEDICINE

## 2024-05-16 PROCEDURE — 85652 RBC SED RATE AUTOMATED: CPT | Performed by: INTERNAL MEDICINE

## 2024-05-16 PROCEDURE — 63600175 PHARM REV CODE 636 W HCPCS: Performed by: GENERAL PRACTICE

## 2024-05-16 PROCEDURE — 80307 DRUG TEST PRSMV CHEM ANLYZR: CPT | Performed by: INTERNAL MEDICINE

## 2024-05-16 RX ORDER — VALACYCLOVIR HYDROCHLORIDE 500 MG/1
1000 TABLET, FILM COATED ORAL 3 TIMES DAILY
Status: DISCONTINUED | OUTPATIENT
Start: 2024-05-16 | End: 2024-05-16

## 2024-05-16 RX ORDER — LEVETIRACETAM 500 MG/1
1500 TABLET ORAL 2 TIMES DAILY
Status: DISCONTINUED | OUTPATIENT
Start: 2024-05-16 | End: 2024-05-25 | Stop reason: HOSPADM

## 2024-05-16 RX ORDER — LEVETIRACETAM 500 MG/1
1000 TABLET ORAL 2 TIMES DAILY
Status: DISCONTINUED | OUTPATIENT
Start: 2024-05-16 | End: 2024-05-16

## 2024-05-16 RX ORDER — SODIUM CHLORIDE 9 MG/ML
INJECTION, SOLUTION INTRAVENOUS CONTINUOUS
Status: DISCONTINUED | OUTPATIENT
Start: 2024-05-16 | End: 2024-05-21

## 2024-05-16 RX ADMIN — GANCICLOVIR SODIUM 386 MG: 500 INJECTION, POWDER, LYOPHILIZED, FOR SOLUTION INTRAVENOUS at 10:05

## 2024-05-16 RX ADMIN — LEVETIRACETAM 1000 MG: 500 TABLET, FILM COATED ORAL at 11:05

## 2024-05-16 RX ADMIN — ACETAMINOPHEN 650 MG: 325 TABLET, FILM COATED ORAL at 09:05

## 2024-05-16 RX ADMIN — LEVETIRACETAM 1500 MG: 500 TABLET, FILM COATED ORAL at 09:05

## 2024-05-16 RX ADMIN — SODIUM CHLORIDE: 9 INJECTION, SOLUTION INTRAVENOUS at 04:05

## 2024-05-16 NOTE — PROCEDURES
EEG    Date/Time: 5/15/2024 6:24 PM    Performed by: Jessica Molina MD  Authorized by: Sienna Rodney FNP      Reason for exam: seizure      Technical description:  A standard digital EEG was performed at [facility].  The 10 20 international system of electrode placement is used.  Standard montages were recorded.  Activation procedures were performed.    Description:  The record was dominated by a 9 hertz posterior dominant rhythm which attenuated with eye opening activity.  During drowsiness, identified by ocular signs and alpha attenuation, there is diffuse asynchronous theta activity, with more prominent theta activity seen throughout the record in the right temporal region. There was one electrographic seizure emanating from the right temporal lobe characterized by evolution of rhythmic sharply contoured theta activity spreading to bitemporal regions.  Stage 2 sleep was not identified.        Impression:  This is an abnormal EEG due to 1) right temporal lobe electrographic seizure, 2) right temporal lobe slowing.

## 2024-05-16 NOTE — NURSING
Nurses Note -- 4 Eyes      5/16/2024   1:46 AM      Skin assessed during: Admit      [] No Altered Skin Integrity Present    []Prevention Measures Documented      [] Yes- Altered Skin Integrity Present or Discovered   [] LDA Added if Not in Epic (Describe Wound)   [] New Altered Skin Integrity was Present on Admit and Documented in LDA   [] Wound Image Taken    Wound Care Consulted? No    Attending Nurse:  Jasmin Galarza RN/Staff Member: Courtney

## 2024-05-16 NOTE — CONSULTS
Infectious Disease  Consult Note    Patient Name: Luis Traylor   MRN: [id number]   Admission Date: 5/15/2024   Hospital Length of Stay: 0 days  Attending Physician: Sher Amor MD   Primary Care Provider: Terrence Craven NP     Isolation Status: No active isolations       Assessment/Plan:       23-year-old male patient known to have past significant for recent admission from 05/07 - 05/13/2024 after he presented with encephalopathy and concern infectious etiology, was initially admitted at [place], transferred to [facility] for concern for CVA, received TNK given left facial droop as well as arm and leg weakness with numbness.  Noted to have temperature of 100.3°, initially admitted to the ICU, had a lumbar puncture showing 400 WBCs, 62% lymphocytic, negative ME panel and crypto antigen, was transferred to [facility] in [place] for higher level of care.  While there, he had an extensive investigation that did not yield a clear diagnosis, antiepileptics were discontinued, doxycycline and valacyclovir were discontinued as well.  Patient was discharged to home on 05/13/2024.  Since his discharge, started having episodes of dysarthria, episodes staring and spacing out, patient reports that during these episodes he feels like he could tell what he wanted to say but could not actually say it.  When he got home, his headache started getting worse, he reports that his headaches were worse lying flat and better when he sits up.  He had intermittent episodes of photophobia, he also reports that his right eye started to be swollen and red chief happened every time when he was having these symptoms in the past, he started again having confusion, dysarthric episodes with repetitive motion such of lip-smacking and rubbing his nose, ultimately on 05/15/2024 he started having shaking chills, he had temperatures checked which was 100.6 he had significant nausea, he was therefore presented again to the emergency  department.  He was started on antiepileptic and admitted to the hospital.  ID consulted for assistance in management.    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 he has had that since 2019   -HSV 2 antibodies are positive in serum   -EEG noted with right temporal lobe electrographic seizure, right temporal lobe slowing  -patient does have animal exposure, has dogs, cats, horses, chickens, ducks, cows, donkey he currently does not take care of them however up until a year ago he used to   -has a 7-month-old child who is healthy  -based on current presentation, temporal lobe activity that appears to be epileptic in nature, patient is positive HSV 2 antibodies as well as known HSV outbreaks in the genital area, aseptic meningitis pictures on previous lumbar puncture, I do have a high suspicion for possible mollaret meningitis which could sometimes also present with seizure and neurologic activities  -on my evaluation, the patient reports only a 2/10 remaining headache, no phonophobia, no photophobia, no nuchal rigidity, no concern for seizure activity or altered mental status  -of note, previous LP opening pressure was at [id number], unclear etiology of this increased ICP, cytology at previous LP showing Negative for malignant cells and concerning for infectious/inflammatory process due to marked mixed inflammation present   -Histoplasma/Blasto Ab are negative, CT chest/abdomen and pelvis negative, tick borne illness serologies are negative as well   -HIV and Syphilis negative     Plan:  -although CNS was negative for HSV last week, would prefer to repeat a lumbar puncture (no space occupying lesions seen on imaging)  -repeat MRI   -while the above is pending, given acyclovir IV is not available, will start on ganciclovir 5 milligrams/kilograms IV q.12 hours  (especially in setting of elevated ICP and severe inflammatory findings on CSF)  -No evidence of bacterial meningitis, will not start on antibiotics especially given his stable clinical condition at this time    -will be hoping to deescalate to oral regimen once LP and MRIs are completed   -Coxiella Ab, Bartonella Ab, Brucella Ab  -discussed with the patient and his fiancee at bedside in details      Thank you for your consult. ID will continue following. Please contact us with any questions or concerns.    Antibiotics:  Ganciclovir 05/16 - [facility]    Portions of this note dictated using EMR integrated voice recognition software, and may be subject to voice recognition errors not corrected at proofreading. Please contact writer for clarification if needed.    Subjective:     Principal Problem: Headache     HPI:   23-year-old male patient known to have past significant for recent admission from 05/07 - 05/13/2024 after he presented with encephalopathy and concern infectious etiology, was initially admitted at [facility], transferred to [facility] for concern for CVA, received TNK given left facial droop as well as arm and leg weakness with numbness.  Noted to have temperature of 100.3°, initially admitted to the ICU, had a lumbar puncture showing 400 WBCs, 62% lymphocytic, negative ME panel and crypto antigen, was transferred to [facility] in [place] for higher level of care.  While there, he had an extensive investigation that did not yield a clear diagnosis, antiepileptics were discontinued, doxycycline and valacyclovir were discontinued as well.  Patient was discharged to home on 05/13/2024.  Since his discharge, started having episodes of dysarthria, episodes staring and spacing out, patient reports that during these episodes he feels like he could tell what he wanted to say but could not actually say it.  When he got home, his headache started getting worse, he reports that his headaches were worse lying flat and  better when he sits up.  He had intermittent episodes of photophobia, he also reports that his right eye started to be swollen and red chief happened every time when he was having these symptoms in the past, he started again having confusion, dysarthric episodes with repetitive motion such of lip-smacking and rubbing his nose, ultimately on 05/15/2024 he started having shaking chills, he had temperatures checked which was 100.6 he had significant nausea, he was therefore presented again to the emergency department.  He was started on antiepileptic and admitted to the hospital.  ID consulted for assistance in management.    Sitting comfortably in bed, denies any fevers, no chills, reports his headache is only at 2/10, denies having any visual concerns, no photophobia, no phonophobia, no nuchal rigidity.    Past Medical History:   Diagnosis Date    Viral or Aseptic meningitis 05/07/2024        Past Surgical History:   Procedure Laterality Date    MAGNETIC RESONANCE IMAGING N/A 5/10/2024    Procedure: MRI (Magnetic Resonance Imagine);  Surgeon: Araceli Bergman MD;  Location: [facility];  Service: Anesthesiology;  Laterality: N/A;       Review of patient's allergies indicates:  No Known Allergies     Family History    Reviewed and non contributory          Social History     Socioeconomic History    Marital status: Significant Other   Tobacco Use    Smoking status: Never    Smokeless tobacco: Never   Substance and Sexual Activity    Alcohol use: Not Currently    Drug use: Never     Social Determinants of Health     Financial Resource Strain: Low Risk  (5/11/2024)    Overall Financial Resource Strain (CARDIA)     Difficulty of Paying Living Expenses: Not hard at all   Food Insecurity: No Food Insecurity (5/11/2024)    Hunger Vital Sign     Worried About Running Out of Food in the Last Year: Never true     Ran Out of Food in the Last Year: Never true   Transportation Needs: No Transportation Needs (5/11/2024)     TRANSPORTATION NEEDS     Transportation : No   Physical Activity: Sufficiently Active (5/11/2024)    Exercise Vital Sign     Days of Exercise per Week: 5 days     Minutes of Exercise per Session: 60 min   Stress: No Stress Concern Present (5/11/2024)    [employer] [place] [employer] - Occupational Stress Questionnaire     Feeling of Stress : Only a little   Housing Stability: Low Risk  (5/11/2024)    Housing Stability Vital Sign     Unable to Pay for Housing in the Last Year: No     Homeless in the Last Year: No        Lines/Drains/Airways       Peripheral Intravenous Line  Duration                  Peripheral IV - Single Lumen 05/15/24 2313 20 G Anterior;Distal;Left Forearm <1 day                     Medication:  No medications prior to admission.          Antimicrobials:  Antibiotics (From admission, onward)      None             Antifungals (From admission, onward)      None            Antivirals (From admission, onward)          Stop Route Frequency     acyclovir (ZOVIRAX) injection         -- IV Every 8 hours (non-standard times)               Review of Systems   Review of Systems   All other systems reviewed and are negative.        Objective:     Vital Signs (Most Recent):  Temp: 97.9 °F (36.6 °C) (05/16/24 1554)  Pulse: 75 (05/16/24 1554)  Resp: 18 (05/16/24 1554)  BP: 122/84 (05/16/24 1554)  SpO2: 99 % (05/16/24 1554)  Vital Signs (24h Range):  Temp:  [97.5 °F (36.4 °C)-98.4 °F (36.9 °C)] 97.9 °F (36.6 °C)  Pulse:  [66-82] 75  Resp:  [16-19] 18  SpO2:  [97 %-100 %] 99 %  BP: (115-132)/(70-91) 122/84      Weight:   Wt Readings from Last 1 Encounters:   05/16/24 77.1 kg (170 lb)      Body mass index is Body mass index is 23.71 kg/m².     Estimated Creatinine Clearance: Estimated Creatinine Clearance: 126.1 mL/min (based on SCr of 0.97 mg/dL).     Physical Exam  Physical Exam  Constitutional:       Appearance: Normal appearance.   HENT:      Head: Normocephalic and atraumatic.      Mouth/Throat:       Pharynx: No oropharyngeal exudate or posterior oropharyngeal erythema.   Eyes:      Extraocular Movements: Extraocular movements intact.      Pupils: Pupils are equal, round, and reactive to light.   Cardiovascular:      Rate and Rhythm: Normal rate and regular rhythm.      Heart sounds: No murmur heard.  Pulmonary:      Effort: No respiratory distress.      Breath sounds: No wheezing, rhonchi or rales.   Abdominal:      General: Bowel sounds are normal. There is no distension.      Palpations: Abdomen is soft.      Tenderness: There is no abdominal tenderness. There is no right CVA tenderness or left CVA tenderness.   Musculoskeletal:         General: No swelling or tenderness.      Cervical back: Neck supple. No rigidity or tenderness.   Lymphadenopathy:      Cervical: No cervical adenopathy.   Skin:     Findings: No lesion or rash.   Neurological:      General: No focal deficit present.      Mental Status: He is alert and oriented to person, place, and time. Mental status is at baseline.      Cranial Nerves: No cranial nerve deficit.      Motor: No weakness.   Psychiatric:         Mood and Affect: Mood normal.         Behavior: Behavior normal.           Significant Labs: CBC:   Recent Labs   Lab 05/15/24  1859 05/16/24  0338   WBC 24.05  24.05* 17.71*   HGB 14.7 13.2*   HCT 44.0 39.4*    358     CMP:   Recent Labs   Lab 05/15/24  1859 05/16/24  0338    137   K 4.5 4.0   CO2 19* 24   BUN 12.8 12.2   CREATININE 0.98 0.97   CALCIUM 9.4 8.8   ALBUMIN 4.3 3.8   BILITOT 0.4 0.4   ALKPHOS 62 54   AST 17 11   ALT 17 14         Microbiology Results (last 7 days)       Procedure Component Value Units Date/Time    Blood culture #2 **CANNOT BE ORDERED STAT** [[id number]] Collected: 05/15/24 2014    Order Status: Resulted Specimen: Blood Updated: 05/15/24 2056    Blood culture #1 **CANNOT BE ORDERED STAT** [[id number]] Collected: 05/15/24 2014    Order Status: Resulted Specimen: Blood Updated: 05/15/24 2056              Significant Imaging: I have reviewed all pertinent imaging results/findings within the past 24 hours.      Vipin Beltran MD  Infectious Disease  [facility]

## 2024-05-16 NOTE — CONSULTS
[facility] - 4th Floor Medical Telemetry  Neurology  Consult Note    Patient Name: Luis Traylor  MRN: [id number]  Admission Date: 5/15/2024  Hospital Length of Stay: 0 days  Code Status: Full Code   Attending Provider: Christofer Izaguirre MD   Consulting Provider: Sienna Rodney [facility]  Primary Care Physician: Terrence Craven NP  Principal Problem:Headache    Inpatient consult to Neurology  Consult performed by: Sienna Rodney FNP  Consult ordered by: Sher Amor MD         Subjective:     Chief Complaint:       HPI:   23-year-old male with no known previous PMH who presented to ED on 05/15 with worsening headache and episode of slurred speech just PTA.  Patient was recently admitted to this hospital on 05/07 for acute onset AMS for which he underwent extensive workup, including partial dose of TNK, stroke w/u which was negative, lumbar puncture which revealed over 400 WBCs with lymphocyte predominance and CSF, and infectious studies.  Patient was thought to have viral/aseptic meningitis, patient's hospitalization was complicated by persistent breakthrough seizures and was ultimately transferred to [facility] in [place] for neurocritical care.  While in [facility] antimicrobials were discontinued shortly after transfer due to his rapid clinical improvement, as well as anti seizure medications d/t pt not thought to be having seizures, and was discharged to home on 05/13.    Pt's significant other at bedside reports while leaving [facility], pt noted to have some stuttering speech with expressive aphasia, as well as on the ride home. She reports since discharge, has had forgetfulness, expressive aphasia, facial twitching, confabulates, and other odd behaviors. She reported just PTA pt was noted to have periorbital edema with erythematous sclera to OD, fever (100.8), chills, shaking, and R ear pain that resolved spontaneously PTA at hospital ED.       CT head  without on this admission unrevealing for acute intracranial abnormalities.  Most recent labs significant for WBC 17.71, and otherwise unremarkable.     Past Medical History:   Diagnosis Date    Viral or Aseptic meningitis 05/07/2024       Past Surgical History:   Procedure Laterality Date    MAGNETIC RESONANCE IMAGING N/A 5/10/2024    Procedure: MRI (Magnetic Resonance Imagine);  Surgeon: Araceli Bergman MD;  Location: [facility];  Service: Anesthesiology;  Laterality: N/A;       Review of patient's allergies indicates:  No Known Allergies    Current Neurological Medications:     No current facility-administered medications on file prior to encounter.     No current outpatient medications on file prior to encounter.     Family History    None       Tobacco Use    Smoking status: Never    Smokeless tobacco: Never   Substance and Sexual Activity    Alcohol use: Not Currently    Drug use: Never    Sexual activity: Not on file     Review of Systems  A 14pt ros was reviewed & is negative unless o/w documented in the hpi    Objective:     Vital Signs (Most Recent):  Temp: 97.5 °F (36.4 °C) (05/16/24 1146)  Pulse: 72 (05/16/24 1146)  Resp: 18 (05/16/24 1146)  BP: 123/83 (05/16/24 1146)  SpO2: 100 % (05/16/24 1146) Vital Signs (24h Range):  Temp:  [97.5 °F (36.4 °C)-98.4 °F (36.9 °C)] 97.5 °F (36.4 °C)  Pulse:  [66-82] 72  Resp:  [16-19] 18  SpO2:  [97 %-100 %] 100 %  BP: (115-132)/(70-91) 123/83     Weight: 77.1 kg (170 lb)  Body mass index is 23.71 kg/m².     Physical Exam  Vitals reviewed.   Constitutional:       General: He is awake.   HENT:      Head: Normocephalic and atraumatic.      Nose: Nose normal.      Mouth/Throat:      Mouth: Mucous membranes are moist.      Pharynx: Oropharynx is clear.   Eyes:      Extraocular Movements: Extraocular movements intact and EOM normal.      Pupils: Pupils are equal, round, and reactive to light.   Pulmonary:      Effort: Pulmonary effort is normal.   Musculoskeletal:          General: Normal range of motion.      Cervical back: Normal range of motion.   Skin:     General: Skin is warm and dry.   Neurological:      General: No focal deficit present.      Mental Status: He is alert and oriented to person, place, and time.      Motor: Motor strength is normal.  Psychiatric:         Speech: Speech normal.         Behavior: Behavior is cooperative.         Thought Content: Thought content normal.          NEUROLOGICAL EXAMINATION:     MENTAL STATUS   Oriented to person, place, and time.   Follows 3 step commands.   Attention: normal. Concentration: normal.   Speech: speech is normal   Level of consciousness: alert  Knowledge: good.   Normal comprehension.     CRANIAL NERVES     CN II   Visual fields full to confrontation.     CN III, IV, VI   Pupils are equal, round, and reactive to light.  Extraocular motions are normal.   Nystagmus: none   Conjugate gaze: present    CN V   Facial sensation intact.     CN VII   Facial expression full, symmetric.     CN VIII   CN VIII normal.     CN XI   CN XI normal.     MOTOR EXAM   Muscle bulk: normal  Overall muscle tone: normal  Right arm pronator drift: absent  Left arm pronator drift: absent    Strength   Strength 5/5 throughout.     SENSORY EXAM   Light touch normal.       Significant Labs:   Recent Lab Results         05/16/24  0338   05/15/24  2024   05/15/24  1859   05/15/24  1824        Human Rhinovirus/Enterovirus   Not Detected           Parainfluenza Virus 1   Not Detected           Parainfluenza Virus 2   Not Detected           Parainfluenza Virus 3   Not Detected           Parainfluenza Virus 4   Not Detected           Albumin/Globulin Ratio 1.2     1.2         ADENOVIRUS   Not Detected           Albumin 3.8     4.3         ALP 54     62         ALT 14     17         Aniso     Slight         AST 11     17         Baso # 0.04             Basophil % 0.2             BILIRUBIN TOTAL 0.4     0.4         BORDETELLA PARAPERTUSSIS ([id number])   Not  Detected           Bordetella pertussis (ptxP)   Not Detected           BUN 12.2     12.8         Calcium 8.8     9.4         CHLAMYDIA PNEUMONIAE   Not Detected           Chloride 105     106         CO2 24     19         Coronavirus 229E   Not Detected           Coronavirus HKU1   Not Detected           Coronavirus NL63   Not Detected           Coronavirus OC43 PCR, Common Cold Virus   Not Detected           Creatinine 0.97     0.98         eGFR >60     >60         Eos # 0.08             Eos % 0.5             Globulin, Total 3.2     3.7         Glucose 98     101         Gran # (ANC)     22.3665         Hematocrit 39.4     44.0         Hemoglobin 13.2     14.7         Human Metapneumovirus   Not Detected           Immature Grans (Abs) 0.09             Immature Granulocytes 0.5             Lymph # 2.80     1.443         LYMPH % 15.8             Lymphocyte %     6         MCH 28.3     28.7         MCHC 33.5     33.4         MCV 84.5     85.9         Microcytosis     Slight         Mono # 1.14             Mono % 6.4             MPV 8.6     8.4         MYCOPLASMA PNEUMONIAE   Not Detected           Myelocytes     1         Neut # 13.56             Neut % 76.6             Neutrophils Relative     93         nRBC 0.0     0.0         Platelet Estimate     Normal         Platelet Count 358     395         POCT Glucose       89       Potassium 4.0     4.5         PROTEIN TOTAL 7.0     8.0         RBC 4.66     5.12         RBC Morph     Abnormal         RDW 12.7     12.4         Sodium 137     136         WBC 17.71     24.05              24.05                 Significant Imaging: I have reviewed all pertinent imaging results/findings within the past 24 hours.  Assessment and Plan:     Seizure-like activity  Keppra 1000 mg BID restarted  Give ativan 2 mg PRN for witnessed seizures  Seizure precautions  Await EEG results  MRA and MRV brain ordered          VTE Risk Mitigation (From admission, onward)           Ordered     IP  VTE HIGH RISK PATIENT  Once         05/15/24 2104     Place sequential compression device  Until discontinued         05/15/24 2104                    Thank you for your consult.  Further recommendations to follow per MD Sienna Rodney, [facility]  Inpatient Neurology  [facility] - 4th Floor Medical Telemetry

## 2024-05-16 NOTE — SUBJECTIVE & OBJECTIVE
Past Medical History:   Diagnosis Date    Viral or Aseptic meningitis 05/07/2024       Past Surgical History:   Procedure Laterality Date    MAGNETIC RESONANCE IMAGING N/A 5/10/2024    Procedure: MRI (Magnetic Resonance Imagine);  Surgeon: Araceli Bergman MD;  Location: [facility];  Service: Anesthesiology;  Laterality: N/A;       Review of patient's allergies indicates:  No Known Allergies    Current Neurological Medications:     No current facility-administered medications on file prior to encounter.     No current outpatient medications on file prior to encounter.     Family History    None       Tobacco Use    Smoking status: Never    Smokeless tobacco: Never   Substance and Sexual Activity    Alcohol use: Not Currently    Drug use: Never    Sexual activity: Not on file     Review of Systems  A 14pt ros was reviewed & is negative unless o/w documented in the hpi    Objective:     Vital Signs (Most Recent):  Temp: 97.5 °F (36.4 °C) (05/16/24 1146)  Pulse: 72 (05/16/24 1146)  Resp: 18 (05/16/24 1146)  BP: 123/83 (05/16/24 1146)  SpO2: 100 % (05/16/24 1146) Vital Signs (24h Range):  Temp:  [97.5 °F (36.4 °C)-98.4 °F (36.9 °C)] 97.5 °F (36.4 °C)  Pulse:  [66-82] 72  Resp:  [16-19] 18  SpO2:  [97 %-100 %] 100 %  BP: (115-132)/(70-91) 123/83     Weight: 77.1 kg (170 lb)  Body mass index is 23.71 kg/m².     Physical Exam  Vitals reviewed.   Constitutional:       General: He is awake.   HENT:      Head: Normocephalic and atraumatic.      Nose: Nose normal.      Mouth/Throat:      Mouth: Mucous membranes are moist.      Pharynx: Oropharynx is clear.   Eyes:      Extraocular Movements: Extraocular movements intact and EOM normal.      Pupils: Pupils are equal, round, and reactive to light.   Pulmonary:      Effort: Pulmonary effort is normal.   Musculoskeletal:         General: Normal range of motion.      Cervical back: Normal range of motion.   Skin:     General: Skin is warm and dry.   Neurological:      General: No  focal deficit present.      Mental Status: He is alert and oriented to person, place, and time.      Motor: Motor strength is normal.  Psychiatric:         Speech: Speech normal.         Behavior: Behavior is cooperative.         Thought Content: Thought content normal.          NEUROLOGICAL EXAMINATION:     MENTAL STATUS   Oriented to person, place, and time.   Follows 3 step commands.   Attention: normal. Concentration: normal.   Speech: speech is normal   Level of consciousness: alert  Knowledge: good.   Normal comprehension.     CRANIAL NERVES     CN II   Visual fields full to confrontation.     CN III, IV, VI   Pupils are equal, round, and reactive to light.  Extraocular motions are normal.   Nystagmus: none   Conjugate gaze: present    CN V   Facial sensation intact.     CN VII   Facial expression full, symmetric.     CN VIII   CN VIII normal.     CN XI   CN XI normal.     MOTOR EXAM   Muscle bulk: normal  Overall muscle tone: normal  Right arm pronator drift: absent  Left arm pronator drift: absent    Strength   Strength 5/5 throughout.     SENSORY EXAM   Light touch normal.       Significant Labs:   Recent Lab Results         05/16/24  0338   05/15/24  2024   05/15/24  1859   05/15/24  1824        Human Rhinovirus/Enterovirus   Not Detected           Parainfluenza Virus 1   Not Detected           Parainfluenza Virus 2   Not Detected           Parainfluenza Virus 3   Not Detected           Parainfluenza Virus 4   Not Detected           Albumin/Globulin Ratio 1.2     1.2         ADENOVIRUS   Not Detected           Albumin 3.8     4.3         ALP 54     62         ALT 14     17         Aniso     Slight         AST 11     17         Baso # 0.04             Basophil % 0.2             BILIRUBIN TOTAL 0.4     0.4         BORDETELLA PARAPERTUSSIS ([id number])   Not Detected           Bordetella pertussis (ptxP)   Not Detected           BUN 12.2     12.8         Calcium 8.8     9.4         CHLAMYDIA PNEUMONIAE   Not  Detected           Chloride 105     106         CO2 24     19         Coronavirus 229E   Not Detected           Coronavirus HKU1   Not Detected           Coronavirus NL63   Not Detected           Coronavirus OC43 PCR, Common Cold Virus   Not Detected           Creatinine 0.97     0.98         eGFR >60     >60         Eos # 0.08             Eos % 0.5             Globulin, Total 3.2     3.7         Glucose 98     101         Gran # (ANC)     22.3665         Hematocrit 39.4     44.0         Hemoglobin 13.2     14.7         Human Metapneumovirus   Not Detected           Immature Grans (Abs) 0.09             Immature Granulocytes 0.5             Lymph # 2.80     1.443         LYMPH % 15.8             Lymphocyte %     6         MCH 28.3     28.7         MCHC 33.5     33.4         MCV 84.5     85.9         Microcytosis     Slight         Mono # 1.14             Mono % 6.4             MPV 8.6     8.4         MYCOPLASMA PNEUMONIAE   Not Detected           Myelocytes     1         Neut # 13.56             Neut % 76.6             Neutrophils Relative     93         nRBC 0.0     0.0         Platelet Estimate     Normal         Platelet Count 358     395         POCT Glucose       89       Potassium 4.0     4.5         PROTEIN TOTAL 7.0     8.0         RBC 4.66     5.12         RBC Morph     Abnormal         RDW 12.7     12.4         Sodium 137     136         WBC 17.71     24.05              24.05                 Significant Imaging: I have reviewed all pertinent imaging results/findings within the past 24 hours.

## 2024-05-16 NOTE — PLAN OF CARE
05/16/24 1628   Discharge Assessment   Assessment Type Discharge Planning Assessment   Confirmed/corrected address, phone number and insurance Yes   Confirmed Demographics Correct on Facesheet   Source of Information patient   Communicated DAVID with patient/caregiver Date not available/Unable to determine   Reason For Admission Seizures   People in Home significant other;child(christina), dependent   Facility Arrived From: Home   Do you expect to return to your current living situation? Yes   Do you have help at home or someone to help you manage your care at home? Yes   Who are your caregiver(s) and their phone number(s)? SO, Mariia Rowland [phone]   Prior to hospitilization cognitive status: Unable to Assess   Current cognitive status: Alert/Oriented   Walking or Climbing Stairs Difficulty no   Dressing/Bathing Difficulty no   Equipment Currently Used at Home none   Do you currently have service(s) that help you manage your care at home? No   Do you take prescription medications? Yes   Do you have prescription coverage? Yes   Coverage [employer]   Do you have any problems affording any of your prescribed medications? No   Is the patient taking medications as prescribed? yes   Who is going to help you get home at discharge? Sabra RYAN   How do you get to doctors appointments? car, drives self   Are you on dialysis? No   Do you take coumadin? No   Discharge Plan A Home with family   Discharge Plan B Home with family   DME Needed Upon Discharge  none   Discharge Plan discussed with: Patient;Spouse/sig other   Name(s) and Number(s) Mariia Kashif [phone]   Transition of Care Barriers None   Alcohol Use   Q1: How often do you have a drink containing alcohol? 2-4 pr month   Q2: How many drinks containing alcohol do you have on a typical day when you are drinking? 1 or 2   Q3: How often do you have six or more drinks on one occasion? Never   Health Literacy   How often do you need to have someone help you when you read  instructions, pamphlets, or other written material from your doctor or pharmacy? Never   OTHER   Name(s) of People in Home SO, Mariia Rowland and infant child     Admitted from home, with C/O seizures.  Lives with CONNOR, Mariia, who will assist with care and provide transportation at discharge.   PCP-Terrence Deleon, NP  Pharmacy-    No discharge needs identified at this time.  Will continue to follow

## 2024-05-16 NOTE — HPI
23-year-old male with no known previous PMH who presented to ED on 05/15 with worsening headache and episode of slurred speech just PTA.  Patient was recently admitted to this hospital on 05/07 for acute onset AMS for which he underwent extensive workup, including partial dose of TNK, stroke w/u which was negative, lumbar puncture which revealed over 400 WBCs with lymphocyte predominance and CSF, and infectious studies.  Patient was thought to have viral/aseptic meningitis, patient's hospitalization was complicated by persistent breakthrough seizures and was ultimately transferred to [facility] in [place] for neurocritical care.  While in [facility] antimicrobials were discontinued shortly after transfer due to his rapid clinical improvement, as well as anti seizure medications d/t pt not thought to be having seizures, and was discharged to home on 05/13.    Pt's significant other at bedside reports while leaving [facility], pt noted to have some stuttering speech with expressive aphasia, as well as on the ride home. She reports since discharge, has had forgetfulness, expressive aphasia, facial twitching, confabulates, and other odd behaviors. She reported just PTA pt was noted to have periorbital edema with erythematous sclera to OD, fever (100.8), chills, shaking, and R ear pain that resolved spontaneously PTA at hospital ED.       CT head without on this admission unrevealing for acute intracranial abnormalities.  Most recent labs significant for WBC 17.71, and otherwise unremarkable.

## 2024-05-16 NOTE — PROGRESS NOTES
[facility] Hospital Medicine Progress Note        Chief Complaint: Inpatient Follow-up for Headache     HPI:   Patient is a 23-year-old male with patient is a 23-year-old male with a recent admission at this facility with acute onset altered mental status for which he underwent an extensive workup.  He initially was thought to be having a stroke and was given a partial dose of TNK, but ultimately underwent a lumbar puncture which showed over a 400 WBCs with a lymphocyte predominance presumably viral/aseptic meningitis.  He was transferred to [place] for neuro critical Care and was seen by infectious disease at that facility.  He was discharged 05/13/2024 with the consensus being he had aseptic meningitis and all antibiotics were discontinued due to his clinical rapid improvement.  He presents to the ER tonight with worsening headache and reported slurred speech prior to arrival.     He arrived afebrile hemodynamically stable maintaining normal sats on room air.  Laboratory work showed leukocytosis of 24 K and CT head showed no acute process.  Hospitalist was consulted for further evaluation.      Interval Hx:   Patient today awake and comfortable. Has a mild headache. No fever today. Denies any neck stiffness, Photophobia, SOB, chills or back pain. No rash.     Family at bedside, explained in detail about the patients condition, diagnosis, vitals, labs and treatment plan. They understand and agree with the plan. All their questions were answered.      Case was discussed with patient's nurse and  on the floor.    Objective/physical exam:  General: In no acute distress  Chest: Clear to auscultation bilaterally  Heart: RRR, +S1, S2, no appreciable murmur  Abdomen: Soft, nontender, BS +  MSK: Warm, no lower extremity edema, no clubbing or cyanosis  Neurologic: Alert and oriented x4, Cranial nerve II-XII intact, Strength 5/5 in all 4 extremities    VITAL SIGNS: 24 HRS MIN &  MAX LAST   Temp  Min: 97.5 °F (36.4 °C)  Max: 98.4 °F (36.9 °C) 97.9 °F (36.6 °C)   BP  Min: 115/73  Max: 132/81 122/84   Pulse  Min: 66  Max: 82  75   Resp  Min: 16  Max: 19 18   SpO2  Min: 97 %  Max: 100 % 99 %     I have reviewed the following labs:  Recent Labs   Lab 05/11/24  0628 05/12/24  0609 05/13/24  0443 05/15/24  1859 05/16/24  0338   WBC 13.50* 14.86* 13.39* 24.05  24.05* 17.71*   RBC 5.29 5.12 5.10 5.12 4.66*   HGB 15.0 14.9 14.8 14.7 13.2*   HCT 45.3 43.2 42.9 44.0 39.4*   MCV 86 84 84 85.9 84.5   MCH 28.4 29.1 29.0 28.7 28.3   MCHC 33.1 34.5 34.5 33.4 33.5   RDW 12.0 12.2 12.5 12.4 12.7    388 388 395 358   MPV 8.8* 8.8* 8.6* 8.4 8.6   GRAN 77.1*  10.4* 80.5*  12.0* 73.8*  9.9*  --   --    LYMPH 15.1*  2.0 11.3*  1.7 16.5*  2.2  --   --    MONO 7.2  1.0 7.4  1.1* 8.4  1.1*  --   --    BASO 0.03 0.06 0.05  --   --    NRBC 0 0 0  --   --      Recent Labs   Lab 05/11/24  0628 05/12/24  0609 05/13/24  0443 05/15/24  1859 05/16/24  0338    137 136 136 137   K 3.8 4.0 4.3 4.5 4.0    107 107  --   --    CO2 20* 19* 20* 19* 24   ANIONGAP 12 11 9  --   --    BUN 15 17 14 12.8 12.2   CREATININE 1.0 1.0 1.1 0.98 0.97   GLU 81 121* 105  --   --    CALCIUM 9.4 9.4 9.4 9.4 8.8   MG 2.1 1.9 1.9  --   --    ALBUMIN 3.8 3.9  --  4.3 3.8   PROT 7.7 7.7  --   --   --    ALKPHOS 59 56  --  62 54   ALT 28 20  --  17 14   AST 17 12  --  17 11   BILITOT 0.5 0.5  --  0.4 0.4     Microbiology Results (last 7 days)       Procedure Component Value Units Date/Time    Blood culture #2 **CANNOT BE ORDERED STAT** [[id number]] Collected: 05/15/24 2014    Order Status: Resulted Specimen: Blood Updated: 05/15/24 2056    Blood culture #1 **CANNOT BE ORDERED STAT** [[id number]] Collected: 05/15/24 2014    Order Status: Resulted Specimen: Blood Updated: 05/15/24 2056             See below for Radiology    Scheduled Med:   acyclovir  10 mg/kg (Ideal) Intravenous Q8H    levETIRAcetam  1,000 mg Oral BID       Continuous Infusions:   sodium chloride 0.9%   Intravenous Continuous 75 mL/hr at 05/16/24 1609 New Bag at 05/16/24 1609      PRN Meds:    Current Facility-Administered Medications:     acetaminophen, 650 mg, Oral, Q8H PRN    melatonin, 6 mg, Oral, Nightly PRN    sodium chloride 0.9%, 10 mL, Intravenous, PRN     Assessment/Plan:  ? Recurrent aseptic vs Viral encephalitis   ? Seizures     Plan:  He looks comfortable today   Will start IV antivirals. 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 I have reviewed or attempted to review medical records based upon their availability    All of the patient's questions have been  addressed and answered. Patient's is agreeable to the above stated plan. I will continue to monitor closely and make adjustments to medical management as needed.  _____________________________________________________________________    Nutrition Status:    Radiology:  I have personally reviewed the following imaging and agree with the radiologist.     CT Head Without Contrast  Narrative: EXAMINATION:  CT HEAD WITHOUT CONTRAST    CLINICAL HISTORY:  headache, fever, exphressive aphasia;    TECHNIQUE:  CT imaging of the head performed from the skull base to the vertex without intravenous contrast. DLP 1014 mGycm. Automatic  exposure control, adjustment of mA/kV or iterative reconstruction technique was used to reduce radiation.    COMPARISON:  10 May 2024    FINDINGS:  There is no acute cortical infarct, hemorrhage or mass lesion.  The ventricles are normal in size.    Visualized paranasal sinuses and mastoid air cells are clear.  Impression: No acute intracranial findings.    Electronically signed by: Rohan Bowman  Date:    05/15/2024  Time:    19:28      Sher Amor MD  Department of Hospital Medicine   [facility]   05/16/2024

## 2024-05-16 NOTE — H&P
[facility] Hospital Medicine History & Physical Examination       Patient Name: Luis Traylor  MRN: [id number]  Patient Class: OP- Observation   Admission Date: 5/15/2024  6:24 PM  Length of Stay: 0  Admitting Service: Hospital Medicine   Attending Physician: Christofer Izaguirre MD   Primary Care Provider: Terrence Craven NP  History source: EMR, patient and/or patient's family    CHIEF COMPLAINT   Headache    HISTORY OF PRESENT ILLNESS:   Patient is a 23-year-old male with patient is a 23-year-old male with a recent admission at this facility with acute onset altered mental status for which he underwent an extensive workup.  He initially was thought to be having a stroke and was given a partial dose of TNK, but ultimately underwent a lumbar puncture which showed over a 400 WBCs with a lymphocyte predominance presumably viral/aseptic meningitis.  He was transferred to [place] for neuro critical Care and was seen by infectious disease at that facility.  He was discharged 05/13/2024 with the consensus being he had aseptic meningitis and all antibiotics were discontinued due to his clinical rapid improvement.  He presents to the ER tonight with worsening headache and reported slurred speech prior to arrival.    He arrived afebrile hemodynamically stable maintaining normal sats on room air.  Laboratory work showed leukocytosis of 24 K and CT head showed no acute process.  Hospitalist was consulted for further evaluation.    PAST MEDICAL HISTORY:     Past Medical History:   Diagnosis Date    Viral or Aseptic meningitis 05/07/2024       PAST SURGICAL HISTORY:     Past Surgical History:   Procedure Laterality Date    MAGNETIC RESONANCE IMAGING N/A 5/10/2024    Procedure: MRI (Magnetic Resonance Imagine);  Surgeon: Araceli Bergman MD;  Location: [facility];  Service: Anesthesiology;  Laterality: N/A;       ALLERGIES:   Patient has no known allergies.    FAMILY HISTORY:  "  Reviewed and non-contributory     SOCIAL HISTORY:   Screening for Social Drivers for health: Patient screened for food insecurity, housing instability, transportation needs, utility   difficulties, and interpersonal safety (select all that apply as identified as concern)  []Housing or Food  []Transportation Needs  []Utility Difficulties  []Interpersonal safety  [x]None  Social History     Tobacco Use    Smoking status: Never    Smokeless tobacco: Never   Substance Use Topics    Alcohol use: Not Currently        HOME MEDICATIONS:     Prior to Admission medications    Not on File       REVIEW OF SYSTEMS:   Except as documented, all other systems reviewed and negative     PHYSICAL EXAM:   T 98.1 °F (36.7 °C)   BP (!) 129/91   P 79   RR 19   O2 100 %  GENERAL: awake, alert, oriented and in no acute distress, non-toxic appearing   HEENT: normocephalic atraumatic   NECK: supple   LUNGS: Clear bilaterally, no wheezing or rales, no accessory muscle use   CVS: Regular rate and rhythm, normal peripheral perfusion  ABD: Soft, non-tender, non-distended, bowel sounds present  EXTREMITIES: no clubbing or cyanosis  SKIN: Warm, dry.   NEURO: alert and oriented, grossly without focal deficits   PSYCHIATRIC: Cooperative    LABS AND IMAGING:     Recent Labs     05/13/24  0443 05/15/24  1859   WBC 13.39* 24.05  24.05*   RBC 5.10 5.12   HGB 14.8 14.7   HCT 42.9 44.0   MCV 84 85.9   MCH 29.0 28.7   MCHC 34.5 33.4   RDW 12.5 12.4    395     No results for input(s): "LACTIC" in the last 72 hours.  No results for input(s): "INR", "APTT", "D-DIMER" in the last 72 hours.  No results for input(s): "HGBA1C", "CHOL", "TRIG", "LDL", "VLDL", "HDL" in the last 72 hours.   Recent Labs     05/13/24  0443 05/15/24  1859    136   K 4.3 4.5   CHLORIDE  --  106   CO2 20* 19*   BUN 14 12.8   CREATININE 1.1 0.98   GLUCOSE  --  101*   CALCIUM 9.4 9.4   MG 1.9  --    PHOS 4.1  --    ALBUMIN  --  4.3   GLOBULIN  --  3.7*   ALKPHOS  --  62 " "  ALT  --  17   AST  --  17   BILITOT  --  0.4     No results for input(s): "BNP", "CPK", "TROPONINI" in the last 72 hours.       CT Head Without Contrast  Narrative: EXAMINATION:  CT HEAD WITHOUT CONTRAST    CLINICAL HISTORY:  headache, fever, exphressive aphasia;    TECHNIQUE:  CT imaging of the head performed from the skull base to the vertex without intravenous contrast. DLP 1014 mGycm. Automatic exposure control, adjustment of mA/kV or iterative reconstruction technique was used to reduce radiation.    COMPARISON:  10 May 2024    FINDINGS:  There is no acute cortical infarct, hemorrhage or mass lesion.  The ventricles are normal in size.    Visualized paranasal sinuses and mastoid air cells are clear.  Impression: No acute intracranial findings.    Electronically signed by: Rohan Bowman  Date:    05/15/2024  Time:    19:28      ASSESSMENT & PLAN:   Headache in the setting of known aseptic meningitis    -continue supportive care   -neuro checks overnight   -monitor for fever spikes   -for now continue supportive care as recommended by ID in [place]  -will consider Infectious Disease consult based on above    DVT prophylaxis: scd/amb  Code status: full     If patient was admitted under observational status it is with my approval/permission.     At least 55 min was spent on this history and physical.  Time seen: 10PM 5/15  Critical care time = 35 min; Critical care diagnosis =   Christofer Izaguirre MD    "

## 2024-05-17 PROBLEM — G93.2 ELEVATED INTRACRANIAL PRESSURE: Status: ACTIVE | Noted: 2024-05-17

## 2024-05-17 PROBLEM — R56.9 SEIZURE-LIKE ACTIVITY: Status: RESOLVED | Noted: 2024-05-16 | Resolved: 2024-05-17

## 2024-05-17 LAB
AMPAR2 IGG SERPL QL CBA IFA: NEGATIVE
AMPHIPHYSIN AB CSF QL IF: NEGATIVE
AMPHIPHYSIN IGG SER QL IA: NEGATIVE
ANION GAP SERPL CALC-SCNC: 8 MEQ/L
ANNOTATION COMMENT IMP: NORMAL
ANNOTATION COMMENT IMP: NORMAL
APPEARANCE CSF: CLEAR
BASOPHILS # BLD AUTO: 0.03 X10(3)/MCL
BASOPHILS NFR BLD AUTO: 0.2 %
BUN SERPL-MCNC: 10.3 MG/DL (ref 8.9–20.6)
C GATTII+NEOFOR DNA CSF QL NAA+NON-PROBE: NOT DETECTED
CALCIUM SERPL-MCNC: 8.4 MG/DL (ref 8.4–10.2)
CASPR2 IGG SER QL CBA IFA: NEGATIVE
CHLORIDE SERPL-SCNC: 108 MMOL/L (ref 98–107)
CMV DNA CSF QL NAA+NON-PROBE: NOT DETECTED
CO2 SERPL-SCNC: 21 MMOL/L (ref 22–29)
COLOR CSF: COLORLESS
CREAT SERPL-MCNC: 0.85 MG/DL (ref 0.73–1.18)
CREAT/UREA NIT SERPL: 12
CV2 AB CSF QL IF: NEGATIVE
CV2 AB SERPL QL IF: NEGATIVE
DPPX IGG SERPL QL IF: NEGATIVE
E COLI K1 DNA CSF QL NAA+NON-PROBE: NOT DETECTED
EOSINOPHIL # BLD AUTO: 0.03 X10(3)/MCL (ref 0–0.9)
EOSINOPHIL NFR BLD AUTO: 0.2 %
ERYTHROCYTE [DISTWIDTH] IN BLOOD BY AUTOMATED COUNT: 12.7 % (ref 11.5–17)
EV RNA CSF QL NAA+NON-PROBE: NOT DETECTED
GABABR IGG SERPL QL CBA IFA: NEGATIVE
GAD65 AB SER-SCNC: 0 NMOL/L
GFAP ALPHA IGG SER QL IF: NEGATIVE
GFR SERPLBLD CREATININE-BSD FMLA CKD-EPI: >60 ML/MIN/1.73/M2
GLIAL NUC TYPE 1 AB CSF QL IF: NEGATIVE
GLIAL NUC TYPE 1 AB SER QL IF: NEGATIVE
GLUCOSE CSF-MCNC: 52 MG/DL (ref 40–70)
GLUCOSE SERPL-MCNC: 102 MG/DL (ref 74–100)
GP B STREP DNA CSF QL NAA+NON-PROBE: NOT DETECTED
GRAM STN SPEC: NORMAL
GRAM STN SPEC: NORMAL
HAEM INFLU DNA CSF QL NAA+NON-PROBE: NOT DETECTED
HCT VFR BLD AUTO: 39.8 % (ref 42–52)
HGB BLD-MCNC: 13.4 G/DL (ref 14–18)
HHV6 DNA CSF QL NAA+NON-PROBE: NOT DETECTED
HSV1 DNA CSF QL NAA+NON-PROBE: NOT DETECTED
HSV2 DNA CSF QL NAA+NON-PROBE: NOT DETECTED
HU1 AB CSF QL IF: NEGATIVE
HU1 AB SER QL: NEGATIVE
HU2 AB CSF QL IF: NEGATIVE
HU2 AB SER QL IF: NEGATIVE
HU3 AB CSF QL IF: NEGATIVE
HU3 AB SER QL: NEGATIVE
IGLON5 IGG SER QL IF: NEGATIVE
IMM GRANULOCYTES # BLD AUTO: 0.08 X10(3)/MCL (ref 0–0.04)
IMM GRANULOCYTES NFR BLD AUTO: 0.5 %
IMMUNOLOGIST REVIEW: NORMAL
INDIA INK PREP CSF: NEGATIVE
KOH PREP SPEC: NORMAL
L MONOCYTOG DNA CSF QL NAA+NON-PROBE: NOT DETECTED
LGI1 IGG SER QL CBA IFA: NEGATIVE
LYMPHOCYTE MANUAL CSF (OHS): 74 %
LYMPHOCYTES # BLD AUTO: 1.82 X10(3)/MCL (ref 0.6–4.6)
LYMPHOCYTES NFR BLD AUTO: 10.7 %
M MGLUR1 AB IFA, S: NEGATIVE
M SEPTIN-7 IFA, S: NEGATIVE
MCH RBC QN AUTO: 28.7 PG (ref 27–31)
MCHC RBC AUTO-ENTMCNC: 33.7 G/DL (ref 33–36)
MCV RBC AUTO: 85.2 FL (ref 80–94)
MONOCYTE MANUAL CSF (OHS): 14 %
MONOCYTES # BLD AUTO: 0.96 X10(3)/MCL (ref 0.1–1.3)
MONOCYTES NFR BLD AUTO: 5.6 %
N MEN DNA CSF QL NAA+NON-PROBE: NOT DETECTED
NEUROCHONDRIN, IFA, S: NEGATIVE
NEUTROPHILS # BLD AUTO: 14.16 X10(3)/MCL (ref 2.1–9.2)
NEUTROPHILS MAN CSF (OHS): 12 %
NEUTROPHILS NFR BLD AUTO: 82.8 %
NIF IGG SER QL IF: NEGATIVE
NMDAR1 IGG SER QL CBA IFA: NEGATIVE
NRBC BLD AUTO-RTO: 0 %
PARANEOPLASTIC AB SER-IMP: NORMAL
PARECHOVIRUS A RNA CSF QL NAA+NON-PROBE: NOT DETECTED
PCA-1 AB CSF QL IF: NEGATIVE
PCA-1 AB SER QL IF: NEGATIVE
PCA-2 AB CSF QL IF: NEGATIVE
PCA-2 AB SER QL IF: NEGATIVE
PCA-TR AB CSF QL IF: NEGATIVE
PCA-TR AB SER QL IF: NEGATIVE
PLATELET # BLD AUTO: 344 X10(3)/MCL (ref 130–400)
PMV BLD AUTO: 8.5 FL (ref 7.4–10.4)
POTASSIUM SERPL-SCNC: 3.7 MMOL/L (ref 3.5–5.1)
PROT CSF-MCNC: 55.6 MG/DL (ref 15–45)
RBC # BLD AUTO: 4.67 X10(6)/MCL (ref 4.7–6.1)
RBC # CSF MANUAL: 1020 /UL
S PNEUM DNA CSF QL NAA+NON-PROBE: NOT DETECTED
SODIUM SERPL-SCNC: 137 MMOL/L (ref 136–145)
VZV DNA CSF QL NAA+NON-PROBE: NOT DETECTED
WBC # CSF MANUAL: 119 /UL (ref 0–5)
WBC # SPEC AUTO: 17.08 X10(3)/MCL (ref 4.5–11.5)

## 2024-05-17 PROCEDURE — 86617 LYME DISEASE ANTIBODY: CPT | Performed by: GENERAL PRACTICE

## 2024-05-17 PROCEDURE — 25000003 PHARM REV CODE 250: Performed by: GENERAL PRACTICE

## 2024-05-17 PROCEDURE — 87210 SMEAR WET MOUNT SALINE/INK: CPT | Performed by: GENERAL PRACTICE

## 2024-05-17 PROCEDURE — 25000003 PHARM REV CODE 250: Performed by: INTERNAL MEDICINE

## 2024-05-17 PROCEDURE — 99291 CRITICAL CARE FIRST HOUR: CPT | Mod: ,,, | Performed by: GENERAL PRACTICE

## 2024-05-17 PROCEDURE — 25000003 PHARM REV CODE 250: Performed by: PSYCHIATRY & NEUROLOGY

## 2024-05-17 PROCEDURE — 86622 BRUCELLA ANTIBODY: CPT | Performed by: GENERAL PRACTICE

## 2024-05-17 PROCEDURE — 80048 BASIC METABOLIC PNL TOTAL CA: CPT | Performed by: INTERNAL MEDICINE

## 2024-05-17 PROCEDURE — 87899 AGENT NOS ASSAY W/OPTIC: CPT | Performed by: GENERAL PRACTICE

## 2024-05-17 PROCEDURE — 87220 TISSUE EXAM FOR FUNGI: CPT | Performed by: GENERAL PRACTICE

## 2024-05-17 PROCEDURE — A9577 INJ MULTIHANCE: HCPCS | Performed by: INTERNAL MEDICINE

## 2024-05-17 PROCEDURE — 82945 GLUCOSE OTHER FLUID: CPT | Performed by: INTERNAL MEDICINE

## 2024-05-17 PROCEDURE — 86431 RHEUMATOID FACTOR QUANT: CPT | Performed by: STUDENT IN AN ORGANIZED HEALTH CARE EDUCATION/TRAINING PROGRAM

## 2024-05-17 PROCEDURE — 87205 SMEAR GRAM STAIN: CPT | Performed by: GENERAL PRACTICE

## 2024-05-17 PROCEDURE — 25500020 PHARM REV CODE 255: Performed by: INTERNAL MEDICINE

## 2024-05-17 PROCEDURE — 89051 BODY FLUID CELL COUNT: CPT | Performed by: INTERNAL MEDICINE

## 2024-05-17 PROCEDURE — 86638 Q FEVER ANTIBODY: CPT | Performed by: GENERAL PRACTICE

## 2024-05-17 PROCEDURE — 84432 ASSAY OF THYROGLOBULIN: CPT | Performed by: PSYCHIATRY & NEUROLOGY

## 2024-05-17 PROCEDURE — 86255 FLUORESCENT ANTIBODY SCREEN: CPT | Performed by: GENERAL PRACTICE

## 2024-05-17 PROCEDURE — 87070 CULTURE OTHR SPECIMN AEROBIC: CPT | Performed by: GENERAL PRACTICE

## 2024-05-17 PROCEDURE — 63600175 PHARM REV CODE 636 W HCPCS: Performed by: GENERAL PRACTICE

## 2024-05-17 PROCEDURE — 99233 SBSQ HOSP IP/OBS HIGH 50: CPT | Mod: ,,, | Performed by: PSYCHIATRY & NEUROLOGY

## 2024-05-17 PROCEDURE — 86480 TB TEST CELL IMMUN MEASURE: CPT | Performed by: GENERAL PRACTICE

## 2024-05-17 PROCEDURE — 87529 HSV DNA AMP PROBE: CPT | Performed by: GENERAL PRACTICE

## 2024-05-17 PROCEDURE — 63600175 PHARM REV CODE 636 W HCPCS: Performed by: PSYCHIATRY & NEUROLOGY

## 2024-05-17 PROCEDURE — 009U3ZX DRAINAGE OF SPINAL CANAL, PERCUTANEOUS APPROACH, DIAGNOSTIC: ICD-10-PCS | Performed by: RADIOLOGY

## 2024-05-17 PROCEDURE — 87483 CNS DNA AMP PROBE TYPE 12-25: CPT | Performed by: GENERAL PRACTICE

## 2024-05-17 PROCEDURE — 11000001 HC ACUTE MED/SURG PRIVATE ROOM

## 2024-05-17 PROCEDURE — 85025 COMPLETE CBC W/AUTO DIFF WBC: CPT | Performed by: GENERAL PRACTICE

## 2024-05-17 PROCEDURE — 36415 COLL VENOUS BLD VENIPUNCTURE: CPT | Performed by: GENERAL PRACTICE

## 2024-05-17 PROCEDURE — 84157 ASSAY OF PROTEIN OTHER: CPT | Performed by: INTERNAL MEDICINE

## 2024-05-17 RX ORDER — VALACYCLOVIR HYDROCHLORIDE 500 MG/1
1000 TABLET, FILM COATED ORAL 3 TIMES DAILY
Status: DISCONTINUED | OUTPATIENT
Start: 2024-05-19 | End: 2024-05-18

## 2024-05-17 RX ORDER — ACETAZOLAMIDE 250 MG/1
250 TABLET ORAL 3 TIMES DAILY
Status: DISCONTINUED | OUTPATIENT
Start: 2024-05-17 | End: 2024-05-21

## 2024-05-17 RX ORDER — ACETAZOLAMIDE 250 MG/1
250 TABLET ORAL 3 TIMES DAILY
Status: DISCONTINUED | OUTPATIENT
Start: 2024-05-17 | End: 2024-05-17

## 2024-05-17 RX ADMIN — LEVETIRACETAM 1500 MG: 500 TABLET, FILM COATED ORAL at 08:05

## 2024-05-17 RX ADMIN — ACETAMINOPHEN 650 MG: 325 TABLET, FILM COATED ORAL at 05:05

## 2024-05-17 RX ADMIN — ACYCLOVIR SODIUM 750 MG: 50 INJECTION, SOLUTION INTRAVENOUS at 10:05

## 2024-05-17 RX ADMIN — ACETAZOLAMIDE 250 MG: 250 TABLET ORAL at 08:05

## 2024-05-17 RX ADMIN — SODIUM CHLORIDE: 9 INJECTION, SOLUTION INTRAVENOUS at 02:05

## 2024-05-17 RX ADMIN — GADOBENATE DIMEGLUMINE 15 ML: 529 INJECTION, SOLUTION INTRAVENOUS at 05:05

## 2024-05-17 RX ADMIN — CEFTRIAXONE SODIUM 2 G: 2 INJECTION, POWDER, FOR SOLUTION INTRAMUSCULAR; INTRAVENOUS at 03:05

## 2024-05-17 RX ADMIN — LEVETIRACETAM 1500 MG: 500 TABLET, FILM COATED ORAL at 09:05

## 2024-05-17 RX ADMIN — ACYCLOVIR SODIUM 750 MG: 50 INJECTION, SOLUTION INTRAVENOUS at 02:05

## 2024-05-17 RX ADMIN — ACETAZOLAMIDE 250 MG: 250 TABLET ORAL at 05:05

## 2024-05-17 RX ADMIN — DEXTROSE MONOHYDRATE 500 MG: 5 INJECTION INTRAVENOUS at 04:05

## 2024-05-17 NOTE — ASSESSMENT & PLAN NOTE
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

## 2024-05-17 NOTE — PROGRESS NOTES
Infectious Disease  Progress Note    Patient Name: Luis Traylor   MRN: [id number]   Admission Date: 5/15/2024   Hospital Length of Stay: 0 days  Attending Physician: Sher Amor MD   Primary Care Provider: Terrence Craven NP     Isolation Status: No active isolations     Assessment/Plan:        23-year-old male patient known to have past significant for recent admission from 05/07 - 05/13/2024 after he presented with encephalopathy and concern infectious etiology, was initially admitted at [place], transferred to [facility] for concern for CVA, received TNK given left facial droop as well as arm and leg weakness with numbness.  Noted to have temperature of 100.3°, initially admitted to the ICU, had a lumbar puncture showing 400 WBCs, 62% lymphocytic, negative ME panel and crypto antigen, was transferred to [facility] in [place] for higher level of care.  While there, he had an extensive investigation that did not yield a clear diagnosis, antiepileptics were discontinued, doxycycline and valacyclovir were discontinued as well.  Patient was discharged to home on 05/13/2024.  Since his discharge, started having episodes of dysarthria, episodes staring and spacing out, patient reports that during these episodes he feels like he could tell what he wanted to say but could not actually say it.  When he got home, his headache started getting worse, he reports that his headaches were worse lying flat and better when he sits up.  He had intermittent episodes of photophobia, he also reports that his right eye started to be swollen and red chief happened every time when he was having these symptoms in the past, he started again having confusion, dysarthric episodes with repetitive motion such of lip-smacking and rubbing his nose, ultimately on 05/15/2024 he started having shaking chills, he had temperatures checked which was 100.6 he had significant nausea, he was therefore presented again to the emergency  department.  He was started on antiepileptic and admitted to the hospital.  ID consulted for assistance in management.     Encephalopathy/encephalitis  Confusion   Seizure activity   HSV 2 infection     -repeated MRI, although initial report not reporting any acute intracranial findings, on review of the images and discussion with Neurology, the right temporal lobe certainly enhances more than the left.  -this could potentially just be due to the postictal status of the patient and following seizure activity, however this could be also the focus of pathology triggering current seizures   -in addition, the patient does have a significantly elevated intracranial pressure 46 cmH2O, previously was at 43 cmH2O, after LP, he reports that he feels somewhat better, his closing pressure was at 34   -reviewed patient's chart in details, we do not have any clear evidence of an infectious etiology, although his CSF wbc's elevated, it is mildly so, his proteins are elevated on today's lumbar puncture at 53 with no severe elevation.  Pleocytosis mostly lymphocytic.  His most recent evaluation including spotted fever group antibodies, Histoplasma antigen in serum, Ehrlichia antibody panel, West Nile virus antibody in CSF, ME panel, Lyme disease serology and CSF (test with significant limitations for diagnosis CNS Lyme) HIV, syphilis, cryptococcal antigen, have all been negative on previous check 05/09/2024  -we have not tested for an immunoblot for Lyme disease  -discussed the case in details with Neurology, another entity that would fit the description of the current presentation with behavioral changes, seizures, temporal lobe centric encephalitis could present over weeks to months, could also be associated with elevated intracranial pressure is limbic encephalitis (autoimmune encephalitis)     Plan:  -discussed with pharmacy, we will be able to obtain 2 days worth of doses of acyclovir IV   -discontinue ganciclovir, start  acyclovir IV 10 milligrams/kilograms q.8 hours  -will plan for initial IV course and potential transitioned to p.o. valacyclovir 1 g p.o. q.8 hours until stand-alone HSV testing is back  -the patient is epidemiologic exposure and CSF profile does not match Coccidioides infection   -KOH evaluation of the CSF was also negative, Gram stain is with many wbc's, no organisms  -I will send a Lyme disease antibody immunoblot, will start empirically on ceftriaxone 2 g IV q.12 hours for now while this test is pending  -will also send autoimmune encephalitis panel and paraneoplastic panel on CSF   -after discussion with Neurology, presentation concerning for autoimmune encephalitis, in which case high-dose steroids would be necessary to control intracranial pressure as well as ongoing seizures  -ophthalmology evaluation for possible Papilledema, uveitis or other signs of inflammation   -Coxiella Ab, Bartonella Ab, Brucella Ab pending  -discussed with the patient and his fiancee at bedside in details        Thank you for your consult. ID will continue following. Please contact us with any questions or concerns.     Antibiotics:  Ganciclovir 05/16 - 05/17  Acyclovir 05/17 - Present  Ceftriaxone 05/17 - Present    45 minutes of critical care was time spent personally by me on the following activities: development of treatment plan with patient or surrogate and bedside caregivers, discussions with consultants and or primary team, evaluation of patient's response to treatment, examination of patient, ordering and performing treatments and interventions, ordering and review of laboratory studies, ordering and review of radiographic studies.  This critical care time did not overlap with that of any other provider or involve time for any procedures.       Portions of this note dictated using EMR integrated voice recognition software, and may be subject to voice recognition errors not corrected at proofreading. Please contact writer  for clarification if needed.    Subjective:     Principal Problem: Headache     Interval History:   Feels much improved after today's lumbar puncture, continues to have a minimal headache however overall reports improvement.  No fevers, no chills.    Review of Systems   Review of Systems   All other systems reviewed and are negative.       Objective:     Vital Signs (Most Recent):  Temp: 98 °F (36.7 °C) (05/17/24 1116)  Pulse: 88 (05/17/24 1116)  Resp: 18 (05/17/24 0012)  BP: 121/74 (05/17/24 1116)  SpO2: 100 % (05/17/24 1116)  Vital Signs (24h Range):  Temp:  [97.9 °F (36.6 °C)-98.2 °F (36.8 °C)] 98 °F (36.7 °C)  Pulse:  [75-91] 88  Resp:  [18] 18  SpO2:  [99 %-100 %] 100 %  BP: (113-129)/(73-84) 121/74      Weight:   Wt Readings from Last 1 Encounters:   05/16/24 77.1 kg (170 lb)      Body mass index is Body mass index is 23.71 kg/m².     Estimated Creatinine Clearance: Estimated Creatinine Clearance: 144 mL/min (based on SCr of 0.85 mg/dL).     Lines/Drains/Airways       Peripheral Intravenous Line  Duration                  Peripheral IV - Single Lumen 05/15/24 2313 20 G Anterior;Distal;Left Forearm 1 day                     Physical Exam  Physical Exam  Constitutional:       Appearance: Normal appearance.   HENT:      Head: Normocephalic and atraumatic.      Mouth/Throat:      Pharynx: No oropharyngeal exudate or posterior oropharyngeal erythema.   Eyes:      Extraocular Movements: Extraocular movements intact.      Pupils: Pupils are equal, round, and reactive to light.   Cardiovascular:      Rate and Rhythm: Normal rate and regular rhythm.      Heart sounds: No murmur heard.  Pulmonary:      Effort: No respiratory distress.      Breath sounds: No wheezing, rhonchi or rales.   Abdominal:      General: Bowel sounds are normal. There is no distension.      Palpations: Abdomen is soft.      Tenderness: There is no abdominal tenderness. There is no right CVA tenderness or left CVA tenderness.   Musculoskeletal:          General: No swelling or tenderness.      Cervical back: Neck supple. No rigidity or tenderness.   Lymphadenopathy:      Cervical: No cervical adenopathy.   Skin:     Findings: No lesion or rash.   Neurological:      General: No focal deficit present.      Mental Status: He is alert and oriented to person, place, and time. Mental status is at baseline.      Cranial Nerves: No cranial nerve deficit.      Motor: No weakness.      Comments: More engaging today, more alert.   Psychiatric:         Mood and Affect: Mood normal.         Behavior: Behavior normal.          Significant Labs: CBC:   Recent Labs   Lab 05/15/24  1859 05/16/24  0338 05/17/24  0528   WBC 24.05  24.05* 17.71* 17.08*   HGB 14.7 13.2* 13.4*   HCT 44.0 39.4* 39.8*    358 344     CMP:   Recent Labs   Lab 05/15/24  1859 05/16/24  0338 05/17/24  0528    137 137   K 4.5 4.0 3.7   CO2 19* 24 21*   BUN 12.8 12.2 10.3   CREATININE 0.98 0.97 0.85   CALCIUM 9.4 8.8 8.4   ALBUMIN 4.3 3.8  --    BILITOT 0.4 0.4  --    ALKPHOS 62 54  --    AST 17 11  --    ALT 17 14  --        Microbiology Results (last 7 days)       Procedure Component Value Units Date/Time    Blood culture #1 **CANNOT BE ORDERED STAT** [[id number]]  (Normal) Collected: 05/15/24 2014    Order Status: Completed Specimen: Blood Updated: 05/16/24 2200     Blood Culture No Growth At 24 Hours    Blood culture #2 **CANNOT BE ORDERED STAT** [[id number]]  (Normal) Collected: 05/15/24 2014    Order Status: Completed Specimen: Blood Updated: 05/16/24 2200     Blood Culture No Growth At 24 Hours             Significant Imaging: I have reviewed all pertinent imaging results/findings within the past 24 hours.      Vipin Beltran MD  Infectious Disease  [facility]

## 2024-05-17 NOTE — SUBJECTIVE & OBJECTIVE
Subjective:     Interval History: MRI today with T2 flair signal brightening in right temporal lobe. LP with OP elevated at 46. Patient off floor during rounds    Current Neurological Medications: keppra 1500 BID    Current Facility-Administered Medications   Medication Dose Route Frequency Provider Last Rate Last Admin    0.9%  NaCl infusion   Intravenous Continuous Sher Amor MD 75 mL/hr at 05/17/24 1414 New Bag at 05/17/24 1414    acetaminophen tablet 650 mg  650 mg Oral Q8H PRN Christofer Izaguirre MD   650 mg at 05/16/24 2130    acyclovir 750 mg in dextrose 5 % (D5W) 250 mL IVPB  10 mg/kg (Ideal) Intravenous Q8H Vipin Beltran  mL/hr at 05/17/24 1413 750 mg at 05/17/24 1413    cefTRIAXone (ROCEPHIN) 2 g in dextrose 5 % in water (D5W) 100 mL IVPB (MB+)  2 g Intravenous Q12H Vipin Beltran MD        levETIRAcetam tablet 1,500 mg  1,500 mg Oral BID Jessica Molina MD   1,500 mg at 05/17/24 0945    melatonin tablet 6 mg  6 mg Oral Nightly PRN Christofer Izaguirre MD        sodium chloride 0.9% flush 10 mL  10 mL Intravenous PRN Christofer Izaguirre MD        [START ON 5/19/2024] valACYclovir tablet 1,000 mg  1,000 mg Oral TID Vipin Beltran MD           Review of Systems  Objective:     Vital Signs (Most Recent):  Temp: 98 °F (36.7 °C) (05/17/24 1116)  Pulse: 88 (05/17/24 1116)  Resp: 18 (05/17/24 0012)  BP: 121/74 (05/17/24 1116)  SpO2: 100 % (05/17/24 1116) Vital Signs (24h Range):  Temp:  [97.9 °F (36.6 °C)-98.2 °F (36.8 °C)] 98 °F (36.7 °C)  Pulse:  [75-91] 88  Resp:  [18] 18  SpO2:  [99 %-100 %] 100 %  BP: (113-129)/(73-84) 121/74     Weight: 77.1 kg (170 lb)  Body mass index is 23.71 kg/m².     Physical Exam          Significant Labs:   Recent Lab Results  (Last 5 results in the past 24 hours)        05/17/24  1110   05/17/24  0528   05/16/24 2008 05/16/24  1744   05/16/24  1732        Phencyclidine       Negative         Amphetamines, Urine       Negative         Anion Gap   8.0              Appear CSF Clear               Barbituates, Urine       Negative         Baso #   0.03             Basophil %   0.2             Benzodiazepine, Urine       Negative         BUN   10.3             BUN/CREAT RATIO   12             Calcium   8.4             Cannabinoids, Urine       Negative         Chloride   108             CO2   21             Cocaine, Urine       Negative         COLOR CSF Colorless               Creatinine   0.85             CRP         11.50       Cryptococcus neoformans/gattii Not Detected               Cytomegalovirus (CMV) Not Detected               eGFR   >60             Enterovirus (EV) Not Detected               Eos #   0.03             Eos %   0.2             Escherichia coli K1 Not Detected               Fentanyl, Urine       Negative         Glucose   102             Glucose CSF 52               GRAM STAIN Many WBC observed                No bacteria seen               Haemophilus influenzae Not Detected               Hematocrit   39.8             Hemoglobin   13.4             HSV-1 Not Detected               HSV-2 Not Detected               Human Herpesvirus 6 (HHV-6) Not Detected               Human Parechovirus (HPEV) Not Detected               Immature Grans (Abs)   0.08             Immature Granulocytes   0.5             SERGE INK Negative               INR     1.0           KOH Prep No fungal elements seen               Listeria monocytogenes Not Detected               Lymph #   1.82             LYMPH %   10.7             Lymphocyte % 74               MCH   28.7             MCHC   33.7             MCV   85.2             MDMA, Urine       Negative         Mono #   0.96             Mono %   5.6             Monocyte % 14               MPV   8.5             Neisseria meningitidis Not Detected               Neut #   14.16             Neut %   82.8             Neutrophils % 12               nRBC   0.0             Opiates, Urine       Negative         pH, Urine       6.5          Platelet Count   344             Potassium   3.7             Protein CSF  55.6               PT     13.1           RBC   4.67             RBC, CSF 1,020               RDW   12.7             Sed Rate         14       Sodium   137             Specific Gravity, Urine Auto       1.015         Streptococcus agalactiae (Group B) Not Detected               Streptococcus pneumoniae Not Detected               Varicella zoster Virus (VZV) Not Detected               WBC,                WBC   17.08                                    Significant Imaging:  MRA and MRV negative EEG with right temporal lobe seizures

## 2024-05-17 NOTE — PROGRESS NOTES
[facility] Hospital Medicine Progress Note        Chief Complaint: Inpatient Follow-up for Headache     HPI:   Patient is a 23-year-old male with patient is a 23-year-old male with a recent admission at this facility with acute onset altered mental status for which he underwent an extensive workup.  He initially was thought to be having a stroke and was given a partial dose of TNK, but ultimately underwent a lumbar puncture which showed over a 400 WBCs with a lymphocyte predominance presumably viral/aseptic meningitis.  He was transferred to [place] for neuro critical Care and was seen by infectious disease at that facility.  He was discharged 05/13/2024 with the consensus being he had aseptic meningitis and all antibiotics were discontinued due to his clinical rapid improvement.  He presents to the ER tonight with worsening headache and reported slurred speech prior to arrival.     He arrived afebrile hemodynamically stable maintaining normal sats on room air.  Laboratory work showed leukocytosis of 24 K and CT head showed no acute process.  Hospitalist was consulted for further evaluation.      Interval Hx:   Patient today awake and comfortable. Denies any headache, fever, chills or photophobia. No rash noticed. Asking if he can go home.     Family at bedside, explained in detail about the patients condition, diagnosis, vitals, labs and treatment plan. They understand and agree with the plan. All their questions were answered.      Case was discussed with patient's nurse, Dr Beltran ID service and Dr Rodriguez Neurologist and  on the floor.    Objective/physical exam:  General: In no acute distress  Chest: Clear to auscultation bilaterally  Heart: RRR, +S1, S2, no appreciable murmur  Abdomen: Soft, nontender, BS +  MSK: Warm, no lower extremity edema, no clubbing or cyanosis  Neurologic: Alert and oriented x4, Cranial nerve II-XII intact, Strength 5/5 in all 4  extremities    VITAL SIGNS: 24 HRS MIN & MAX LAST   Temp  Min: 97.9 °F (36.6 °C)  Max: 98.2 °F (36.8 °C) 97.9 °F (36.6 °C)   BP  Min: 113/73  Max: 129/75 119/75   Pulse  Min: 79  Max: 91  86   Resp  Min: 18  Max: 18 18   SpO2  Min: 99 %  Max: 100 % 100 %     I have reviewed the following labs:  Recent Labs   Lab 05/11/24  0628 05/12/24  0609 05/13/24  0443 05/15/24  1859 05/16/24  0338 05/17/24  0528   WBC 13.50* 14.86* 13.39* 24.05  24.05* 17.71* 17.08*   RBC 5.29 5.12 5.10 5.12 4.66* 4.67*   HGB 15.0 14.9 14.8 14.7 13.2* 13.4*   HCT 45.3 43.2 42.9 44.0 39.4* 39.8*   MCV 86 84 84 85.9 84.5 85.2   MCH 28.4 29.1 29.0 28.7 28.3 28.7   MCHC 33.1 34.5 34.5 33.4 33.5 33.7   RDW 12.0 12.2 12.5 12.4 12.7 12.7    388 388 395 358 344   MPV 8.8* 8.8* 8.6* 8.4 8.6 8.5   GRAN 77.1*  10.4* 80.5*  12.0* 73.8*  9.9*  --   --   --    LYMPH 15.1*  2.0 11.3*  1.7 16.5*  2.2  --   --   --    MONO 7.2  1.0 7.4  1.1* 8.4  1.1*  --   --   --    BASO 0.03 0.06 0.05  --   --   --    NRBC 0 0 0  --   --   --      Recent Labs   Lab 05/11/24  0628 05/12/24  0609 05/13/24  0443 05/15/24  1859 05/16/24  0338 05/17/24  0528    137 136 136 137 137   K 3.8 4.0 4.3 4.5 4.0 3.7    107 107  --   --   --    CO2 20* 19* 20* 19* 24 21*   ANIONGAP 12 11 9  --   --   --    BUN 15 17 14 12.8 12.2 10.3   CREATININE 1.0 1.0 1.1 0.98 0.97 0.85   GLU 81 121* 105  --   --   --    CALCIUM 9.4 9.4 9.4 9.4 8.8 8.4   MG 2.1 1.9 1.9  --   --   --    ALBUMIN 3.8 3.9  --  4.3 3.8  --    PROT 7.7 7.7  --   --   --   --    ALKPHOS 59 56  --  62 54  --    ALT 28 20  --  17 14  --    AST 17 12  --  17 11  --    BILITOT 0.5 0.5  --  0.4 0.4  --      Microbiology Results (last 7 days)       Procedure Component Value Units Date/Time    Gram Stain [[id number]] Collected: 05/17/24 1110    Order Status: Completed Specimen: CSF (Spinal Fluid) Updated: 05/17/24 9828     GRAM STAIN Many WBC observed      No bacteria seen    Alea Ink Prep CSF  [[id number]] Collected: 05/17/24 1110    Order Status: Completed Specimen: CSF (Spinal Fluid) Updated: 05/17/24 1450     SERGE INK PREP CSF ([facility]) Negative    KOH Prep [[id number]] Collected: 05/17/24 1110    Order Status: Completed Specimen: Body Fluid Updated: 05/17/24 1450     KOH Prep No fungal elements seen    Cerebrospinal Fluid Culture [[id number]] Collected: 05/17/24 1110    Order Status: Resulted Specimen: CSF (Spinal Fluid) Updated: 05/17/24 1412    Cryptococcal antigen, CSF [[id number]] Collected: 05/17/24 1110    Order Status: Sent Specimen: CSF (Spinal Fluid) from CSF Tap, Tube 1 Updated: 05/17/24 1409    Blood culture #1 **CANNOT BE ORDERED STAT** [[id number]]  (Normal) Collected: 05/15/24 2014    Order Status: Completed Specimen: Blood Updated: 05/16/24 2200     Blood Culture No Growth At 24 Hours    Blood culture #2 **CANNOT BE ORDERED STAT** [[id number]]  (Normal) Collected: 05/15/24 2014    Order Status: Completed Specimen: Blood Updated: 05/16/24 2200     Blood Culture No Growth At 24 Hours             See below for Radiology    Scheduled Med:   acetaZOLAMIDE  250 mg Oral TID    acyclovir  10 mg/kg (Ideal) Intravenous Q8H    cefTRIAXone (Rocephin) IV (PEDS and ADULTS)  2 g Intravenous Q12H    levETIRAcetam  1,500 mg Oral BID    methylPREDNISolone sodium succinate injection  500 mg Intravenous Q12H    [START ON 5/19/2024] valACYclovir  1,000 mg Oral TID      Continuous Infusions:   sodium chloride 0.9%   Intravenous Continuous 75 mL/hr at 05/17/24 1414 New Bag at 05/17/24 1414      PRN Meds:    Current Facility-Administered Medications:     acetaminophen, 650 mg, Oral, Q8H PRN    melatonin, 6 mg, Oral, Nightly PRN    sodium chloride 0.9%, 10 mL, Intravenous, PRN     Assessment/Plan:  ? 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   Seizures     Plan:  He clinically looks asymptomatic.   CSF opening pressure was elevated.   CSF WBC and protein elevated. Increased lymphocytes   CSF HSV PCR  negative  Serology HSV 2 IGG positive   EEG showed seizure activities   He is now on IV antivirals, IV steroids and IV Rocephin   Will also continue Keppra     Continue iv fluids   Reviewed today's labs and  WBC 17, Hb 13, Plt 358, Crt 0.97    Continue supportive care     Labs in am     Critical care note:  Critical care diagnosis: Encephalitis needing iv antivirals and antibiotics   Critical care interventions: Hands-on evaluation, review of labs/radiographs/records and discussion with patient and family if present  Critical care time spent: 35 minutes     VTE prophylaxis: SCD    Patient condition:  Guarded    Anticipated discharge and Disposition:   Home       All diagnosis and differential diagnosis have been reviewed; assessment and plan has been documented; I have personally reviewed the labs and test results that are presently available; I have reviewed the patients medication list; I have reviewed the consulting providers response and recommendations. I have reviewed or attempted to review medical records based upon their availability    All of the patient's questions have been  addressed and answered. Patient's is agreeable to the above stated plan. I will continue to monitor closely and make adjustments to medical management as needed.  _____________________________________________________________________    Nutrition Status:    Radiology:  I have personally reviewed the following imaging and agree with the radiologist.     FL LUMBAR PUNCTURE THERAPEUTIC WITH IMAGING  Addendum: On further review, there is asymmetric hyperintensity involving the right  temporal lobe which suggest nonspecific inflammation.  There however is no  associated abnormal enhancement or diffusion weighted restriction.     Electronically signed by: Ori Wynne   Date:    05/17/2024   Time:    12:46  Narrative: EXAMINATION:  FL LUMBAR PUNCTURE THERAPEUTIC WITH IMAGING    CLINICAL HISTORY:  Lumbar puncture, concern for  meningitis;    TECHNIQUE:  Procedure explained to the patient and informed consent obtained. Suitable skin entry site chosen under fluoroscopy. Lower back prepped and draped in sterile fashion. Lidocaine used for local anesthesia.    COMPARISON:  CT 05/15/2024    FINDINGS:  Under intermittent fluoroscopic guidance, a 22-gauge spinal needle was advanced into the thecal sac with position confirmed by flow of CSF.  Opening pressure 46 cm CSF. Approximately 14 mL of CSF collected and sent to the lab for further evaluation.  Closing pressure 34 cm CSF.  The needle was removed and hemostasis achieved at the skin puncture site. No immediate complication. Estimated blood loss negligible.    Absorbed dose is 1.95 mGy.  Impression: Successful fluoroscopic guided lumbar puncture.    Electronically signed by: Romain Silva  Date:    05/17/2024  Time:    11:41  MRV Brain Without Contrast  Narrative: EXAMINATION:  MRV BRAIN WITHOUT CONTRAST    CLINICAL HISTORY:  Dural venous sinus thrombosis suspected;Intracranial and intraspinal phlebitis and thrombophlebitis    TECHNIQUE:  MRV was performed without administration of contrast.    COMPARISON:  MRI brain same date    FINDINGS:  Flow is seen bilaterally within the transverse sinuses.  The left transverse sinus is hypoplastic.  There is unremarkable flow within straight sinus and the sagittal sinus.  The superficial cerebral veins are patent.  No abnormality about the vein of Carlos identified.  Impression: No dural venous sinuses thrombosis identified.    Electronically signed by: Ori Wynne  Date:    05/17/2024  Time:    12:25  MRA Brain without contrast  Narrative: EXAMINATION:  MRA BRAIN WITHOUT CONTRAST    CLINICAL HISTORY:  Neuro deficit, acute, stroke suspected;Other symptoms and signs involving the nervous system    TECHNIQUE:  3-D time of flight MR sequences were performed through the [place] without administration of contrast.    COMPARISON:  MRI brain same  date.    FINDINGS:  Images are partially degraded by artifacts caused by patient motion.  There appears unremarkable flow-related visualization of the intracranial internal carotid arteries, middle and anterior cerebral arteries and the major branches. There is no conclusive evidence of an aneurysmal dilatation, flow limiting stenosis or  vascular malformation. Unremarkable flow identified within the vertebral and the basilar arteries.  The posterior cerebral arteries are patent.  Impression: No flow-limiting hemodynamically significant stenosis identified.    Electronically signed by: Ori Wynne  Date:    05/17/2024  Time:    12:15  MRI Brain W WO Contrast  Narrative: EXAMINATION:  MRI BRAIN W WO CONTRAST    CLINICAL HISTORY:  Headache, new or worsening, neuro deficit (Age 19-49y);Unspecified convulsions    TECHNIQUE:  Multiplanar MRI sequences were performed of the brain without contrast.    COMPARISON:  CT brain without contrast May 15, 2024    FINDINGS:  Images degraded by artifacts.  Gray-white matter differentiation is unremarkable.  There are no    foci of abnormal increased or decreased signal intensity throughout the cerebral hemispheres, cerebellum, basal ganglia or brainstem.  There is no pathologic intra-axial, leptomeningeal or dural enhancement.  Gradient echo sequences demonstrate no evidence of de phasing artifact to suggest hemorrhagic byproducts. No evidence of diffusion restriction or ADC map signal drop out to suggest acute infarct. The sella and suprasellar areas are unremarkable.    The cerebellar tonsils are normally positioned. There is no acute intracranial hemorrhage, hydrocephalus, midline shift or mass effect. No acute extra axial fluid collections identified. The mastoid air cells are clear.  Impression: No acute intracranial findings identified.    No significant discrepancy with overnight report.    Electronically signed by: Ori  Ricci  Date:    05/17/2024  Time:    07:26      Sher Amor MD  Department of [facility] Medicine   [facility]   05/17/2024

## 2024-05-17 NOTE — PROGRESS NOTES
[facility] - 4th Floor Medical Telemetry  Neurology  Progress Note    Patient Name: Luis Traylor  MRN: [id number]  Admission Date: 5/15/2024  Hospital Length of Stay: 0 days  Code Status: Full Code   Attending Provider: Sher Amor MD  Primary Care Physician: Terrence Craven NP   Principal Problem:Headache    HPI:   23-year-old male with no known previous PMH who presented to ED on 05/15 with worsening headache and episode of slurred speech just PTA.  Patient was recently admitted to this hospital on 05/07 for acute onset AMS for which he underwent extensive workup, including partial dose of TNK, stroke w/u which was negative, lumbar puncture which revealed over 400 WBCs with lymphocyte predominance and CSF, and infectious studies.  Patient was thought to have viral/aseptic meningitis, patient's hospitalization was complicated by persistent breakthrough seizures and was ultimately transferred to [facility] in [place] for neurocritical care.  While in [facility] antimicrobials were discontinued shortly after transfer due to his rapid clinical improvement, as well as anti seizure medications d/t pt not thought to be having seizures, and was discharged to home on 05/13.    Pt's significant other at bedside reports while leaving [facility], pt noted to have some stuttering speech with expressive aphasia, as well as on the ride home. She reports since discharge, has had forgetfulness, expressive aphasia, facial twitching, confabulates, and other odd behaviors. She reported just PTA pt was noted to have periorbital edema with erythematous sclera to OD, fever (100.8), chills, shaking, and R ear pain that resolved spontaneously PTA at hospital ED.       CT head without on this admission unrevealing for acute intracranial abnormalities.  Most recent labs significant for WBC 17.71, and otherwise unremarkable.    Overview/Hospital Course:  No notes on  file        Subjective:     Interval History: MRI today with T2 flair signal brightening in right temporal lobe. LP with OP elevated at 46. Patient off floor during rounds    Current Neurological Medications: keppra 1500 BID    Current Facility-Administered Medications   Medication Dose Route Frequency Provider Last Rate Last Admin    0.9%  NaCl infusion   Intravenous Continuous Sher Amor MD 75 mL/hr at 05/17/24 1414 New Bag at 05/17/24 1414    acetaminophen tablet 650 mg  650 mg Oral Q8H PRN Christofer Izaguirre MD   650 mg at 05/16/24 2130    acyclovir 750 mg in dextrose 5 % (D5W) 250 mL IVPB  10 mg/kg (Ideal) Intravenous Q8H Vipin Beltran  mL/hr at 05/17/24 1413 750 mg at 05/17/24 1413    cefTRIAXone (ROCEPHIN) 2 g in dextrose 5 % in water (D5W) 100 mL IVPB (MB+)  2 g Intravenous Q12H Vipin Beltran MD        levETIRAcetam tablet 1,500 mg  1,500 mg Oral BID Jessica Molina MD   1,500 mg at 05/17/24 0945    melatonin tablet 6 mg  6 mg Oral Nightly PRN Christofer Izaguirre MD        sodium chloride 0.9% flush 10 mL  10 mL Intravenous PRN Christofer Izaguirre MD        [START ON 5/19/2024] valACYclovir tablet 1,000 mg  1,000 mg Oral TID Vipin Beltran MD           Review of Systems  Objective:     Vital Signs (Most Recent):  Temp: 98 °F (36.7 °C) (05/17/24 1116)  Pulse: 88 (05/17/24 1116)  Resp: 18 (05/17/24 0012)  BP: 121/74 (05/17/24 1116)  SpO2: 100 % (05/17/24 1116) Vital Signs (24h Range):  Temp:  [97.9 °F (36.6 °C)-98.2 °F (36.8 °C)] 98 °F (36.7 °C)  Pulse:  [75-91] 88  Resp:  [18] 18  SpO2:  [99 %-100 %] 100 %  BP: (113-129)/(73-84) 121/74     Weight: 77.1 kg (170 lb)  Body mass index is 23.71 kg/m².     Physical Exam          Significant Labs:   Recent Lab Results  (Last 5 results in the past 24 hours)        05/17/24  1110   05/17/24  0528   05/16/24 2008 05/16/24  1744   05/16/24  1732        Phencyclidine       Negative         Amphetamines, Urine       Negative         Anion Gap    8.0             Appear CSF Clear               Barbituates, Urine       Negative         Baso #   0.03             Basophil %   0.2             Benzodiazepine, Urine       Negative         BUN   10.3             BUN/CREAT RATIO   12             Calcium   8.4             Cannabinoids, Urine       Negative         Chloride   108             CO2   21             Cocaine, Urine       Negative         COLOR CSF Colorless               Creatinine   0.85             CRP         11.50       Cryptococcus neoformans/gattii Not Detected               Cytomegalovirus (CMV) Not Detected               eGFR   >60             Enterovirus (EV) Not Detected               Eos #   0.03             Eos %   0.2             Escherichia coli K1 Not Detected               Fentanyl, Urine       Negative         Glucose   102             Glucose CSF 52               GRAM STAIN Many WBC observed                No bacteria seen               Haemophilus influenzae Not Detected               Hematocrit   39.8             Hemoglobin   13.4             HSV-1 Not Detected               HSV-2 Not Detected               Human Herpesvirus 6 (HHV-6) Not Detected               Human Parechovirus (HPEV) Not Detected               Immature Grans (Abs)   0.08             Immature Granulocytes   0.5             SERGE INK Negative               INR     1.0           KOH Prep No fungal elements seen               Listeria monocytogenes Not Detected               Lymph #   1.82             LYMPH %   10.7             Lymphocyte % 74               MCH   28.7             MCHC   33.7             MCV   85.2             MDMA, Urine       Negative         Mono #   0.96             Mono %   5.6             Monocyte % 14               MPV   8.5             Neisseria meningitidis Not Detected               Neut #   14.16             Neut %   82.8             Neutrophils % 12               nRBC   0.0             Opiates, Urine       Negative         pH, Urine        6.5         Platelet Count   344             Potassium   3.7             Protein CSF  55.6               PT     13.1           RBC   4.67             RBC, CSF 1,020               RDW   12.7             Sed Rate         14       Sodium   137             Specific Gravity, Urine Auto       1.015         Streptococcus agalactiae (Group B) Not Detected               Streptococcus pneumoniae Not Detected               Varicella zoster Virus (VZV) Not Detected               WBC,                WBC   17.08                                    Significant Imaging:  MRA and MRV negative EEG with right temporal lobe seizures    Assessment and Plan:     Elevated intracranial pressure  Related to encephalitis?    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

## 2024-05-18 LAB
ALBUMIN SERPL-MCNC: 4 G/DL (ref 3.5–5)
ALBUMIN/GLOB SERPL: 1 RATIO (ref 1.1–2)
ALP SERPL-CCNC: 58 UNIT/L (ref 40–150)
ALT SERPL-CCNC: 18 UNIT/L (ref 0–55)
ANION GAP SERPL CALC-SCNC: 8 MEQ/L
AST SERPL-CCNC: 11 UNIT/L (ref 5–34)
BASOPHILS # BLD AUTO: 0.01 X10(3)/MCL
BASOPHILS NFR BLD AUTO: 0.1 %
BILIRUB SERPL-MCNC: 0.3 MG/DL
BUN SERPL-MCNC: 12.2 MG/DL (ref 8.9–20.6)
CALCIUM SERPL-MCNC: 9.2 MG/DL (ref 8.4–10.2)
CHLORIDE SERPL-SCNC: 110 MMOL/L (ref 98–107)
CO2 SERPL-SCNC: 17 MMOL/L (ref 22–29)
CREAT SERPL-MCNC: 0.94 MG/DL (ref 0.73–1.18)
CREAT/UREA NIT SERPL: 13
CRP SERPL-MCNC: 7.1 MG/L
CRYPTOC AG CSF QL IA.RAPID: NEGATIVE
EOSINOPHIL # BLD AUTO: 0 X10(3)/MCL (ref 0–0.9)
EOSINOPHIL NFR BLD AUTO: 0 %
ERYTHROCYTE [DISTWIDTH] IN BLOOD BY AUTOMATED COUNT: 12.6 % (ref 11.5–17)
GFR SERPLBLD CREATININE-BSD FMLA CKD-EPI: >60 ML/MIN/1.73/M2
GLOBULIN SER-MCNC: 3.9 GM/DL (ref 2.4–3.5)
GLUCOSE SERPL-MCNC: 165 MG/DL (ref 74–100)
HCT VFR BLD AUTO: 43.8 % (ref 42–52)
HGB BLD-MCNC: 14.7 G/DL (ref 14–18)
IMM GRANULOCYTES # BLD AUTO: 0.12 X10(3)/MCL (ref 0–0.04)
IMM GRANULOCYTES NFR BLD AUTO: 0.7 %
LYMPHOCYTES # BLD AUTO: 1.09 X10(3)/MCL (ref 0.6–4.6)
LYMPHOCYTES NFR BLD AUTO: 6.2 %
MCH RBC QN AUTO: 29 PG (ref 27–31)
MCHC RBC AUTO-ENTMCNC: 33.6 G/DL (ref 33–36)
MCV RBC AUTO: 86.4 FL (ref 80–94)
MONOCYTES # BLD AUTO: 0.09 X10(3)/MCL (ref 0.1–1.3)
MONOCYTES NFR BLD AUTO: 0.5 %
NEUTROPHILS # BLD AUTO: 16.17 X10(3)/MCL (ref 2.1–9.2)
NEUTROPHILS NFR BLD AUTO: 92.5 %
NRBC BLD AUTO-RTO: 0 %
PLATELET # BLD AUTO: 402 X10(3)/MCL (ref 130–400)
PMV BLD AUTO: 8.8 FL (ref 7.4–10.4)
POTASSIUM SERPL-SCNC: 4.2 MMOL/L (ref 3.5–5.1)
PROT SERPL-MCNC: 7.9 GM/DL (ref 6.4–8.3)
RBC # BLD AUTO: 5.07 X10(6)/MCL (ref 4.7–6.1)
SODIUM SERPL-SCNC: 135 MMOL/L (ref 136–145)
WBC # SPEC AUTO: 17.48 X10(3)/MCL (ref 4.5–11.5)

## 2024-05-18 PROCEDURE — 36415 COLL VENOUS BLD VENIPUNCTURE: CPT | Performed by: PSYCHIATRY & NEUROLOGY

## 2024-05-18 PROCEDURE — 25000003 PHARM REV CODE 250: Performed by: INTERNAL MEDICINE

## 2024-05-18 PROCEDURE — 80053 COMPREHEN METABOLIC PANEL: CPT | Performed by: INTERNAL MEDICINE

## 2024-05-18 PROCEDURE — 86140 C-REACTIVE PROTEIN: CPT | Performed by: INTERNAL MEDICINE

## 2024-05-18 PROCEDURE — 86363 MOG-IGG1 ANTB FLO CYTMTRY EA: CPT

## 2024-05-18 PROCEDURE — 25000003 PHARM REV CODE 250: Performed by: PSYCHIATRY & NEUROLOGY

## 2024-05-18 PROCEDURE — 11000001 HC ACUTE MED/SURG PRIVATE ROOM

## 2024-05-18 PROCEDURE — 36415 COLL VENOUS BLD VENIPUNCTURE: CPT | Performed by: INTERNAL MEDICINE

## 2024-05-18 PROCEDURE — 25000003 PHARM REV CODE 250: Performed by: GENERAL PRACTICE

## 2024-05-18 PROCEDURE — 85025 COMPLETE CBC W/AUTO DIFF WBC: CPT | Performed by: INTERNAL MEDICINE

## 2024-05-18 PROCEDURE — 99233 SBSQ HOSP IP/OBS HIGH 50: CPT | Mod: ,,, | Performed by: PSYCHIATRY & NEUROLOGY

## 2024-05-18 PROCEDURE — 86053 AQAPRN-4 ANTB FLO CYTMTRY EA: CPT | Performed by: PSYCHIATRY & NEUROLOGY

## 2024-05-18 PROCEDURE — 86363 MOG-IGG1 ANTB FLO CYTMTRY EA: CPT | Performed by: PSYCHIATRY & NEUROLOGY

## 2024-05-18 PROCEDURE — 95714 VEEG EA 12-26 HR UNMNTR: CPT

## 2024-05-18 PROCEDURE — 63600175 PHARM REV CODE 636 W HCPCS: Performed by: PSYCHIATRY & NEUROLOGY

## 2024-05-18 PROCEDURE — 95700 EEG CONT REC W/VID EEG TECH: CPT

## 2024-05-18 PROCEDURE — 63600175 PHARM REV CODE 636 W HCPCS: Performed by: GENERAL PRACTICE

## 2024-05-18 RX ORDER — SODIUM BICARBONATE 650 MG/1
650 TABLET ORAL DAILY
Status: DISCONTINUED | OUTPATIENT
Start: 2024-05-18 | End: 2024-05-19

## 2024-05-18 RX ORDER — VALACYCLOVIR HYDROCHLORIDE 500 MG/1
1000 TABLET, FILM COATED ORAL 3 TIMES DAILY
Status: DISCONTINUED | OUTPATIENT
Start: 2024-05-19 | End: 2024-05-23

## 2024-05-18 RX ADMIN — CEFTRIAXONE SODIUM 2 G: 2 INJECTION, POWDER, FOR SOLUTION INTRAMUSCULAR; INTRAVENOUS at 04:05

## 2024-05-18 RX ADMIN — LEVETIRACETAM 1500 MG: 500 TABLET, FILM COATED ORAL at 09:05

## 2024-05-18 RX ADMIN — ACETAZOLAMIDE 250 MG: 250 TABLET ORAL at 09:05

## 2024-05-18 RX ADMIN — ACYCLOVIR SODIUM 750 MG: 50 INJECTION, SOLUTION INTRAVENOUS at 02:05

## 2024-05-18 RX ADMIN — CEFTRIAXONE SODIUM 2 G: 2 INJECTION, POWDER, FOR SOLUTION INTRAMUSCULAR; INTRAVENOUS at 02:05

## 2024-05-18 RX ADMIN — DEXTROSE MONOHYDRATE 500 MG: 5 INJECTION INTRAVENOUS at 04:05

## 2024-05-18 RX ADMIN — DEXTROSE MONOHYDRATE 500 MG: 5 INJECTION INTRAVENOUS at 05:05

## 2024-05-18 RX ADMIN — ACETAZOLAMIDE 250 MG: 250 TABLET ORAL at 02:05

## 2024-05-18 RX ADMIN — ACYCLOVIR SODIUM 750 MG: 50 INJECTION, SOLUTION INTRAVENOUS at 05:05

## 2024-05-18 RX ADMIN — LEVETIRACETAM 1500 MG: 500 TABLET, FILM COATED ORAL at 08:05

## 2024-05-18 RX ADMIN — ACYCLOVIR SODIUM 750 MG: 50 INJECTION, SOLUTION INTRAVENOUS at 10:05

## 2024-05-18 RX ADMIN — SODIUM BICARBONATE 650 MG TABLET 650 MG: at 08:05

## 2024-05-18 RX ADMIN — ACETAZOLAMIDE 250 MG: 250 TABLET ORAL at 08:05

## 2024-05-18 NOTE — PROGRESS NOTES
[facility] - 4th Floor Medical Telemetry  Neurology  Progress Note    Patient Name: Luis Traylor  MRN: [id number]  Admission Date: 5/15/2024  Hospital Length of Stay: 1 days  Code Status: Full Code   Attending Provider: Sher Amor MD  Primary Care Physician: Terrence Craven NP   Principal Problem:Headache    HPI:   23-year-old male with no known previous PMH who presented to ED on 05/15 with worsening headache and episode of slurred speech just PTA.  Patient was recently admitted to this hospital on 05/07 for acute onset AMS for which he underwent extensive workup, including partial dose of TNK, stroke w/u which was negative, lumbar puncture which revealed over 400 WBCs with lymphocyte predominance and CSF, and infectious studies.  Patient was thought to have viral/aseptic meningitis, patient's hospitalization was complicated by persistent breakthrough seizures and was ultimately transferred to [facility] in [place] for neurocritical care.  While in [facility] antimicrobials were discontinued shortly after transfer due to his rapid clinical improvement, as well as anti seizure medications d/t pt not thought to be having seizures, and was discharged to home on 05/13.    Pt's significant other at bedside reports while leaving [facility], pt noted to have some stuttering speech with expressive aphasia, as well as on the ride home. She reports since discharge, has had forgetfulness, expressive aphasia, facial twitching, confabulates, and other odd behaviors. She reported just PTA pt was noted to have periorbital edema with erythematous sclera to OD, fever (100.8), chills, shaking, and R ear pain that resolved spontaneously PTA at hospital ED.       CT head without on this admission unrevealing for acute intracranial abnormalities.  Most recent labs significant for WBC 17.71, and otherwise unremarkable.    Overview/Hospital Course:  No notes on  "file        Subjective:     Interval History: Denies headache. Reports "whatever medication is in IV" is helping him. IVMP, and diamox started yesterday        Current Facility-Administered Medications   Medication Dose Route Frequency Provider Last Rate Last Admin    0.9%  NaCl infusion   Intravenous Continuous Sher Amor MD 75 mL/hr at 05/17/24 1414 New Bag at 05/17/24 1414    acetaminophen tablet 650 mg  650 mg Oral Q8H PRN Christofer Izaguirre MD   650 mg at 05/17/24 1712    acetaZOLAMIDE tablet 250 mg  250 mg Oral TID Sher Amor MD   250 mg at 05/18/24 0836    acyclovir 750 mg in dextrose 5 % (D5W) 250 mL IVPB  10 mg/kg (Ideal) Intravenous Q8H Vipin Beltran MD   Stopped at 05/18/24 0632    cefTRIAXone (ROCEPHIN) 2 g in dextrose 5 % in water (D5W) 100 mL IVPB (MB+)  2 g Intravenous Q12H Vipin Beltran  mL/hr at 05/18/24 0212 2 g at 05/18/24 0212    levETIRAcetam tablet 1,500 mg  1,500 mg Oral BID Jesisca Molina MD   1,500 mg at 05/18/24 0836    melatonin tablet 6 mg  6 mg Oral Nightly PRN Christofer Izaguirre MD        methylPREDNISolone sodium succinate (SOLU-MEDROL) 500 mg in dextrose 5 % (D5W) 100 mL IVPB  500 mg Intravenous Q12H Jessica Molina  mL/hr at 05/18/24 0420 500 mg at 05/18/24 0420    sodium bicarbonate tablet 650 mg  650 mg Oral Daily Sher Amor MD   650 mg at 05/18/24 0836    sodium chloride 0.9% flush 10 mL  10 mL Intravenous PRN Christofer Izaguirre MD        [START ON 5/19/2024] valACYclovir tablet 1,000 mg  1,000 mg Oral TID Sher Amor MD           Review of Systems  Objective:     Vital Signs (Most Recent):  Temp: 97.6 °F (36.4 °C) (05/18/24 0712)  Pulse: 72 (05/18/24 0712)  Resp: 18 (05/18/24 0712)  BP: 118/66 (05/18/24 0712)  SpO2: 100 % (05/18/24 0712) Vital Signs (24h Range):  Temp:  [97.6 °F (36.4 °C)-98 °F (36.7 °C)] 97.6 °F (36.4 °C)  Pulse:  [72-88] 72  Resp:  [16-18] 18  SpO2:  [100 %] 100 %  BP: " (115-132)/(65-75) 118/66     Weight: 77.1 kg (170 lb)  Body mass index is 23.71 kg/m².     Physical Exam          NAD  Alert and oriented  Cognition and perception intact  No aphasia  EOMI  No facial asymmetry  No dysarthria  Moves all extremities symmetrically  No gross coordination abnormalities        Significant Labs:   Recent Lab Results         05/18/24  0601   05/17/24  1110        Albumin/Globulin Ratio 1.0         Albumin 4.0         ALP 58         ALT 18         Anion Gap 8.0         Appear CSF   Clear       AST 11         Baso # 0.01         Basophil % 0.1         BILIRUBIN TOTAL 0.3         BUN 12.2         BUN/CREAT RATIO 13         Calcium 9.2         Chloride 110         CO2 17         COLOR CSF   Colorless       Creatinine 0.94         CRP 7.10         Crypto Ag, CSF   Negative       Cryptococcus neoformans/gattii   Not Detected       CSF CULTURE   No Growth At 24 Hours  [P]       Cytomegalovirus (CMV)   Not Detected       eGFR >60         Enterovirus (EV)   Not Detected       Eos # 0.00         Eos % 0.0         Escherichia coli K1   Not Detected       Globulin, Total 3.9         Glucose 165         Glucose CSF   52       GRAM STAIN   Many WBC observed          No bacteria seen       Haemophilus influenzae   Not Detected       Hematocrit 43.8         Hemoglobin 14.7         HSV-1   Not Detected       HSV-2   Not Detected       Human Herpesvirus 6 (HHV-6)   Not Detected       Human Parechovirus (HPEV)   Not Detected       Immature Grans (Abs) 0.12         Immature Granulocytes 0.7         SERGE INK   Negative       KOH Prep   No fungal elements seen       Listeria monocytogenes   Not Detected       Lymph # 1.09         LYMPH % 6.2         Lymphocyte %   74       MCH 29.0         MCHC 33.6         MCV 86.4         Mono # 0.09         Mono % 0.5         Monocyte %   14       MPV 8.8         Neisseria meningitidis   Not Detected       Neut # 16.17         Neut % 92.5         Neutrophils %   12        nRBC 0.0         Platelet Count 402         Potassium 4.2         Protein CSF    55.6       PROTEIN TOTAL 7.9         RBC 5.07         RBC, CSF   1,020       RDW 12.6         Sodium 135         Streptococcus agalactiae (Group B)   Not Detected       Streptococcus pneumoniae   Not Detected       Varicella zoster Virus (VZV)   Not Detected       WBC, CSF   119       WBC 17.48                  [P] - Preliminary Result                 Assessment and Plan:     Elevated intracranial pressure  Assumed to be related to encephalitis    Continue Diamox  If HA returns consider LP  NSGY on case    Limbic encephalitis  Infectious workup negative and autoimmune encephalopathy panel negative    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

## 2024-05-18 NOTE — PROGRESS NOTES
[facility]  Hospital Medicine Progress Note        Chief Complaint: Inpatient Follow-up for Headache     HPI:   Patient is a 23-year-old male with patient is a 23-year-old male with a recent admission at this facility with acute onset altered mental status for which he underwent an extensive workup.  He initially was thought to be having a stroke and was given a partial dose of TNK, but ultimately underwent a lumbar puncture which showed over a 400 WBCs with a lymphocyte predominance presumably viral/aseptic meningitis.  He was transferred to [place] for neuro critical Care and was seen by infectious disease at that facility.  He was discharged 05/13/2024 with the consensus being he had aseptic meningitis and all antibiotics were discontinued due to his clinical rapid improvement.  MILENA franco presents to the ER tonight with worsening headache and reported slurred speech prior to arrival. He also had a seizure like episode that he does not remember.      He arrived afebrile hemodynamically stable maintaining normal sats on room air.  Laboratory work showed leukocytosis of 24 K and CT head showed no acute process.  Hospitalist was consulted for further evaluation.     Patient has mild headache but was afebrile. 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 He was started on Keppra. His clinical picture was worrisome for encephalitis. 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 He had LP done this visit was opening pressure was elevated. CSF showed elevated WBC and protein. He was started on IV antivirals by ID and iv steroids by neuro for possible autoimmune encephalitis.       Interval Hx:   Patient today awake and comfortable. Feels back at baseline. Denies any headache, seizures, fever or chills. Eating well.     Case was discussed with patient's nurse, Dr Beltran ID service and Dr Rodriguez Neurologist and  on the floor.    Objective/physical exam:  General: In no acute  distress  Chest: Clear to auscultation bilaterally  Heart: RRR, +S1, S2, no appreciable murmur  Abdomen: Soft, nontender, BS +  MSK: Warm, no lower extremity edema, no clubbing or cyanosis  Neurologic: Alert and oriented x4, Cranial nerve II-XII intact, Strength 5/5 in all 4 extremities    VITAL SIGNS: 24 HRS MIN & MAX LAST   Temp  Min: 97.6 °F (36.4 °C)  Max: 98 °F (36.7 °C) 97.6 °F (36.4 °C)   BP  Min: 115/74  Max: 132/74 118/66   Pulse  Min: 72  Max: 88  72   Resp  Min: 16  Max: 18 18   SpO2  Min: 100 %  Max: 100 % 100 %     I have reviewed the following labs:  Recent Labs   Lab 05/12/24  0609 05/13/24  0443 05/15/24  1859 05/16/24  0338 05/17/24  0528 05/18/24  0601   WBC 14.86* 13.39*   < > 17.71* 17.08* 17.48*   RBC 5.12 5.10   < > 4.66* 4.67* 5.07   HGB 14.9 14.8   < > 13.2* 13.4* 14.7   HCT 43.2 42.9   < > 39.4* 39.8* 43.8   MCV 84 84   < > 84.5 85.2 86.4   MCH 29.1 29.0   < > 28.3 28.7 29.0   MCHC 34.5 34.5   < > 33.5 33.7 33.6   RDW 12.2 12.5   < > 12.7 12.7 12.6    388   < > 358 344 402*   MPV 8.8* 8.6*   < > 8.6 8.5 8.8   GRAN 80.5*  12.0* 73.8*  9.9*  --   --   --   --    LYMPH 11.3*  1.7 16.5*  2.2  --   --   --   --    MONO 7.4  1.1* 8.4  1.1*  --   --   --   --    BASO 0.06 0.05  --   --   --   --    NRBC 0 0  --   --   --   --     < > = values in this interval not displayed.     Recent Labs   Lab 05/12/24  0609 05/13/24  0443 05/15/24  1859 05/15/24  1859 05/16/24  0338 05/17/24  0528 05/18/24  0601    136 136   < > 137 137 135*   K 4.0 4.3 4.5   < > 4.0 3.7 4.2    107  --   --   --   --   --    CO2 19* 20* 19*   < > 24 21* 17*   ANIONGAP 11 9  --   --   --   --   --    BUN 17 14 12.8   < > 12.2 10.3 12.2   CREATININE 1.0 1.1 0.98   < > 0.97 0.85 0.94   * 105  --   --   --   --   --    CALCIUM 9.4 9.4 9.4   < > 8.8 8.4 9.2   MG 1.9 1.9  --   --   --   --   --    ALBUMIN 3.9  --  4.3  --  3.8  --  4.0   PROT 7.7  --   --   --   --   --   --    ALKPHOS 56  --  62  --   54  --  58   ALT 20  --  17  --  14  --  18   AST 12  --  17  --  11  --  11   BILITOT 0.5  --  0.4  --  0.4  --  0.3    < > = values in this interval not displayed.     Microbiology Results (last 7 days)       Procedure Component Value Units Date/Time    Cryptococcal antigen, CSF [[id number]]  (Normal) Collected: 05/17/24 1110    Order Status: Completed Specimen: CSF (Spinal Fluid) from CSF Tap, Tube 1 Updated: 05/18/24 0829     Cryptococcal Antigen, CSF Negative    Cerebrospinal Fluid Culture [[id number]] Collected: 05/17/24 1110    Order Status: Completed Specimen: CSF (Spinal Fluid) Updated: 05/18/24 0648     CULTURE, CSF ([facility]) No Growth At 24 Hours    Blood culture #2 **CANNOT BE ORDERED STAT** [[id number]]  (Normal) Collected: 05/15/24 2014    Order Status: Completed Specimen: Blood Updated: 05/17/24 2201     Blood Culture No Growth At 48 Hours    Blood culture #1 **CANNOT BE ORDERED STAT** [[id number]]  (Normal) Collected: 05/15/24 2014    Order Status: Completed Specimen: Blood Updated: 05/17/24 2201     Blood Culture No Growth At 48 Hours    Gram Stain [[id number]] Collected: 05/17/24 1110    Order Status: Completed Specimen: CSF (Spinal Fluid) Updated: 05/17/24 1459     GRAM STAIN Many WBC observed      No bacteria seen    Alea Ink Prep CSF [[id number]] Collected: 05/17/24 1110    Order Status: Completed Specimen: CSF (Spinal Fluid) Updated: 05/17/24 1450     ALEA INK PREP CSF ([facility]) Negative    KOH Prep [[id number]] Collected: 05/17/24 1110    Order Status: Completed Specimen: Body Fluid Updated: 05/17/24 1450     KOH Prep No fungal elements seen             See below for Radiology    Scheduled Med:   acetaZOLAMIDE  250 mg Oral TID    acyclovir  10 mg/kg (Ideal) Intravenous Q8H    cefTRIAXone (Rocephin) IV (PEDS and ADULTS)  2 g Intravenous Q12H    levETIRAcetam  1,500 mg Oral BID    methylPREDNISolone sodium succinate injection  500 mg Intravenous Q12H    sodium bicarbonate  650 mg Oral Daily       Continuous Infusions:   sodium chloride 0.9%   Intravenous Continuous 75 mL/hr at 05/17/24 1414 New Bag at 05/17/24 1414      PRN Meds:    Current Facility-Administered Medications:     acetaminophen, 650 mg, Oral, Q8H PRN    melatonin, 6 mg, Oral, Nightly PRN    sodium chloride 0.9%, 10 mL, Intravenous, PRN     Assessment/Plan:  ? 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   Seizures temporal lobe   Metabolic acidosis   Mild hyperglycemia from steroids     Plan:  Patient looks comfortable. Has been afebrile  Undergoing 24 hrs EEG today   Initial EEG showed seizure activities   He is now on IV antivirals, IV steroids and IV Rocephin   Will also continue Keppra     Continue iv fluids and added po bicarb   Reviewed today's labs and  WBC 17, Hb 14, Plt 402, CO2 17, Crt 0.94    F/U by ID and neuro team     Continue supportive care     Labs in am     Critical care note:  Critical care diagnosis: Encephalitis needing iv antivirals and antibiotics   Critical care interventions: Hands-on evaluation, review of labs/radiographs/records and discussion with patient and family if present  Critical care time spent: 35 minutes     VTE prophylaxis: SCD    Patient condition:  Guarded    Anticipated discharge and Disposition:   Home       All diagnosis and differential diagnosis have been reviewed; assessment and plan has been documented; I have personally reviewed the labs and test results that are presently available; I have reviewed the patients medication list; I have reviewed the consulting providers response and recommendations. I have reviewed or attempted to review medical records based upon their availability    All of the patient's questions have been  addressed and answered. Patient's is agreeable to the above stated plan. I will continue to monitor closely and make adjustments to medical management as needed.  _____________________________________________________________________    Nutrition  Status:    Radiology:  I have personally reviewed the following imaging and agree with the radiologist.     FL LUMBAR PUNCTURE THERAPEUTIC WITH IMAGING  Addendum: On further review, there is asymmetric hyperintensity involving the right  temporal lobe which suggest nonspecific inflammation.  There however is no  associated abnormal enhancement or diffusion weighted restriction.     Electronically signed by: Ori Wynne   Date:    05/17/2024   Time:    12:46  Narrative: EXAMINATION:  FL LUMBAR PUNCTURE THERAPEUTIC WITH IMAGING    CLINICAL HISTORY:  Lumbar puncture, concern for meningitis;    TECHNIQUE:  Procedure explained to the patient and informed consent obtained. Suitable skin entry site chosen under fluoroscopy. Lower back prepped and draped in sterile fashion. Lidocaine used for local anesthesia.    COMPARISON:  CT 05/15/2024    FINDINGS:  Under intermittent fluoroscopic guidance, a 22-gauge spinal needle was advanced into the thecal sac with position confirmed by flow of CSF.  Opening pressure 46 cm CSF. Approximately 14 mL of CSF collected and sent to the lab for further evaluation.  Closing pressure 34 cm CSF.  The needle was removed and hemostasis achieved at the skin puncture site. No immediate complication. Estimated blood loss negligible.    Absorbed dose is 1.95 mGy.  Impression: Successful fluoroscopic guided lumbar puncture.    Electronically signed by: Romain Silva  Date:    05/17/2024  Time:    11:41  MRV Brain Without Contrast  Narrative: EXAMINATION:  MRV BRAIN WITHOUT CONTRAST    CLINICAL HISTORY:  Dural venous sinus thrombosis suspected;Intracranial and intraspinal phlebitis and thrombophlebitis    TECHNIQUE:  MRV was performed without administration of contrast.    COMPARISON:  MRI brain same date    FINDINGS:  Flow is seen bilaterally within the transverse sinuses.  The left transverse sinus is hypoplastic.  There is unremarkable flow within straight sinus and the sagittal sinus.  The  superficial cerebral veins are patent.  No abnormality about the vein of Carlos identified.  Impression: No dural venous sinuses thrombosis identified.    Electronically signed by: Ori Wynne  Date:    05/17/2024  Time:    12:25  MRA Brain without contrast  Narrative: EXAMINATION:  MRA BRAIN WITHOUT CONTRAST    CLINICAL HISTORY:  Neuro deficit, acute, stroke suspected;Other symptoms and signs involving the nervous system    TECHNIQUE:  3-D time of flight MR sequences were performed through the [place] [facility] [place] without administration of contrast.    COMPARISON:  MRI brain same date.    FINDINGS:  Images are partially degraded by artifacts caused by patient motion.  There appears unremarkable flow-related visualization of the intracranial internal carotid arteries, middle and anterior cerebral arteries and the major branches. There is no conclusive evidence of an aneurysmal dilatation, flow limiting stenosis or  vascular malformation. Unremarkable flow identified within the vertebral and the basilar arteries.  The posterior cerebral arteries are patent.  Impression: No flow-limiting hemodynamically significant stenosis identified.    Electronically signed by: Ori Wynne  Date:    05/17/2024  Time:    12:15  MRI Brain W WO Contrast  Narrative: EXAMINATION:  MRI BRAIN W WO CONTRAST    CLINICAL HISTORY:  Headache, new or worsening, neuro deficit (Age 19-49y);Unspecified convulsions    TECHNIQUE:  Multiplanar MRI sequences were performed of the brain without contrast.    COMPARISON:  CT brain without contrast May 15, 2024    FINDINGS:  Images degraded by artifacts.  Gray-white matter differentiation is unremarkable.  There are no    foci of abnormal increased or decreased signal intensity throughout the cerebral hemispheres, cerebellum, basal ganglia or brainstem.  There is no pathologic intra-axial, leptomeningeal or dural enhancement.  Gradient echo sequences demonstrate no evidence of de phasing artifact to suggest  hemorrhagic byproducts. No evidence of diffusion restriction or ADC map signal drop out to suggest acute infarct. The sella and suprasellar areas are unremarkable.    The cerebellar tonsils are normally positioned. There is no acute intracranial hemorrhage, hydrocephalus, midline shift or mass effect. No acute extra axial fluid collections identified. The mastoid air cells are clear.  Impression: No acute intracranial findings identified.    No significant discrepancy with overnight report.    Electronically signed by: Ori Wynne  Date:    05/17/2024  Time:    07:26      Sher Amor MD  Department of Hospital Medicine   [facility]   05/18/2024

## 2024-05-18 NOTE — CONSULTS
[facility]                       [address] [id number]    PATIENT NAME:       LUIS CALDWELL  YOB: 2000  CSN:                [id number]   MRN:                [id number]  ADMIT DATE:         05/15/2024 18:24:00  PHYSICIAN:          Genna Tuttle MD                            CONSULTATION    DATE OF CONSULT:      Thank you Dr. Olivarez and Dr. Molina for letting me see Luis Caldwell,   who has a complicated history of being admitted several times at different   hospitals with headaches, was worked up with elevated WBC with CSF, was tried on   some antiviral medication, was transferred to outside hospital, discharged and   was brought back over here from outpatient with more headache.  At several   points, lumbar puncture done that showed elevated pressure that improved with   drainage of CSF.  Currently, he is completely neurologically intact.  Denies any   vision.  His headaches were better since yesterday.  He was started on Diamox   and steroid.    I looked at the various scans that shows small ventricles, but no evidence of   mass.  MRV does not show any significant narrowing of the jugular or sigmoid   sinus.    IMPRESSION:  Luis Caldwell has headaches.  He denies any vision issues, it   improved with the medication.  At this time, he will probably observe, hopefully   does not need any surgical intervention from my standpoint.        ______________________________  MD LUIS Tavera/EILEEN  DD:  05/18/2024  Time:  09:33AM  DT:  05/18/2024  Time:  09:48AM  Job #:  [id number]      CONSULTATION

## 2024-05-18 NOTE — SUBJECTIVE & OBJECTIVE
"  Subjective:     Interval History: Denies headache. Reports "whatever medication is in IV" is helping him. IVMP, and diamox started yesterday        Current Facility-Administered Medications   Medication Dose Route Frequency Provider Last Rate Last Admin    0.9%  NaCl infusion   Intravenous Continuous Sher Amor MD 75 mL/hr at 05/17/24 1414 New Bag at 05/17/24 1414    acetaminophen tablet 650 mg  650 mg Oral Q8H PRN Christofer Izaguirre MD   650 mg at 05/17/24 1712    acetaZOLAMIDE tablet 250 mg  250 mg Oral TID Sher Amor MD   250 mg at 05/18/24 0836    acyclovir 750 mg in dextrose 5 % (D5W) 250 mL IVPB  10 mg/kg (Ideal) Intravenous Q8H Vipin Beltran MD   Stopped at 05/18/24 0632    cefTRIAXone (ROCEPHIN) 2 g in dextrose 5 % in water (D5W) 100 mL IVPB (MB+)  2 g Intravenous Q12H Vipin Beltran  mL/hr at 05/18/24 0212 2 g at 05/18/24 0212    levETIRAcetam tablet 1,500 mg  1,500 mg Oral BID Jessica Molina MD   1,500 mg at 05/18/24 0836    melatonin tablet 6 mg  6 mg Oral Nightly PRN Christofer Izaguirre MD        methylPREDNISolone sodium succinate (SOLU-MEDROL) 500 mg in dextrose 5 % (D5W) 100 mL IVPB  500 mg Intravenous Q12H Jessica Molina  mL/hr at 05/18/24 0420 500 mg at 05/18/24 0420    sodium bicarbonate tablet 650 mg  650 mg Oral Daily Sher Amor MD   650 mg at 05/18/24 0836    sodium chloride 0.9% flush 10 mL  10 mL Intravenous PRN Christofer Izaguirre MD        [START ON 5/19/2024] valACYclovir tablet 1,000 mg  1,000 mg Oral TID Sher Amor MD           Review of Systems  Objective:     Vital Signs (Most Recent):  Temp: 97.6 °F (36.4 °C) (05/18/24 0712)  Pulse: 72 (05/18/24 0712)  Resp: 18 (05/18/24 0712)  BP: 118/66 (05/18/24 0712)  SpO2: 100 % (05/18/24 0712) Vital Signs (24h Range):  Temp:  [97.6 °F (36.4 °C)-98 °F (36.7 °C)] 97.6 °F (36.4 °C)  Pulse:  [72-88] 72  Resp:  [16-18] 18  SpO2:  [100 %] 100 %  BP: (115-132)/(65-75) 118/66 "     Weight: 77.1 kg (170 lb)  Body mass index is 23.71 kg/m².     Physical Exam          NAD  Alert and oriented  Cognition and perception intact  No aphasia  EOMI  No facial asymmetry  No dysarthria  Moves all extremities symmetrically  No gross coordination abnormalities        Significant Labs:   Recent Lab Results         05/18/24  0601   05/17/24  1110        Albumin/Globulin Ratio 1.0         Albumin 4.0         ALP 58         ALT 18         Anion Gap 8.0         Appear CSF   Clear       AST 11         Baso # 0.01         Basophil % 0.1         BILIRUBIN TOTAL 0.3         BUN 12.2         BUN/CREAT RATIO 13         Calcium 9.2         Chloride 110         CO2 17         COLOR CSF   Colorless       Creatinine 0.94         CRP 7.10         Crypto Ag, CSF   Negative       Cryptococcus neoformans/gattii   Not Detected       CSF CULTURE   No Growth At 24 Hours  [P]       Cytomegalovirus (CMV)   Not Detected       eGFR >60         Enterovirus (EV)   Not Detected       Eos # 0.00         Eos % 0.0         Escherichia coli K1   Not Detected       Globulin, Total 3.9         Glucose 165         Glucose CSF   52       GRAM STAIN   Many WBC observed          No bacteria seen       Haemophilus influenzae   Not Detected       Hematocrit 43.8         Hemoglobin 14.7         HSV-1   Not Detected       HSV-2   Not Detected       Human Herpesvirus 6 (HHV-6)   Not Detected       Human Parechovirus (HPEV)   Not Detected       Immature Grans (Abs) 0.12         Immature Granulocytes 0.7         SERGE INK   Negative       KOH Prep   No fungal elements seen       Listeria monocytogenes   Not Detected       Lymph # 1.09         LYMPH % 6.2         Lymphocyte %   74       MCH 29.0         MCHC 33.6         MCV 86.4         Mono # 0.09         Mono % 0.5         Monocyte %   14       MPV 8.8         Neisseria meningitidis   Not Detected       Neut # 16.17         Neut % 92.5         Neutrophils %   12       nRBC 0.0         Platelet  Count 402         Potassium 4.2         Protein CSF    55.6       PROTEIN TOTAL 7.9         RBC 5.07         RBC, CSF   1,020       RDW 12.6         Sodium 135         Streptococcus agalactiae (Group B)   Not Detected       Streptococcus pneumoniae   Not Detected       Varicella zoster Virus (VZV)   Not Detected       WBC, CSF   119       WBC 17.48                  [P] - Preliminary Result

## 2024-05-18 NOTE — ASSESSMENT & PLAN NOTE
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

## 2024-05-19 LAB
ABS NEUT (OLG): 32.51 X10(3)/MCL (ref 2.1–9.2)
ANION GAP SERPL CALC-SCNC: 8 MEQ/L
B HENSELAE IGG TITR SER IF: NORMAL TITER
B HENSELAE IGM TITR SER IF: NORMAL TITER
B QUINTANA IGG TITR SER IF: NORMAL TITER
B QUINTANA IGM TITR SER IF: NORMAL TITER
BUN SERPL-MCNC: 18.3 MG/DL (ref 8.9–20.6)
CALCIUM SERPL-MCNC: 8.7 MG/DL (ref 8.4–10.2)
CHLORIDE SERPL-SCNC: 116 MMOL/L (ref 98–107)
CO2 SERPL-SCNC: 15 MMOL/L (ref 22–29)
CREAT SERPL-MCNC: 0.94 MG/DL (ref 0.73–1.18)
CREAT/UREA NIT SERPL: 19
ERYTHROCYTE [DISTWIDTH] IN BLOOD BY AUTOMATED COUNT: 12.5 % (ref 11.5–17)
GFR SERPLBLD CREATININE-BSD FMLA CKD-EPI: >60 ML/MIN/1.73/M2
GLUCOSE SERPL-MCNC: 138 MG/DL (ref 74–100)
HCT VFR BLD AUTO: 37.6 % (ref 42–52)
HGB BLD-MCNC: 12.6 G/DL (ref 14–18)
INSTRUMENT WBC (OLG): 33.17 X10(3)/MCL
LYMPHOCYTES NFR BLD MANUAL: 0.33 X10(3)/MCL
LYMPHOCYTES NFR BLD MANUAL: 1 %
MCH RBC QN AUTO: 28.9 PG (ref 27–31)
MCHC RBC AUTO-ENTMCNC: 33.5 G/DL (ref 33–36)
MCV RBC AUTO: 86.2 FL (ref 80–94)
MONOCYTES NFR BLD MANUAL: 0.33 X10(3)/MCL (ref 0.1–1.3)
MONOCYTES NFR BLD MANUAL: 1 %
NEUTROPHILS NFR BLD MANUAL: 98 %
NRBC BLD AUTO-RTO: 0 %
PLATELET # BLD AUTO: 349 X10(3)/MCL (ref 130–400)
PLATELET # BLD EST: NORMAL 10*3/UL
PMV BLD AUTO: 8.7 FL (ref 7.4–10.4)
POTASSIUM SERPL-SCNC: 4.1 MMOL/L (ref 3.5–5.1)
RBC # BLD AUTO: 4.36 X10(6)/MCL (ref 4.7–6.1)
RBC MORPH BLD: NORMAL
SODIUM SERPL-SCNC: 139 MMOL/L (ref 136–145)
WBC # SPEC AUTO: 33.17 X10(3)/MCL (ref 4.5–11.5)

## 2024-05-19 PROCEDURE — 85027 COMPLETE CBC AUTOMATED: CPT | Performed by: INTERNAL MEDICINE

## 2024-05-19 PROCEDURE — 25000003 PHARM REV CODE 250: Performed by: INTERNAL MEDICINE

## 2024-05-19 PROCEDURE — 36415 COLL VENOUS BLD VENIPUNCTURE: CPT | Performed by: INTERNAL MEDICINE

## 2024-05-19 PROCEDURE — 63600175 PHARM REV CODE 636 W HCPCS: Performed by: PSYCHIATRY & NEUROLOGY

## 2024-05-19 PROCEDURE — 25000003 PHARM REV CODE 250: Performed by: GENERAL PRACTICE

## 2024-05-19 PROCEDURE — 25000003 PHARM REV CODE 250: Performed by: PSYCHIATRY & NEUROLOGY

## 2024-05-19 PROCEDURE — 95718 EEG PHYS/QHP 2-12 HR W/VEEG: CPT | Mod: ,,, | Performed by: PSYCHIATRY & NEUROLOGY

## 2024-05-19 PROCEDURE — 63600175 PHARM REV CODE 636 W HCPCS: Performed by: GENERAL PRACTICE

## 2024-05-19 PROCEDURE — 99233 SBSQ HOSP IP/OBS HIGH 50: CPT | Mod: ,,, | Performed by: PSYCHIATRY & NEUROLOGY

## 2024-05-19 PROCEDURE — 80048 BASIC METABOLIC PNL TOTAL CA: CPT | Performed by: INTERNAL MEDICINE

## 2024-05-19 PROCEDURE — 11000001 HC ACUTE MED/SURG PRIVATE ROOM

## 2024-05-19 RX ORDER — SODIUM BICARBONATE 650 MG/1
650 TABLET ORAL 2 TIMES DAILY
Status: DISCONTINUED | OUTPATIENT
Start: 2024-05-19 | End: 2024-05-21

## 2024-05-19 RX ADMIN — DEXTROSE MONOHYDRATE 500 MG: 5 INJECTION INTRAVENOUS at 10:05

## 2024-05-19 RX ADMIN — CEFTRIAXONE SODIUM 2 G: 2 INJECTION, POWDER, FOR SOLUTION INTRAMUSCULAR; INTRAVENOUS at 03:05

## 2024-05-19 RX ADMIN — SODIUM BICARBONATE 650 MG TABLET 650 MG: at 10:05

## 2024-05-19 RX ADMIN — LEVETIRACETAM 1500 MG: 500 TABLET, FILM COATED ORAL at 09:05

## 2024-05-19 RX ADMIN — LEVETIRACETAM 1500 MG: 500 TABLET, FILM COATED ORAL at 10:05

## 2024-05-19 RX ADMIN — CEFTRIAXONE SODIUM 2 G: 2 INJECTION, POWDER, FOR SOLUTION INTRAMUSCULAR; INTRAVENOUS at 04:05

## 2024-05-19 RX ADMIN — ACETAZOLAMIDE 250 MG: 250 TABLET ORAL at 03:05

## 2024-05-19 RX ADMIN — ACETAZOLAMIDE 250 MG: 250 TABLET ORAL at 10:05

## 2024-05-19 RX ADMIN — ACETAZOLAMIDE 250 MG: 250 TABLET ORAL at 09:05

## 2024-05-19 RX ADMIN — VALACYCLOVIR 1000 MG: 500 TABLET, FILM COATED ORAL at 03:05

## 2024-05-19 RX ADMIN — DEXTROSE MONOHYDRATE 500 MG: 5 INJECTION INTRAVENOUS at 11:05

## 2024-05-19 RX ADMIN — ACYCLOVIR SODIUM 750 MG: 50 INJECTION, SOLUTION INTRAVENOUS at 05:05

## 2024-05-19 RX ADMIN — VALACYCLOVIR 1000 MG: 500 TABLET, FILM COATED ORAL at 10:05

## 2024-05-19 RX ADMIN — SODIUM BICARBONATE 650 MG TABLET 650 MG: at 09:05

## 2024-05-19 NOTE — PROGRESS NOTES
"[facility] 4th Floor Medical Telemetry  Neurosurgery  Progress Note    Subjective:     Interval History: NAEs overnight. No complaints. No headaches. No vision issues.     Post-Op Info:  * No surgery found *          Medications:  Continuous Infusions:   sodium chloride 0.9%   Intravenous Continuous 75 mL/hr at 05/17/24 1414 New Bag at 05/17/24 1414     Scheduled Meds:   acetaZOLAMIDE  250 mg Oral TID    cefTRIAXone (Rocephin) IV (PEDS and ADULTS)  2 g Intravenous Q12H    levETIRAcetam  1,500 mg Oral BID    methylPREDNISolone sodium succinate injection  500 mg Intravenous Q12H    sodium bicarbonate  650 mg Oral Daily    valACYclovir  1,000 mg Oral TID     PRN Meds:  Current Facility-Administered Medications:     acetaminophen, 650 mg, Oral, Q8H PRN    melatonin, 6 mg, Oral, Nightly PRN    sodium chloride 0.9%, 10 mL, Intravenous, PRN     Review of Systems  Objective:     Weight: 77.1 kg (170 lb)  Body mass index is 23.71 kg/m².  Vital Signs (Most Recent):  Temp: 97.9 °F (36.6 °C) (05/19/24 0712)  Pulse: 92 (05/19/24 0712)  Resp: 18 (05/19/24 0712)  BP: 125/63 (05/19/24 0712)  SpO2: 99 % (05/19/24 0712) Vital Signs (24h Range):  Temp:  [97.7 °F (36.5 °C)-98.6 °F (37 °C)] 97.9 °F (36.6 °C)  Pulse:  [76-94] 92  Resp:  [16-18] 18  SpO2:  [99 %-100 %] 99 %  BP: (117-137)/(63-77) 125/63                              Neurosurgery Physical Exam  A/Ox4  EOMI. CN2-12 intact. Speech normal.   Moves all extremities well. Ambulates.     Significant Labs:  Recent Labs   Lab 05/18/24  0601 05/19/24  0404   * 139   K 4.2 4.1   CO2 17* 15*   BUN 12.2 18.3   CREATININE 0.94 0.94   CALCIUM 9.2 8.7     Recent Labs   Lab 05/18/24  0601 05/19/24  0404   WBC 17.48* 33.17  33.17*   HGB 14.7 12.6*   HCT 43.8 37.6*   * 349     No results for input(s): "LABPT", "INR", "APTT" in the last 48 hours.  Microbiology Results (last 7 days)       Procedure Component Value Units Date/Time    Cerebrospinal Fluid Culture " [[id number]] Collected: 05/17/24 1110    Order Status: Completed Specimen: CSF (Spinal Fluid) Updated: 05/19/24 0711     CULTURE, CSF ([facility]) No Growth At 48 Hours    Blood culture #1 **CANNOT BE ORDERED STAT** [[id number]]  (Normal) Collected: 05/15/24 2014    Order Status: Completed Specimen: Blood Updated: 05/18/24 2201     Blood Culture No Growth At 72 Hours    Blood culture #2 **CANNOT BE ORDERED STAT** [[id number]]  (Normal) Collected: 05/15/24 2014    Order Status: Completed Specimen: Blood Updated: 05/18/24 2201     Blood Culture No Growth At 72 Hours    Cryptococcal antigen, CSF [[id number]]  (Normal) Collected: 05/17/24 1110    Order Status: Completed Specimen: CSF (Spinal Fluid) from CSF Tap, Tube 1 Updated: 05/18/24 0829     Cryptococcal Antigen, CSF Negative    Gram Stain [[id number]] Collected: 05/17/24 1110    Order Status: Completed Specimen: CSF (Spinal Fluid) Updated: 05/17/24 1459     GRAM STAIN Many WBC observed      No bacteria seen    Alea Ink Prep CSF [[id number]] Collected: 05/17/24 1110    Order Status: Completed Specimen: CSF (Spinal Fluid) Updated: 05/17/24 1450     ALEA INK PREP CSF ([facility]) Negative    KOH Prep [[id number]] Collected: 05/17/24 1110    Order Status: Completed Specimen: Body Fluid Updated: 05/17/24 1450     KOH Prep No fungal elements seen              Assessment/Plan:     Active Diagnoses:    Diagnosis Date Noted POA    PRINCIPAL PROBLEM:  Headache [R51.9] 05/15/2024 Yes    Elevated intracranial pressure [G93.2] 05/17/2024 Yes    TIA (transient ischemic attack) [G45.9] 05/16/2024 Unknown    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   -No surgical intervention planned at this time  -Continue current management     JESUS Woodward  Neurosurgery  [facility] - 4th Floor Medical Telemetry

## 2024-05-19 NOTE — PROGRESS NOTES
[facility] - 4th Floor Medical Telemetry  Neurology  Progress Note    Patient Name: Luis Traylor  MRN: [id number]  Admission Date: 5/15/2024  Hospital Length of Stay: 2 days  Code Status: Full Code   Attending Provider: Sher Amor MD  Primary Care Physician: Terrence Craven NP   Principal Problem:Headache    HPI:   23-year-old male with no known previous PMH who presented to ED on 05/15 with worsening headache and episode of slurred speech just PTA.  Patient was recently admitted to this hospital on 05/07 for acute onset AMS for which he underwent extensive workup, including partial dose of TNK, stroke w/u which was negative, lumbar puncture which revealed over 400 WBCs with lymphocyte predominance and CSF, and infectious studies.  Patient was thought to have viral/aseptic meningitis, patient's hospitalization was complicated by persistent breakthrough seizures and was ultimately transferred to [facility] in [place] for neurocritical care.  While in [facility] antimicrobials were discontinued shortly after transfer due to his rapid clinical improvement, as well as anti seizure medications d/t pt not thought to be having seizures, and was discharged to home on 05/13.    Pt's significant other at bedside reports while leaving [facility], pt noted to have some stuttering speech with expressive aphasia, as well as on the ride home. She reports since discharge, has had forgetfulness, expressive aphasia, facial twitching, confabulates, and other odd behaviors. She reported just PTA pt was noted to have periorbital edema with erythematous sclera to OD, fever (100.8), chills, shaking, and R ear pain that resolved spontaneously PTA at hospital ED.       CT head without on this admission unrevealing for acute intracranial abnormalities.  Most recent labs significant for WBC 17.71, and otherwise unremarkable.    Overview/Hospital Course:  No notes on  file        Subjective:     Interval History: EEG showed no seizures, on IVMP, IV antivirals and rocephin        Current Facility-Administered Medications   Medication Dose Route Frequency Provider Last Rate Last Admin    0.9%  NaCl infusion   Intravenous Continuous Sher Amor MD 75 mL/hr at 05/17/24 1414 New Bag at 05/17/24 1414    acetaminophen tablet 650 mg  650 mg Oral Q8H PRN Christofer Izaguirre MD   650 mg at 05/17/24 1712    acetaZOLAMIDE tablet 250 mg  250 mg Oral TID Sher Amor MD   250 mg at 05/19/24 0921    cefTRIAXone (ROCEPHIN) 2 g in dextrose 5 % in water (D5W) 100 mL IVPB (MB+)  2 g Intravenous Q12H Vipin Beltran MD   Stopped at 05/19/24 0450    levETIRAcetam tablet 1,500 mg  1,500 mg Oral BID Jessica Molina MD   1,500 mg at 05/19/24 0921    melatonin tablet 6 mg  6 mg Oral Nightly PRN Christofer Izaguirre MD        methylPREDNISolone sodium succinate (SOLU-MEDROL) 500 mg in dextrose 5 % (D5W) 100 mL IVPB  500 mg Intravenous Q12H Jessica Molina MD   Stopped at 05/19/24 1146    sodium bicarbonate tablet 650 mg  650 mg Oral BID Sher Amor MD        sodium chloride 0.9% flush 10 mL  10 mL Intravenous PRN Christofer Izaguirre MD        valACYclovir tablet 1,000 mg  1,000 mg Oral TID Sher Amor MD           Review of Systems  Objective:     Vital Signs (Most Recent):  Temp: 97.7 °F (36.5 °C) (05/19/24 1026)  Pulse: 77 (05/19/24 1026)  Resp: 18 (05/19/24 1026)  BP: 127/74 (05/19/24 1026)  SpO2: 100 % (05/19/24 1026) Vital Signs (24h Range):  Temp:  [97.7 °F (36.5 °C)-98.6 °F (37 °C)] 97.7 °F (36.5 °C)  Pulse:  [76-94] 77  Resp:  [16-18] 18  SpO2:  [99 %-100 %] 100 %  BP: (117-137)/(63-76) 127/74     Weight: 77.1 kg (170 lb)  Body mass index is 23.71 kg/m².     Physical Exam            NAD  Alert and oriented  Cognition and perception intact  No aphasia  EOMI  No facial asymmetry  No dysarthria  Moves all extremities symmetrically  No gross  coordination abnormalities    Significant Labs:   Recent Lab Results         05/19/24  0404        Anion Gap 8.0       BUN 18.3       BUN/CREAT RATIO 19       Calcium 8.7       Chloride 116       CO2 15       Creatinine 0.94       eGFR >60       Glucose 138       Gran # (ANC) [phone]       Hematocrit 37.6       Hemoglobin 12.6       Lymph # 0.3317       Lymphocyte % 1       MCH 28.9       MCHC 33.5       MCV 86.2       Mono # 0.3317       Mono % 1       MPV 8.7       Neutrophils Relative 98       nRBC 0.0       Platelet Estimate Normal       Platelet Count 349       Potassium 4.1       RBC 4.36       RBC Morph Normal       RDW 12.5       Sodium 139       WBC 33.17        33.17                 Assessment and Plan:     Elevated intracranial pressure  Assumed to be related to encephalitis    Continue Diamox  Repeat LP tomorrow or Tuesday to reassess pressure,     Cont to assess for any changes in clinical sx, HA, vision change etc    Encephalitis  Infectious workup negative and autoimmune encephalopathy panel negative    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

## 2024-05-19 NOTE — PROGRESS NOTES
[facility] Hospital Medicine Progress Note        Chief Complaint: Inpatient Follow-up for Headache     HPI:   Patient is a 23-year-old male with patient is a 23-year-old male with a recent admission at this facility with acute onset altered mental status for which he underwent an extensive workup.  He initially was thought to be having a stroke and was given a partial dose of TNK, but ultimately underwent a lumbar puncture which showed over a 400 WBCs with a lymphocyte predominance presumably viral/aseptic meningitis.  He was transferred to [place] for neuro critical Care and was seen by infectious disease at that facility.  He was discharged 05/13/2024 with the consensus being he had aseptic meningitis and all antibiotics were discontinued due to his clinical rapid improvement.  MILENA franco presents to the ER tonight with worsening headache and reported slurred speech prior to arrival. He also had a seizure like episode that he does not remember.      He arrived afebrile hemodynamically stable maintaining normal sats on room air.  Laboratory work showed leukocytosis of 24 K and CT head showed no acute process.  Hospitalist was consulted for further evaluation.     Patient has mild headache but was afebrile. 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 He was started on Keppra. His clinical picture was worrisome for encephalitis. 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 He had LP done this visit was opening pressure was elevated. CSF showed elevated WBC and protein. He was started on IV antivirals by ID and iv steroids by neuro for possible autoimmune encephalitis.       Interval Hx:   Patient today awake and comfortable. Doing well with no headache, fever or vision changes. No new seizures.     Case was discussed with patient's nurse, Dr Beltran ID service and Dr Rodriguez Neurologist and  on the floor.    Objective/physical exam:  General: In no acute distress  Chest:  Clear to auscultation bilaterally  Heart: RRR, +S1, S2, no appreciable murmur  Abdomen: Soft, nontender, BS +  MSK: Warm, no lower extremity edema, no clubbing or cyanosis  Neurologic: Alert and oriented x4, Cranial nerve II-XII intact, Strength 5/5 in all 4 extremities    VITAL SIGNS: 24 HRS MIN & MAX LAST   Temp  Min: 97.7 °F (36.5 °C)  Max: 98.6 °F (37 °C) 97.7 °F (36.5 °C)   BP  Min: 117/66  Max: 137/76 127/74   Pulse  Min: 76  Max: 94  77   Resp  Min: 16  Max: 18 18   SpO2  Min: 99 %  Max: 100 % 100 %     I have reviewed the following labs:  Recent Labs   Lab 05/13/24  0443 05/15/24  1859 05/17/24  0528 05/18/24  0601 05/19/24  0404   WBC 13.39*   < > 17.08* 17.48* 33.17  33.17*   RBC 5.10   < > 4.67* 5.07 4.36*   HGB 14.8   < > 13.4* 14.7 12.6*   HCT 42.9   < > 39.8* 43.8 37.6*   MCV 84   < > 85.2 86.4 86.2   MCH 29.0   < > 28.7 29.0 28.9   MCHC 34.5   < > 33.7 33.6 33.5   RDW 12.5   < > 12.7 12.6 12.5      < > 344 402* 349   MPV 8.6*   < > 8.5 8.8 8.7   GRAN 73.8*  9.9*  --   --   --   --    LYMPH 16.5*  2.2  --   --   --   --    MONO 8.4  1.1*  --   --   --   --    BASO 0.05  --   --   --   --    NRBC 0  --   --   --   --     < > = values in this interval not displayed.     Recent Labs   Lab 05/13/24  0443 05/15/24  1859 05/15/24  1859 05/16/24  0338 05/17/24 0528 05/18/24 0601 05/19/24  0404    136   < > 137 137 135* 139   K 4.3 4.5   < > 4.0 3.7 4.2 4.1     --   --   --   --   --   --    CO2 20* 19*   < > 24 21* 17* 15*   ANIONGAP 9  --   --   --   --   --   --    BUN 14 12.8   < > 12.2 10.3 12.2 18.3   CREATININE 1.1 0.98   < > 0.97 0.85 0.94 0.94     --   --   --   --   --   --    CALCIUM 9.4 9.4   < > 8.8 8.4 9.2 8.7   MG 1.9  --   --   --   --   --   --    ALBUMIN  --  4.3  --  3.8  --  4.0  --    ALKPHOS  --  62  --  54  --  58  --    ALT  --  17  --  14  --  18  --    AST  --  17  --  11  --  11  --    BILITOT  --  0.4  --  0.4  --  0.3  --     < > = values in this  interval not displayed.     Microbiology Results (last 7 days)       Procedure Component Value Units Date/Time    Cerebrospinal Fluid Culture [[id number]] Collected: 05/17/24 1110    Order Status: Completed Specimen: CSF (Spinal Fluid) Updated: 05/19/24 0711     CULTURE, CSF ([facility]) No Growth At 48 Hours    Blood culture #1 **CANNOT BE ORDERED STAT** [[id number]]  (Normal) Collected: 05/15/24 2014    Order Status: Completed Specimen: Blood Updated: 05/18/24 2201     Blood Culture No Growth At 72 Hours    Blood culture #2 **CANNOT BE ORDERED STAT** [[id number]]  (Normal) Collected: 05/15/24 2014    Order Status: Completed Specimen: Blood Updated: 05/18/24 2201     Blood Culture No Growth At 72 Hours    Cryptococcal antigen, CSF [[id number]]  (Normal) Collected: 05/17/24 1110    Order Status: Completed Specimen: CSF (Spinal Fluid) from CSF Tap, Tube 1 Updated: 05/18/24 0829     Cryptococcal Antigen, CSF Negative    Gram Stain [[id number]] Collected: 05/17/24 1110    Order Status: Completed Specimen: CSF (Spinal Fluid) Updated: 05/17/24 1459     GRAM STAIN Many WBC observed      No bacteria seen    Alea Ink Prep CSF [[id number]] Collected: 05/17/24 1110    Order Status: Completed Specimen: CSF (Spinal Fluid) Updated: 05/17/24 1450     ALEA INK PREP CSF ([facility]) Negative    KOH Prep [[id number]] Collected: 05/17/24 1110    Order Status: Completed Specimen: Body Fluid Updated: 05/17/24 1450     KOH Prep No fungal elements seen             See below for Radiology    Scheduled Med:   acetaZOLAMIDE  250 mg Oral TID    cefTRIAXone (Rocephin) IV (PEDS and ADULTS)  2 g Intravenous Q12H    levETIRAcetam  1,500 mg Oral BID    methylPREDNISolone sodium succinate injection  500 mg Intravenous Q12H    sodium bicarbonate  650 mg Oral BID    valACYclovir  1,000 mg Oral TID      Continuous Infusions:   sodium chloride 0.9%   Intravenous Continuous 75 mL/hr at 05/17/24 1414 New Bag at 05/17/24 1414      PRN Meds:    Current  Facility-Administered Medications:     acetaminophen, 650 mg, Oral, Q8H PRN    melatonin, 6 mg, Oral, Nightly PRN    sodium chloride 0.9%, 10 mL, Intravenous, PRN     Assessment/Plan:  ? 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   Leukocytosis from steroid use   Seizures temporal lobe   Metabolic acidosis   Mild hyperglycemia from steroids     Plan:  Patient clinically looking comfortable. Has been afebrile  Ambulating well   24 hrs EEG did not show any seizures  Initial EEG showed + seizure activities   He is now on IV antivirals, IV steroids and IV Rocephin   Will also continue Keppra     Continue iv fluids and added po bicarb   Reviewed today's labs and  WBC 33, Hb 12, Plt 349, CO2 15, Crt 0.94    F/U by ID and neuro team     May need another LP in am to assess ICP    Continue supportive care     Labs in am     Critical care note:  Critical care diagnosis: Encephalitis with elevated ICP needing iv antivirals and antibiotics   Critical care interventions: Hands-on evaluation, review of labs/radiographs/records and discussion with patient and family if present  Critical care time spent: 35 minutes     VTE prophylaxis: SCD    Patient condition:  Guarded    Anticipated discharge and Disposition:   Home       All diagnosis and differential diagnosis have been reviewed; assessment and plan has been documented; I have personally reviewed the labs and test results that are presently available; I have reviewed the patients medication list; I have reviewed the consulting providers response and recommendations. I have reviewed or attempted to review medical records based upon their availability    All of the patient's questions have been  addressed and answered. Patient's is agreeable to the above stated plan. I will continue to monitor closely and make adjustments to medical management as needed.  _____________________________________________________________________    Nutrition Status:    Radiology:  I have  personally reviewed the following imaging and agree with the radiologist.     FL LUMBAR PUNCTURE THERAPEUTIC WITH IMAGING  Addendum: On further review, there is asymmetric hyperintensity involving the right  temporal lobe which suggest nonspecific inflammation.  There however is no  associated abnormal enhancement or diffusion weighted restriction.     Electronically signed by: Ori Wynne   Date:    05/17/2024   Time:    12:46  Narrative: EXAMINATION:  FL LUMBAR PUNCTURE THERAPEUTIC WITH IMAGING    CLINICAL HISTORY:  Lumbar puncture, concern for meningitis;    TECHNIQUE:  Procedure explained to the patient and informed consent obtained. Suitable skin entry site chosen under fluoroscopy. Lower back prepped and draped in sterile fashion. Lidocaine used for local anesthesia.    COMPARISON:  CT 05/15/2024    FINDINGS:  Under intermittent fluoroscopic guidance, a 22-gauge spinal needle was advanced into the thecal sac with position confirmed by flow of CSF.  Opening pressure 46 cm CSF. Approximately 14 mL of CSF collected and sent to the lab for further evaluation.  Closing pressure 34 cm CSF.  The needle was removed and hemostasis achieved at the skin puncture site. No immediate complication. Estimated blood loss negligible.    Absorbed dose is 1.95 mGy.  Impression: Successful fluoroscopic guided lumbar puncture.    Electronically signed by: Romain Silva  Date:    05/17/2024  Time:    11:41  MRV Brain Without Contrast  Narrative: EXAMINATION:  MRV BRAIN WITHOUT CONTRAST    CLINICAL HISTORY:  Dural venous sinus thrombosis suspected;Intracranial and intraspinal phlebitis and thrombophlebitis    TECHNIQUE:  MRV was performed without administration of contrast.    COMPARISON:  MRI brain same date    FINDINGS:  Flow is seen bilaterally within the transverse sinuses.  The left transverse sinus is hypoplastic.  There is unremarkable flow within straight sinus and the sagittal sinus.  The superficial cerebral veins are  patent.  No abnormality about the vein of Carlos identified.  Impression: No dural venous sinuses thrombosis identified.    Electronically signed by: Ori Wynne  Date:    05/17/2024  Time:    12:25  MRA Brain without contrast  Narrative: EXAMINATION:  MRA BRAIN WITHOUT CONTRAST    CLINICAL HISTORY:  Neuro deficit, acute, stroke suspected;Other symptoms and signs involving the nervous system    TECHNIQUE:  3-D time of flight MR sequences were performed through the [place] without administration of contrast.    COMPARISON:  MRI brain same date.    FINDINGS:  Images are partially degraded by artifacts caused by patient motion.  There appears unremarkable flow-related visualization of the intracranial internal carotid arteries, middle and anterior cerebral arteries and the major branches. There is no conclusive evidence of an aneurysmal dilatation, flow limiting stenosis or  vascular malformation. Unremarkable flow identified within the vertebral and the basilar arteries.  The posterior cerebral arteries are patent.  Impression: No flow-limiting hemodynamically significant stenosis identified.    Electronically signed by: Ori Wynne  Date:    05/17/2024  Time:    12:15  MRI Brain W WO Contrast  Narrative: EXAMINATION:  MRI BRAIN W WO CONTRAST    CLINICAL HISTORY:  Headache, new or worsening, neuro deficit (Age 19-49y);Unspecified convulsions    TECHNIQUE:  Multiplanar MRI sequences were performed of the brain without contrast.    COMPARISON:  CT brain without contrast May 15, 2024    FINDINGS:  Images degraded by artifacts.  Gray-white matter differentiation is unremarkable.  There are no    foci of abnormal increased or decreased signal intensity throughout the cerebral hemispheres, cerebellum, basal ganglia or brainstem.  There is no pathologic intra-axial, leptomeningeal or dural enhancement.  Gradient echo sequences demonstrate no evidence of de phasing artifact to suggest hemorrhagic byproducts. No  evidence of diffusion restriction or ADC map signal drop out to suggest acute infarct. The sella and suprasellar areas are unremarkable.    The cerebellar tonsils are normally positioned. There is no acute intracranial hemorrhage, hydrocephalus, midline shift or mass effect. No acute extra axial fluid collections identified. The mastoid air cells are clear.  Impression: No acute intracranial findings identified.    No significant discrepancy with overnight report.    Electronically signed by: Ori Wynne  Date:    05/17/2024  Time:    07:26      Sher Amor MD  Department of Hospital Medicine   [facility]   05/19/2024

## 2024-05-19 NOTE — PROCEDURES
"Luis Traylor is a 23 y.o. male patient.    Temp: 97.7 °F (36.5 °C) (05/19/24 1026)  Pulse: 77 (05/19/24 1026)  Resp: 18 (05/19/24 1026)  BP: 127/74 (05/19/24 1026)  SpO2: 100 % (05/19/24 1026)  Weight: 77.1 kg (170 lb) (05/16/24 1146)  Height: 5' 11" (180.3 cm) (05/16/24 1146)       24 hr. Video EEG Monitoring    Date/Time: 5/19/2024 11:53 AM    Performed by: Jessica Molina MD  Authorized by: Jessica Molina MD          5/19/2024      Reason for exam: seizure    Duration 9 hours 6 minutes    Technical description:  Long term monitoring with video was performed at [facility].  The 10 20 international system of electrode placement is used.  Standard montages were recorded.     Description:  The record was dominated by a 9  hertz posterior dominant rhythm which attenuated with eye opening activity.  During drowsiness, identified by ocular signs and alpha attenuation, there is diffuse asynchronous theta activity.  More prominent theta frequency slowing was seen in the right temporal lobe. Stage 2 sleep was identified by vertex waves, sleep spindles, and K complexes.      Impression:  This is an abnormal EEG due to right temporal slowing. There were no electrographic seizures.   "

## 2024-05-19 NOTE — ASSESSMENT & PLAN NOTE
Assumed to be related to encephalitis    Continue Diamox  Repeat LP tomorrow or Tuesday to reassess pressure,     Cont to assess for any changes in clinical sx, HA, vision change etc

## 2024-05-19 NOTE — SUBJECTIVE & OBJECTIVE
Subjective:     Interval History: EEG showed no seizures, on IVMP, IV antivirals and rocephin        Current Facility-Administered Medications   Medication Dose Route Frequency Provider Last Rate Last Admin    0.9%  NaCl infusion   Intravenous Continuous Sher Amor MD 75 mL/hr at 05/17/24 1414 New Bag at 05/17/24 1414    acetaminophen tablet 650 mg  650 mg Oral Q8H PRN Christofer Izaguirre MD   650 mg at 05/17/24 1712    acetaZOLAMIDE tablet 250 mg  250 mg Oral TID Sher Amor MD   250 mg at 05/19/24 0921    cefTRIAXone (ROCEPHIN) 2 g in dextrose 5 % in water (D5W) 100 mL IVPB (MB+)  2 g Intravenous Q12H Vipin Beltran MD   Stopped at 05/19/24 0450    levETIRAcetam tablet 1,500 mg  1,500 mg Oral BID Jessica Molina MD   1,500 mg at 05/19/24 0921    melatonin tablet 6 mg  6 mg Oral Nightly PRN Christofer Izaguirre MD        methylPREDNISolone sodium succinate (SOLU-MEDROL) 500 mg in dextrose 5 % (D5W) 100 mL IVPB  500 mg Intravenous Q12H Jessica Molina MD   Stopped at 05/19/24 1146    sodium bicarbonate tablet 650 mg  650 mg Oral BID Sher Amor MD        sodium chloride 0.9% flush 10 mL  10 mL Intravenous PRN Christofre Izaguirre MD        valACYclovir tablet 1,000 mg  1,000 mg Oral TID Sher Amor MD           Review of Systems  Objective:     Vital Signs (Most Recent):  Temp: 97.7 °F (36.5 °C) (05/19/24 1026)  Pulse: 77 (05/19/24 1026)  Resp: 18 (05/19/24 1026)  BP: 127/74 (05/19/24 1026)  SpO2: 100 % (05/19/24 1026) Vital Signs (24h Range):  Temp:  [97.7 °F (36.5 °C)-98.6 °F (37 °C)] 97.7 °F (36.5 °C)  Pulse:  [76-94] 77  Resp:  [16-18] 18  SpO2:  [99 %-100 %] 100 %  BP: (117-137)/(63-76) 127/74     Weight: 77.1 kg (170 lb)  Body mass index is 23.71 kg/m².     Physical Exam            NAD  Alert and oriented  Cognition and perception intact  No aphasia  EOMI  No facial asymmetry  No dysarthria  Moves all extremities symmetrically  No gross coordination  abnormalities    Significant Labs:   Recent Lab Results         05/19/24  0404        Anion Gap 8.0       BUN 18.3       BUN/CREAT RATIO 19       Calcium 8.7       Chloride 116       CO2 15       Creatinine 0.94       eGFR >60       Glucose 138       Gran # (ANC) [phone]       Hematocrit 37.6       Hemoglobin 12.6       Lymph # 0.3317       Lymphocyte % 1       MCH 28.9       MCHC 33.5       MCV 86.2       Mono # 0.3317       Mono % 1       MPV 8.7       Neutrophils Relative 98       nRBC 0.0       Platelet Estimate Normal       Platelet Count 349       Potassium 4.1       RBC 4.36       RBC Morph Normal       RDW 12.5       Sodium 139       WBC 33.17        33.17

## 2024-05-19 NOTE — ASSESSMENT & PLAN NOTE
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

## 2024-05-20 LAB
ANION GAP SERPL CALC-SCNC: 8 MEQ/L
B BURGDOR AB PATRN SER IB-IMP: NORMAL
B BURGDOR IGG PATRN SER IB-IMP: NORMAL KDA
B BURGDOR IGG SER QL IB: NEGATIVE
B BURGDOR IGM PATRN SER IB-IMP: NORMAL KDA
B BURGDOR IGM SER QL IB: NEGATIVE
BACTERIA BLD CULT: NORMAL
BACTERIA BLD CULT: NORMAL
BASOPHILS # BLD AUTO: 0.05 X10(3)/MCL
BASOPHILS NFR BLD AUTO: 0.2 %
BUN SERPL-MCNC: 16.6 MG/DL (ref 8.9–20.6)
CALCIUM SERPL-MCNC: 8.4 MG/DL (ref 8.4–10.2)
CHLORIDE SERPL-SCNC: 118 MMOL/L (ref 98–107)
CO2 SERPL-SCNC: 15 MMOL/L (ref 22–29)
CREAT SERPL-MCNC: 0.94 MG/DL (ref 0.73–1.18)
CREAT/UREA NIT SERPL: 18
EOSINOPHIL # BLD AUTO: 0 X10(3)/MCL (ref 0–0.9)
EOSINOPHIL NFR BLD AUTO: 0 %
ERYTHROCYTE [DISTWIDTH] IN BLOOD BY AUTOMATED COUNT: 12.7 % (ref 11.5–17)
GFR SERPLBLD CREATININE-BSD FMLA CKD-EPI: >60 ML/MIN/1.73/M2
GLUCOSE SERPL-MCNC: 139 MG/DL (ref 74–100)
HCT VFR BLD AUTO: 38.4 % (ref 42–52)
HGB BLD-MCNC: 12.7 G/DL (ref 14–18)
IMM GRANULOCYTES # BLD AUTO: 1.1 X10(3)/MCL (ref 0–0.04)
IMM GRANULOCYTES NFR BLD AUTO: 4.8 %
LYMPHOCYTES # BLD AUTO: 0.72 X10(3)/MCL (ref 0.6–4.6)
LYMPHOCYTES NFR BLD AUTO: 3.2 %
MCH RBC QN AUTO: 28.7 PG (ref 27–31)
MCHC RBC AUTO-ENTMCNC: 33.1 G/DL (ref 33–36)
MCV RBC AUTO: 86.9 FL (ref 80–94)
MONOCYTES # BLD AUTO: 0.39 X10(3)/MCL (ref 0.1–1.3)
MONOCYTES NFR BLD AUTO: 1.7 %
NEUTROPHILS # BLD AUTO: 20.58 X10(3)/MCL (ref 2.1–9.2)
NEUTROPHILS NFR BLD AUTO: 90.1 %
NRBC BLD AUTO-RTO: 0 %
PLATELET # BLD AUTO: 341 X10(3)/MCL (ref 130–400)
PMV BLD AUTO: 8.5 FL (ref 7.4–10.4)
POTASSIUM SERPL-SCNC: 3.7 MMOL/L (ref 3.5–5.1)
RBC # BLD AUTO: 4.42 X10(6)/MCL (ref 4.7–6.1)
SODIUM SERPL-SCNC: 141 MMOL/L (ref 136–145)
WBC # SPEC AUTO: 22.84 X10(3)/MCL (ref 4.5–11.5)

## 2024-05-20 PROCEDURE — 99233 SBSQ HOSP IP/OBS HIGH 50: CPT | Mod: ,,, | Performed by: GENERAL PRACTICE

## 2024-05-20 PROCEDURE — 36415 COLL VENOUS BLD VENIPUNCTURE: CPT | Performed by: INTERNAL MEDICINE

## 2024-05-20 PROCEDURE — 63600175 PHARM REV CODE 636 W HCPCS: Performed by: PSYCHIATRY & NEUROLOGY

## 2024-05-20 PROCEDURE — 25000003 PHARM REV CODE 250: Performed by: INTERNAL MEDICINE

## 2024-05-20 PROCEDURE — 99233 SBSQ HOSP IP/OBS HIGH 50: CPT | Mod: ,,, | Performed by: PSYCHIATRY & NEUROLOGY

## 2024-05-20 PROCEDURE — 25000003 PHARM REV CODE 250: Performed by: GENERAL PRACTICE

## 2024-05-20 PROCEDURE — 11000001 HC ACUTE MED/SURG PRIVATE ROOM

## 2024-05-20 PROCEDURE — 85025 COMPLETE CBC W/AUTO DIFF WBC: CPT | Performed by: INTERNAL MEDICINE

## 2024-05-20 PROCEDURE — 25000003 PHARM REV CODE 250: Performed by: PSYCHIATRY & NEUROLOGY

## 2024-05-20 PROCEDURE — 80048 BASIC METABOLIC PNL TOTAL CA: CPT | Performed by: INTERNAL MEDICINE

## 2024-05-20 PROCEDURE — 63600175 PHARM REV CODE 636 W HCPCS: Performed by: GENERAL PRACTICE

## 2024-05-20 RX ADMIN — DEXTROSE MONOHYDRATE 500 MG: 5 INJECTION INTRAVENOUS at 09:05

## 2024-05-20 RX ADMIN — CEFTRIAXONE SODIUM 2 G: 2 INJECTION, POWDER, FOR SOLUTION INTRAMUSCULAR; INTRAVENOUS at 03:05

## 2024-05-20 RX ADMIN — ACETAZOLAMIDE 250 MG: 250 TABLET ORAL at 09:05

## 2024-05-20 RX ADMIN — SODIUM BICARBONATE 650 MG TABLET 650 MG: at 09:05

## 2024-05-20 RX ADMIN — ACETAZOLAMIDE 250 MG: 250 TABLET ORAL at 03:05

## 2024-05-20 RX ADMIN — LEVETIRACETAM 1500 MG: 500 TABLET, FILM COATED ORAL at 09:05

## 2024-05-20 RX ADMIN — SODIUM BICARBONATE 650 MG TABLET 650 MG: at 08:05

## 2024-05-20 RX ADMIN — VALACYCLOVIR 1000 MG: 500 TABLET, FILM COATED ORAL at 03:05

## 2024-05-20 RX ADMIN — LEVETIRACETAM 1500 MG: 500 TABLET, FILM COATED ORAL at 08:05

## 2024-05-20 RX ADMIN — SODIUM CHLORIDE: 9 INJECTION, SOLUTION INTRAVENOUS at 07:05

## 2024-05-20 RX ADMIN — ACETAZOLAMIDE 250 MG: 250 TABLET ORAL at 08:05

## 2024-05-20 RX ADMIN — VALACYCLOVIR 1000 MG: 500 TABLET, FILM COATED ORAL at 09:05

## 2024-05-20 RX ADMIN — VALACYCLOVIR 1000 MG: 500 TABLET, FILM COATED ORAL at 08:05

## 2024-05-20 NOTE — PROGRESS NOTES
Infectious Disease  Progress Note    Patient Name: Luis Traylor   MRN: [id number]   Admission Date: 5/15/2024   Hospital Length of Stay: 3 days  Attending Physician: Sher Amor MD   Primary Care Provider: Terrence Craven NP     Isolation Status: No active isolations     Assessment/Plan:        23-year-old male patient known to have past significant for recent admission from 05/07 - 05/13/2024 after he presented with encephalopathy and concern infectious etiology, was initially admitted at [place], transferred to [facility] for concern for CVA, received TNK given left facial droop as well as arm and leg weakness with numbness.  Noted to have temperature of 100.3°, initially admitted to the ICU, had a lumbar puncture showing 400 WBCs, 62% lymphocytic, negative ME panel and crypto antigen, was transferred to [facility] in [place] for higher level of care.  While there, he had an extensive investigation that did not yield a clear diagnosis, antiepileptics were discontinued, doxycycline and valacyclovir were discontinued as well.  Patient was discharged to home on 05/13/2024.  Since his discharge, started having episodes of dysarthria, episodes staring and spacing out, patient reports that during these episodes he feels like he could tell what he wanted to say but could not actually say it.  When he got home, his headache started getting worse, he reports that his headaches were worse lying flat and better when he sits up.  He had intermittent episodes of photophobia, he also reports that his right eye started to be swollen and red chief happened every time when he was having these symptoms in the past, he started again having confusion, dysarthric episodes with repetitive motion such of lip-smacking and rubbing his nose, ultimately on 05/15/2024 he started having shaking chills, he had temperatures checked which was 100.6 he had significant nausea, he was therefore presented again to the emergency  department.  He was started on antiepileptic and admitted to the hospital.  ID consulted for assistance in management.     Encephalopathy/encephalitis  Confusion   Seizure activity   HSV 2 infection     -feeling better, closer to baseline.  No fevers, no chills   -no further episodes of seizures, being followed with neurology   -possible repeat LP today for re-evaluation of opening pressure   -denies having any residual headaches  -Q fever antibody screen is positive  -appears to responded significantly to steroids and Acetazolamide       Plan:  -continue valacyclovir 1 g p.o. q.8 hours until stand-alone HSV testing is back  -continue ceftriaxone 2 g IV q.12 hours for now   -steroids and acetazolamide management per Neurology  -Lyme disease antibody immunoblot, Coxiella titers, Bartonella Ab, Brucella Ab pending  -discussed with the patient and his fiancee at bedside in details, answered all questions to the best of my ability  -discussed with primary attending physician as well     Thank you for your consult. ID will continue following. Please contact us with any questions or concerns.     Antibiotics:  Ganciclovir 05/16 - 05/17  Acyclovir 05/17 - Present  Ceftriaxone 05/17 - Present       Portions of this note dictated using EMR integrated voice recognition software, and may be subject to voice recognition errors not corrected at proofreading. Please contact writer for clarification if needed.    Subjective:     Principal Problem: Headache     Interval History:   Overall improved, remains somewhat apathetic, however is able to answer questions appropriately, maintains conversation appropriately, fiancee at bedside, no new concerns    Review of Systems   Review of Systems   All other systems reviewed and are negative.       Objective:     Vital Signs (Most Recent):  Temp: 97.8 °F (36.6 °C) (05/20/24 0442)  Pulse: 82 (05/20/24 0442)  Resp: 20 (05/20/24 0442)  BP: 116/65 (05/20/24 0442)  SpO2: 98 % (05/20/24 0442)   Vital Signs (24h Range):  Temp:  [97.6 °F (36.4 °C)-98.1 °F (36.7 °C)] 97.8 °F (36.6 °C)  Pulse:  [77-92] 82  Resp:  [18-20] 20  SpO2:  [98 %-100 %] 98 %  BP: (116-131)/(64-77) 116/65      Weight:   Wt Readings from Last 1 Encounters:   05/16/24 77.1 kg (170 lb)      Body mass index is Body mass index is 23.71 kg/m².     Estimated Creatinine Clearance: Estimated Creatinine Clearance: 130.2 mL/min (based on SCr of 0.94 mg/dL).     Lines/Drains/Airways       Peripheral Intravenous Line  Duration                  Peripheral IV - Single Lumen 05/15/24 2313 20 G Anterior;Distal;Left Forearm 4 days                     Physical Exam  Physical Exam  Constitutional:       Appearance: Normal appearance.   HENT:      Head: Normocephalic and atraumatic.      Mouth/Throat:      Pharynx: No oropharyngeal exudate or posterior oropharyngeal erythema.   Eyes:      Extraocular Movements: Extraocular movements intact.      Pupils: Pupils are equal, round, and reactive to light.   Cardiovascular:      Rate and Rhythm: Normal rate and regular rhythm.      Heart sounds: No murmur heard.  Pulmonary:      Effort: No respiratory distress.      Breath sounds: No wheezing, rhonchi or rales.   Abdominal:      General: Bowel sounds are normal. There is no distension.      Palpations: Abdomen is soft.      Tenderness: There is no abdominal tenderness. There is no right CVA tenderness or left CVA tenderness.   Musculoskeletal:         General: No swelling or tenderness.      Cervical back: Neck supple. No rigidity or tenderness.   Lymphadenopathy:      Cervical: No cervical adenopathy.   Skin:     Findings: No lesion or rash.   Neurological:      General: No focal deficit present.      Mental Status: He is alert and oriented to person, place, and time. Mental status is at baseline.      Cranial Nerves: No cranial nerve deficit.      Motor: No weakness.   Psychiatric:         Mood and Affect: Mood normal.         Behavior: Behavior normal.           Significant Labs: CBC:   Recent Labs   Lab 05/19/24  0404   WBC 33.17  33.17*   HGB 12.6*   HCT 37.6*        CMP:   Recent Labs   Lab 05/19/24  0404      K 4.1   CO2 15*   BUN 18.3   CREATININE 0.94   CALCIUM 8.7       Microbiology Results (last 7 days)       Procedure Component Value Units Date/Time    Blood culture #2 **CANNOT BE ORDERED STAT** [[id number]]  (Normal) Collected: 05/15/24 2014    Order Status: Completed Specimen: Blood Updated: 05/19/24 2201     Blood Culture No Growth At 96 Hours    Blood culture #1 **CANNOT BE ORDERED STAT** [[id number]]  (Normal) Collected: 05/15/24 2014    Order Status: Completed Specimen: Blood Updated: 05/19/24 2201     Blood Culture No Growth At 96 Hours    Cerebrospinal Fluid Culture [[id number]] Collected: 05/17/24 1110    Order Status: Completed Specimen: CSF (Spinal Fluid) Updated: 05/19/24 0711     CULTURE, CSF ([facility]) No Growth At 48 Hours    Cryptococcal antigen, CSF [[id number]]  (Normal) Collected: 05/17/24 1110    Order Status: Completed Specimen: CSF (Spinal Fluid) from CSF Tap, Tube 1 Updated: 05/18/24 0829     Cryptococcal Antigen, CSF Negative    Gram Stain [[id number]] Collected: 05/17/24 1110    Order Status: Completed Specimen: CSF (Spinal Fluid) Updated: 05/17/24 1459     GRAM STAIN Many WBC observed      No bacteria seen    Alea Ink Prep CSF [[id number]] Collected: 05/17/24 1110    Order Status: Completed Specimen: CSF (Spinal Fluid) Updated: 05/17/24 1450     ALEA INK PREP CSF ([facility]) Negative    KOH Prep [[id number]] Collected: 05/17/24 1110    Order Status: Completed Specimen: Body Fluid Updated: 05/17/24 1450     KOH Prep No fungal elements seen             Significant Imaging: I have reviewed all pertinent imaging results/findings within the past 24 hours.      Vipin Beltran MD  Infectious Disease  [facility]

## 2024-05-20 NOTE — ASSESSMENT & PLAN NOTE
Assumed to be related to encephalitis    Continue Diamox  Defer to MD for repeat LP today  Will repeat CT head w/o today d/t changes in cognition yesterday

## 2024-05-20 NOTE — PROGRESS NOTES
[facility] Hospital Medicine Progress Note        Chief Complaint: Inpatient Follow-up for Headache     HPI:   Patient is a 23-year-old male with patient is a 23-year-old male with a recent admission at this facility with acute onset altered mental status for which he underwent an extensive workup.  He initially was thought to be having a stroke and was given a partial dose of TNK, but ultimately underwent a lumbar puncture which showed over a 400 WBCs with a lymphocyte predominance presumably viral/aseptic meningitis.  He was transferred to [facility] for neuro critical Care and was seen by infectious disease at that facility.  He was discharged 05/13/2024 with the consensus being he had aseptic meningitis and all antibiotics were discontinued due to his clinical rapid improvement.  MILENA franco presents to the ER tonight with worsening headache and reported slurred speech prior to arrival. He also had a seizure like episode that he does not remember.      He arrived afebrile hemodynamically stable maintaining normal sats on room air.  Laboratory work showed leukocytosis of 24 K and CT head showed no acute process.  Hospitalist was consulted for further evaluation.     Patient has mild headache but was afebrile. 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 He was started on Keppra. His clinical picture was worrisome for encephalitis. 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 He had LP done this visit was opening pressure was elevated. CSF showed elevated WBC and protein. He was started on IV antivirals by ID and iv steroids by neuro for possible autoimmune encephalitis.       Interval Hx:   Patient today awake and comfortable. No acute issues overnight. Has been afebrile.     Case was discussed with patient's nurse, Dr Beltran ID service and Dr Rodriguez Neurologist and  on the floor.    Objective/physical exam:  General: In no acute distress  Chest: Clear to auscultation  bilaterally  Heart: RRR, +S1, S2, no appreciable murmur  Abdomen: Soft, nontender, BS +  MSK: Warm, no lower extremity edema, no clubbing or cyanosis  Neurologic: Alert and oriented x4, Cranial nerve II-XII intact, Strength 5/5 in all 4 extremities    VITAL SIGNS: 24 HRS MIN & MAX LAST   Temp  Min: 97.6 °F (36.4 °C)  Max: 98.1 °F (36.7 °C) 98 °F (36.7 °C)   BP  Min: 116/65  Max: 131/77 119/69   Pulse  Min: 82  Max: 92  87   Resp  Min: 18  Max: 20 20   SpO2  Min: 98 %  Max: 99 % 99 %     I have reviewed the following labs:  Recent Labs   Lab 05/17/24  0528 05/18/24  0601 05/19/24  0404   WBC 17.08* 17.48* 33.17  33.17*   RBC 4.67* 5.07 4.36*   HGB 13.4* 14.7 12.6*   HCT 39.8* 43.8 37.6*   MCV 85.2 86.4 86.2   MCH 28.7 29.0 28.9   MCHC 33.7 33.6 33.5   RDW 12.7 12.6 12.5    402* 349   MPV 8.5 8.8 8.7     Recent Labs   Lab 05/15/24  1859 05/16/24  0338 05/17/24  0528 05/18/24  0601 05/19/24  0404    137 137 135* 139   K 4.5 4.0 3.7 4.2 4.1   CO2 19* 24 21* 17* 15*   BUN 12.8 12.2 10.3 12.2 18.3   CREATININE 0.98 0.97 0.85 0.94 0.94   CALCIUM 9.4 8.8 8.4 9.2 8.7   ALBUMIN 4.3 3.8  --  4.0  --    ALKPHOS 62 54  --  58  --    ALT 17 14  --  18  --    AST 17 11  --  11  --    BILITOT 0.4 0.4  --  0.3  --      Microbiology Results (last 7 days)       Procedure Component Value Units Date/Time    Cerebrospinal Fluid Culture [[id number]] Collected: 05/17/24 1110    Order Status: Completed Specimen: CSF (Spinal Fluid) Updated: 05/20/24 0957     CULTURE, CSF ([facility]) No Growth At 72 Hours    Blood culture #2 **CANNOT BE ORDERED STAT** [[id number]]  (Normal) Collected: 05/15/24 2014    Order Status: Completed Specimen: Blood Updated: 05/19/24 2201     Blood Culture No Growth At 96 Hours    Blood culture #1 **CANNOT BE ORDERED STAT** [[id number]]  (Normal) Collected: 05/15/24 2014    Order Status: Completed Specimen: Blood Updated: 05/19/24 2201     Blood Culture No Growth At 96 Hours    Cryptococcal antigen, CSF  [[id number]]  (Normal) Collected: 05/17/24 1110    Order Status: Completed Specimen: CSF (Spinal Fluid) from CSF Tap, Tube 1 Updated: 05/18/24 0829     Cryptococcal Antigen, CSF Negative    Gram Stain [[id number]] Collected: 05/17/24 1110    Order Status: Completed Specimen: CSF (Spinal Fluid) Updated: 05/17/24 1459     GRAM STAIN Many WBC observed      No bacteria seen    Alea Ink Prep CSF [[id number]] Collected: 05/17/24 1110    Order Status: Completed Specimen: CSF (Spinal Fluid) Updated: 05/17/24 1450     ALEA INK PREP CSF ([facility]) Negative    KOH Prep [[id number]] Collected: 05/17/24 1110    Order Status: Completed Specimen: Body Fluid Updated: 05/17/24 1450     KOH Prep No fungal elements seen             See below for Radiology    Scheduled Med:   acetaZOLAMIDE  250 mg Oral TID    cefTRIAXone (Rocephin) IV (PEDS and ADULTS)  2 g Intravenous Q12H    levETIRAcetam  1,500 mg Oral BID    methylPREDNISolone sodium succinate injection  500 mg Intravenous Q12H    sodium bicarbonate  650 mg Oral BID    valACYclovir  1,000 mg Oral TID      Continuous Infusions:   sodium chloride 0.9%   Intravenous Continuous 75 mL/hr at 05/17/24 1414 New Bag at 05/17/24 1414      PRN Meds:    Current Facility-Administered Medications:     acetaminophen, 650 mg, Oral, Q8H PRN    melatonin, 6 mg, Oral, Nightly PRN    sodium chloride 0.9%, 10 mL, Intravenous, PRN     Assessment/Plan:  ? 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   Leukocytosis from steroid use   Seizures temporal lobe   Metabolic acidosis   Mild hyperglycemia from steroids     Plan:  Patient continues to do well. Has been afebrile.   Ambulating well   24 hrs EEG did not show any seizures  Initial EEG showed + seizure activities   He is now on IV antivirals, IV steroids and IV Rocephin   Will also continue Keppra     Continue iv fluids and po bicarb     CBC, BMP today and in am     F/U by ID and neuro team     LP today to assess ICP    Continue supportive care      Labs in am       VTE prophylaxis: SCD    Patient condition:  Guarded    Anticipated discharge and Disposition:   Home       All diagnosis and differential diagnosis have been reviewed; assessment and plan has been documented; I have personally reviewed the labs and test results that are presently available; I have reviewed the patients medication list; I have reviewed the consulting providers response and recommendations. I have reviewed or attempted to review medical records based upon their availability    All of the patient's questions have been  addressed and answered. Patient's is agreeable to the above stated plan. I will continue to monitor closely and make adjustments to medical management as needed.  _____________________________________________________________________    Nutrition Status:    Radiology:  I have personally reviewed the following imaging and agree with the radiologist.     CT Head Without Contrast  Narrative: EXAMINATION:  CT HEAD WITHOUT CONTRAST    TECHNIQUE:  Sequential axial images were performed of the brain from skull base to vertex without contrast.    Dose length product was 1057 mGycm. Automated exposure control was utilized to minimize radiation dose.    COMPARISON:  None available.    FINDINGS:  The gray white matter differentiation is unremarkable. There is no intracranial hemorrhage, hydrocephalus, midline shift or mass effect. No acute extra axial fluid collections identified. Visualized paranasal sinuses and the mastoid air cells are well aerated.  Impression: No acute intracranial findings identified.    Electronically signed by: Ori Wynne  Date:    05/20/2024  Time:    12:08      Sher Amor MD  Department of Hospital Medicine   [facility]   05/20/2024

## 2024-05-20 NOTE — SUBJECTIVE & OBJECTIVE
Subjective:     Interval History:   Neurologic exam stable w/o significant events overnight or this AM. However, pt's significant other at bedside reports yesterday pt noted to be lethargic with slowed speech throughout day. Reports he's back to baseline upon waking this AM.     Current Neurological Medications:   Current Facility-Administered Medications   Medication Dose Route Frequency Provider Last Rate Last Admin    0.9%  NaCl infusion   Intravenous Continuous Sher Amor MD 75 mL/hr at 05/17/24 1414 New Bag at 05/17/24 1414    acetaminophen tablet 650 mg  650 mg Oral Q8H PRN Christofer Izaguirre MD   650 mg at 05/17/24 1712    acetaZOLAMIDE tablet 250 mg  250 mg Oral TID Sher Amor MD   250 mg at 05/20/24 0852    cefTRIAXone (ROCEPHIN) 2 g in dextrose 5 % in water (D5W) 100 mL IVPB (MB+)  2 g Intravenous Q12H Vipin Beltran MD   Stopped at 05/20/24 0418    levETIRAcetam tablet 1,500 mg  1,500 mg Oral BID Jessica Molina MD   1,500 mg at 05/20/24 0851    melatonin tablet 6 mg  6 mg Oral Nightly PRN Christofer Izaguirre MD        methylPREDNISolone sodium succinate (SOLU-MEDROL) 500 mg in dextrose 5 % (D5W) 100 mL IVPB  500 mg Intravenous Q12H Jessica Molina  mL/hr at 05/20/24 0958 500 mg at 05/20/24 0958    sodium bicarbonate tablet 650 mg  650 mg Oral BID Sher Amor MD   650 mg at 05/20/24 0851    sodium chloride 0.9% flush 10 mL  10 mL Intravenous PRN Christofer Izaguirre MD        valACYclovir tablet 1,000 mg  1,000 mg Oral TID Sher Amor MD   1,000 mg at 05/20/24 0852       Review of Systems  A 14pt ros was reviewed & is negative unless o/w documented in the hpi    Objective:     Vital Signs (Most Recent):  Temp: 97.8 °F (36.6 °C) (05/20/24 0828)  Pulse: 84 (05/20/24 0828)  Resp: 20 (05/20/24 0442)  BP: 122/71 (05/20/24 0828)  SpO2: 98 % (05/20/24 0828) Vital Signs (24h Range):  Temp:  [97.6 °F (36.4 °C)-98.1 °F (36.7 °C)] 97.8 °F (36.6  °C)  Pulse:  [82-92] 84  Resp:  [18-20] 20  SpO2:  [98 %-99 %] 98 %  BP: (116-131)/(64-77) 122/71     Weight: 77.1 kg (170 lb)  Body mass index is 23.71 kg/m².     Physical Exam   GENERAL: NAD, calm, cooperative, appropriate, awake/alert  MENTAL STATUS: Oriented x4, follows commands reliably  SPEECH/LANGUAGE: Clear, coherent  CN:  EOMI, gaze conjugate, visual fields intact  PERRLA  Motor: no focal motor weakness  Sensory: Normal to tactile stim/vibration  Gait: not observed         Significant Labs:   Recent Lab Results       None            Significant Imaging: I have reviewed all pertinent imaging results/findings within the past 24 hours.

## 2024-05-20 NOTE — PROGRESS NOTES
[facility] - 4th Floor Medical Telemetry  Neurology  Progress Note    Patient Name: Luis Traylor  MRN: [id number]  Admission Date: 5/15/2024  Hospital Length of Stay: 3 days  Code Status: Full Code   Attending Provider: Sher Amor MD  Primary Care Physician: Terrence Craven NP   Principal Problem:Headache    HPI:   23-year-old male with no known previous PMH who presented to ED on 05/15 with worsening headache and episode of slurred speech just PTA.  Patient was recently admitted to this hospital on 05/07 for acute onset AMS for which he underwent extensive workup, including partial dose of TNK, stroke w/u which was negative, lumbar puncture which revealed over 400 WBCs with lymphocyte predominance and CSF, and infectious studies.  Patient was thought to have viral/aseptic meningitis, patient's hospitalization was complicated by persistent breakthrough seizures and was ultimately transferred to [facility] in [place] for neurocritical care.  While in [facility] antimicrobials were discontinued shortly after transfer due to his rapid clinical improvement, as well as anti seizure medications d/t pt not thought to be having seizures, and was discharged to home on 05/13.    Pt's significant other at bedside reports while leaving [facility], pt noted to have some stuttering speech with expressive aphasia, as well as on the ride home. She reports since discharge, has had forgetfulness, expressive aphasia, facial twitching, confabulates, and other odd behaviors. She reported just PTA pt was noted to have periorbital edema with erythematous sclera to OD, fever (100.8), chills, shaking, and R ear pain that resolved spontaneously PTA at hospital ED.       CT head without on this admission unrevealing for acute intracranial abnormalities.  Most recent labs significant for WBC 17.71, and otherwise unremarkable.    Overview/Hospital Course:  No notes on  file        Subjective:     Interval History:   Neurologic exam stable w/o significant events overnight or this AM. However, pt's significant other at bedside reports yesterday pt noted to be lethargic with slowed speech throughout day. Reports he's back to baseline upon waking this AM.     Current Neurological Medications:   Current Facility-Administered Medications   Medication Dose Route Frequency Provider Last Rate Last Admin    0.9%  NaCl infusion   Intravenous Continuous Sher Amor MD 75 mL/hr at 05/17/24 1414 New Bag at 05/17/24 1414    acetaminophen tablet 650 mg  650 mg Oral Q8H PRN Christofer Izaguirre MD   650 mg at 05/17/24 1712    acetaZOLAMIDE tablet 250 mg  250 mg Oral TID Sher Amor MD   250 mg at 05/20/24 0852    cefTRIAXone (ROCEPHIN) 2 g in dextrose 5 % in water (D5W) 100 mL IVPB (MB+)  2 g Intravenous Q12H Vipin Beltran MD   Stopped at 05/20/24 0418    levETIRAcetam tablet 1,500 mg  1,500 mg Oral BID Jessica Molina MD   1,500 mg at 05/20/24 0851    melatonin tablet 6 mg  6 mg Oral Nightly PRN Christofer Izaguirre MD        methylPREDNISolone sodium succinate (SOLU-MEDROL) 500 mg in dextrose 5 % (D5W) 100 mL IVPB  500 mg Intravenous Q12H Jessica Molina  mL/hr at 05/20/24 0958 500 mg at 05/20/24 0958    sodium bicarbonate tablet 650 mg  650 mg Oral BID Sher Amor MD   650 mg at 05/20/24 0851    sodium chloride 0.9% flush 10 mL  10 mL Intravenous PRN Christofer Izaguirre MD        valACYclovir tablet 1,000 mg  1,000 mg Oral TID Sher Amor MD   1,000 mg at 05/20/24 0852       Review of Systems  A 14pt ros was reviewed & is negative unless o/w documented in the hpi    Objective:     Vital Signs (Most Recent):  Temp: 97.8 °F (36.6 °C) (05/20/24 0828)  Pulse: 84 (05/20/24 0828)  Resp: 20 (05/20/24 0442)  BP: 122/71 (05/20/24 0828)  SpO2: 98 % (05/20/24 0828) Vital Signs (24h Range):  Temp:  [97.6 °F (36.4 °C)-98.1 °F (36.7 °C)] 97.8  °F (36.6 °C)  Pulse:  [82-92] 84  Resp:  [18-20] 20  SpO2:  [98 %-99 %] 98 %  BP: (116-131)/(64-77) 122/71     Weight: 77.1 kg (170 lb)  Body mass index is 23.71 kg/m².     Physical Exam   GENERAL: NAD, calm, cooperative, appropriate, awake/alert  MENTAL STATUS: Oriented x4, follows commands reliably  SPEECH/LANGUAGE: Clear, coherent  CN:  EOMI, gaze conjugate, visual fields intact  PERRLA  Motor: no focal motor weakness  Sensory: Normal to tactile stim/vibration  Gait: not observed         Significant Labs:   Recent Lab Results       None            Significant Imaging: I have reviewed all pertinent imaging results/findings within the past 24 hours.  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

## 2024-05-21 LAB
ANION GAP SERPL CALC-SCNC: 8 MEQ/L
ANION GAP SERPL CALC-SCNC: 9 MEQ/L
APPEARANCE CSF: CLEAR
APTT PPP: 23.3 SECONDS (ref 23.2–33.7)
BACTERIA #/AREA URNS AUTO: ABNORMAL /HPF
BASOPHILS # BLD AUTO: 0.02 X10(3)/MCL
BASOPHILS NFR BLD AUTO: 0.1 %
BILIRUB UR QL STRIP.AUTO: NEGATIVE
BRUCELLA IGG SER QL IA: NEGATIVE
BRUCELLA IGG+IGM SER-IMP: NORMAL
BRUCELLA IGM SER QL IA: NEGATIVE
BUN SERPL-MCNC: 17.4 MG/DL (ref 8.9–20.6)
BUN SERPL-MCNC: 23.6 MG/DL (ref 8.9–20.6)
C GATTII+NEOFOR DNA CSF QL NAA+NON-PROBE: NOT DETECTED
CALCIUM SERPL-MCNC: 8.4 MG/DL (ref 8.4–10.2)
CALCIUM SERPL-MCNC: 8.4 MG/DL (ref 8.4–10.2)
CHLORIDE SERPL-SCNC: 114 MMOL/L (ref 98–107)
CHLORIDE SERPL-SCNC: 116 MMOL/L (ref 98–107)
CLARITY UR: ABNORMAL
CMV DNA CSF QL NAA+NON-PROBE: NOT DETECTED
CO2 SERPL-SCNC: 14 MMOL/L (ref 22–29)
CO2 SERPL-SCNC: 15 MMOL/L (ref 22–29)
COLOR CSF: COLORLESS
COLOR UR AUTO: ABNORMAL
CREAT SERPL-MCNC: 0.81 MG/DL (ref 0.73–1.18)
CREAT SERPL-MCNC: 1.16 MG/DL (ref 0.73–1.18)
CREAT/UREA NIT SERPL: 20
CREAT/UREA NIT SERPL: 21
E COLI K1 DNA CSF QL NAA+NON-PROBE: NOT DETECTED
EOSINOPHIL # BLD AUTO: 0 X10(3)/MCL (ref 0–0.9)
EOSINOPHIL NFR BLD AUTO: 0 %
ERYTHROCYTE [DISTWIDTH] IN BLOOD BY AUTOMATED COUNT: 12.7 % (ref 11.5–17)
EV RNA CSF QL NAA+NON-PROBE: NOT DETECTED
GAMMA INTERFERON BACKGROUND BLD IA-ACNC: 0.01 IU/ML
GFR SERPLBLD CREATININE-BSD FMLA CKD-EPI: >60 ML/MIN/1.73/M2
GFR SERPLBLD CREATININE-BSD FMLA CKD-EPI: >60 ML/MIN/1.73/M2
GLUCOSE CSF-MCNC: 91 MG/DL (ref 40–70)
GLUCOSE SERPL-MCNC: 145 MG/DL (ref 74–100)
GLUCOSE SERPL-MCNC: 156 MG/DL (ref 74–100)
GLUCOSE UR QL STRIP: NORMAL
GP B STREP DNA CSF QL NAA+NON-PROBE: NOT DETECTED
HAEM INFLU DNA CSF QL NAA+NON-PROBE: NOT DETECTED
HCT VFR BLD AUTO: 37.8 % (ref 42–52)
HGB BLD-MCNC: 12.7 G/DL (ref 14–18)
HGB UR QL STRIP: ABNORMAL
HHV6 DNA CSF QL NAA+NON-PROBE: NOT DETECTED
HSV1 DNA CSF QL NAA+NON-PROBE: NOT DETECTED
HSV2 DNA CSF QL NAA+NON-PROBE: NOT DETECTED
IMM GRANULOCYTES # BLD AUTO: 0.65 X10(3)/MCL (ref 0–0.04)
IMM GRANULOCYTES NFR BLD AUTO: 3.6 %
INR PPP: 1.2
KETONES UR QL STRIP: NEGATIVE
L MONOCYTOG DNA CSF QL NAA+NON-PROBE: NOT DETECTED
LACTATE SERPL-SCNC: 2.1 MMOL/L (ref 0.5–2.2)
LACTATE SERPL-SCNC: 2.4 MMOL/L (ref 0.5–2.2)
LEUKOCYTE ESTERASE UR QL STRIP: NEGATIVE
LYMPHOCYTE MANUAL CSF (OHS): 95 %
LYMPHOCYTES # BLD AUTO: 0.99 X10(3)/MCL (ref 0.6–4.6)
LYMPHOCYTES NFR BLD AUTO: 5.5 %
M TB IFN-G BLD-IMP: NEGATIVE
M TB IFN-G CD4+ BCKGRND COR BLD-ACNC: 0.01 IU/ML
M TB IFN-G CD4+CD8+ BCKGRND COR BLD-ACNC: 0.01 IU/ML
MCH RBC QN AUTO: 28.7 PG (ref 27–31)
MCHC RBC AUTO-ENTMCNC: 33.6 G/DL (ref 33–36)
MCV RBC AUTO: 85.5 FL (ref 80–94)
MITOGEN IGNF BCKGRD COR BLD-ACNC: 9.99 IU/ML
MONOCYTE MANUAL CSF (OHS): 5 %
MONOCYTES # BLD AUTO: 0.41 X10(3)/MCL (ref 0.1–1.3)
MONOCYTES NFR BLD AUTO: 2.3 %
N MEN DNA CSF QL NAA+NON-PROBE: NOT DETECTED
NEUTROPHILS # BLD AUTO: 16.04 X10(3)/MCL (ref 2.1–9.2)
NEUTROPHILS NFR BLD AUTO: 88.5 %
NITRITE UR QL STRIP: NEGATIVE
NRBC BLD AUTO-RTO: 0 %
PARECHOVIRUS A RNA CSF QL NAA+NON-PROBE: NOT DETECTED
PH UR STRIP: 7 [PH]
PLATELET # BLD AUTO: 346 X10(3)/MCL (ref 130–400)
PMV BLD AUTO: 8.8 FL (ref 7.4–10.4)
POTASSIUM SERPL-SCNC: 3.8 MMOL/L (ref 3.5–5.1)
POTASSIUM SERPL-SCNC: 4 MMOL/L (ref 3.5–5.1)
PROT CSF-MCNC: 34.8 MG/DL (ref 15–45)
PROT UR QL STRIP: ABNORMAL
PROTHROMBIN TIME: 15.3 SECONDS (ref 12.5–14.5)
RBC # BLD AUTO: 4.42 X10(6)/MCL (ref 4.7–6.1)
RBC # CSF MANUAL: 469 /UL
RBC #/AREA URNS AUTO: >100 /HPF
S PNEUM DNA CSF QL NAA+NON-PROBE: NOT DETECTED
SODIUM SERPL-SCNC: 137 MMOL/L (ref 136–145)
SODIUM SERPL-SCNC: 139 MMOL/L (ref 136–145)
SP GR UR STRIP.AUTO: >1.05 (ref 1–1.03)
SQUAMOUS #/AREA URNS LPF: ABNORMAL /HPF
UROBILINOGEN UR STRIP-ACNC: NORMAL
VZV DNA CSF QL NAA+NON-PROBE: NOT DETECTED
WBC # CSF MANUAL: 83 /UL (ref 0–5)
WBC # SPEC AUTO: 18.11 X10(3)/MCL (ref 4.5–11.5)
WBC #/AREA URNS AUTO: ABNORMAL /HPF

## 2024-05-21 PROCEDURE — 99233 SBSQ HOSP IP/OBS HIGH 50: CPT | Mod: ,,, | Performed by: PSYCHIATRY & NEUROLOGY

## 2024-05-21 PROCEDURE — 63600175 PHARM REV CODE 636 W HCPCS

## 2024-05-21 PROCEDURE — 84110 ASSAY OF PORPHOBILINOGEN: CPT | Performed by: INTERNAL MEDICINE

## 2024-05-21 PROCEDURE — 89051 BODY FLUID CELL COUNT: CPT

## 2024-05-21 PROCEDURE — 84157 ASSAY OF PROTEIN OTHER: CPT

## 2024-05-21 PROCEDURE — 87070 CULTURE OTHR SPECIMN AEROBIC: CPT

## 2024-05-21 PROCEDURE — 99233 SBSQ HOSP IP/OBS HIGH 50: CPT | Mod: ,,, | Performed by: GENERAL PRACTICE

## 2024-05-21 PROCEDURE — 25000003 PHARM REV CODE 250

## 2024-05-21 PROCEDURE — 87801 DETECT AGNT MULT DNA AMPLI: CPT | Performed by: GENERAL PRACTICE

## 2024-05-21 PROCEDURE — 85610 PROTHROMBIN TIME: CPT | Performed by: INTERNAL MEDICINE

## 2024-05-21 PROCEDURE — 81001 URINALYSIS AUTO W/SCOPE: CPT | Performed by: INTERNAL MEDICINE

## 2024-05-21 PROCEDURE — 25000003 PHARM REV CODE 250: Performed by: PSYCHIATRY & NEUROLOGY

## 2024-05-21 PROCEDURE — 25500020 PHARM REV CODE 255: Performed by: INTERNAL MEDICINE

## 2024-05-21 PROCEDURE — 009U3ZX DRAINAGE OF SPINAL CANAL, PERCUTANEOUS APPROACH, DIAGNOSTIC: ICD-10-PCS | Performed by: RADIOLOGY

## 2024-05-21 PROCEDURE — 63600175 PHARM REV CODE 636 W HCPCS: Performed by: INTERNAL MEDICINE

## 2024-05-21 PROCEDURE — 86255 FLUORESCENT ANTIBODY SCREEN: CPT

## 2024-05-21 PROCEDURE — 80048 BASIC METABOLIC PNL TOTAL CA: CPT | Performed by: INTERNAL MEDICINE

## 2024-05-21 PROCEDURE — 84120 ASSAY OF URINE PORPHYRINS: CPT | Performed by: INTERNAL MEDICINE

## 2024-05-21 PROCEDURE — 82945 GLUCOSE OTHER FLUID: CPT

## 2024-05-21 PROCEDURE — 25000003 PHARM REV CODE 250: Performed by: GENERAL PRACTICE

## 2024-05-21 PROCEDURE — 25000003 PHARM REV CODE 250: Performed by: INTERNAL MEDICINE

## 2024-05-21 PROCEDURE — 84311 SPECTROPHOTOMETRY: CPT | Performed by: PSYCHIATRY & NEUROLOGY

## 2024-05-21 PROCEDURE — 85025 COMPLETE CBC W/AUTO DIFF WBC: CPT | Performed by: INTERNAL MEDICINE

## 2024-05-21 PROCEDURE — 87483 CNS DNA AMP PROBE TYPE 12-25: CPT | Performed by: GENERAL PRACTICE

## 2024-05-21 PROCEDURE — 83605 ASSAY OF LACTIC ACID: CPT | Performed by: INTERNAL MEDICINE

## 2024-05-21 PROCEDURE — 25000003 PHARM REV CODE 250: Performed by: STUDENT IN AN ORGANIZED HEALTH CARE EDUCATION/TRAINING PROGRAM

## 2024-05-21 PROCEDURE — 11000001 HC ACUTE MED/SURG PRIVATE ROOM

## 2024-05-21 PROCEDURE — 36415 COLL VENOUS BLD VENIPUNCTURE: CPT | Performed by: INTERNAL MEDICINE

## 2024-05-21 PROCEDURE — 85730 THROMBOPLASTIN TIME PARTIAL: CPT | Performed by: INTERNAL MEDICINE

## 2024-05-21 PROCEDURE — S5010 5% DEXTROSE AND 0.45% SALINE: HCPCS | Performed by: INTERNAL MEDICINE

## 2024-05-21 PROCEDURE — 63600175 PHARM REV CODE 636 W HCPCS: Performed by: GENERAL PRACTICE

## 2024-05-21 RX ORDER — ACETAZOLAMIDE 250 MG/1
250 TABLET ORAL 3 TIMES DAILY
Qty: 90 TABLET | Refills: 11 | Status: SHIPPED | OUTPATIENT
Start: 2024-05-21 | End: 2024-05-24 | Stop reason: HOSPADM

## 2024-05-21 RX ORDER — TAMSULOSIN HYDROCHLORIDE 0.4 MG/1
0.4 CAPSULE ORAL ONCE
Status: COMPLETED | OUTPATIENT
Start: 2024-05-21 | End: 2024-05-21

## 2024-05-21 RX ORDER — LEVETIRACETAM 500 MG/1
1500 TABLET ORAL 2 TIMES DAILY
Qty: 180 TABLET | Refills: 11 | Status: SHIPPED | OUTPATIENT
Start: 2024-05-21 | End: 2024-05-24 | Stop reason: HOSPADM

## 2024-05-21 RX ORDER — LACOSAMIDE 50 MG/1
100 TABLET ORAL EVERY 12 HOURS
Status: DISCONTINUED | OUTPATIENT
Start: 2024-05-21 | End: 2024-05-25 | Stop reason: HOSPADM

## 2024-05-21 RX ORDER — KETOROLAC TROMETHAMINE 30 MG/ML
15 INJECTION, SOLUTION INTRAMUSCULAR; INTRAVENOUS EVERY 6 HOURS PRN
Status: DISPENSED | OUTPATIENT
Start: 2024-05-21 | End: 2024-05-24

## 2024-05-21 RX ORDER — IBUPROFEN 400 MG/1
400 TABLET ORAL ONCE
Status: COMPLETED | OUTPATIENT
Start: 2024-05-21 | End: 2024-05-21

## 2024-05-21 RX ORDER — HYDROMORPHONE HYDROCHLORIDE 2 MG/ML
1 INJECTION, SOLUTION INTRAMUSCULAR; INTRAVENOUS; SUBCUTANEOUS ONCE
Status: COMPLETED | OUTPATIENT
Start: 2024-05-21 | End: 2024-05-21

## 2024-05-21 RX ADMIN — SODIUM BICARBONATE 650 MG TABLET 650 MG: at 09:05

## 2024-05-21 RX ADMIN — LEVETIRACETAM 1500 MG: 500 TABLET, FILM COATED ORAL at 09:05

## 2024-05-21 RX ADMIN — ACETAZOLAMIDE 250 MG: 250 TABLET ORAL at 09:05

## 2024-05-21 RX ADMIN — HYDROMORPHONE HYDROCHLORIDE 1 MG: 2 INJECTION, SOLUTION INTRAMUSCULAR; INTRAVENOUS; SUBCUTANEOUS at 12:05

## 2024-05-21 RX ADMIN — DEXTROSE MONOHYDRATE AND SODIUM CHLORIDE: 5; .45 INJECTION, SOLUTION INTRAVENOUS at 02:05

## 2024-05-21 RX ADMIN — LEVETIRACETAM 1500 MG: 500 TABLET, FILM COATED ORAL at 08:05

## 2024-05-21 RX ADMIN — CEFTRIAXONE SODIUM 2 G: 2 INJECTION, POWDER, FOR SOLUTION INTRAMUSCULAR; INTRAVENOUS at 03:05

## 2024-05-21 RX ADMIN — METHYLPREDNISOLONE SODIUM SUCCINATE 1000 MG: 1 INJECTION, POWDER, LYOPHILIZED, FOR SOLUTION INTRAMUSCULAR; INTRAVENOUS at 10:05

## 2024-05-21 RX ADMIN — SODIUM CHLORIDE: 9 INJECTION, SOLUTION INTRAVENOUS at 09:05

## 2024-05-21 RX ADMIN — VALACYCLOVIR 1000 MG: 500 TABLET, FILM COATED ORAL at 02:05

## 2024-05-21 RX ADMIN — IBUPROFEN 400 MG: 400 TABLET, FILM COATED ORAL at 11:05

## 2024-05-21 RX ADMIN — VALACYCLOVIR 1000 MG: 500 TABLET, FILM COATED ORAL at 09:05

## 2024-05-21 RX ADMIN — TAMSULOSIN HYDROCHLORIDE 0.4 MG: 0.4 CAPSULE ORAL at 05:05

## 2024-05-21 RX ADMIN — VALACYCLOVIR 1000 MG: 500 TABLET, FILM COATED ORAL at 08:05

## 2024-05-21 RX ADMIN — IOHEXOL 100 ML: 350 INJECTION, SOLUTION INTRAVENOUS at 12:05

## 2024-05-21 RX ADMIN — LACOSAMIDE 100 MG: 50 TABLET, FILM COATED ORAL at 08:05

## 2024-05-21 RX ADMIN — CEFTRIAXONE SODIUM 2 G: 2 INJECTION, POWDER, FOR SOLUTION INTRAMUSCULAR; INTRAVENOUS at 02:05

## 2024-05-21 RX ADMIN — KETOROLAC TROMETHAMINE 15 MG: 30 INJECTION, SOLUTION INTRAMUSCULAR; INTRAVENOUS at 04:05

## 2024-05-21 NOTE — PROGRESS NOTES
Inpatient Nutrition Evaluation    Admit Date: 5/15/2024   Total duration of encounter: 6 days   Patient Age: 23 y.o.    Nutrition Recommendation/Prescription     Continue regular diet as tolerated  Monitor need for bowel regimen  Monitor labs, intake and weight    Nutrition Assessment     Chart Review    Reason Seen: length of stay    Malnutrition Screening Tool Results   Have you recently lost weight without trying?: No  Have you been eating poorly because of a decreased appetite?: No   MST Score: 0   Diagnosis:  ? 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   Leukocytosis from steroid use   Seizures temporal lobe   Metabolic acidosis   Mild hyperglycemia from steroids     Relevant Medical History: n/a    Scheduled Medications:  acetaZOLAMIDE, 250 mg, TID  cefTRIAXone (Rocephin) IV (PEDS and ADULTS), 2 g, Q12H  levETIRAcetam, 1,500 mg, BID  methylPREDNISolone sodium succinate injection, 1,000 mg, Q24H  sodium bicarbonate, 650 mg, BID  valACYclovir, 1,000 mg, TID    Continuous Infusions:  sodium chloride 0.9%, Last Rate: 75 mL/hr at 05/21/24 0930    PRN Medications:   Current Facility-Administered Medications:     acetaminophen, 650 mg, Oral, Q8H PRN    melatonin, 6 mg, Oral, Nightly PRN    sodium chloride 0.9%, 10 mL, Intravenous, PRN    Recent Labs   Lab 05/15/24  1859 05/16/24  0338 05/16/24  1732 05/17/24  0528 05/18/24  0601 05/19/24  0404 05/20/24  1405 05/20/24  1406 05/21/24  0354    137  --  137 135* 139  --  141 139   K 4.5 4.0  --  3.7 4.2 4.1  --  3.7 4.0   CALCIUM 9.4 8.8  --  8.4 9.2 8.7  --  8.4 8.4   CHLORIDE 106 105  --  108* 110* 116*  --  118* 116*   CO2 19* 24  --  21* 17* 15*  --  15* 14*   BUN 12.8 12.2  --  10.3 12.2 18.3  --  16.6 17.4   CREATININE 0.98 0.97  --  0.85 0.94 0.94  --  0.94 0.81   EGFRNORACEVR >60 >60  --  >60 >60 >60  --  >60 >60   GLUCOSE 101* 98  --  102* 165* 138*  --  139* 145*   BILITOT 0.4 0.4  --   --  0.3  --   --   --   --    ALKPHOS 62 54  --   --  58  --  "  --   --   --    ALT 17 14  --   --  18  --   --   --   --    AST 17 11  --   --  11  --   --   --   --    ALBUMIN 4.3 3.8  --   --  4.0  --   --   --   --    CRP  --   --  11.50*  --  7.10*  --   --   --   --    WBC 24.05  24.05* 17.71*  --  17.08* 17.48* 33.17  33.17* 22.84*  --  18.11*   HGB 14.7 13.2*  --  13.4* 14.7 12.6* 12.7*  --  12.7*   HCT 44.0 39.4*  --  39.8* 43.8 37.6* 38.4*  --  37.8*     Nutrition Orders:  Diet Adult Regular      Appetite/Oral Intake: good/% of meals  Factors Affecting Nutritional Intake: none identified  Food/Restorationist/Cultural Preferences: none reported  Food Allergies: no known food allergies  Last Bowel Movement: 05/18/24  Wound(s):      Comments    5/21: Pt reports good appetite currently and PTA, denies N/V. Last BM 5/18 noted, monitor need for bowel regimen. Denies unintentional weight loss.    Anthropometrics    Height: 5' 11" (180.3 cm), Height Method: Stated  Last Weight: 77.1 kg (170 lb) (05/16/24 1146), Weight Method: Standard Scale  BMI (Calculated): 23.7  BMI Classification: normal (BMI 18.5-24.9)        Ideal Body Weight (IBW), Male: 172 lb     % Ideal Body Weight, Male (lb): 98.84 %                          Usual Weight Provided By: patient denies unintentional weight loss    Wt Readings from Last 5 Encounters:   05/16/24 77.1 kg (170 lb)   05/11/24 77.4 kg (170 lb 10.2 oz)   05/07/24 77.4 kg (170 lb 10.2 oz)     Weight Change(s) Since Admission:   Wt Readings from Last 1 Encounters:   05/16/24 1146 77.1 kg (170 lb)   05/15/24 1821 77.1 kg (170 lb)   Admit Weight: 77.1 kg (170 lb) (05/15/24 1821), Weight Method: Standard Scale    Patient Education     Not applicable.    Nutrition Goals & Monitoring     Dietitian will monitor: food and beverage intake, weight, glucose/endocrine profile, and gastrointestinal profile    Nutrition Risk/Follow-Up: low (follow-up in 5-7 days)  Patients assigned 'low nutrition risk' status do not qualify for a full nutritional " assessment but will be monitored and re-evaluated in a 5-7 day time period. Please consult if re-evaluation needed sooner.

## 2024-05-21 NOTE — PROGRESS NOTES
[facility]  Hospital Medicine Progress Note        Chief Complaint: Inpatient Follow-up for Headache     HPI:   Patient is a 23-year-old male with patient is a 23-year-old male with a recent admission at this facility with acute onset altered mental status for which he underwent an extensive workup.  He initially was thought to be having a stroke and was given a partial dose of TNK, but ultimately underwent a lumbar puncture which showed over a 400 WBCs with a lymphocyte predominance presumably viral/aseptic meningitis.  He was transferred to [place] for neuro critical Care and was seen by infectious disease at that facility.  He was discharged 05/13/2024 with the consensus being he had aseptic meningitis and all antibiotics were discontinued due to his clinical rapid improvement.  MILENA franco presents to the ER tonight with worsening headache and reported slurred speech prior to arrival. He also had a seizure like episode that he does not remember.      He arrived afebrile hemodynamically stable maintaining normal sats on room air.  Laboratory work showed leukocytosis of 24 K and CT head showed no acute process.  Hospitalist was consulted for further evaluation.     Patient has mild headache but was afebrile. 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 He was started on Keppra. His clinical picture was worrisome for encephalitis. 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 He had LP done this visit was opening pressure was elevated. CSF showed elevated WBC and protein. He was started on IV antivirals by ID and iv steroids by neuro for possible autoimmune encephalitis.       Interval Hx:   Patient today awake and c/o left sided abdomen/flank pain. This sudden onset, 10/10. No radiating. Denies any nausea, vomiting, fever or chills.     Case was discussed with patient's nurse, Dr Beltran ID service and Dr Rodriguez Neurologist and  on the floor.    Objective/physical  exam:  General: In moderate distress  Chest: Clear to auscultation bilaterally  Heart: RRR, +S1, S2, no appreciable murmur  Abdomen: Soft, Left flank tenderness   MSK: Warm, no lower extremity edema, no clubbing or cyanosis  Neurologic: Alert and oriented x4, Cranial nerve II-XII intact, Strength 5/5 in all 4 extremities    VITAL SIGNS: 24 HRS MIN & MAX LAST   Temp  Min: 97.5 °F (36.4 °C)  Max: 97.9 °F (36.6 °C) 97.5 °F (36.4 °C)   BP  Min: 124/78  Max: 141/83 (!) 141/83   Pulse  Min: 68  Max: 87  84   Resp  Min: 18  Max: 18 18   SpO2  Min: 97 %  Max: 100 % 100 %     I have reviewed the following labs:  Recent Labs   Lab 05/19/24  0404 05/20/24  1405 05/21/24  0354   WBC 33.17  33.17* 22.84* 18.11*   RBC 4.36* 4.42* 4.42*   HGB 12.6* 12.7* 12.7*   HCT 37.6* 38.4* 37.8*   MCV 86.2 86.9 85.5   MCH 28.9 28.7 28.7   MCHC 33.5 33.1 33.6   RDW 12.5 12.7 12.7    341 346   MPV 8.7 8.5 8.8     Recent Labs   Lab 05/15/24  1859 05/16/24  0338 05/17/24  0528 05/18/24  0601 05/19/24  0404 05/20/24  1406 05/21/24  0354    137   < > 135* 139 141 139   K 4.5 4.0   < > 4.2 4.1 3.7 4.0   CO2 19* 24   < > 17* 15* 15* 14*   BUN 12.8 12.2   < > 12.2 18.3 16.6 17.4   CREATININE 0.98 0.97   < > 0.94 0.94 0.94 0.81   CALCIUM 9.4 8.8   < > 9.2 8.7 8.4 8.4   ALBUMIN 4.3 3.8  --  4.0  --   --   --    ALKPHOS 62 54  --  58  --   --   --    ALT 17 14  --  18  --   --   --    AST 17 11  --  11  --   --   --    BILITOT 0.4 0.4  --  0.3  --   --   --     < > = values in this interval not displayed.     Microbiology Results (last 7 days)       Procedure Component Value Units Date/Time    Cerebrospinal Fluid Culture [[id number]] Collected: 05/17/24 1110    Order Status: Completed Specimen: CSF (Spinal Fluid) Updated: 05/21/24 0755     CULTURE, CSF ([facility]) No growth at 4 days     GRAM STAIN Many WBC observed      No bacteria seen    Cerebrospinal Fluid Culture [[id number]]     Order Status: Sent Specimen: CSF (Spinal Fluid) from  Cerebrospinal Fluid     Blood culture #1 **CANNOT BE ORDERED STAT** [[id number]]  (Normal) Collected: 05/15/24 2014    Order Status: Completed Specimen: Blood Updated: 05/20/24 2201     Blood Culture No Growth at 5 days    Blood culture #2 **CANNOT BE ORDERED STAT** [[id number]]  (Normal) Collected: 05/15/24 2014    Order Status: Completed Specimen: Blood Updated: 05/20/24 2201     Blood Culture No Growth at 5 days    Cryptococcal antigen, CSF [[id number]]  (Normal) Collected: 05/17/24 1110    Order Status: Completed Specimen: CSF (Spinal Fluid) from CSF Tap, Tube 1 Updated: 05/18/24 0829     Cryptococcal Antigen, CSF Negative    Gram Stain [[id number]] Collected: 05/17/24 1110    Order Status: Completed Specimen: CSF (Spinal Fluid) Updated: 05/17/24 1459     GRAM STAIN Many WBC observed      No bacteria seen    Alea Ink Prep CSF [[id number]] Collected: 05/17/24 1110    Order Status: Completed Specimen: CSF (Spinal Fluid) Updated: 05/17/24 1450     ALEA INK PREP CSF ([facility]) Negative    KOH Prep [[id number]] Collected: 05/17/24 1110    Order Status: Completed Specimen: Body Fluid Updated: 05/17/24 1450     KOH Prep No fungal elements seen             See below for Radiology    Scheduled Med:   cefTRIAXone (Rocephin) IV (PEDS and ADULTS)  2 g Intravenous Q12H    HYDROmorphone  1 mg Intravenous Once    levETIRAcetam  1,500 mg Oral BID    valACYclovir  1,000 mg Oral TID      Continuous Infusions:   sodium bicarbonate 50 mEq in dextrose 5 % and 0.45 % NaCl 1,000 mL infusion   Intravenous Continuous          PRN Meds:    Current Facility-Administered Medications:     acetaminophen, 650 mg, Oral, Q8H PRN    melatonin, 6 mg, Oral, Nightly PRN    sodium chloride 0.9%, 10 mL, Intravenous, PRN     Assessment/Plan:  ? 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 He is also acidotic CO2  14  He is afebrile.   Ordered dilaudid 1 mg iv x 1 now and CT abdomen stat  Changed IV fluids to D5 1/2 NS with Bicarb at 100 cc/hr   Check lactic acid   Dc acetazolamide     24 hrs EEG did not show any seizures  Initial EEG showed + seizure activities   He is now on IV antivirals, IV steroids and IV Rocephin   Will also continue Keppra     CBC, BMP today and in am     F/U by ID and neuro team     LP today to assess ICP    Continue supportive care     Labs in am     Critical care note:  Critical care diagnosis: Severe metabolic acidosis needing IV bicarb drip   Critical care interventions: Hands-on evaluation, review of labs/radiographs/records and discussion with patient and family if present  Critical care time spent: 35 minutes       VTE prophylaxis: SCD    Patient condition:  Guarded    Anticipated discharge and Disposition:   Home       All diagnosis and differential diagnosis have been reviewed; assessment and plan has been documented; I have personally reviewed the labs and test results that are presently available; I have reviewed the patients medication list; I have reviewed the consulting providers response and recommendations. I have reviewed or attempted to review medical records based upon their availability    All of the patient's questions have been  addressed and answered. Patient's is agreeable to the above stated plan. I will continue to monitor closely and make adjustments to medical management as needed.  _____________________________________________________________________    Nutrition Status:    Radiology:  I have personally reviewed the following imaging and agree with the radiologist.     CT Head Without Contrast  Narrative: EXAMINATION:  CT HEAD WITHOUT CONTRAST    TECHNIQUE:  Sequential axial images were performed of the brain from skull base to vertex without contrast.    Dose length product was 1057 mGycm. Automated exposure control  was utilized to minimize radiation dose.    COMPARISON:  None available.    FINDINGS:  The gray white matter differentiation is unremarkable. There is no intracranial hemorrhage, hydrocephalus, midline shift or mass effect. No acute extra axial fluid collections identified. Visualized paranasal sinuses and the mastoid air cells are well aerated.  Impression: No acute intracranial findings identified.    Electronically signed by: Ori Wynne  Date:    05/20/2024  Time:    12:08      Sher Amor MD  Department of Hospital Medicine   [facility]   05/21/2024          CT shows left kidney stone  Urologist consulted

## 2024-05-21 NOTE — PROGRESS NOTES
Infectious Disease  Progress Note    Patient Name: Luis Traylor   MRN: [id number]   Admission Date: 5/15/2024   Hospital Length of Stay: 4 days  Attending Physician: Sher Amor MD   Primary Care Provider: Terrence Craven NP     Isolation Status: No active isolations     Assessment/Plan:        23-year-old male patient known to have past significant for recent admission from 05/07 - 05/13/2024 after he presented with encephalopathy and concern infectious etiology, was initially admitted at [place], transferred to [facility] for concern for CVA, received TNK given left facial droop as well as arm and leg weakness with numbness.  Noted to have temperature of 100.3°, initially admitted to the ICU, had a lumbar puncture showing 400 WBCs, 62% lymphocytic, negative ME panel and crypto antigen, was transferred to [facility] in [place] for higher level of care.  While there, he had an extensive investigation that did not yield a clear diagnosis, antiepileptics were discontinued, doxycycline and valacyclovir were discontinued as well.  Patient was discharged to home on 05/13/2024.  Since his discharge, started having episodes of dysarthria, episodes staring and spacing out, patient reports that during these episodes he feels like he could tell what he wanted to say but could not actually say it.  When he got home, his headache started getting worse, he reports that his headaches were worse lying flat and better when he sits up.  He had intermittent episodes of photophobia, he also reports that his right eye started to be swollen and red chief happened every time when he was having these symptoms in the past, he started again having confusion, dysarthric episodes with repetitive motion such of lip-smacking and rubbing his nose, ultimately on 05/15/2024 he started having shaking chills, he had temperatures checked which was 100.6 he had significant nausea, he was therefore presented again to the emergency  department.  He was started on antiepileptic and admitted to the hospital.  ID consulted for assistance in management.     Encephalopathy/encephalitis  Confusion   Seizure activity   HSV 2 infection        Plan:  F/u repeat LP from today  continue valacyclovir 1 g p.o. q.8 hours until stand-alone HSV testing is back  -continue ceftriaxone 2 g IV q.12 hours for now   -steroids and acetazolamide management per Neurology  -Lyme disease antibody immunoblot, Coxiella titers, Bartonella Ab, Brucella Ab pending  CT A/P noted -- mild left hydro, urology consulted.  Await input.  -discussed with patient     Antibiotics:  Ganciclovir 05/16 - 05/17  Acyclovir 05/17 - Present  Ceftriaxone 05/17 - Present       Portions of this note dictated using EMR integrated voice recognition software, and may be subject to voice recognition errors not corrected at proofreading. Please contact writer for clarification if needed.    Subjective:     Principal Problem: Headache     Interval History:   Feeling almost to baseline, smiling.  AF.  Although w/abdominal pain.  CT A/P showed left ureteral calculi causing mild hydronephrosis, thickened bladder wall, and terminal ileum mild thickening.     Review of Systems   Review of Systems   All other systems reviewed and are negative.       Objective:     Vital Signs (Most Recent):  Temp: 97.6 °F (36.4 °C) (05/21/24 1552)  Pulse: 100 (05/21/24 1552)  Resp: 18 (05/21/24 1220)  BP: (!) 141/90 (05/21/24 1552)  SpO2: 99 % (05/21/24 1552)  Vital Signs (24h Range):  Temp:  [97.5 °F (36.4 °C)-97.6 °F (36.4 °C)] 97.6 °F (36.4 °C)  Pulse:  [] 100  Resp:  [18] 18  SpO2:  [97 %-100 %] 99 %  BP: (124-141)/(72-90) 141/90      Weight:   Wt Readings from Last 1 Encounters:   05/16/24 77.1 kg (170 lb)      Body mass index is Body mass index is 23.71 kg/m².     Estimated Creatinine Clearance: Estimated Creatinine Clearance: 151.1 mL/min (based on SCr of 0.81 mg/dL).     Lines/Drains/Airways       Peripheral  Intravenous Line  Duration                  Peripheral IV - Single Lumen 05/15/24 2313 20 G Anterior;Distal;Left Forearm 5 days                     Physical Exam  Physical Exam  Constitutional:       Appearance: Normal appearance.   HENT:      Head: Normocephalic and atraumatic.      Mouth/Throat:      Pharynx: No oropharyngeal exudate or posterior oropharyngeal erythema.   Eyes:      Extraocular Movements: Extraocular movements intact.      Pupils: Pupils are equal, round, and reactive to light.   Cardiovascular:      Rate and Rhythm: Normal rate and regular rhythm.      Heart sounds: No murmur heard.  Pulmonary:      Effort: No respiratory distress.      Breath sounds: No wheezing, rhonchi or rales.   Abdominal:      General: Bowel sounds are normal. There is no distension.      Palpations: Abdomen is soft.      Tenderness: There is no abdominal tenderness. There is no right CVA tenderness or left CVA tenderness.   Musculoskeletal:         General: No swelling or tenderness.      Cervical back: Neck supple. No rigidity or tenderness.   Lymphadenopathy:      Cervical: No cervical adenopathy.   Skin:     Findings: No lesion or rash.   Neurological:      General: No focal deficit present.      Mental Status: He is alert and oriented to person, place, and time. Mental status is at baseline.      Cranial Nerves: No cranial nerve deficit.      Motor: No weakness.   Psychiatric:         Mood and Affect: Mood normal.         Behavior: Behavior normal.          Significant Labs: CBC:   Recent Labs   Lab 05/20/24  1405 05/21/24  0354   WBC 22.84* 18.11*   HGB 12.7* 12.7*   HCT 38.4* 37.8*    346     CMP:   Recent Labs   Lab 05/20/24  1406 05/21/24  0354    139   K 3.7 4.0   CO2 15* 14*   BUN 16.6 17.4   CREATININE 0.94 0.81   CALCIUM 8.4 8.4       Microbiology Results (last 7 days)       Procedure Component Value Units Date/Time    Cerebrospinal Fluid Culture [[id number]] Collected: 05/21/24 1332    Order  Status: Resulted Specimen: CSF (Spinal Fluid) from Cerebrospinal Fluid Updated: 05/21/24 1332    Cerebrospinal Fluid Culture [[id number]] Collected: 05/17/24 1110    Order Status: Completed Specimen: CSF (Spinal Fluid) Updated: 05/21/24 0755     CULTURE, CSF ([facility]) No growth at 4 days     GRAM STAIN Many WBC observed      No bacteria seen    Blood culture #1 **CANNOT BE ORDERED STAT** [[id number]]  (Normal) Collected: 05/15/24 2014    Order Status: Completed Specimen: Blood Updated: 05/20/24 2201     Blood Culture No Growth at 5 days    Blood culture #2 **CANNOT BE ORDERED STAT** [[id number]]  (Normal) Collected: 05/15/24 2014    Order Status: Completed Specimen: Blood Updated: 05/20/24 2201     Blood Culture No Growth at 5 days    Cryptococcal antigen, CSF [[id number]]  (Normal) Collected: 05/17/24 1110    Order Status: Completed Specimen: CSF (Spinal Fluid) from CSF Tap, Tube 1 Updated: 05/18/24 0829     Cryptococcal Antigen, CSF Negative    Gram Stain [[id number]] Collected: 05/17/24 1110    Order Status: Completed Specimen: CSF (Spinal Fluid) Updated: 05/17/24 1459     GRAM STAIN Many WBC observed      No bacteria seen    Alea Ink Prep CSF [[id number]] Collected: 05/17/24 1110    Order Status: Completed Specimen: CSF (Spinal Fluid) Updated: 05/17/24 1450     ALEA INK PREP CSF ([facility]) Negative    KOH Prep [[id number]] Collected: 05/17/24 1110    Order Status: Completed Specimen: Body Fluid Updated: 05/17/24 1450     KOH Prep No fungal elements seen             Significant Imaging: I have reviewed all pertinent imaging results/findings within the past 24 hours.      Ana Maria Bergman, SALINAS  Infectious Disease  [facility]

## 2024-05-21 NOTE — ASSESSMENT & PLAN NOTE
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

## 2024-05-21 NOTE — SUBJECTIVE & OBJECTIVE
Subjective:     Interval History:   Neurologic exam remains stable and unchanged. No further seizure activity overnight or this AM or any other significant events.     Current Neurological Medications:     Current Facility-Administered Medications   Medication Dose Route Frequency Provider Last Rate Last Admin    0.9%  NaCl infusion   Intravenous Continuous Sher Amor MD 75 mL/hr at 05/21/24 0930 New Bag at 05/21/24 0930    acetaminophen tablet 650 mg  650 mg Oral Q8H PRN Christofer Izaguirre MD   650 mg at 05/17/24 1712    acetaZOLAMIDE tablet 250 mg  250 mg Oral TID Sher Amor MD   250 mg at 05/21/24 0926    cefTRIAXone (ROCEPHIN) 2 g in dextrose 5 % in water (D5W) 100 mL IVPB (MB+)  2 g Intravenous Q12H Vipin Beltran MD   Stopped at 05/21/24 0334    levETIRAcetam tablet 1,500 mg  1,500 mg Oral BID Jessica Molina MD   1,500 mg at 05/21/24 0926    melatonin tablet 6 mg  6 mg Oral Nightly PRN Christofer Izaguirre MD        methylPREDNISolone sodium succinate (SOLU-MEDROL) 1,000 mg in dextrose 5 % (D5W) 100 mL IVPB  1,000 mg Intravenous Q24H Sienna Rodney FNP        sodium bicarbonate tablet 650 mg  650 mg Oral BID Sher Amor MD   650 mg at 05/21/24 0925    sodium chloride 0.9% flush 10 mL  10 mL Intravenous PRN Christofer Izaguirre MD        valACYclovir tablet 1,000 mg  1,000 mg Oral TID Sher Amor MD   1,000 mg at 05/21/24 0926       Review of Systems  A 14pt ros was reviewed & is negative unless o/w documented in the hpi    Objective:     Vital Signs (Most Recent):  Temp: 97.5 °F (36.4 °C) (05/21/24 0755)  Pulse: 84 (05/21/24 0755)  Resp: 18 (05/21/24 0349)  BP: (!) 141/83 (05/21/24 0755)  SpO2: 100 % (05/21/24 0755) Vital Signs (24h Range):  Temp:  [97.5 °F (36.4 °C)-98 °F (36.7 °C)] 97.5 °F (36.4 °C)  Pulse:  [68-87] 84  Resp:  [18] 18  SpO2:  [97 %-100 %] 100 %  BP: (119-141)/(69-83) 141/83     Weight: 77.1 kg (170 lb)  Body mass index is 23.71  kg/m².     Physical Exam   GENERAL: NAD, calm, cooperative, appropriate, awake/alert  MENTAL STATUS: Oriented x4, follows commands reliably  SPEECH/LANGUAGE: Clear, coherent  CN:  EOMI, gaze conjugate, visual fields intact  PERRLA  Motor: no focal motor weakness  Sensory: Normal to tactile stim/vibration  Gait: not observed          Significant Labs:   Recent Lab Results         05/21/24  0652   05/21/24  0354   05/20/24  1406   05/20/24  1405        Anion Gap   9.0   8.0         PTT 23.3  Comment: For Minimal Heparin Infusion, the goal aPTT 64-85 seconds corresponds to an anti-Xa of 0.3-0.5.    For Low Intensity and High Intensity Heparin, the goal aPTT  seconds corresponds to an anti-Xa of 0.3-0.7             Baso #   0.02     0.05       Basophil %   0.1     0.2       BUN   17.4   16.6         BUN/CREAT RATIO   21   18         Calcium   8.4   8.4         Chloride   116   118         CO2   14   15         Creatinine   0.81   0.94         eGFR   >60   >60         Eos #   0.00     0.00       Eos %   0.0     0.0       Glucose   145   139         Hematocrit   37.8     38.4       Hemoglobin   12.7     12.7       Immature Grans (Abs)   0.65     1.10       Immature Granulocytes   3.6     4.8       INR 1.2             Lymph #   0.99     0.72       LYMPH %   5.5     3.2       MCH   28.7     28.7       MCHC   33.6     33.1       MCV   85.5     86.9       Mono #   0.41     0.39       Mono %   2.3     1.7       MPV   8.8     8.5       Neut #   16.04     20.58       Neut %   88.5     90.1       nRBC   0.0     0.0       Platelet Count   346     341       Potassium   4.0   3.7         PT 15.3             RBC   4.42     4.42       RDW   12.7     12.7       Sodium   139   141         WBC   18.11     22.84               Significant Imaging: I have reviewed all pertinent imaging results/findings within the past 24 hours.

## 2024-05-21 NOTE — PROGRESS NOTES
[facility] - 4th Floor Medical Telemetry  Neurology  Progress Note    Patient Name: Luis Traylor  MRN: [id number]  Admission Date: 5/15/2024  Hospital Length of Stay: 4 days  Code Status: Full Code   Attending Provider: Sher Amor MD  Primary Care Physician: Terrence Craven NP   Principal Problem:Headache    HPI:   23-year-old male with no known previous PMH who presented to ED on 05/15 with worsening headache and episode of slurred speech just PTA.  Patient was recently admitted to this hospital on 05/07 for acute onset AMS for which he underwent extensive workup, including partial dose of TNK, stroke w/u which was negative, lumbar puncture which revealed over 400 WBCs with lymphocyte predominance and CSF, and infectious studies.  Patient was thought to have viral/aseptic meningitis, patient's hospitalization was complicated by persistent breakthrough seizures and was ultimately transferred to [facility] in [place] for neurocritical care.  While in [facility] antimicrobials were discontinued shortly after transfer due to his rapid clinical improvement, as well as anti seizure medications d/t pt not thought to be having seizures, and was discharged to home on 05/13.    Pt's significant other at bedside reports while leaving [facility], pt noted to have some stuttering speech with expressive aphasia, as well as on the ride home. She reports since discharge, has had forgetfulness, expressive aphasia, facial twitching, confabulates, and other odd behaviors. She reported just PTA pt was noted to have periorbital edema with erythematous sclera to OD, fever (100.8), chills, shaking, and R ear pain that resolved spontaneously PTA at hospital ED.       CT head without on this admission unrevealing for acute intracranial abnormalities.  Most recent labs significant for WBC 17.71, and otherwise unremarkable.    Overview/Hospital Course:  No notes on  file        Subjective:     Interval History:   Neurologic exam remains stable and unchanged. No further seizure activity overnight or this AM or any other significant events.     Current Neurological Medications:     Current Facility-Administered Medications   Medication Dose Route Frequency Provider Last Rate Last Admin    0.9%  NaCl infusion   Intravenous Continuous Sher Amor MD 75 mL/hr at 05/21/24 0930 New Bag at 05/21/24 0930    acetaminophen tablet 650 mg  650 mg Oral Q8H PRN Christofer Izaguirre MD   650 mg at 05/17/24 1712    acetaZOLAMIDE tablet 250 mg  250 mg Oral TID Sher Amor MD   250 mg at 05/21/24 0926    cefTRIAXone (ROCEPHIN) 2 g in dextrose 5 % in water (D5W) 100 mL IVPB (MB+)  2 g Intravenous Q12H Vipin Beltran MD   Stopped at 05/21/24 0334    levETIRAcetam tablet 1,500 mg  1,500 mg Oral BID Jessica Molina MD   1,500 mg at 05/21/24 0926    melatonin tablet 6 mg  6 mg Oral Nightly PRN Christofer Izaguirre MD        methylPREDNISolone sodium succinate (SOLU-MEDROL) 1,000 mg in dextrose 5 % (D5W) 100 mL IVPB  1,000 mg Intravenous Q24H Sienna Rodney FNP        sodium bicarbonate tablet 650 mg  650 mg Oral BID Sher Amor MD   650 mg at 05/21/24 0925    sodium chloride 0.9% flush 10 mL  10 mL Intravenous PRN Christofer Izaguirre MD        valACYclovir tablet 1,000 mg  1,000 mg Oral TID Sher Amor MD   1,000 mg at 05/21/24 0926       Review of Systems  A 14pt ros was reviewed & is negative unless o/w documented in the hpi    Objective:     Vital Signs (Most Recent):  Temp: 97.5 °F (36.4 °C) (05/21/24 0755)  Pulse: 84 (05/21/24 0755)  Resp: 18 (05/21/24 0349)  BP: (!) 141/83 (05/21/24 0755)  SpO2: 100 % (05/21/24 0755) Vital Signs (24h Range):  Temp:  [97.5 °F (36.4 °C)-98 °F (36.7 °C)] 97.5 °F (36.4 °C)  Pulse:  [68-87] 84  Resp:  [18] 18  SpO2:  [97 %-100 %] 100 %  BP: (119-141)/(69-83) 141/83     Weight: 77.1 kg (170 lb)  Body mass index is  23.71 kg/m².     Physical Exam   GENERAL: NAD, calm, cooperative, appropriate, awake/alert  MENTAL STATUS: Oriented x4, follows commands reliably  SPEECH/LANGUAGE: Clear, coherent  CN:  EOMI, gaze conjugate, visual fields intact  PERRLA  Motor: no focal motor weakness  Sensory: Normal to tactile stim/vibration  Gait: not observed          Significant Labs:   Recent Lab Results         05/21/24  0652   05/21/24  0354   05/20/24  1406   05/20/24  1405        Anion Gap   9.0   8.0         PTT 23.3  Comment: For Minimal Heparin Infusion, the goal aPTT 64-85 seconds corresponds to an anti-Xa of 0.3-0.5.    For Low Intensity and High Intensity Heparin, the goal aPTT  seconds corresponds to an anti-Xa of 0.3-0.7             Baso #   0.02     0.05       Basophil %   0.1     0.2       BUN   17.4   16.6         BUN/CREAT RATIO   21   18         Calcium   8.4   8.4         Chloride   116   118         CO2   14   15         Creatinine   0.81   0.94         eGFR   >60   >60         Eos #   0.00     0.00       Eos %   0.0     0.0       Glucose   145   139         Hematocrit   37.8     38.4       Hemoglobin   12.7     12.7       Immature Grans (Abs)   0.65     1.10       Immature Granulocytes   3.6     4.8       INR 1.2             Lymph #   0.99     0.72       LYMPH %   5.5     3.2       MCH   28.7     28.7       MCHC   33.6     33.1       MCV   85.5     86.9       Mono #   0.41     0.39       Mono %   2.3     1.7       MPV   8.8     8.5       Neut #   16.04     20.58       Neut %   88.5     90.1       nRBC   0.0     0.0       Platelet Count   346     341       Potassium   4.0   3.7         PT 15.3             RBC   4.42     4.42       RDW   12.7     12.7       Sodium   139   141         WBC   18.11     22.84               Significant Imaging: I have reviewed all pertinent imaging results/findings within the past 24 hours.  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    -Cont keppra 1500 BID  -Last dose IVMP today    -okay to be discharged to home today following repeat LP and last dose IVMP from neurologic standpoint  -will continue current dose keppra and diamox following discharge  -steroid tapering dose following discharge  -will f/u with neurology o/p   -Further recommendations per MD      VTE Risk Mitigation (From admission, onward)           Ordered     IP VTE HIGH RISK PATIENT  Once         05/15/24 2104     Place sequential compression device  Until discontinued         05/15/24 2104                    CHARLES Holguin-BC  Inpatient Neurology  [facility] - 4th Floor Medical Telemetry

## 2024-05-22 LAB
ALBUMIN SERPL-MCNC: 3.1 G/DL (ref 3.5–5)
ALBUMIN/GLOB SERPL: 1.2 RATIO (ref 1.1–2)
ALP SERPL-CCNC: 42 UNIT/L (ref 40–150)
ALT SERPL-CCNC: 38 UNIT/L (ref 0–55)
ANION GAP SERPL CALC-SCNC: 8 MEQ/L
AST SERPL-CCNC: 16 UNIT/L (ref 5–34)
BACTERIA CSF CULT: NO GROWTH
BASOPHILS # BLD AUTO: 0.03 X10(3)/MCL
BASOPHILS NFR BLD AUTO: 0.2 %
BILIRUB SERPL-MCNC: 0.2 MG/DL
BUN SERPL-MCNC: 25.5 MG/DL (ref 8.9–20.6)
CALCIUM SERPL-MCNC: 8.3 MG/DL (ref 8.4–10.2)
CHLORIDE SERPL-SCNC: 112 MMOL/L (ref 98–107)
CO2 SERPL-SCNC: 17 MMOL/L (ref 22–29)
CREAT SERPL-MCNC: 1.08 MG/DL (ref 0.73–1.18)
CREAT/UREA NIT SERPL: 24
EOSINOPHIL # BLD AUTO: 0 X10(3)/MCL (ref 0–0.9)
EOSINOPHIL NFR BLD AUTO: 0 %
ERYTHROCYTE [DISTWIDTH] IN BLOOD BY AUTOMATED COUNT: 12.7 % (ref 11.5–17)
GFR SERPLBLD CREATININE-BSD FMLA CKD-EPI: >60 ML/MIN/1.73/M2
GLOBULIN SER-MCNC: 2.6 GM/DL (ref 2.4–3.5)
GLUCOSE SERPL-MCNC: 151 MG/DL (ref 74–100)
GRAM STN SPEC: NORMAL
GRAM STN SPEC: NORMAL
HCT VFR BLD AUTO: 34.5 % (ref 42–52)
HGB BLD-MCNC: 11.9 G/DL (ref 14–18)
HSV1 DNA CSF QL NAA+PROBE: NEGATIVE
HSV2 DNA CSF QL NAA+PROBE: NEGATIVE
IMM GRANULOCYTES # BLD AUTO: 0.68 X10(3)/MCL (ref 0–0.04)
IMM GRANULOCYTES NFR BLD AUTO: 4.4 %
LACTATE SERPL-SCNC: 1.9 MMOL/L (ref 0.5–2.2)
LYMPHOCYTES # BLD AUTO: 0.91 X10(3)/MCL (ref 0.6–4.6)
LYMPHOCYTES NFR BLD AUTO: 5.8 %
MAYO GENERIC ORDERABLE RESULT: NORMAL
MCH RBC QN AUTO: 29 PG (ref 27–31)
MCHC RBC AUTO-ENTMCNC: 34.5 G/DL (ref 33–36)
MCV RBC AUTO: 83.9 FL (ref 80–94)
MONOCYTES # BLD AUTO: 0.7 X10(3)/MCL (ref 0.1–1.3)
MONOCYTES NFR BLD AUTO: 4.5 %
NEUTROPHILS # BLD AUTO: 13.25 X10(3)/MCL (ref 2.1–9.2)
NEUTROPHILS NFR BLD AUTO: 85.1 %
NRBC BLD AUTO-RTO: 0 %
PLATELET # BLD AUTO: 309 X10(3)/MCL (ref 130–400)
PMV BLD AUTO: 8.7 FL (ref 7.4–10.4)
POTASSIUM SERPL-SCNC: 3.9 MMOL/L (ref 3.5–5.1)
PROT SERPL-MCNC: 5.7 GM/DL (ref 6.4–8.3)
PSYCHE PATHOLOGY RESULT: NORMAL
RBC # BLD AUTO: 4.11 X10(6)/MCL (ref 4.7–6.1)
SODIUM SERPL-SCNC: 137 MMOL/L (ref 136–145)
WBC # SPEC AUTO: 15.57 X10(3)/MCL (ref 4.5–11.5)

## 2024-05-22 PROCEDURE — 25000003 PHARM REV CODE 250: Performed by: GENERAL PRACTICE

## 2024-05-22 PROCEDURE — 83605 ASSAY OF LACTIC ACID: CPT | Performed by: INTERNAL MEDICINE

## 2024-05-22 PROCEDURE — 63600175 PHARM REV CODE 636 W HCPCS: Performed by: GENERAL PRACTICE

## 2024-05-22 PROCEDURE — 11000001 HC ACUTE MED/SURG PRIVATE ROOM

## 2024-05-22 PROCEDURE — 63600175 PHARM REV CODE 636 W HCPCS: Performed by: INTERNAL MEDICINE

## 2024-05-22 PROCEDURE — 99233 SBSQ HOSP IP/OBS HIGH 50: CPT | Mod: ,,, | Performed by: PSYCHIATRY & NEUROLOGY

## 2024-05-22 PROCEDURE — 80053 COMPREHEN METABOLIC PANEL: CPT | Performed by: INTERNAL MEDICINE

## 2024-05-22 PROCEDURE — 63600175 PHARM REV CODE 636 W HCPCS: Performed by: NURSE PRACTITIONER

## 2024-05-22 PROCEDURE — 85025 COMPLETE CBC W/AUTO DIFF WBC: CPT | Performed by: INTERNAL MEDICINE

## 2024-05-22 PROCEDURE — 36415 COLL VENOUS BLD VENIPUNCTURE: CPT | Performed by: INTERNAL MEDICINE

## 2024-05-22 PROCEDURE — 25000003 PHARM REV CODE 250: Performed by: PSYCHIATRY & NEUROLOGY

## 2024-05-22 PROCEDURE — 25000003 PHARM REV CODE 250: Performed by: INTERNAL MEDICINE

## 2024-05-22 PROCEDURE — S5010 5% DEXTROSE AND 0.45% SALINE: HCPCS | Performed by: INTERNAL MEDICINE

## 2024-05-22 PROCEDURE — 25000003 PHARM REV CODE 250: Performed by: NURSE PRACTITIONER

## 2024-05-22 RX ORDER — TAMSULOSIN HYDROCHLORIDE 0.4 MG/1
0.4 CAPSULE ORAL DAILY
Status: DISCONTINUED | OUTPATIENT
Start: 2024-05-22 | End: 2024-05-25 | Stop reason: HOSPADM

## 2024-05-22 RX ORDER — MORPHINE SULFATE 4 MG/ML
2 INJECTION, SOLUTION INTRAMUSCULAR; INTRAVENOUS EVERY 4 HOURS PRN
Status: DISCONTINUED | OUTPATIENT
Start: 2024-05-22 | End: 2024-05-25 | Stop reason: HOSPADM

## 2024-05-22 RX ADMIN — VALACYCLOVIR 1000 MG: 500 TABLET, FILM COATED ORAL at 09:05

## 2024-05-22 RX ADMIN — DEXTROSE MONOHYDRATE AND SODIUM CHLORIDE: 5; .45 INJECTION, SOLUTION INTRAVENOUS at 12:05

## 2024-05-22 RX ADMIN — KETOROLAC TROMETHAMINE 15 MG: 30 INJECTION, SOLUTION INTRAMUSCULAR; INTRAVENOUS at 11:05

## 2024-05-22 RX ADMIN — LEVETIRACETAM 1500 MG: 500 TABLET, FILM COATED ORAL at 08:05

## 2024-05-22 RX ADMIN — KETOROLAC TROMETHAMINE 15 MG: 30 INJECTION, SOLUTION INTRAMUSCULAR; INTRAVENOUS at 12:05

## 2024-05-22 RX ADMIN — LACOSAMIDE 100 MG: 50 TABLET, FILM COATED ORAL at 08:05

## 2024-05-22 RX ADMIN — DEXTROSE MONOHYDRATE AND SODIUM CHLORIDE: 5; .45 INJECTION, SOLUTION INTRAVENOUS at 03:05

## 2024-05-22 RX ADMIN — VALACYCLOVIR 1000 MG: 500 TABLET, FILM COATED ORAL at 03:05

## 2024-05-22 RX ADMIN — CEFTRIAXONE SODIUM 2 G: 2 INJECTION, POWDER, FOR SOLUTION INTRAMUSCULAR; INTRAVENOUS at 02:05

## 2024-05-22 RX ADMIN — MORPHINE SULFATE 2 MG: 4 INJECTION INTRAVENOUS at 02:05

## 2024-05-22 RX ADMIN — LEVETIRACETAM 1500 MG: 500 TABLET, FILM COATED ORAL at 09:05

## 2024-05-22 RX ADMIN — LACOSAMIDE 100 MG: 50 TABLET, FILM COATED ORAL at 09:05

## 2024-05-22 RX ADMIN — TAMSULOSIN HYDROCHLORIDE 0.4 MG: 0.4 CAPSULE ORAL at 08:05

## 2024-05-22 RX ADMIN — VALACYCLOVIR 1000 MG: 500 TABLET, FILM COATED ORAL at 08:05

## 2024-05-22 NOTE — ASSESSMENT & PLAN NOTE
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

## 2024-05-22 NOTE — PROGRESS NOTES
C/O of no headaches, vision issues.  LP yesterday showed opening pressure of 46, down to 19 after drainage.  Will follow.

## 2024-05-22 NOTE — CONSULTS
Luis Traylor 2000  [id number]  5/22/2024    CONSULTING PHYSICIAN: Mt    Reason for consult: left kidney stone     HPI: Mr. Traylor is a 22 yo male with recent history of viral/aseptic meningitis, he was treated in a neuro critical care unit in [place] and discharged on 5/13. He represented here on 5/15 with worsening headache and slurred speech. He is undergoing work up by infectious disease and Neurology. He has also has seizures since admission. CT abd/pel ordered for flank pain yesterday showed a left distal ureteral calculus causing mild hydroureteronephrosis. Denies known history of kidney stones. Has never seen a Urologist.      Past Medical History:   Diagnosis Date    Viral or Aseptic meningitis 05/07/2024     Past Surgical History:   Procedure Laterality Date    MAGNETIC RESONANCE IMAGING N/A 5/10/2024    Procedure: MRI (Magnetic Resonance Imagine);  Surgeon: Araceli Bergman MD;  Location: [facility];  Service: Anesthesiology;  Laterality: N/A;     No family history on file.    Social History     Tobacco Use    Smoking status: Never    Smokeless tobacco: Never   Substance Use Topics    Alcohol use: Not Currently    Drug use: Never     Current Facility-Administered Medications   Medication Dose Route Frequency Provider Last Rate Last Admin    acetaminophen tablet 650 mg  650 mg Oral Q8H PRN Christofer Izaguirre MD   650 mg at 05/17/24 1712    cefTRIAXone (ROCEPHIN) 2 g in dextrose 5 % in water (D5W) 100 mL IVPB (MB+)  2 g Intravenous Q12H Vipin Beltran MD   Stopped at 05/22/24 0247    ketorolac injection 15 mg  15 mg Intravenous Q6H PRN Sher Amor MD   15 mg at 05/22/24 0017    lacosamide tablet 100 mg  100 mg Oral Q12H Jessica Molina MD   100 mg at 05/21/24 2003    levETIRAcetam tablet 1,500 mg  1,500 mg Oral BID Jessica Molina MD   1,500 mg at 05/21/24 2003    melatonin tablet 6 mg  6 mg Oral Nightly PRN Christofer Izaguirre MD        morphine injection 2 mg   2 mg Intravenous Q4H PRN [place] Das AGACNP-BC   2 mg at 05/22/24 0245    sodium bicarbonate 50 mEq in dextrose 5 % and 0.45 % NaCl 1,000 mL infusion   Intravenous Continuous Sher Amor  mL/hr at 05/22/24 0608 Rate Verify at 05/22/24 0608    sodium chloride 0.9% flush 10 mL  10 mL Intravenous PRN Christofer Izaguirre MD        valACYclovir tablet 1,000 mg  1,000 mg Oral TID Sher Amor MD   1,000 mg at 05/21/24 2003     Review of patient's allergies indicates:  No Known Allergies    ROS: 12 point review of systems negative other than the HPI    PHYSICAL EXAM:  Vitals:    05/21/24 2310 05/22/24 0245 05/22/24 0314 05/22/24 0724   BP: 121/73  114/65 122/70   Pulse: 74  75 68   Resp: 16 16 16 18   Temp: 98.6 °F (37 °C)  97.8 °F (36.6 °C) 97.9 °F (36.6 °C)   TempSrc: Oral  Oral Oral   SpO2: 99%  97% 99%   Weight:       Height:           Intake/Output Summary (Last 24 hours) at 5/22/2024 0808  Last data filed at 5/22/2024 0608  Gross per 24 hour   Intake 1751.25 ml   Output 700 ml   Net 1051.25 ml       GEN: WN/WD NAD  HEENT: NCAT, PERRLA, EOMI, OP clear, nares patent  CV: RRR  RESP: Even and unlabored  ABD: soft, NTND  : No CVA tenderness  EXT: no C/C/E  NEURO: no focal deficits, MAEW, AAOx4      LABS:  Recent Results (from the past 24 hour(s))   Protein, CSF    Collection Time: 05/21/24  1:32 PM   Result Value Ref Range    Protein CSF  34.8 15.0 - 45.0 mg/dL   Glucose, CSF    Collection Time: 05/21/24  1:32 PM   Result Value Ref Range    Glucose CSF  91 (H) 40 - 70 mg/dL   Cerebrospinal Fluid Culture    Collection Time: 05/21/24  1:32 PM    Specimen: Cerebrospinal Fluid; CSF (Spinal Fluid)   Result Value Ref Range    CULTURE, CSF ([facility]) No Growth At 24 Hours     GRAM STAIN Rare WBC observed     GRAM STAIN No bacteria seen    Cell Count, CSF    Collection Time: 05/21/24  1:32 PM   Result Value Ref Range    Appear CSF Clear Clear    Color CSF Colorless Colorless    WBC CSF 83 (H) 0 -  5 /uL    RBC  /uL   Differential, CSF    Collection Time: 05/21/24  1:32 PM    Specimen: Cerebrospinal Fluid; CSF (Spinal Fluid)   Result Value Ref Range    Lymphocyte % 95 %    Monocyte % 5 %   ME Panel    Collection Time: 05/21/24  1:32 PM   Result Value Ref Range    Escherichia coli K1 Not Detected Not Detected    Haemophilus influenzae Not Detected Not Detected    Listeria monocytogenes Not Detected Not Detected    Neisseria meningitidis Not Detected Not Detected    Streptococcus agalactiae (Group B) Not Detected Not Detected    Streptococcus pneumoniae Not Detected Not Detected    Cytomegalovirus (CMV) Not Detected Not Detected    Enterovirus (EV) Not Detected Not Detected    HSV-1 Not Detected Not Detected    HSV-2 Not Detected Not Detected    Human Herpesvirus 6 (HHV-6) Not Detected Not Detected    Human Parechovirus (HPEV) Not Detected Not Detected    Varicella zoster Virus (VZV) Not Detected Not Detected    Cryptococcus neoformans/gattii Not Detected Not Detected   Lactic Acid, Plasma    Collection Time: 05/21/24  1:57 PM   Result Value Ref Range    Lactic Acid Level 2.1 0.5 - 2.2 mmol/L   Lactic Acid, Plasma    Collection Time: 05/21/24  3:47 PM   Result Value Ref Range    Lactic Acid Level 2.4 (H) 0.5 - 2.2 mmol/L   Urinalysis, Reflex to Urine Culture    Collection Time: 05/21/24  4:39 PM    Specimen: Urine   Result Value Ref Range    Color, UA Light-Orange (A) Yellow, Light-Yellow, Colorless, Straw, Dark-Yellow    Appearance, UA Turbid (A) Clear    Specific Gravity, UA >1.050 (H) 1.005 - 1.030    pH, UA 7.0 5.0 - 8.5    Protein, UA 1+ (A) Negative    Glucose, UA Normal Negative, Normal    Ketones, UA Negative Negative    Blood, UA 3+ (A) Negative    Bilirubin, UA Negative Negative    Urobilinogen, UA Normal 0.2, 1.0, Normal    Nitrites, UA Negative Negative    Leukocyte Esterase, UA Negative Negative    WBC, UA None Seen None Seen, 0-2, 3-5, 0-5 /HPF    Bacteria, UA None Seen None Seen, Trace /HPF     Squamous Epithelial Cells, UA Trace None Seen /HPF    RBC, UA >100 (A) None Seen, 0-2, 3-5, 0-5 /HPF   Basic Metabolic Panel    Collection Time: 05/21/24  8:32 PM   Result Value Ref Range    Sodium 137 136 - 145 mmol/L    Potassium 3.8 3.5 - 5.1 mmol/L    Chloride 114 (H) 98 - 107 mmol/L    CO2 15 (L) 22 - 29 mmol/L    Glucose 156 (H) 74 - 100 mg/dL    Blood Urea Nitrogen 23.6 (H) 8.9 - 20.6 mg/dL    Creatinine 1.16 0.73 - 1.18 mg/dL    BUN/Creatinine Ratio 20     Calcium 8.4 8.4 - 10.2 mg/dL    Anion Gap 8.0 mEq/L    eGFR >60 mL/min/1.73/m2   Comprehensive Metabolic Panel    Collection Time: 05/22/24  3:40 AM   Result Value Ref Range    Sodium 137 136 - 145 mmol/L    Potassium 3.9 3.5 - 5.1 mmol/L    Chloride 112 (H) 98 - 107 mmol/L    CO2 17 (L) 22 - 29 mmol/L    Glucose 151 (H) 74 - 100 mg/dL    Blood Urea Nitrogen 25.5 (H) 8.9 - 20.6 mg/dL    Creatinine 1.08 0.73 - 1.18 mg/dL    Calcium 8.3 (L) 8.4 - 10.2 mg/dL    Protein Total 5.7 (L) 6.4 - 8.3 gm/dL    Albumin 3.1 (L) 3.5 - 5.0 g/dL    Globulin 2.6 2.4 - 3.5 gm/dL    Albumin/Globulin Ratio 1.2 1.1 - 2.0 ratio    Bilirubin Total 0.2 <=1.5 mg/dL    ALP 42 40 - 150 unit/L    ALT 38 0 - 55 unit/L    AST 16 5 - 34 unit/L    eGFR >60 mL/min/1.73/m2    Anion Gap 8.0 mEq/L    BUN/Creatinine Ratio 24    CBC with Differential    Collection Time: 05/22/24  3:40 AM   Result Value Ref Range    WBC 15.57 (H) 4.50 - 11.50 x10(3)/mcL    RBC 4.11 (L) 4.70 - 6.10 x10(6)/mcL    Hgb 11.9 (L) 14.0 - 18.0 g/dL    Hct 34.5 (L) 42.0 - 52.0 %    MCV 83.9 80.0 - 94.0 fL    MCH 29.0 27.0 - 31.0 pg    MCHC 34.5 33.0 - 36.0 g/dL    RDW 12.7 11.5 - 17.0 %    Platelet 309 130 - 400 x10(3)/mcL    MPV 8.7 7.4 - 10.4 fL    Neut % 85.1 %    Lymph % 5.8 %    Mono % 4.5 %    Eos % 0.0 %    Basophil % 0.2 %    Lymph # 0.91 0.6 - 4.6 x10(3)/mcL    Neut # 13.25 (H) 2.1 - 9.2 x10(3)/mcL    Mono # 0.70 0.1 - 1.3 x10(3)/mcL    Eos # 0.00 0 - 0.9 x10(3)/mcL    Baso # 0.03 <=0.2 x10(3)/mcL    IG# 0.68  (H) 0 - 0.04 x10(3)/mcL    IG% 4.4 %    NRBC% 0.0 %         IMAGING:  EXAMINATION:  CT ABDOMEN PELVIS W WO CONTRAST    CLINICAL HISTORY:  Flank pain, kidney stone suspected;Nausea/vomiting;    TECHNIQUE:  Multidetector axial images were obtained of the abdomen and pelvis before and following the administration of IV contrast. Oral contrast was not administered.    Dose length product of 1022 mGycm. Automated exposure control was utilized to minimize radiation dose.    COMPARISON:  None available.    FINDINGS:  Included portion of the lungs are without suspicious nodularity, acute air space infiltrates or fluid within the pleural spaces.    Hepatic volume and attenuation is unremarkable. Gallbladder wall is not thickened and there is no intra luminal calcified calculus. No biliary dilation identified. Pancreatic unremarkable attenuation without acute peripancreatic phlegmons. Main pancreatic duct is not dilated. Spleen is of normal size without focal lesion.    The adrenal glands appear within normal limits. The kidneys are unremarkable in size and contour.  Left kidney is remarkable for non occluding calculi.  There is mild left hydronephrosis and hydroureter which is caused by distal ureteral 4 mm calculus on image 118 series 2.  This calculus is approximately 3.0 cm proximal from the uterovesical junction.  There is also right kidney interpolar location minimal non occluding calculus.  No right obstructive uropathy.  There are no significant perinephric inflammatory standings.    Stomach is nondistended without mural thickening.  Small bowel loops are without dilatation.  There is suspected mild mural thickening of the terminal ileum..  Appendix is not visualized.  Please correlate clinically.  Colon is nondistended and there are no pericolonic acute strandings.  No bowel obstruction.  No free fluid    Urinary bladder wall is minimally thickened.  There is right pelvic small free fluid seen on image 16 series  6.    No acute or otherwise osseous abnormality identified.   Impression:       1.  Left distal ureteral calculus causing mild hydroureteronephrosis.    2.  Bilateral kidneys non occluding calculi.    3.  Slightly thickened urinary bladder wall.    4.  Pelvic small free fluid of indeterminate significance.    5.  Terminal ileum mild mural thickening.      Electronically signed by: Ori Wynne  Date: 05/21/2024  Time: 13:36         ASSESSMENT:  22 yo male admitted with encephalitis had CT abd/pel yesterday for flank pain which revealed a 4 mm left distal ureteral calculus with mild hydro, CT reviewed. UA without signs of infection. He reports flank pain is much improved.      PLAN:  Start Flomax 0.4 mg Daily. Continue IVF. No indication for acute surgical intervention. Discussed diagnosis and treatment options, due tot the size of the stone we will continue trial of passage with Flomax, pain and nausea medication. Patient agrees. We will have him  follow up in 6 weeks with an ultrasound. Discussed with him in the meantime, if he has intractable pain, nausea or vomiting to call us or return to the ER.      SHAYLA Chase

## 2024-05-22 NOTE — PROGRESS NOTES
[facility]  Hospital Medicine Progress Note        Chief Complaint: Inpatient Follow-up for Headache     HPI:   Patient is a 23-year-old male with patient is a 23-year-old male with a recent admission at this facility with acute onset altered mental status for which he underwent an extensive workup.  He initially was thought to be having a stroke and was given a partial dose of TNK, but ultimately underwent a lumbar puncture which showed over a 400 WBCs with a lymphocyte predominance presumably viral/aseptic meningitis.  He was transferred to [place] for neuro critical Care and was seen by infectious disease at that facility.  He was discharged 05/13/2024 with the consensus being he had aseptic meningitis and all antibiotics were discontinued due to his clinical rapid improvement.  MILENA franco presents to the ER tonight with worsening headache and reported slurred speech prior to arrival. He also had a seizure like episode that he does not remember.      He arrived afebrile hemodynamically stable maintaining normal sats on room air.  Laboratory work showed leukocytosis of 24 K and CT head showed no acute process.  Hospitalist was consulted for further evaluation.     Patient has mild headache but was afebrile. 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 He was started on Keppra. His clinical picture was worrisome for encephalitis. 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 He had LP done this visit was opening pressure was elevated. CSF showed elevated WBC and protein. He was started on IV antivirals by ID and iv steroids by neuro for possible autoimmune encephalitis.     On 5/21/24 he started to have sudden onset of left flank pain, CT abdomen was done and showed left ureteral stone. Seen by urology team and advised to start on flomax and iv hydration. No acute interventions was recommended. Pain controlled with iv prn toradol.     Rpt LP showed persistent elevated opening pressure.  He was afebrile. Infectious disease work up was all negative.       Interval Hx:   Patient today awake and comfortable. Left flank pain is now much better. He has been afebrile. No headache or blurry vision.     Case was discussed with patient's nurse, Dr Beltran ID service and Dr Rodriguez Neurologist and  on the floor.    Objective/physical exam:  General: In moderate distress  Chest: Clear to auscultation bilaterally  Heart: RRR, +S1, S2, no appreciable murmur  Abdomen: Soft, Left flank tenderness   MSK: Warm, no lower extremity edema, no clubbing or cyanosis  Neurologic: Alert and oriented x4, Cranial nerve II-XII intact, Strength 5/5 in all 4 extremities    VITAL SIGNS: 24 HRS MIN & MAX LAST   Temp  Min: 97.6 °F (36.4 °C)  Max: 98.6 °F (37 °C) 97.9 °F (36.6 °C)   BP  Min: 114/65  Max: 141/90 122/70   Pulse  Min: 68  Max: 100  68   Resp  Min: 16  Max: 18 18   SpO2  Min: 97 %  Max: 99 % 99 %     I have reviewed the following labs:  Recent Labs   Lab 05/20/24  1405 05/21/24  0354 05/22/24  0340   WBC 22.84* 18.11* 15.57*   RBC 4.42* 4.42* 4.11*   HGB 12.7* 12.7* 11.9*   HCT 38.4* 37.8* 34.5*   MCV 86.9 85.5 83.9   MCH 28.7 28.7 29.0   MCHC 33.1 33.6 34.5   RDW 12.7 12.7 12.7    346 309   MPV 8.5 8.8 8.7     Recent Labs   Lab 05/16/24  0338 05/17/24  0528 05/18/24  0601 05/19/24  0404 05/21/24  0354 05/21/24 2032 05/22/24  0340      < > 135*   < > 139 137 137   K 4.0   < > 4.2   < > 4.0 3.8 3.9   CO2 24   < > 17*   < > 14* 15* 17*   BUN 12.2   < > 12.2   < > 17.4 23.6* 25.5*   CREATININE 0.97   < > 0.94   < > 0.81 1.16 1.08   CALCIUM 8.8   < > 9.2   < > 8.4 8.4 8.3*   ALBUMIN 3.8  --  4.0  --   --   --  3.1*   ALKPHOS 54  --  58  --   --   --  42   ALT 14  --  18  --   --   --  38   AST 11  --  11  --   --   --  16   BILITOT 0.4  --  0.3  --   --   --  0.2    < > = values in this interval not displayed.     Microbiology Results (last 7 days)       Procedure Component Value Units Date/Time     Cerebrospinal Fluid Culture [[id number]] Collected: 05/17/24 1110    Order Status: Completed Specimen: CSF (Spinal Fluid) Updated: 05/22/24 0919     CULTURE, CSF ([facility]) No Growth     GRAM STAIN Many WBC observed      No bacteria seen    Cerebrospinal Fluid Culture [[id number]] Collected: 05/21/24 1332    Order Status: Completed Specimen: CSF (Spinal Fluid) from Cerebrospinal Fluid Updated: 05/22/24 0658     CULTURE, CSF ([facility]) No Growth At 24 Hours     GRAM STAIN Rare WBC observed      No bacteria seen    Blood culture #1 **CANNOT BE ORDERED STAT** [[id number]]  (Normal) Collected: 05/15/24 2014    Order Status: Completed Specimen: Blood Updated: 05/20/24 2201     Blood Culture No Growth at 5 days    Blood culture #2 **CANNOT BE ORDERED STAT** [[id number]]  (Normal) Collected: 05/15/24 2014    Order Status: Completed Specimen: Blood Updated: 05/20/24 2201     Blood Culture No Growth at 5 days    Cryptococcal antigen, CSF [[id number]]  (Normal) Collected: 05/17/24 1110    Order Status: Completed Specimen: CSF (Spinal Fluid) from CSF Tap, Tube 1 Updated: 05/18/24 0829     Cryptococcal Antigen, CSF Negative    Gram Stain [[id number]] Collected: 05/17/24 1110    Order Status: Completed Specimen: CSF (Spinal Fluid) Updated: 05/17/24 1459     GRAM STAIN Many WBC observed      No bacteria seen    Alea Ink Prep CSF [[id number]] Collected: 05/17/24 1110    Order Status: Completed Specimen: CSF (Spinal Fluid) Updated: 05/17/24 1450     ALEA INK PREP CSF ([facility]) Negative    KOH Prep [[id number]] Collected: 05/17/24 1110    Order Status: Completed Specimen: Body Fluid Updated: 05/17/24 1450     KOH Prep No fungal elements seen             See below for Radiology    Scheduled Med:   cefTRIAXone (Rocephin) IV (PEDS and ADULTS)  2 g Intravenous Q12H    lacosamide  100 mg Oral Q12H    levETIRAcetam  1,500 mg Oral BID    tamsulosin  0.4 mg Oral Daily    valACYclovir  1,000 mg Oral TID      Continuous Infusions:   sodium bicarbonate 50  mEq in dextrose 5 % and 0.45 % NaCl 1,000 mL infusion   Intravenous Continuous          PRN Meds:    Current Facility-Administered Medications:     acetaminophen, 650 mg, Oral, Q8H PRN    ketorolac, 15 mg, Intravenous, Q6H PRN    melatonin, 6 mg, Oral, Nightly PRN    morphine, 2 mg, Intravenous, Q4H PRN    sodium chloride 0.9%, 10 mL, Intravenous, PRN     Assessment/Plan:  ? 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   Acute flank pain secondary to left ureteral stone - improved   Leukocytosis from steroid use   Seizures temporal lobe   Metabolic acidosis : worse   Mild hyperglycemia from steroids     Plan:  Patient looks more comfortable today. He is afebrile.   Left flank pain is now stable   Seen by urology and recommended flomax and iv hydration   Monitor urine for passage of stone     CO2 improved, today 17, Continue D5 1/2 NS with Bicarb at 100 cc/hr   Lactic acid 2.4, check today   Dc acetazolamide     24 hrs EEG did not show any seizures  Initial EEG showed + seizure activities   Clinically has no meningeal signs but rpt LP showed elevated opening pressure  D/W neuro and they are looking at possible transfer to [facility] ???  He is now on IV antivirals, IV steroids and IV Rocephin   Will also continue Keppra     Reviewed today's labs and  WBC 15, Hb 11, Plt 309, Na 137, K 3.9, Crt 1.08    CBC, BMP today and in am     F/U by ID and neuro team       Continue supportive care     Labs in am     Critical care note:  Critical care diagnosis: Severe metabolic acidosis needing IV bicarb drip   Critical care interventions: Hands-on evaluation, review of labs/radiographs/records and discussion with patient and family if present  Critical care time spent: 35 minutes       VTE prophylaxis: SCD    Patient condition:  Guarded    Anticipated discharge and Disposition:   Home       All diagnosis and differential diagnosis have been reviewed; assessment and plan has been documented; I have personally reviewed the labs and  test results that are presently available; I have reviewed the patients medication list; I have reviewed the consulting providers response and recommendations. I have reviewed or attempted to review medical records based upon their availability    All of the patient's questions have been  addressed and answered. Patient's is agreeable to the above stated plan. I will continue to monitor closely and make adjustments to medical management as needed.  _____________________________________________________________________    Nutrition Status:    Radiology:  I have personally reviewed the following imaging and agree with the radiologist.     FL LUMBAR PUNCTURE DIAGNOSTIC WITH IMAGING  Narrative: EXAMINATION:  FL LUMBAR PUNCTURE DIAGNOSTIC WITH IMAGING    CLINICAL HISTORY:  Recheck opening pressure. Need opening and closing pressures;    TECHNIQUE:  Procedure explained to the patient and informed consent obtained. Suitable skin entry site chosen under fluoroscopy. Lower back prepped and draped in sterile fashion. Lidocaine used for local anesthesia.    COMPARISON:  Lumbar puncture images 5/17/24    FINDINGS:  Under intermittent fluoroscopic guidance, a 22-gauge spinal needle was advanced into the thecal sac with position confirmed by flow of CSF.  Opening pressure 46 cm CSF. Approximately 21 mL of CSF collected and sent to the lab for further evaluation.  Closing pressure 19 cm CSF.  The needle was removed and hemostasis achieved at the skin puncture site. No immediate complication. Estimated blood loss negligible.    Absorbed dose is 1.26 mGy.  Impression: Successful fluoroscopic guided lumbar puncture.    Electronically signed by: Romain Silva  Date:    05/21/2024  Time:    15:43  CT Abdomen Pelvis W Wo Contrast  Narrative: EXAMINATION:  CT ABDOMEN PELVIS W WO CONTRAST    CLINICAL HISTORY:  Flank pain, kidney stone suspected;Nausea/vomiting;    TECHNIQUE:  Multidetector axial images were obtained of the abdomen and  pelvis before and following the administration of IV contrast. Oral contrast was not administered.    Dose length product of 1022 mGycm. Automated exposure control was utilized to minimize radiation dose.    COMPARISON:  None available.    FINDINGS:  Included portion of the lungs are without suspicious nodularity, acute air space infiltrates or fluid within the pleural spaces.    Hepatic volume and attenuation is unremarkable. Gallbladder wall is not thickened and there is no intra luminal calcified calculus. No biliary dilation identified. Pancreatic unremarkable attenuation without acute peripancreatic phlegmons. Main pancreatic duct is not dilated. Spleen is of normal size without focal lesion.    The adrenal glands appear within normal limits. The kidneys are unremarkable in size and contour.  Left kidney is remarkable for non occluding calculi.  There is mild left hydronephrosis and hydroureter which is caused by distal ureteral 4 mm calculus on image 118 series 2.  This calculus is approximately 3.0 cm proximal from the uterovesical junction.  There is also right kidney interpolar location minimal non occluding calculus.  No right obstructive uropathy.  There are no significant perinephric inflammatory standings.    Stomach is nondistended without mural thickening.  Small bowel loops are without dilatation.  There is suspected mild mural thickening of the terminal ileum..  Appendix is not visualized.  Please correlate clinically.  Colon is nondistended and there are no pericolonic acute strandings.  No bowel obstruction.  No free fluid    Urinary bladder wall is minimally thickened.  There is right pelvic small free fluid seen on image 16 series 6.    No acute or otherwise osseous abnormality identified.  Impression: 1.  Left distal ureteral calculus causing mild hydroureteronephrosis.    2.  Bilateral kidneys non occluding calculi.    3.  Slightly thickened urinary bladder wall.    4.  Pelvic small free fluid  of indeterminate significance.    5.  Terminal ileum mild mural thickening.    Electronically signed by: Ori Wynne  Date:    05/21/2024  Time:    13:36      Sher Amor MD  Department of Hospital Medicine   [facility]   05/22/2024          CT shows left kidney stone  Urologist consulted

## 2024-05-22 NOTE — PROGRESS NOTES
[facility] - 4th Floor Medical Telemetry  Neurology  Progress Note    Patient Name: Luis Traylor  MRN: [id number]  Admission Date: 5/15/2024  Hospital Length of Stay: 5 days  Code Status: Full Code   Attending Provider: Sher Amor MD  Primary Care Physician: Terrence Craven NP   Principal Problem:Headache    HPI:   23-year-old male with no known previous PMH who presented to ED on 05/15 with worsening headache and episode of slurred speech just PTA.  Patient was recently admitted to this hospital on 05/07 for acute onset AMS for which he underwent extensive workup, including partial dose of TNK, stroke w/u which was negative, lumbar puncture which revealed over 400 WBCs with lymphocyte predominance and CSF, and infectious studies.  Patient was thought to have viral/aseptic meningitis, patient's hospitalization was complicated by persistent breakthrough seizures and was ultimately transferred to [facility] in [place] for neurocritical care.  While in [facility] antimicrobials were discontinued shortly after transfer due to his rapid clinical improvement, as well as anti seizure medications d/t pt not thought to be having seizures, and was discharged to home on 05/13.    Pt's significant other at bedside reports while leaving [facility], pt noted to have some stuttering speech with expressive aphasia, as well as on the ride home. She reports since discharge, has had forgetfulness, expressive aphasia, facial twitching, confabulates, and other odd behaviors. She reported just PTA pt was noted to have periorbital edema with erythematous sclera to OD, fever (100.8), chills, shaking, and R ear pain that resolved spontaneously PTA at hospital ED.       CT head without on this admission unrevealing for acute intracranial abnormalities.  Most recent labs significant for WBC 17.71, and otherwise unremarkable.    Overview/Hospital Course:  No notes on  file        Subjective:     Interval History:   Pt reports improvement of LLQ pain. Repeat LP yesterday significant for elevated opening pressure (46).   During exam, pt noted to have unusual affect with some associated distracted attention.  Vimpat added to AED regimen yesterday d/t suspicion for subclinical seizures.     Plan of care d/w pt and pt's significant other at bedside per Dr. Molina.     Current Neurological Medications:     Current Facility-Administered Medications   Medication Dose Route Frequency Provider Last Rate Last Admin    acetaminophen tablet 650 mg  650 mg Oral Q8H PRN Christofer Izaguirre MD   650 mg at 05/17/24 1712    cefTRIAXone (ROCEPHIN) 2 g in dextrose 5 % in water (D5W) 100 mL IVPB (MB+)  2 g Intravenous Q12H Vipin Beltran MD   Stopped at 05/22/24 0247    ketorolac injection 15 mg  15 mg Intravenous Q6H PRN Sher Amor MD   15 mg at 05/22/24 0017    lacosamide tablet 100 mg  100 mg Oral Q12H Jessica Molina MD   100 mg at 05/22/24 0853    levETIRAcetam tablet 1,500 mg  1,500 mg Oral BID Jessica Molina MD   1,500 mg at 05/22/24 0853    melatonin tablet 6 mg  6 mg Oral Nightly PRN Christofer Izaguirre MD        morphine injection 2 mg  2 mg Intravenous Q4H PRN Albania Das AGACNP-BC   2 mg at 05/22/24 0245    sodium bicarbonate 50 mEq in dextrose 5 % and 0.45 % NaCl 1,000 mL infusion   Intravenous Continuous Sher Amor MD        sodium chloride 0.9% flush 10 mL  10 mL Intravenous PRN Christofer Izaguirre MD        tamsulosin 24 hr capsule 0.4 mg  0.4 mg Oral Daily Ana Maria Chapman FNP   0.4 mg at 05/22/24 0858    valACYclovir tablet 1,000 mg  1,000 mg Oral TID Sher Amor MD   1,000 mg at 05/22/24 0853       Review of Systems  A 14pt ros was reviewed & is negative unless o/w documented in the hpi    Objective:     Vital Signs (Most Recent):  Temp: 98.1 °F (36.7 °C) (05/22/24 1146)  Pulse: 83 (05/22/24 1146)  Resp: 18 (05/22/24  1146)  BP: 134/85 (05/22/24 1146)  SpO2: 100 % (05/22/24 1146) Vital Signs (24h Range):  Temp:  [97.6 °F (36.4 °C)-98.6 °F (37 °C)] 98.1 °F (36.7 °C)  Pulse:  [] 83  Resp:  [16-18] 18  SpO2:  [97 %-100 %] 100 %  BP: (114-141)/(65-90) 134/85     Weight: 77.1 kg (170 lb)  Body mass index is 23.71 kg/m².     Physical Exam   GENERAL: NAD, calm, cooperative, awake/alert  MENTAL STATUS: Oriented x4, follows commands reliably  SPEECH/LANGUAGE: Clear, coherent  CN:  EOMI, gaze conjugate, visual fields intact  PERRLA  Motor: no focal motor weakness  Sensory: Normal to tactile stim/vibration  Gait: steady w/o assistance       Significant Labs:   Recent Lab Results  (Last 5 results in the past 24 hours)        05/22/24  1203   05/22/24  0340   05/21/24  2032   05/21/24  1639   05/21/24  1547        Albumin/Globulin Ratio   1.2             Albumin   3.1             ALP   42             ALT   38             Anion Gap   8.0   8.0           Appear CSF               Appearance, UA       Turbid         AST   16             Bacteria, UA       None Seen         Baso #   0.03             Basophil %   0.2             BILIRUBIN TOTAL   0.2             Bilirubin, UA       Negative         BUN   25.5   23.6           BUN/CREAT RATIO   24   20           Calcium   8.3   8.4           Chloride   112   114           CO2   17   15           COLOR CSF               Color, UA       Light-Orange         Creatinine   1.08   1.16           Cryptococcus neoformans/gattii               CSF CULTURE               Cytomegalovirus (CMV)               eGFR   >60   >60           Enterovirus (EV)               Eos #   0.00             Eos %   0.0             Escherichia coli K1               Pathology Result               Globulin, Total   2.6             Glucose   151   156           Glucose CSF               Glucose, UA       Normal         GRAM STAIN               Haemophilus influenzae               Hematocrit   34.5             Hemoglobin   11.9              HSV-1               HSV-2               Human Herpesvirus 6 (HHV-6)               Human Parechovirus (HPEV)               Immature Grans (Abs)   0.68             Immature Granulocytes   4.4             Ketones, UA       Negative         Lactic Acid Level 1.9         2.4       Leukocyte Esterase, UA       Negative         Listeria monocytogenes               Lymph #   0.91             LYMPH %   5.8             Lymphocyte %               MCH   29.0             MCHC   34.5             MCV   83.9             Mono #   0.70             Mono %   4.5             Monocyte %               MPV   8.7             Neisseria meningitidis               Neut #   13.25             Neut %   85.1             NITRITE UA       Negative         nRBC   0.0             Blood, UA       3+         pH, UA       7.0         Platelet Count   309             Potassium   3.9   3.8           Protein CSF                PROTEIN TOTAL   5.7             Protein, UA       1+         RBC   4.11             RBC, CSF               RBC, UA       >100         RDW   12.7             Sodium   137   137           Specific Gravity,UA       >1.050         Squamous Epithelial Cells, UA       Trace         Streptococcus agalactiae (Group B)               Streptococcus pneumoniae               Urobilinogen, UA       Normal         Varicella zoster Virus (VZV)               WBC, CSF               WBC, UA       None Seen         WBC   15.57                                    Significant Imaging: I have reviewed all pertinent imaging results/findings within the past 24 hours.  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

## 2024-05-23 PROBLEM — R56.9 SEIZURES: Status: ACTIVE | Noted: 2024-05-23

## 2024-05-23 LAB
ALBUMIN SERPL-MCNC: 2.8 G/DL (ref 3.5–5)
ALBUMIN/GLOB SERPL: 1.2 RATIO (ref 1.1–2)
ALP SERPL-CCNC: 41 UNIT/L (ref 40–150)
ALT SERPL-CCNC: 31 UNIT/L (ref 0–55)
ANION GAP SERPL CALC-SCNC: 4 MEQ/L
AST SERPL-CCNC: 19 UNIT/L (ref 5–34)
BASOPHILS # BLD AUTO: 0.05 X10(3)/MCL
BASOPHILS NFR BLD AUTO: 0.4 %
BILIRUB SERPL-MCNC: 0.3 MG/DL
BUN SERPL-MCNC: 29.9 MG/DL (ref 8.9–20.6)
CALCIUM SERPL-MCNC: 7.9 MG/DL (ref 8.4–10.2)
CHLORIDE SERPL-SCNC: 113 MMOL/L (ref 98–107)
CO2 SERPL-SCNC: 22 MMOL/L (ref 22–29)
CREAT SERPL-MCNC: 1.18 MG/DL (ref 0.73–1.18)
CREAT UR-MCNC: 71.6 MG/DL (ref 63–166)
CREAT/UREA NIT SERPL: 25
EOSINOPHIL # BLD AUTO: 0.01 X10(3)/MCL (ref 0–0.9)
EOSINOPHIL NFR BLD AUTO: 0.1 %
ERYTHROCYTE [DISTWIDTH] IN BLOOD BY AUTOMATED COUNT: 13 % (ref 11.5–17)
GFR SERPLBLD CREATININE-BSD FMLA CKD-EPI: >60 ML/MIN/1.73/M2
GLOBULIN SER-MCNC: 2.4 GM/DL (ref 2.4–3.5)
GLUCOSE SERPL-MCNC: 98 MG/DL (ref 74–100)
HCT VFR BLD AUTO: 34.9 % (ref 42–52)
HGB BLD-MCNC: 12 G/DL (ref 14–18)
IMM GRANULOCYTES # BLD AUTO: 0.65 X10(3)/MCL (ref 0–0.04)
IMM GRANULOCYTES NFR BLD AUTO: 4.8 %
LYMPHOCYTES # BLD AUTO: 1.65 X10(3)/MCL (ref 0.6–4.6)
LYMPHOCYTES NFR BLD AUTO: 12.3 %
MCH RBC QN AUTO: 28.7 PG (ref 27–31)
MCHC RBC AUTO-ENTMCNC: 34.4 G/DL (ref 33–36)
MCV RBC AUTO: 83.5 FL (ref 80–94)
MONOCYTES # BLD AUTO: 1.37 X10(3)/MCL (ref 0.1–1.3)
MONOCYTES NFR BLD AUTO: 10.2 %
NEUTROPHILS # BLD AUTO: 9.71 X10(3)/MCL (ref 2.1–9.2)
NEUTROPHILS NFR BLD AUTO: 72.2 %
NRBC BLD AUTO-RTO: 0.2 %
PLATELET # BLD AUTO: 259 X10(3)/MCL (ref 130–400)
PMV BLD AUTO: 8.7 FL (ref 7.4–10.4)
POTASSIUM SERPL-SCNC: 3.4 MMOL/L (ref 3.5–5.1)
PROT SERPL-MCNC: 5.2 GM/DL (ref 6.4–8.3)
PROT UR STRIP-MCNC: <6.8 MG/DL
RBC # BLD AUTO: 4.18 X10(6)/MCL (ref 4.7–6.1)
RHEUMATOID FACT SERPL-ACNC: <13 IU/ML
SODIUM SERPL-SCNC: 139 MMOL/L (ref 136–145)
THYROGLOB AB SERPL IA-ACNC: 16 IU/ML
THYROID PEROXIDASE QUANT (OLG): <4 IU/ML
WBC # SPEC AUTO: 13.44 X10(3)/MCL (ref 4.5–11.5)

## 2024-05-23 PROCEDURE — 36415 COLL VENOUS BLD VENIPUNCTURE: CPT | Performed by: INTERNAL MEDICINE

## 2024-05-23 PROCEDURE — 25000003 PHARM REV CODE 250: Performed by: PSYCHIATRY & NEUROLOGY

## 2024-05-23 PROCEDURE — 84156 ASSAY OF PROTEIN URINE: CPT | Performed by: STUDENT IN AN ORGANIZED HEALTH CARE EDUCATION/TRAINING PROGRAM

## 2024-05-23 PROCEDURE — 86235 NUCLEAR ANTIGEN ANTIBODY: CPT | Performed by: STUDENT IN AN ORGANIZED HEALTH CARE EDUCATION/TRAINING PROGRAM

## 2024-05-23 PROCEDURE — 25000003 PHARM REV CODE 250: Performed by: OPHTHALMOLOGY

## 2024-05-23 PROCEDURE — 80053 COMPREHEN METABOLIC PANEL: CPT | Performed by: INTERNAL MEDICINE

## 2024-05-23 PROCEDURE — 11000001 HC ACUTE MED/SURG PRIVATE ROOM

## 2024-05-23 PROCEDURE — 25000003 PHARM REV CODE 250: Performed by: NURSE PRACTITIONER

## 2024-05-23 PROCEDURE — 85025 COMPLETE CBC W/AUTO DIFF WBC: CPT | Performed by: INTERNAL MEDICINE

## 2024-05-23 PROCEDURE — 99233 SBSQ HOSP IP/OBS HIGH 50: CPT | Mod: ,,, | Performed by: GENERAL PRACTICE

## 2024-05-23 PROCEDURE — 95819 EEG AWAKE AND ASLEEP: CPT

## 2024-05-23 PROCEDURE — 99232 SBSQ HOSP IP/OBS MODERATE 35: CPT | Mod: ,,, | Performed by: SPECIALIST

## 2024-05-23 PROCEDURE — 95816 EEG AWAKE AND DROWSY: CPT | Mod: 26,,, | Performed by: SPECIALIST

## 2024-05-23 PROCEDURE — S5010 5% DEXTROSE AND 0.45% SALINE: HCPCS | Performed by: INTERNAL MEDICINE

## 2024-05-23 PROCEDURE — 25000003 PHARM REV CODE 250: Performed by: INTERNAL MEDICINE

## 2024-05-23 PROCEDURE — 63600175 PHARM REV CODE 636 W HCPCS: Performed by: INTERNAL MEDICINE

## 2024-05-23 PROCEDURE — 86431 RHEUMATOID FACTOR QUANT: CPT | Performed by: STUDENT IN AN ORGANIZED HEALTH CARE EDUCATION/TRAINING PROGRAM

## 2024-05-23 RX ORDER — PHENYLEPHRINE HYDROCHLORIDE 100 MG/ML
2 SOLUTION/ DROPS OPHTHALMIC ONCE
Status: DISCONTINUED | OUTPATIENT
Start: 2024-05-23 | End: 2024-05-25 | Stop reason: HOSPADM

## 2024-05-23 RX ORDER — TROPICAMIDE 10 MG/ML
2 SOLUTION/ DROPS OPHTHALMIC ONCE
Status: DISCONTINUED | OUTPATIENT
Start: 2024-05-23 | End: 2024-05-25 | Stop reason: HOSPADM

## 2024-05-23 RX ORDER — PROPARACAINE HYDROCHLORIDE 5 MG/ML
2 SOLUTION/ DROPS OPHTHALMIC ONCE
Status: DISCONTINUED | OUTPATIENT
Start: 2024-05-23 | End: 2024-05-25 | Stop reason: HOSPADM

## 2024-05-23 RX ADMIN — LACOSAMIDE 100 MG: 50 TABLET, FILM COATED ORAL at 09:05

## 2024-05-23 RX ADMIN — LEVETIRACETAM 1500 MG: 500 TABLET, FILM COATED ORAL at 08:05

## 2024-05-23 RX ADMIN — LEVETIRACETAM 1500 MG: 500 TABLET, FILM COATED ORAL at 09:05

## 2024-05-23 RX ADMIN — KETOROLAC TROMETHAMINE 15 MG: 30 INJECTION, SOLUTION INTRAMUSCULAR; INTRAVENOUS at 01:05

## 2024-05-23 RX ADMIN — LACOSAMIDE 100 MG: 50 TABLET, FILM COATED ORAL at 08:05

## 2024-05-23 RX ADMIN — VALACYCLOVIR 1000 MG: 500 TABLET, FILM COATED ORAL at 08:05

## 2024-05-23 RX ADMIN — DEXTROSE MONOHYDRATE AND SODIUM CHLORIDE: 5; .45 INJECTION, SOLUTION INTRAVENOUS at 03:05

## 2024-05-23 RX ADMIN — KETOROLAC TROMETHAMINE 15 MG: 30 INJECTION, SOLUTION INTRAMUSCULAR; INTRAVENOUS at 06:05

## 2024-05-23 RX ADMIN — TAMSULOSIN HYDROCHLORIDE 0.4 MG: 0.4 CAPSULE ORAL at 08:05

## 2024-05-23 NOTE — CONSULTS
Ophthalmology Initial Consult Note    Patient Name: Luis Traylor  Age: 23 y.o.  : 2000  MRN: [id number]  Admission Date: 5/15/2024    Reason for Consult:      Medical Management  Chief Complaint   Patient presents with    Headache     Headache and slurred speech that has since resolved that began just PTA that worsens when supine and a fever x1 of 100.6 also just PTA, no meds taken. Pt was recently admitted for encephalitis vs viral meningitis. Work-up was inconclusive and pt was ultimately discharged w/ follow-up appt w/ neurology, ID. CBG- 89         Self, Aaareferral    HPI:     Luis Traylor is a 23 y.o. male  seen to rule out optic nerve swelling/uveitis. Pt deneis and visual difficulty/ pain/ flashes/ floaters/ photophobia. No previous ocular exam. No family hx of eye disorders.         Current Facility-Administered Medications   Medication Dose Route Frequency Provider Last Rate Last Admin    acetaminophen tablet 650 mg  650 mg Oral Q8H PRN Christofer Izaguirre MD   650 mg at 24 1712    ketorolac injection 15 mg  15 mg Intravenous Q6H PRN Sher Amor MD   15 mg at 24 1307    lacosamide tablet 100 mg  100 mg Oral Q12H Jessica Molina MD   100 mg at 24 0813    levETIRAcetam tablet 1,500 mg  1,500 mg Oral BID Jessica Molina MD   1,500 mg at 24 0813    melatonin tablet 6 mg  6 mg Oral Nightly PRN Christofer Izaguirre MD        morphine injection 2 mg  2 mg Intravenous Q4H PRN Albania Das AGACNP-BC   2 mg at 24 0245    phenylephrine HCL 10% ophthalmic solution 2 drop  2 drop Both Eyes Once HinojosaAraceli MD        proparacaine 0.5 % ophthalmic solution 2 drop  2 drop Both Eyes Once Hinojosa, Araceli SOMMERS MD        sodium chloride 0.9% flush 10 mL  10 mL Intravenous PRN Christofer Izaguirre MD        tamsulosin 24 hr capsule 0.4 mg  0.4 mg Oral Daily Ana Maria Chapman FNP   0.4 mg at 24 0813    tropicamide 1% ophthalmic  solution 2 drop  2 drop Both Eyes Once Hinojosa, MD Merissa Ernandez Alexi D., NP    Past Medical History:   Diagnosis Date    Viral or Aseptic meningitis 05/07/2024      Past Surgical History:   Procedure Laterality Date    MAGNETIC RESONANCE IMAGING N/A 5/10/2024    Procedure: MRI (Magnetic Resonance Imagine);  Surgeon: Araceli Bergman MD;  Location: [facility] OR;  Service: Anesthesiology;  Laterality: N/A;      No family history on file.  Social History     Tobacco Use    Smoking status: Never    Smokeless tobacco: Never   Substance Use Topics    Alcohol use: Not Currently     No medications prior to admission.     Review of patient's allergies indicates:  No Known Allergies         Review of Systems:     Neg as per hpi       Objective:       VITAL SIGNS: 24 HR MIN & MAX LAST    Temp  Min: 97.4 °F (36.3 °C)  Max: 98 °F (36.7 °C)  97.9 °F (36.6 °C)        BP  Min: 107/63  Max: 145/77  131/81     Pulse  Min: 74  Max: 90  90     Resp  Min: 20  Max: 20  20    SpO2  Min: 97 %  Max: 100 %  97 %        VISUAL ACUITY nsc: 20/25 ou  PUPILS: 3-2mm OU, equal and reactive, no afferent pupillary defect  INTRAOCULAR PRESSURE: 13/16    CONFRONTATIONAL VISUAL FIELDS: grossly full OU  EXTRAOCULAR MOVEMENTS: full OU    ORBITS: no proptosis, no enopthalmos, no bony step-off OU  LIDS/LASHES/LACRIMAL: no lesions, no lacerations OU  CONJUNCTIVA/SCLERA: white and quiet OU  CORNEA: clear OU   ANTERIOR CHAMBER: deep and formed OU  IRIS: round and flat OU  LENS: clear OU    DILATED FUNDUS EXAM:   CDR: 0.3 OU,  grade 1 edema    MVP: no macular lesions OU, vessels normal OU, periphery without holes/tears/breaks OU    FL LUMBAR PUNCTURE DIAGNOSTIC WITH IMAGING   Final Result      Successful fluoroscopic guided lumbar puncture.         Electronically signed by: Romain Silva   Date:    05/21/2024   Time:    15:43      CT Abdomen Pelvis W Wo Contrast   Final Result      1.  Left distal ureteral calculus causing mild  hydroureteronephrosis.      2.  Bilateral kidneys non occluding calculi.      3.  Slightly thickened urinary bladder wall.      4.  Pelvic small free fluid of indeterminate significance.      5.  Terminal ileum mild mural thickening.         Electronically signed by: Ori Wynne   Date:    05/21/2024   Time:    13:36      CT Head Without Contrast   Final Result      No acute intracranial findings identified.         Electronically signed by: Ori Wynne   Date:    05/20/2024   Time:    12:08      FL LUMBAR PUNCTURE THERAPEUTIC WITH IMAGING   Final Result   Addendum (preliminary) 1 of 1      On further review, there is asymmetric hyperintensity involving the right    temporal lobe which suggest nonspecific inflammation.  There however is no    associated abnormal enhancement or diffusion weighted restriction.         Electronically signed by: Ori Wynne   Date:    05/17/2024   Time:    12:46      Final      Successful fluoroscopic guided lumbar puncture.         Electronically signed by: Romain Silva   Date:    05/17/2024   Time:    11:41      MRI Brain W WO Contrast   Final Result      No acute intracranial findings identified.      No significant discrepancy with overnight report.         Electronically signed by: Ori Wynne   Date:    05/17/2024   Time:    07:26      MRA Brain without contrast   Final Result      No flow-limiting hemodynamically significant stenosis identified.         Electronically signed by: Ori Wynne   Date:    05/17/2024   Time:    12:15      MRV Brain Without Contrast   Final Result      No dural venous sinuses thrombosis identified.         Electronically signed by: Ori Wynne   Date:    05/17/2024   Time:    12:25      CT Head Without Contrast   Final Result      No acute intracranial findings.         Electronically signed by: Rohan Bowman   Date:    05/15/2024   Time:    19:28          Assessment / Plan:       Active Hospital Problems    Diagnosis  POA    *Headache [R51.9]   Yes    Seizures [R56.9]  Unknown    Elevated intracranial pressure [G93.2]  Yes    TIA (transient ischemic attack) [G45.9]  Unknown    Encephalitis [G04.90]  Yes      Resolved Hospital Problems    Diagnosis Date Resolved POA    Seizure-like activity [R56.9] 05/17/2024 Unknown       1) Grade 1 optic nerve swelling- no evident of ocular inflammation. Most likely due to elevated intracranial pressure. Pt will need to f/u w/ optho upon discharge for further testing. Findings discussed w/ pt/ fly at bedside.   @[facility]@

## 2024-05-23 NOTE — PROGRESS NOTES
Infectious Disease  Progress Note    Patient Name: Luis Traylor   MRN: [id number]   Admission Date: 5/15/2024   Hospital Length of Stay: 6 days  Attending Physician: Sher Amor MD   Primary Care Provider: Terrence Craven NP     Isolation Status: No active isolations     Assessment/Plan:        23-year-old male patient known to have past significant for recent admission from 05/07 - 05/13/2024 after he presented with encephalopathy and concern infectious etiology, was initially admitted at [facility], transferred to [facility] for concern for CVA, received TNK given left facial droop as well as arm and leg weakness with numbness.  Noted to have temperature of 100.3°, initially admitted to the ICU, had a lumbar puncture showing 400 WBCs, 62% lymphocytic, negative ME panel and crypto antigen, was transferred to [facility] in [place] for higher level of care.  While there, he had an extensive investigation that did not yield a clear diagnosis, antiepileptics were discontinued, doxycycline and valacyclovir were discontinued as well.  Patient was discharged to home on 05/13/2024.  Since his discharge, started having episodes of dysarthria, episodes staring and spacing out, patient reports that during these episodes he feels like he could tell what he wanted to say but could not actually say it.  When he got home, his headache started getting worse, he reports that his headaches were worse lying flat and better when he sits up.  He had intermittent episodes of photophobia, he also reports that his right eye started to be swollen and red chief happened every time when he was having these symptoms in the past, he started again having confusion, dysarthric episodes with repetitive motion such of lip-smacking and rubbing his nose, ultimately on 05/15/2024 he started having shaking chills, he had temperatures checked which was 100.6 he had significant nausea, he was therefore presented again to the emergency  department.  He was started on antiepileptic and admitted to the hospital.  ID consulted for assistance in management.     Encephalopathy/encephalitis  Confusion   Seizure activity   HSV 2 infection  Elevated intracranial pressure  Partially obstructing renal stone     -continues to be somewhat slow to respond.  Denies any headaches however.  He is conversational and engaging.  He is alert and oriented x4.  Overall feels about the same   -most recent lumbar puncture 05/21/2024 continues with elevated opening pressure despite previous lumbar punctures and acetazolamide administration at 46 cm, closing pressure 19 cm.  Remains with pleocytosis although improved, 2 weeks ago was at 400, 6 days ago was 119, most recently at 83 WBCs noted, 95% lymphocytes, glucose remains normal or elevated and protein levels are back down to normal at 34  -HSV PCR and CSF stand-alone test for HSV 1 and HSV 2 were both negative, 3 consecutive ME panels are negative  -Lyme disease antibody immunoblasts is negative in serum  -although the Q fever antibody screen was reactive, the reflex titers were all negative   -QuantiFERON gold TB is negative, brucella antibodies are negative, Bartonella antibody panel is negative, CSF West Nile IgG and IgM are negative, Histoplasma/Blastomyces antigen are negative, Ehrlichia antibody panel is negative    Plan:  -given very broad negative infectious evaluation, will discontinue ceftriaxone and valganciclovir  -neurology following, EEG monitoring continued  -will add 16 S broad-spectrum PCR to the CSF collected for the sake of completion although an infectious etiology at this time is very low   -will follow up on further Neurology recommendations  -will reach out to ophthalmology for consideration of evaluation given increased intracranial pressure, patient reporting multiple episodes of swelling of the right eye as well as injection of the conjunctiva although currently does not appear to be  symptomatic  -discussed in great details with the patient and fiancee at bedside     Antibiotics:  Ganciclovir 05/16 - 05/17  Acyclovir 05/17 - Present  Ceftriaxone 05/17 - Present    ID will continue following. Please call with any questions or concerns.      Portions of this note dictated using EMR integrated voice recognition software, and may be subject to voice recognition errors not corrected at proofreading. Please contact writer for clarification if needed.    Subjective:     Principal Problem: Headache     Interval History:   No headaches on evaluation today.  Remains somewhat slow to respond however conversational and engaging.  No new complaints    Review of Systems   Review of Systems   All other systems reviewed and are negative.       Objective:     Vital Signs (Most Recent):  Temp: 97.4 °F (36.3 °C) (05/23/24 0700)  Pulse: 83 (05/23/24 0700)  Resp: 18 (05/22/24 1517)  BP: 133/82 (05/23/24 0700)  SpO2: 97 % (05/23/24 0700)  Vital Signs (24h Range):  Temp:  [97.4 °F (36.3 °C)-98.1 °F (36.7 °C)] 97.4 °F (36.3 °C)  Pulse:  [74-83] 83  Resp:  [18] 18  SpO2:  [97 %-100 %] 97 %  BP: (107-134)/(63-85) 133/82      Weight:   Wt Readings from Last 1 Encounters:   05/16/24 77.1 kg (170 lb)      Body mass index is Body mass index is 23.71 kg/m².     Estimated Creatinine Clearance: Estimated Creatinine Clearance: 103.7 mL/min (based on SCr of 1.18 mg/dL).     Lines/Drains/Airways       Peripheral Intravenous Line  Duration                  Peripheral IV - Single Lumen 05/15/24 2313 20 G Anterior;Distal;Left Forearm 7 days                     Physical Exam  Physical Exam  Constitutional:       Appearance: Normal appearance.   HENT:      Head: Normocephalic and atraumatic.      Mouth/Throat:      Pharynx: No oropharyngeal exudate or posterior oropharyngeal erythema.   Eyes:      Extraocular Movements: Extraocular movements intact.      Pupils: Pupils are equal, round, and reactive to light.   Cardiovascular:       Rate and Rhythm: Normal rate and regular rhythm.      Heart sounds: No murmur heard.  Pulmonary:      Effort: No respiratory distress.      Breath sounds: No wheezing, rhonchi or rales.   Abdominal:      General: Bowel sounds are normal. There is no distension.      Palpations: Abdomen is soft.      Tenderness: There is no abdominal tenderness. There is no right CVA tenderness or left CVA tenderness.   Musculoskeletal:         General: No swelling or tenderness.      Cervical back: Neck supple. No rigidity or tenderness.   Lymphadenopathy:      Cervical: No cervical adenopathy.   Skin:     Findings: No lesion or rash.   Neurological:      General: No focal deficit present.      Mental Status: He is alert and oriented to person, place, and time. Mental status is at baseline.      Cranial Nerves: No cranial nerve deficit.      Motor: No weakness.   Psychiatric:         Mood and Affect: Mood normal.         Behavior: Behavior normal.          Significant Labs: CBC:   Recent Labs   Lab 05/22/24 0340 05/23/24  0457   WBC 15.57* 13.44*   HGB 11.9* 12.0*   HCT 34.5* 34.9*    259     CMP:   Recent Labs   Lab 05/21/24 2032 05/22/24 0340 05/23/24  0457    137 139   K 3.8 3.9 3.4*   CO2 15* 17* 22   BUN 23.6* 25.5* 29.9*   CREATININE 1.16 1.08 1.18   CALCIUM 8.4 8.3* 7.9*   ALBUMIN  --  3.1* 2.8*   BILITOT  --  0.2 0.3   ALKPHOS  --  42 41   AST  --  16 19   ALT  --  38 31       Microbiology Results (last 7 days)       Procedure Component Value Units Date/Time    Cerebrospinal Fluid Culture [[id number]] Collected: 05/17/24 1110    Order Status: Completed Specimen: CSF (Spinal Fluid) Updated: 05/22/24 0919     CULTURE, CSF ([facility]) No Growth     GRAM STAIN Many WBC observed      No bacteria seen    Cerebrospinal Fluid Culture [[id number]] Collected: 05/21/24 1332    Order Status: Completed Specimen: CSF (Spinal Fluid) from Cerebrospinal Fluid Updated: 05/22/24 0658     CULTURE, CSF ([facility]) No Growth At 24 Hours      GRAM STAIN Rare WBC observed      No bacteria seen    Blood culture #1 **CANNOT BE ORDERED STAT** [[id number]]  (Normal) Collected: 05/15/24 2014    Order Status: Completed Specimen: Blood Updated: 05/20/24 2201     Blood Culture No Growth at 5 days    Blood culture #2 **CANNOT BE ORDERED STAT** [[id number]]  (Normal) Collected: 05/15/24 2014    Order Status: Completed Specimen: Blood Updated: 05/20/24 2201     Blood Culture No Growth at 5 days    Cryptococcal antigen, CSF [[id number]]  (Normal) Collected: 05/17/24 1110    Order Status: Completed Specimen: CSF (Spinal Fluid) from CSF Tap, Tube 1 Updated: 05/18/24 0829     Cryptococcal Antigen, CSF Negative    Gram Stain [[id number]] Collected: 05/17/24 1110    Order Status: Completed Specimen: CSF (Spinal Fluid) Updated: 05/17/24 1459     GRAM STAIN Many WBC observed      No bacteria seen    Alea Ink Prep CSF [[id number]] Collected: 05/17/24 1110    Order Status: Completed Specimen: CSF (Spinal Fluid) Updated: 05/17/24 1450     ALEA INK PREP CSF ([facility]) Negative    KOH Prep [[id number]] Collected: 05/17/24 1110    Order Status: Completed Specimen: Body Fluid Updated: 05/17/24 1450     KOH Prep No fungal elements seen             Significant Imaging: I have reviewed all pertinent imaging results/findings within the past 24 hours.      Vipin Beltran MD  Infectious Disease  [facility]

## 2024-05-23 NOTE — PROGRESS NOTES
[facility] - 4th Floor Medical Telemetry  Neurology  Progress Note    Patient Name: Luis Traylor  MRN: [id number]  Admission Date: 5/15/2024  Hospital Length of Stay: 6 days  Code Status: Full Code   Attending Provider: Sher Amor MD  Primary Care Physician: Terrence Craven NP   Principal Problem:Headache    HPI:   23-year-old male with no known previous PMH who presented to ED on 05/15 with worsening headache and episode of slurred speech just PTA.  Patient was recently admitted to this hospital on 05/07 for acute onset AMS for which he underwent extensive workup, including partial dose of TNK, stroke w/u which was negative, lumbar puncture which revealed over 400 WBCs with lymphocyte predominance and CSF, and infectious studies.  Patient was thought to have viral/aseptic meningitis, patient's hospitalization was complicated by persistent breakthrough seizures and was ultimately transferred to [facility] in [place] for neurocritical care.  While in [facility] antimicrobials were discontinued shortly after transfer due to his rapid clinical improvement, as well as anti seizure medications d/t pt not thought to be having seizures, and was discharged to home on 05/13.    Pt's significant other at bedside reports while leaving [facility], pt noted to have some stuttering speech with expressive aphasia, as well as on the ride home. She reports since discharge, has had forgetfulness, expressive aphasia, facial twitching, confabulates, and other odd behaviors. She reported just PTA pt was noted to have periorbital edema with erythematous sclera to OD, fever (100.8), chills, shaking, and R ear pain that resolved spontaneously PTA at hospital ED.       CT head without on this admission unrevealing for acute intracranial abnormalities.  Most recent labs significant for WBC 17.71, and otherwise unremarkable.    Overview/Hospital Course:  No notes on  file        Subjective:     Interval History:   Neurologic exam remains stable and unchanged. Pt reports mild HA this AM that was resolved at the time of exam.     Current Neurological Medications:     Current Facility-Administered Medications   Medication Dose Route Frequency Provider Last Rate Last Admin    acetaminophen tablet 650 mg  650 mg Oral Q8H PRN Christofer Izaguirre MD   650 mg at 05/17/24 1712    ketorolac injection 15 mg  15 mg Intravenous Q6H PRN Sher Amor MD   15 mg at 05/23/24 1307    lacosamide tablet 100 mg  100 mg Oral Q12H Jessica Molina MD   100 mg at 05/23/24 0813    levETIRAcetam tablet 1,500 mg  1,500 mg Oral BID Jessica Molina MD   1,500 mg at 05/23/24 0813    melatonin tablet 6 mg  6 mg Oral Nightly PRN Christofer Izaguirre MD        morphine injection 2 mg  2 mg Intravenous Q4H PRN Albania Das AGACNP-BC   2 mg at 05/22/24 0245    phenylephrine HCL 10% ophthalmic solution 2 drop  2 drop Both Eyes Once Hinojosa, Araceli SOMMERS MD        proparacaine 0.5 % ophthalmic solution 2 drop  2 drop Both Eyes Once Hinojosa, Araceli SOMMERS MD        sodium chloride 0.9% flush 10 mL  10 mL Intravenous PRN Christofer Izaguirre MD        tamsulosin 24 hr capsule 0.4 mg  0.4 mg Oral Daily Ana Maria Chapman FNP   0.4 mg at 05/23/24 0813    tropicamide 1% ophthalmic solution 2 drop  2 drop Both Eyes Once Hinojosa, Araceli SOMMERS MD           Review of Systems  A 14pt ros was reviewed & is negative unless o/w documented in the hpi    Objective:     Vital Signs (Most Recent):  Temp: 97.9 °F (36.6 °C) (05/23/24 1527)  Pulse: 90 (05/23/24 1527)  Resp: 20 (05/23/24 1527)  BP: 131/81 (05/23/24 1527)  SpO2: 97 % (05/23/24 1527) Vital Signs (24h Range):  Temp:  [97.4 °F (36.3 °C)-98 °F (36.7 °C)] 97.9 °F (36.6 °C)  Pulse:  [74-90] 90  Resp:  [20] 20  SpO2:  [97 %-100 %] 97 %  BP: (107-145)/(63-82) 131/81     Weight: 77.1 kg (170 lb)  Body mass index is 23.71 kg/m².     Physical Exam    GENERAL: NAD, calm, cooperative, awake/alert  MENTAL STATUS: Oriented x4, follows commands reliably  SPEECH/LANGUAGE: Clear, coherent  CN:  EOMI, gaze conjugate, visual fields intact  PERRLA  Motor: no focal motor weakness  Sensory: Normal to tactile stim/vibration  Gait: steady w/o assistance          Significant Labs:   Recent Lab Results         05/23/24  0457        Albumin/Globulin Ratio 1.2       Albumin 2.8       ALP 41       ALT 31       Anion Gap 4.0       AST 19       Baso # 0.05       Basophil % 0.4       BILIRUBIN TOTAL 0.3       BUN 29.9       BUN/CREAT RATIO 25       Calcium 7.9       Chloride 113       CO2 22       Creatinine 1.18       eGFR >60       Eos # 0.01       Eos % 0.1       Globulin, Total 2.4       Glucose 98       Hematocrit 34.9       Hemoglobin 12.0       Immature Grans (Abs) 0.65       Immature Granulocytes 4.8       Lymph # 1.65       LYMPH % 12.3       MCH 28.7       MCHC 34.4       MCV 83.5       Mono # 1.37       Mono % 10.2       MPV 8.7       Neut # 9.71       Neut % 72.2       nRBC 0.2       Platelet Count 259       Potassium 3.4       PROTEIN TOTAL 5.2       RBC 4.18       RDW 13.0       Rheumatoid Factor <13       Sodium 139       WBC 13.44               Significant Imaging: I have reviewed all pertinent imaging results/findings within the past 24 hours.  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

## 2024-05-23 NOTE — PROCEDURES
"Luis Traylor is a 23 y.o. male patient.    Temp: 97.9 °F (36.6 °C) (24)  Pulse: 90 (24)  Resp: 20 (24)  BP: 131/81 (24 152)  SpO2: 97 % (24 152)  Weight: 77.1 kg (170 lb) (24 1146)  Height: 5' 11" (180.3 cm) (24 114)       EEG    Date/Time: 2024 6:19 PM    Performed by: Saul Sung MD  Authorized by: Jessica Molina MD        EEG REPORT    PATIENT: Luis Traylor  : 2000  Self, Aaareferral  No address on file   @DX@  DATE:     INTERPRETING PHYSICIAN: Saul Sung     Reason for EEG: [x]Seizure  []Encephalopathy  []___     Digital EEG reviewed.  Electrodes placed in customary 10-20 fashion.    Video recorded:  [x]yes  []no  Duration of recordin  minutes   Patient [x]Awake   []Drowsy   [x]Asleep.  Background consists of symmetrical __ [x]delta [x]theta    []alpha   frequencies.                                           [x] Posterior dominant waking resting background absent   Lateralizing, focal, or epileptiform features were not present.  Activations:   []HV []photic performed and not associated with abnormalities. [x]Activations not performed.   Technical character: []Good   []Suboptimal ___  EKG: [x]NSR         IMPRESSION: This is a nonspecific EEG with diffuse slow activity.     Comments:   The lack of focal or epileptiform features does not negate or exclude a diagnosis of seizure or epilepsy, as the sensitivity of interictal EEG may be < or equal to 50%.    Saul Sung MD [facility]  Current Outpatient Medications   Medication Instructions    acetaZOLAMIDE (DIAMOX) 250 mg, Oral, 3 times daily    levETIRAcetam (KEPPRA) 1,500 mg, Oral, 2 times daily        2024    "

## 2024-05-23 NOTE — SUBJECTIVE & OBJECTIVE
Subjective:     Interval History:   Neurologic exam remains stable and unchanged. Pt reports mild HA this AM that was resolved at the time of exam.     Current Neurological Medications:     Current Facility-Administered Medications   Medication Dose Route Frequency Provider Last Rate Last Admin    acetaminophen tablet 650 mg  650 mg Oral Q8H PRN Christofer Izaguirre MD   650 mg at 05/17/24 1712    ketorolac injection 15 mg  15 mg Intravenous Q6H PRN Sher Amor MD   15 mg at 05/23/24 1307    lacosamide tablet 100 mg  100 mg Oral Q12H Jessica Molina MD   100 mg at 05/23/24 0813    levETIRAcetam tablet 1,500 mg  1,500 mg Oral BID Jessica Molina MD   1,500 mg at 05/23/24 0813    melatonin tablet 6 mg  6 mg Oral Nightly PRN Christofer Izaguirre MD        morphine injection 2 mg  2 mg Intravenous Q4H PRN Albania Das AGACNP-BC   2 mg at 05/22/24 0245    phenylephrine HCL 10% ophthalmic solution 2 drop  2 drop Both Eyes Once Hinojosa, Araceli SOMMERS MD        proparacaine 0.5 % ophthalmic solution 2 drop  2 drop Both Eyes Once Hinojosa, Araceli SOMMERS MD        sodium chloride 0.9% flush 10 mL  10 mL Intravenous PRN Christofer Izaguirre MD        tamsulosin 24 hr capsule 0.4 mg  0.4 mg Oral Daily Ana Maria Chapman FNP   0.4 mg at 05/23/24 0813    tropicamide 1% ophthalmic solution 2 drop  2 drop Both Eyes Once HinojosaAraceli MD           Review of Systems  A 14pt ros was reviewed & is negative unless o/w documented in the hpi    Objective:     Vital Signs (Most Recent):  Temp: 97.9 °F (36.6 °C) (05/23/24 1527)  Pulse: 90 (05/23/24 1527)  Resp: 20 (05/23/24 1527)  BP: 131/81 (05/23/24 1527)  SpO2: 97 % (05/23/24 1527) Vital Signs (24h Range):  Temp:  [97.4 °F (36.3 °C)-98 °F (36.7 °C)] 97.9 °F (36.6 °C)  Pulse:  [74-90] 90  Resp:  [20] 20  SpO2:  [97 %-100 %] 97 %  BP: (107-145)/(63-82) 131/81     Weight: 77.1 kg (170 lb)  Body mass index is 23.71 kg/m².     Physical Exam   GENERAL: NAD,  calm, cooperative, awake/alert  MENTAL STATUS: Oriented x4, follows commands reliably  SPEECH/LANGUAGE: Clear, coherent  CN:  EOMI, gaze conjugate, visual fields intact  PERRLA  Motor: no focal motor weakness  Sensory: Normal to tactile stim/vibration  Gait: steady w/o assistance          Significant Labs:   Recent Lab Results         05/23/24  0457        Albumin/Globulin Ratio 1.2       Albumin 2.8       ALP 41       ALT 31       Anion Gap 4.0       AST 19       Baso # 0.05       Basophil % 0.4       BILIRUBIN TOTAL 0.3       BUN 29.9       BUN/CREAT RATIO 25       Calcium 7.9       Chloride 113       CO2 22       Creatinine 1.18       eGFR >60       Eos # 0.01       Eos % 0.1       Globulin, Total 2.4       Glucose 98       Hematocrit 34.9       Hemoglobin 12.0       Immature Grans (Abs) 0.65       Immature Granulocytes 4.8       Lymph # 1.65       LYMPH % 12.3       MCH 28.7       MCHC 34.4       MCV 83.5       Mono # 1.37       Mono % 10.2       MPV 8.7       Neut # 9.71       Neut % 72.2       nRBC 0.2       Platelet Count 259       Potassium 3.4       PROTEIN TOTAL 5.2       RBC 4.18       RDW 13.0       Rheumatoid Factor <13       Sodium 139       WBC 13.44               Significant Imaging: I have reviewed all pertinent imaging results/findings within the past 24 hours.

## 2024-05-23 NOTE — PROGRESS NOTES
[facility]  Hospital Medicine Progress Note        Chief Complaint: Inpatient Follow-up for Headache     HPI:   Patient is a 23-year-old male with patient is a 23-year-old male with a recent admission at this facility with acute onset altered mental status for which he underwent an extensive workup.  He initially was thought to be having a stroke and was given a partial dose of TNK, but ultimately underwent a lumbar puncture which showed over a 400 WBCs with a lymphocyte predominance presumably viral/aseptic meningitis.  He was transferred to [place] for neuro critical Care and was seen by infectious disease at that facility.  He was discharged 05/13/2024 with the consensus being he had aseptic meningitis and all antibiotics were discontinued due to his clinical rapid improvement.  He presents to the ER tonight with worsening headache and reported slurred speech prior to arrival. He also had a seizure like episode that he does not remember.      He arrived afebrile hemodynamically stable maintaining normal sats on room air.  Laboratory work showed leukocytosis of 24 K and CT head showed no acute process.  Hospitalist was consulted for further evaluation.     Patient has mild headache but was afebrile. 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 He was started on Keppra. His clinical picture was worrisome for encephalitis. 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 He had LP done this visit was opening pressure was elevated. CSF showed elevated WBC and protein. He was started on IV antivirals by ID and IV steroids x 5 days by Neurology for possible autoimmune encephalitis.     On 5/21/24 he started to have sudden onset of left flank pain, CT abdomen was done and showed left ureteral stone. Seen by urology team and advised to start on flomax and IV hydration. No acute interventions was recommended. Pain controlled with IV PRN Toradol.     Rpt LP showed persistent elevated opening  pressure. He was afebrile. Infectious disease work up was all negative.  Per neurology note, patient's exam improved but still not at baseline.  Option for  shunt discussed by Neurology but thought to be premature and aggressive given its permanent in case high opening pressure on multiple LPs is due to transient infection/inflammation.  Pleocytosis has also improved on subsequent LP with persistence of opening pressure.    Interval Hx:   Today, patient stated he was doing well and had no new complaints.  Awaiting neurology recommendations. Ophthalmology consulted last week; will reach out again for evaluation in light of complaints of R eye swelling & conjunctival injection.    Objective/physical exam:  General: alert male lying comfortably in bed, in no acute distress  HENT: oral and oropharyngeal mucosa moist, pink, with no erythema or exudates, no ear pain or discharge  Neck: normal neck movement, no lymph nodes or swellings, no JVD or Carotid bruit  Respiratory: clear breathing sounds bilaterally, no crackles, rales, ronchi or wheezes  Cardiovascular: clear S1 and S2, no murmurs, rubs or gallops  Peripheral Vascular: no lesions, ulcers or erosions, normal peripheral pulses and no pedal edema  Gastrointestinal: soft, non-tender, non-distended abdomen, no guarding, rigidity or rebound tenderness, normal bowel sounds  Integumentary: normal skin color, no rashes or lesions  Neuro: AAO x 3; motor strength 5/5 in B/L UEs & LEs; sensation intact to gross and fine touch B/L; CN II-XII grossly intact    VITAL SIGNS: 24 HRS MIN & MAX LAST   Temp  Min: 97.4 °F (36.3 °C)  Max: 98 °F (36.7 °C) 97.8 °F (36.6 °C)   BP  Min: 107/63  Max: 145/77 (!) 145/77   Pulse  Min: 74  Max: 90  90   Resp  Min: 18  Max: 20 20   SpO2  Min: 97 %  Max: 100 % 98 %     I have reviewed the following labs:  Recent Labs   Lab 05/21/24  0354 05/22/24  0340 05/23/24  0457   WBC 18.11* 15.57* 13.44*   RBC 4.42* 4.11* 4.18*   HGB 12.7* 11.9* 12.0*    HCT 37.8* 34.5* 34.9*   MCV 85.5 83.9 83.5   MCH 28.7 29.0 28.7   MCHC 33.6 34.5 34.4   RDW 12.7 12.7 13.0    309 259   MPV 8.8 8.7 8.7     Recent Labs   Lab 05/18/24  0601 05/19/24  0404 05/21/24  2032 05/22/24  0340 05/23/24  0457   *   < > 137 137 139   K 4.2   < > 3.8 3.9 3.4*   CO2 17*   < > 15* 17* 22   BUN 12.2   < > 23.6* 25.5* 29.9*   CREATININE 0.94   < > 1.16 1.08 1.18   CALCIUM 9.2   < > 8.4 8.3* 7.9*   ALBUMIN 4.0  --   --  3.1* 2.8*   ALKPHOS 58  --   --  42 41   ALT 18  --   --  38 31   AST 11  --   --  16 19   BILITOT 0.3  --   --  0.2 0.3    < > = values in this interval not displayed.     Assessment/Plan:  ? Recurrent Aseptic/Autoimmune vs Viral encephalitis   Acute flank pain secondary to left ureteral stone - improved   Leukocytosis from steroid use   Seizures temporal lobe   Metabolic acidosis : worse   Mild hyperglycemia from steroids     Plan:  Continues to be admitted   Reporting no new complaints   Remains afebrile  Urology consulted; recommended flomax and iv hydration   Ophthalmology consulted; awaiting evaluation and recommendations   ID on board; follow recommendations  Neurology on board; follow recommendations  Neurosurgery following  On IV D5/NaHCO3 infusion; will stop today  24 hrs EEG did not show any seizures  Initial EEG showed + seizure activities   Clinically has no meningeal signs but rpt LP showed elevated opening pressure  Continued on lacosamide 100 mg b.i.d., Keppra 1500 mg b.i.d., tamsulosin 0.4 mg daily  Continue monitoring patient's symptoms    VTE prophylaxis: SCD    Patient condition:  Guarded    Anticipated discharge and Disposition:   Home       All diagnosis and differential diagnosis have been reviewed; assessment and plan has been documented; I have personally reviewed the labs and test results that are presently available; I have reviewed the patients medication list; I have reviewed the consulting providers response and recommendations. I have  reviewed or attempted to review medical records based upon their availability    All of the patient's questions have been  addressed and answered. Patient's is agreeable to the above stated plan. I will continue to monitor closely and make adjustments to medical management as needed.  _____________________________________________________________________    Radiology:  I have personally reviewed the following imaging and agree with the radiologist.     FL LUMBAR PUNCTURE DIAGNOSTIC WITH IMAGING  Narrative: EXAMINATION:  FL LUMBAR PUNCTURE DIAGNOSTIC WITH IMAGING    CLINICAL HISTORY:  Recheck opening pressure. Need opening and closing pressures;    TECHNIQUE:  Procedure explained to the patient and informed consent obtained. Suitable skin entry site chosen under fluoroscopy. Lower back prepped and draped in sterile fashion. Lidocaine used for local anesthesia.    COMPARISON:  Lumbar puncture images 5/17/24    FINDINGS:  Under intermittent fluoroscopic guidance, a 22-gauge spinal needle was advanced into the thecal sac with position confirmed by flow of CSF.  Opening pressure 46 cm CSF. Approximately 21 mL of CSF collected and sent to the lab for further evaluation.  Closing pressure 19 cm CSF.  The needle was removed and hemostasis achieved at the skin puncture site. No immediate complication. Estimated blood loss negligible.    Absorbed dose is 1.26 mGy.  Impression: Successful fluoroscopic guided lumbar puncture.    Electronically signed by: Romain Silva  Date:    05/21/2024  Time:    15:43  CT Abdomen Pelvis W Wo Contrast  Narrative: EXAMINATION:  CT ABDOMEN PELVIS W WO CONTRAST    CLINICAL HISTORY:  Flank pain, kidney stone suspected;Nausea/vomiting;    TECHNIQUE:  Multidetector axial images were obtained of the abdomen and pelvis before and following the administration of IV contrast. Oral contrast was not administered.    Dose length product of 1022 mGycm. Automated exposure control was utilized to minimize  radiation dose.    COMPARISON:  None available.    FINDINGS:  Included portion of the lungs are without suspicious nodularity, acute air space infiltrates or fluid within the pleural spaces.    Hepatic volume and attenuation is unremarkable. Gallbladder wall is not thickened and there is no intra luminal calcified calculus. No biliary dilation identified. Pancreatic unremarkable attenuation without acute peripancreatic phlegmons. Main pancreatic duct is not dilated. Spleen is of normal size without focal lesion.    The adrenal glands appear within normal limits. The kidneys are unremarkable in size and contour.  Left kidney is remarkable for non occluding calculi.  There is mild left hydronephrosis and hydroureter which is caused by distal ureteral 4 mm calculus on image 118 series 2.  This calculus is approximately 3.0 cm proximal from the uterovesical junction.  There is also right kidney interpolar location minimal non occluding calculus.  No right obstructive uropathy.  There are no significant perinephric inflammatory standings.    Stomach is nondistended without mural thickening.  Small bowel loops are without dilatation.  There is suspected mild mural thickening of the terminal ileum..  Appendix is not visualized.  Please correlate clinically.  Colon is nondistended and there are no pericolonic acute strandings.  No bowel obstruction.  No free fluid    Urinary bladder wall is minimally thickened.  There is right pelvic small free fluid seen on image 16 series 6.    No acute or otherwise osseous abnormality identified.  Impression: 1.  Left distal ureteral calculus causing mild hydroureteronephrosis.    2.  Bilateral kidneys non occluding calculi.    3.  Slightly thickened urinary bladder wall.    4.  Pelvic small free fluid of indeterminate significance.    5.  Terminal ileum mild mural thickening.    Electronically signed by: Ori Wynne  Date:    05/21/2024  Time:    13:36      Earnest Santo  MD  Department of Hospital Medicine   [facility]   05/23/2024

## 2024-05-24 LAB
ANION GAP SERPL CALC-SCNC: 8 MEQ/L
ANION GAP SERPL CALC-SCNC: 8 MEQ/L
BASOPHILS # BLD AUTO: 0.05 X10(3)/MCL
BASOPHILS NFR BLD AUTO: 0.3 %
BUN SERPL-MCNC: 20.8 MG/DL (ref 8.9–20.6)
BUN SERPL-MCNC: 24.5 MG/DL (ref 8.9–20.6)
CALCIUM SERPL-MCNC: 7.6 MG/DL (ref 8.4–10.2)
CALCIUM SERPL-MCNC: 8 MG/DL (ref 8.4–10.2)
CHLORIDE SERPL-SCNC: 108 MMOL/L (ref 98–107)
CHLORIDE SERPL-SCNC: 111 MMOL/L (ref 98–107)
CLINICAL BIOCHEMIST REVIEW: NORMAL
CO2 SERPL-SCNC: 23 MMOL/L (ref 22–29)
CO2 SERPL-SCNC: 23 MMOL/L (ref 22–29)
COPRO UR-SCNC: 177 NMOL/L
CREAT SERPL-MCNC: 1.17 MG/DL (ref 0.73–1.18)
CREAT SERPL-MCNC: 1.31 MG/DL (ref 0.73–1.18)
CREAT/UREA NIT SERPL: 18
CREAT/UREA NIT SERPL: 19
EOSINOPHIL # BLD AUTO: 0.1 X10(3)/MCL (ref 0–0.9)
EOSINOPHIL NFR BLD AUTO: 0.6 %
ERYTHROCYTE [DISTWIDTH] IN BLOOD BY AUTOMATED COUNT: 12.8 % (ref 11.5–17)
GFR SERPLBLD CREATININE-BSD FMLA CKD-EPI: >60 ML/MIN/1.73/M2
GFR SERPLBLD CREATININE-BSD FMLA CKD-EPI: >60 ML/MIN/1.73/M2
GLUCOSE SERPL-MCNC: 100 MG/DL (ref 74–100)
GLUCOSE SERPL-MCNC: 114 MG/DL (ref 74–100)
HCT VFR BLD AUTO: 39.4 % (ref 42–52)
HEPTA-CP UR-SCNC: 2 NMOL/L
HGB BLD-MCNC: 13.5 G/DL (ref 14–18)
IMM GRANULOCYTES # BLD AUTO: 0.52 X10(3)/MCL (ref 0–0.04)
IMM GRANULOCYTES NFR BLD AUTO: 3 %
LYMPHOCYTES # BLD AUTO: 1.39 X10(3)/MCL (ref 0.6–4.6)
LYMPHOCYTES NFR BLD AUTO: 8 %
M HEXACARBOXYLPORPHYRINS: 1 NMOL/L
MCH RBC QN AUTO: 28.7 PG (ref 27–31)
MCHC RBC AUTO-ENTMCNC: 34.3 G/DL (ref 33–36)
MCV RBC AUTO: 83.8 FL (ref 80–94)
MONOCYTES # BLD AUTO: 1.29 X10(3)/MCL (ref 0.1–1.3)
MONOCYTES NFR BLD AUTO: 7.4 %
NEUTROPHILS # BLD AUTO: 14.08 X10(3)/MCL (ref 2.1–9.2)
NEUTROPHILS NFR BLD AUTO: 80.7 %
NRBC BLD AUTO-RTO: 0 %
PBG UR-SCNC: 0.3 MCMOL/L
PENTA-CP UR-SCNC: 5 NMOL/L
PLATELET # BLD AUTO: 214 X10(3)/MCL (ref 130–400)
PMV BLD AUTO: 8.2 FL (ref 7.4–10.4)
PORPHYRINS SERPL-MCNC: <1 MCG/DL
PORPHYRINS UR-IMP: ABNORMAL
POTASSIUM SERPL-SCNC: 3.3 MMOL/L (ref 3.5–5.1)
POTASSIUM SERPL-SCNC: 3.5 MMOL/L (ref 3.5–5.1)
PROVIDER SIGNING NAME: NORMAL
RBC # BLD AUTO: 4.7 X10(6)/MCL (ref 4.7–6.1)
SODIUM SERPL-SCNC: 139 MMOL/L (ref 136–145)
SODIUM SERPL-SCNC: 142 MMOL/L (ref 136–145)
UROPOR UR-SCNC: 4 NMOL/L
WBC # SPEC AUTO: 17.43 X10(3)/MCL (ref 4.5–11.5)

## 2024-05-24 PROCEDURE — 63600175 PHARM REV CODE 636 W HCPCS: Performed by: INTERNAL MEDICINE

## 2024-05-24 PROCEDURE — 85025 COMPLETE CBC W/AUTO DIFF WBC: CPT | Performed by: STUDENT IN AN ORGANIZED HEALTH CARE EDUCATION/TRAINING PROGRAM

## 2024-05-24 PROCEDURE — 36415 COLL VENOUS BLD VENIPUNCTURE: CPT | Performed by: STUDENT IN AN ORGANIZED HEALTH CARE EDUCATION/TRAINING PROGRAM

## 2024-05-24 PROCEDURE — 80048 BASIC METABOLIC PNL TOTAL CA: CPT | Performed by: STUDENT IN AN ORGANIZED HEALTH CARE EDUCATION/TRAINING PROGRAM

## 2024-05-24 PROCEDURE — 63600175 PHARM REV CODE 636 W HCPCS: Performed by: STUDENT IN AN ORGANIZED HEALTH CARE EDUCATION/TRAINING PROGRAM

## 2024-05-24 PROCEDURE — 25000003 PHARM REV CODE 250: Performed by: PSYCHIATRY & NEUROLOGY

## 2024-05-24 PROCEDURE — 11000001 HC ACUTE MED/SURG PRIVATE ROOM

## 2024-05-24 PROCEDURE — 25000003 PHARM REV CODE 250: Performed by: NURSE PRACTITIONER

## 2024-05-24 PROCEDURE — 99231 SBSQ HOSP IP/OBS SF/LOW 25: CPT | Mod: ,,, | Performed by: SPECIALIST

## 2024-05-24 PROCEDURE — 25000003 PHARM REV CODE 250: Performed by: STUDENT IN AN ORGANIZED HEALTH CARE EDUCATION/TRAINING PROGRAM

## 2024-05-24 RX ORDER — SODIUM CHLORIDE, SODIUM LACTATE, POTASSIUM CHLORIDE, CALCIUM CHLORIDE 600; 310; 30; 20 MG/100ML; MG/100ML; MG/100ML; MG/100ML
INJECTION, SOLUTION INTRAVENOUS CONTINUOUS
Status: DISCONTINUED | OUTPATIENT
Start: 2024-05-24 | End: 2024-05-25 | Stop reason: HOSPADM

## 2024-05-24 RX ORDER — POTASSIUM CHLORIDE 20 MEQ/1
40 TABLET, EXTENDED RELEASE ORAL DAILY
Qty: 4 TABLET | Refills: 0 | Status: SHIPPED | OUTPATIENT
Start: 2024-05-24 | End: 2024-05-26

## 2024-05-24 RX ORDER — DIPHENHYDRAMINE HCL 25 MG
25 CAPSULE ORAL EVERY 6 HOURS PRN
Status: DISCONTINUED | OUTPATIENT
Start: 2024-05-24 | End: 2024-05-25 | Stop reason: HOSPADM

## 2024-05-24 RX ORDER — POTASSIUM CHLORIDE 20 MEQ/1
40 TABLET, EXTENDED RELEASE ORAL ONCE
Status: COMPLETED | OUTPATIENT
Start: 2024-05-24 | End: 2024-05-24

## 2024-05-24 RX ORDER — LEVETIRACETAM 500 MG/1
1500 TABLET ORAL 2 TIMES DAILY
Qty: 180 TABLET | Refills: 11 | Status: SHIPPED | OUTPATIENT
Start: 2024-05-24 | End: 2024-05-24 | Stop reason: HOSPADM

## 2024-05-24 RX ORDER — TAMSULOSIN HYDROCHLORIDE 0.4 MG/1
0.4 CAPSULE ORAL DAILY
Qty: 30 CAPSULE | Refills: 11 | Status: SHIPPED | OUTPATIENT
Start: 2024-05-25 | End: 2025-05-25

## 2024-05-24 RX ORDER — LEVETIRACETAM 750 MG/1
1500 TABLET ORAL 2 TIMES DAILY
Qty: 120 TABLET | Refills: 11 | Status: ON HOLD | OUTPATIENT
Start: 2024-05-24 | End: 2024-06-05 | Stop reason: HOSPADM

## 2024-05-24 RX ORDER — LACOSAMIDE 100 MG/1
100 TABLET ORAL EVERY 12 HOURS
Qty: 60 TABLET | Refills: 11 | Status: SHIPPED | OUTPATIENT
Start: 2024-05-24 | End: 2024-05-24 | Stop reason: HOSPADM

## 2024-05-24 RX ORDER — LACOSAMIDE 100 MG/1
100 TABLET ORAL EVERY 12 HOURS
Qty: 60 TABLET | Refills: 11 | Status: ON HOLD | OUTPATIENT
Start: 2024-05-24 | End: 2024-06-05 | Stop reason: HOSPADM

## 2024-05-24 RX ADMIN — LEVETIRACETAM 1500 MG: 500 TABLET, FILM COATED ORAL at 09:05

## 2024-05-24 RX ADMIN — TAMSULOSIN HYDROCHLORIDE 0.4 MG: 0.4 CAPSULE ORAL at 09:05

## 2024-05-24 RX ADMIN — SODIUM CHLORIDE, POTASSIUM CHLORIDE, SODIUM LACTATE AND CALCIUM CHLORIDE 500 ML: 600; 310; 30; 20 INJECTION, SOLUTION INTRAVENOUS at 12:05

## 2024-05-24 RX ADMIN — KETOROLAC TROMETHAMINE 15 MG: 30 INJECTION, SOLUTION INTRAMUSCULAR; INTRAVENOUS at 02:05

## 2024-05-24 RX ADMIN — SODIUM CHLORIDE, POTASSIUM CHLORIDE, SODIUM LACTATE AND CALCIUM CHLORIDE: 600; 310; 30; 20 INJECTION, SOLUTION INTRAVENOUS at 06:05

## 2024-05-24 RX ADMIN — LACOSAMIDE 100 MG: 50 TABLET, FILM COATED ORAL at 09:05

## 2024-05-24 RX ADMIN — POTASSIUM CHLORIDE 40 MEQ: 1500 TABLET, EXTENDED RELEASE ORAL at 12:05

## 2024-05-24 RX ADMIN — POTASSIUM CHLORIDE 40 MEQ: 1500 TABLET, EXTENDED RELEASE ORAL at 09:05

## 2024-05-24 RX ADMIN — SODIUM CHLORIDE, POTASSIUM CHLORIDE, SODIUM LACTATE AND CALCIUM CHLORIDE: 600; 310; 30; 20 INJECTION, SOLUTION INTRAVENOUS at 01:05

## 2024-05-24 NOTE — PROGRESS NOTES
[facility] - 4th Floor Medical Telemetry  Neurology  Progress Note    Patient Name: Luis Traylor  MRN: [id number]  Admission Date: 5/15/2024  Hospital Length of Stay: 7 days  Code Status: Full Code   Attending Provider: Earnest Santo MD  Primary Care Physician: Terrence Craven NP   Principal Problem:Headache    HPI:   23-year-old male with no known previous PMH who presented to ED on 05/15 with worsening headache and episode of slurred speech just PTA.  Patient was recently admitted to this hospital on 05/07 for acute onset AMS for which he underwent extensive workup, including partial dose of TNK, stroke w/u which was negative, lumbar puncture which revealed over 400 WBCs with lymphocyte predominance and CSF, and infectious studies.  Patient was thought to have viral/aseptic meningitis, patient's hospitalization was complicated by persistent breakthrough seizures and was ultimately transferred to [facility] in [place] for neurocritical care.  While in [facility] antimicrobials were discontinued shortly after transfer due to his rapid clinical improvement, as well as anti seizure medications d/t pt not thought to be having seizures, and was discharged to home on 05/13.    Pt's significant other at bedside reports while leaving [facility], pt noted to have some stuttering speech with expressive aphasia, as well as on the ride home. She reports since discharge, has had forgetfulness, expressive aphasia, facial twitching, confabulates, and other odd behaviors. She reported just PTA pt was noted to have periorbital edema with erythematous sclera to OD, fever (100.8), chills, shaking, and R ear pain that resolved spontaneously PTA at hospital ED.       CT head without on this admission unrevealing for acute intracranial abnormalities.  Most recent labs significant for WBC 17.71, and otherwise unremarkable.    Overview/Hospital Course:  No notes on file        Subjective:      Interval History:   Neurologic exam stable this AM, pt ambulating through hallway with s/o during exam.   No seizure like events, HA, or any other significant issues overnight or this AM.     Repeat EEG yesterday unrevealing for seizure activity.    Current Neurological Medications:     Current Facility-Administered Medications   Medication Dose Route Frequency Provider Last Rate Last Admin    acetaminophen tablet 650 mg  650 mg Oral Q8H PRN Christofer Izaguirre MD   650 mg at 05/17/24 1712    ketorolac injection 15 mg  15 mg Intravenous Q6H PRN Sher Amor MD   15 mg at 05/24/24 0242    lacosamide tablet 100 mg  100 mg Oral Q12H Jessica Molina MD   100 mg at 05/24/24 0904    levETIRAcetam tablet 1,500 mg  1,500 mg Oral BID Jessica Molina MD   1,500 mg at 05/24/24 0903    melatonin tablet 6 mg  6 mg Oral Nightly PRN Christofer Izaguirre MD        morphine injection 2 mg  2 mg Intravenous Q4H PRN Albania Das AGACNJESUS-BC   2 mg at 05/22/24 0245    phenylephrine HCL 10% ophthalmic solution 2 drop  2 drop Both Eyes Once HinojosaAraceli MD        proparacaine 0.5 % ophthalmic solution 2 drop  2 drop Both Eyes Once HinojosaAraceli MD        sodium chloride 0.9% flush 10 mL  10 mL Intravenous PRN Christofer Izaguirre MD        tamsulosin 24 hr capsule 0.4 mg  0.4 mg Oral Daily Ana Maria Chapman FNP   0.4 mg at 05/24/24 0904    tropicamide 1% ophthalmic solution 2 drop  2 drop Both Eyes Once HinojosaAraceli MD           Review of Systems  A 14pt ros was reviewed & is negative unless o/w documented in the hpi    Objective:     Vital Signs (Most Recent):  Temp: 98.1 °F (36.7 °C) (05/24/24 0729)  Pulse: 86 (05/24/24 0729)  Resp: 20 (05/24/24 0415)  BP: 124/69 (05/24/24 0729)  SpO2: 97 % (05/24/24 0415) Vital Signs (24h Range):  Temp:  [97.1 °F (36.2 °C)-98.4 °F (36.9 °C)] 98.1 °F (36.7 °C)  Pulse:  [72-91] 86  Resp:  [16-20] 20  SpO2:  [97 %-98 %] 97 %  BP: (120-145)/(69-81)  124/69     Weight: 77.1 kg (170 lb)  Body mass index is 23.71 kg/m².     Physical Exam   GENERAL: NAD, calm, cooperative, awake/alert  MENTAL STATUS: Oriented x4, follows commands reliably  SPEECH/LANGUAGE: Clear, coherent  CN:  EOMI, gaze conjugate, visual fields intact  PERRLA  Motor: no focal motor weakness  Sensory: Normal to tactile stim/vibration  Gait: steady w/o assistance        Significant Labs:   Recent Lab Results         05/23/24 1919        Urine Creatinine 71.6       Urine Protein <6.8               Significant Imaging: I have reviewed all pertinent imaging results/findings within the past 24 hours.  Assessment and Plan:     Seizures  Cont current AEDs: keppra 1500 BID and vimpat 100 mg BID  Continue these AEDs following discharge    Elevated intracranial pressure  Repeat LP on 5/21: opening pressure 46    -diamox d/c d/t renal stone  -signs of symptomatic elevated ICP d/w pt and pt's s/o per Dr. 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 Molina  -Further recommendations per MD    VTE Risk Mitigation (From admission, onward)           Ordered     IP VTE HIGH RISK PATIENT  Once         05/15/24 2104     Place sequential compression device  Until discontinued         05/15/24 2104                  CHARLES Holguin-BC  Inpatient Neurology  [facility] - 4th Floor Medical Telemetry

## 2024-05-24 NOTE — SUBJECTIVE & OBJECTIVE
Subjective:     Interval History:   Neurologic exam stable this AM, pt ambulating through hallway with s/o during exam.   No seizure like events, HA, or any other significant issues overnight or this AM.     Repeat EEG yesterday unrevealing for seizure activity.    Current Neurological Medications:     Current Facility-Administered Medications   Medication Dose Route Frequency Provider Last Rate Last Admin    acetaminophen tablet 650 mg  650 mg Oral Q8H PRN Christofer Izaguirre MD   650 mg at 05/17/24 1712    ketorolac injection 15 mg  15 mg Intravenous Q6H PRN Sher Amor MD   15 mg at 05/24/24 0242    lacosamide tablet 100 mg  100 mg Oral Q12H Jessica Molina MD   100 mg at 05/24/24 0904    levETIRAcetam tablet 1,500 mg  1,500 mg Oral BID Jessica Molina MD   1,500 mg at 05/24/24 0903    melatonin tablet 6 mg  6 mg Oral Nightly PRN Christofer Izaguirre MD        morphine injection 2 mg  2 mg Intravenous Q4H PRN Albania Das AGACNJESUS-BC   2 mg at 05/22/24 0245    phenylephrine HCL 10% ophthalmic solution 2 drop  2 drop Both Eyes Once HinojosaAraceli MD        proparacaine 0.5 % ophthalmic solution 2 drop  2 drop Both Eyes Once HinojosaAraceli MD        sodium chloride 0.9% flush 10 mL  10 mL Intravenous PRN Christofer Izaguirre MD        tamsulosin 24 hr capsule 0.4 mg  0.4 mg Oral Daily Ana Maria Chapman FNP   0.4 mg at 05/24/24 0904    tropicamide 1% ophthalmic solution 2 drop  2 drop Both Eyes Once HinojosaAraceli MD           Review of Systems  A 14pt ros was reviewed & is negative unless o/w documented in the hpi    Objective:     Vital Signs (Most Recent):  Temp: 98.1 °F (36.7 °C) (05/24/24 0729)  Pulse: 86 (05/24/24 0729)  Resp: 20 (05/24/24 0415)  BP: 124/69 (05/24/24 0729)  SpO2: 97 % (05/24/24 0415) Vital Signs (24h Range):  Temp:  [97.1 °F (36.2 °C)-98.4 °F (36.9 °C)] 98.1 °F (36.7 °C)  Pulse:  [72-91] 86  Resp:  [16-20] 20  SpO2:  [97 %-98 %] 97 %  BP:  (120-145)/(69-81) 124/69     Weight: 77.1 kg (170 lb)  Body mass index is 23.71 kg/m².     Physical Exam   GENERAL: NAD, calm, cooperative, awake/alert  MENTAL STATUS: Oriented x4, follows commands reliably  SPEECH/LANGUAGE: Clear, coherent  CN:  EOMI, gaze conjugate, visual fields intact  PERRLA  Motor: no focal motor weakness  Sensory: Normal to tactile stim/vibration  Gait: steady w/o assistance        Significant Labs:   Recent Lab Results         05/23/24 1919        Urine Creatinine 71.6       Urine Protein <6.8               Significant Imaging: I have reviewed all pertinent imaging results/findings within the past 24 hours.

## 2024-05-24 NOTE — ASSESSMENT & PLAN NOTE
Repeat LP on 5/21: opening pressure 46    -diamox d/c d/t renal stone  -signs of symptomatic elevated ICP d/w pt and pt's s/o per Dr. Sung

## 2024-05-25 VITALS
TEMPERATURE: 98 F | HEART RATE: 61 BPM | OXYGEN SATURATION: 100 % | WEIGHT: 170 LBS | SYSTOLIC BLOOD PRESSURE: 125 MMHG | DIASTOLIC BLOOD PRESSURE: 84 MMHG | HEIGHT: 71 IN | RESPIRATION RATE: 18 BRPM | BODY MASS INDEX: 23.8 KG/M2

## 2024-05-25 LAB
ANION GAP SERPL CALC-SCNC: 5 MEQ/L
BUN SERPL-MCNC: 16.9 MG/DL (ref 8.9–20.6)
CALCIUM SERPL-MCNC: 7.9 MG/DL (ref 8.4–10.2)
CHLORIDE SERPL-SCNC: 112 MMOL/L (ref 98–107)
CO2 SERPL-SCNC: 26 MMOL/L (ref 22–29)
CREAT SERPL-MCNC: 0.95 MG/DL (ref 0.73–1.18)
CREAT/UREA NIT SERPL: 18
GFR SERPLBLD CREATININE-BSD FMLA CKD-EPI: >60 ML/MIN/1.73/M2
GLUCOSE SERPL-MCNC: 94 MG/DL (ref 74–100)
POTASSIUM SERPL-SCNC: 4.3 MMOL/L (ref 3.5–5.1)
SODIUM SERPL-SCNC: 143 MMOL/L (ref 136–145)

## 2024-05-25 PROCEDURE — 25000003 PHARM REV CODE 250: Performed by: PSYCHIATRY & NEUROLOGY

## 2024-05-25 PROCEDURE — 36415 COLL VENOUS BLD VENIPUNCTURE: CPT | Performed by: STUDENT IN AN ORGANIZED HEALTH CARE EDUCATION/TRAINING PROGRAM

## 2024-05-25 PROCEDURE — 25000003 PHARM REV CODE 250: Performed by: NURSE PRACTITIONER

## 2024-05-25 PROCEDURE — 80048 BASIC METABOLIC PNL TOTAL CA: CPT | Performed by: STUDENT IN AN ORGANIZED HEALTH CARE EDUCATION/TRAINING PROGRAM

## 2024-05-25 RX ADMIN — LACOSAMIDE 100 MG: 50 TABLET, FILM COATED ORAL at 08:05

## 2024-05-25 RX ADMIN — TAMSULOSIN HYDROCHLORIDE 0.4 MG: 0.4 CAPSULE ORAL at 08:05

## 2024-05-25 RX ADMIN — LEVETIRACETAM 1500 MG: 500 TABLET, FILM COATED ORAL at 08:05

## 2024-05-25 NOTE — PROGRESS NOTES
[facility]  Hospital Medicine Progress Note        Chief Complaint: Inpatient Follow-up for Headache     HPI:   Patient is a 23-year-old male with patient is a 23-year-old male with a recent admission at this facility with acute onset altered mental status for which he underwent an extensive workup.  He initially was thought to be having a stroke and was given a partial dose of TNK, but ultimately underwent a lumbar puncture which showed over a 400 WBCs with a lymphocyte predominance presumably viral/aseptic meningitis.  He was transferred to [facility] for neuro critical Care and was seen by infectious disease at that facility.  He was discharged 05/13/2024 with the consensus being he had aseptic meningitis and all antibiotics were discontinued due to his clinical rapid improvement.  He presents to the ER tonight with worsening headache and reported slurred speech prior to arrival. He also had a seizure like episode that he does not remember.      He arrived afebrile hemodynamically stable maintaining normal sats on room air.  Laboratory work showed leukocytosis of 24 K and CT head showed no acute process.  Hospitalist was consulted for further evaluation.     Patient has mild headache but was afebrile. 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 He was started on Keppra. His clinical picture was worrisome for encephalitis. 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 He had LP done this visit was opening pressure was elevated. CSF showed elevated WBC and protein. He was started on IV antivirals by ID and IV steroids x 5 days by Neurology for possible autoimmune encephalitis.     On 5/21/24 he started to have sudden onset of left flank pain, CT abdomen was done and showed left ureteral stone. Seen by urology team and advised to start on flomax and IV hydration. No acute interventions was recommended. Pain controlled with IV PRN Toradol.     Rpt LP showed persistent elevated opening  pressure. He was afebrile. Infectious disease work up was all negative.  Per neurology note, patient's exam improved but still not at baseline.  Option for  shunt discussed by Neurology but thought to be premature and aggressive given its permanent in case high opening pressure on multiple LPs is due to transient infection/inflammation.  Pleocytosis has also improved on subsequent LP with persistence of opening pressure. Ophthalmology consulted last week for evaluation in light of complaints of R eye swelling & conjunctival injection; plan for outpatient F/U.    Interval Hx:   Today, patient stated he was doing well and had no new complaints. LORNA noted; placed on IV LR. Will plan for DC home once LORNA improved with Neurology F/U.    Objective/physical exam:  General: alert male lying comfortably in bed, in no acute distress  HENT: oral and oropharyngeal mucosa moist, pink, with no erythema or exudates, no ear pain or discharge  Neck: normal neck movement, no lymph nodes or swellings, no JVD or Carotid bruit  Respiratory: clear breathing sounds bilaterally, no crackles, rales, ronchi or wheezes  Cardiovascular: clear S1 and S2, no murmurs, rubs or gallops  Peripheral Vascular: no lesions, ulcers or erosions, normal peripheral pulses and no pedal edema  Gastrointestinal: soft, non-tender, non-distended abdomen, no guarding, rigidity or rebound tenderness, normal bowel sounds  Integumentary: normal skin color, no rashes or lesions  Neuro: AAO x 3; motor strength 5/5 in B/L UEs & LEs; sensation intact to gross and fine touch B/L; CN II-XII grossly intact    VITAL SIGNS: 24 HRS MIN & MAX LAST   Temp  Min: 97.1 °F (36.2 °C)  Max: 98.4 °F (36.9 °C) 97.9 °F (36.6 °C)   BP  Min: 120/72  Max: 125/77 122/77   Pulse  Min: 72  Max: 91  87   Resp  Min: 16  Max: 20 20   SpO2  Min: 97 %  Max: 100 % 100 %     I have reviewed the following labs:  Recent Labs   Lab 05/22/24  0340 05/23/24  0457 05/24/24  1115   WBC 15.57* 13.44*  17.43*   RBC 4.11* 4.18* 4.70   HGB 11.9* 12.0* 13.5*   HCT 34.5* 34.9* 39.4*   MCV 83.9 83.5 83.8   MCH 29.0 28.7 28.7   MCHC 34.5 34.4 34.3   RDW 12.7 13.0 12.8    259 214   MPV 8.7 8.7 8.2     Recent Labs   Lab 05/18/24  0601 05/19/24  0404 05/22/24  0340 05/23/24  0457 05/24/24  1115   *   < > 137 139 139   K 4.2   < > 3.9 3.4* 3.3*   CO2 17*   < > 17* 22 23   BUN 12.2   < > 25.5* 29.9* 24.5*   CREATININE 0.94   < > 1.08 1.18 1.31*   CALCIUM 9.2   < > 8.3* 7.9* 8.0*   ALBUMIN 4.0  --  3.1* 2.8*  --    ALKPHOS 58  --  42 41  --    ALT 18  --  38 31  --    AST 11  --  16 19  --    BILITOT 0.3  --  0.2 0.3  --     < > = values in this interval not displayed.     Assessment/Plan:  ? Recurrent Aseptic/Autoimmune vs Viral encephalitis   Acute flank pain secondary to left ureteral stone - improved   Leukocytosis from steroid use   Seizures temporal lobe   Metabolic acidosis : worse   Mild hyperglycemia from steroids     Plan:  Continues to be admitted   Reporting no new complaints   Remains afebrile  Urology consulted; recommended flomax and iv hydration   Ophthalmology consulted; awaiting evaluation and recommendations   ID on board; follow recommendations  Neurology on board; follow recommendations  Neurosurgery following  On IV /hr for LORNA  Replaced K with PO KCl  24 hrs EEG did not show any seizures  Initial EEG showed + seizure activities   Clinically has no meningeal signs but rpt LP showed elevated opening pressure  Continued on lacosamide 100 mg b.i.d., Keppra 1500 mg b.i.d., tamsulosin 0.4 mg daily  Plan for DC home with Neurology & Ophthalmology F/U    VTE prophylaxis: SCD    Patient condition:  Guarded    Anticipated discharge and Disposition:   Home       All diagnosis and differential diagnosis have been reviewed; assessment and plan has been documented; I have personally reviewed the labs and test results that are presently available; I have reviewed the patients medication list; I have  reviewed the consulting providers response and recommendations. I have reviewed or attempted to review medical records based upon their availability    All of the patient's questions have been  addressed and answered. Patient's is agreeable to the above stated plan. I will continue to monitor closely and make adjustments to medical management as needed.  _____________________________________________________________________    Radiology:  I have personally reviewed the following imaging and agree with the radiologist.     FL LUMBAR PUNCTURE DIAGNOSTIC WITH IMAGING  Narrative: EXAMINATION:  FL LUMBAR PUNCTURE DIAGNOSTIC WITH IMAGING    CLINICAL HISTORY:  Recheck opening pressure. Need opening and closing pressures;    TECHNIQUE:  Procedure explained to the patient and informed consent obtained. Suitable skin entry site chosen under fluoroscopy. Lower back prepped and draped in sterile fashion. Lidocaine used for local anesthesia.    COMPARISON:  Lumbar puncture images 5/17/24    FINDINGS:  Under intermittent fluoroscopic guidance, a 22-gauge spinal needle was advanced into the thecal sac with position confirmed by flow of CSF.  Opening pressure 46 cm CSF. Approximately 21 mL of CSF collected and sent to the lab for further evaluation.  Closing pressure 19 cm CSF.  The needle was removed and hemostasis achieved at the skin puncture site. No immediate complication. Estimated blood loss negligible.    Absorbed dose is 1.26 mGy.  Impression: Successful fluoroscopic guided lumbar puncture.    Electronically signed by: Romain Silva  Date:    05/21/2024  Time:    15:43  CT Abdomen Pelvis W Wo Contrast  Narrative: EXAMINATION:  CT ABDOMEN PELVIS W WO CONTRAST    CLINICAL HISTORY:  Flank pain, kidney stone suspected;Nausea/vomiting;    TECHNIQUE:  Multidetector axial images were obtained of the abdomen and pelvis before and following the administration of IV contrast. Oral contrast was not administered.    Dose length product  of 1022 mGycm. Automated exposure control was utilized to minimize radiation dose.    COMPARISON:  None available.    FINDINGS:  Included portion of the lungs are without suspicious nodularity, acute air space infiltrates or fluid within the pleural spaces.    Hepatic volume and attenuation is unremarkable. Gallbladder wall is not thickened and there is no intra luminal calcified calculus. No biliary dilation identified. Pancreatic unremarkable attenuation without acute peripancreatic phlegmons. Main pancreatic duct is not dilated. Spleen is of normal size without focal lesion.    The adrenal glands appear within normal limits. The kidneys are unremarkable in size and contour.  Left kidney is remarkable for non occluding calculi.  There is mild left hydronephrosis and hydroureter which is caused by distal ureteral 4 mm calculus on image 118 series 2.  This calculus is approximately 3.0 cm proximal from the uterovesical junction.  There is also right kidney interpolar location minimal non occluding calculus.  No right obstructive uropathy.  There are no significant perinephric inflammatory standings.    Stomach is nondistended without mural thickening.  Small bowel loops are without dilatation.  There is suspected mild mural thickening of the terminal ileum..  Appendix is not visualized.  Please correlate clinically.  Colon is nondistended and there are no pericolonic acute strandings.  No bowel obstruction.  No free fluid    Urinary bladder wall is minimally thickened.  There is right pelvic small free fluid seen on image 16 series 6.    No acute or otherwise osseous abnormality identified.  Impression: 1.  Left distal ureteral calculus causing mild hydroureteronephrosis.    2.  Bilateral kidneys non occluding calculi.    3.  Slightly thickened urinary bladder wall.    4.  Pelvic small free fluid of indeterminate significance.    5.  Terminal ileum mild mural thickening.    Electronically signed by: Ori  Wynne  Date:    05/21/2024  Time:    13:36      Earnest Santo MD  Department of Hospital Medicine   [facility]   05/24/2024

## 2024-05-26 LAB
AMPAR2 IGG CSF QL CBA IFA: NEGATIVE
AMPHIPHYSIN AB CSF QL IF: NEGATIVE
ANNOTATION COMMENT IMP: NORMAL
BACTERIA CSF CULT: NORMAL
CASPR2 IGG CSF QL CBA IFA: NEGATIVE
CV2 AB CSF QL IF: NEGATIVE
DPPX IGG CSF QL IF: NEGATIVE
GABABR IGG CSF QL CBA IFA: NEGATIVE
GAD65 AB CSF-SCNC: 0 NMOL/L
GFAP ALPHA IGG CSF QL IF: NEGATIVE
GLIAL NUC TYPE 1 AB CSF QL IF: NEGATIVE
GRAM STN SPEC: NORMAL
GRAM STN SPEC: NORMAL
HU1 AB CSF QL IF: NEGATIVE
HU2 AB CSF QL IF: NEGATIVE
HU3 AB CSF QL IF: NEGATIVE
IGLON5 IGG CSF QL IF: NEGATIVE
IMMUNOLOGIST REVIEW: NORMAL
LGI1 IGG CSF QL CBA IFA: NEGATIVE
M MGLUR1 AB IFA, CSF: NEGATIVE
M NEUROCHONDRIN IFA, CSF: NEGATIVE
M SEPTIN-7 IFA, CSF: NEGATIVE
NIF IGG CSF QL IF: NEGATIVE
NMDAR1 IGG CSF QL CBA IFA: NEGATIVE
PCA-1 AB CSF QL IF: NEGATIVE
PCA-2 AB CSF QL IF: NEGATIVE
PCA-TR AB CSF QL IF: NEGATIVE

## 2024-05-28 ENCOUNTER — TELEPHONE (OUTPATIENT)
Dept: INFECTIOUS DISEASES | Facility: CLINIC | Age: 24
End: 2024-05-28
Payer: COMMERCIAL

## 2024-05-28 LAB
MAYO GENERIC ORDERABLE RESULT: NORMAL
RHEUMATOID FACTOR IGA QUANTITATIVE (OLG): 1.7 IU/ML
RHEUMATOID FACTOR IGM QUANTITATIVE (OLG): 0.7 IU/ML
SSA(RO) AB QUANT (OHS): <0.4 U/ML
SSB(LA) AB QUANT (OHS): <0.4 U/ML

## 2024-05-28 NOTE — TELEPHONE ENCOUNTER
----- Message from Deb Conner sent at 5/28/2024  3:11 PM CDT -----  Regarding: pt advice  Contact: [phone]  Pt calling in regards to having question in reference to do appt  6/3/24, pls call

## 2024-05-28 NOTE — TELEPHONE ENCOUNTER
Called and spoke to pt laura. They would like appt to be in [place].    F/u should be scheduled in [place] due to pt being seen by ID there.     Sent message to ANKUSH García team

## 2024-05-29 LAB
AMPHIPHYSIN AB CSF QL IF: NEGATIVE
ANNOTATION COMMENT IMP: NORMAL
CV2 AB CSF QL IF: NEGATIVE
GLIAL NUC TYPE 1 AB CSF QL IF: NEGATIVE
HU1 AB CSF QL IF: NEGATIVE
HU2 AB CSF QL IF: NEGATIVE
HU3 AB CSF QL IF: NEGATIVE
PARANEOPLASTIC AB SER-IMP: NORMAL
PCA-1 AB CSF QL IF: NEGATIVE
PCA-2 AB CSF QL IF: NEGATIVE
PCA-TR AB CSF QL IF: NEGATIVE

## 2024-05-30 ENCOUNTER — PATIENT OUTREACH (OUTPATIENT)
Dept: ADMINISTRATIVE | Facility: CLINIC | Age: 24
End: 2024-05-30
Payer: COMMERCIAL

## 2024-05-30 LAB
AQP4 H2O CHANNEL IGG SERPL QL: NEGATIVE
IMMUNOLOGIST REVIEW: ABNORMAL
MAYO GENERIC ORDERABLE RESULT: ABNORMAL
MOG IGG1 SERPL QL FC: POSITIVE

## 2024-05-30 NOTE — PROGRESS NOTES
One of his labs from his hospital  visit came back positive. MOG antibody. 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 I am wondering how he is doing. Please call him.

## 2024-05-30 NOTE — PROGRESS NOTES
C3 nurse spoke with Luis Traylor  for a [facility] post hospital discharge follow up call. The patient has a scheduled [facility] appointment with Genna Tuttle MD (Neurosurgery) on 6/13/2024; at 9 am

## 2024-05-31 ENCOUNTER — TELEPHONE (OUTPATIENT)
Dept: NEUROLOGY | Facility: CLINIC | Age: 24
End: 2024-05-31
Payer: COMMERCIAL

## 2024-05-31 NOTE — TELEPHONE ENCOUNTER
State he is doing fine. He still has the rash ( raised red bumps ) on his chest, shoulders, back and notice the rash has spread to his buttocks since he was discharge from the hospital.

## 2024-05-31 NOTE — TELEPHONE ENCOUNTER
----- Message from Jessica Molina MD sent at 5/30/2024  4:45 PM CDT -----  One of his labs from his hospital  visit came back positive. MOG antibody. 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 I am wondering how he is doing. Please call him.

## 2024-05-31 NOTE — PROGRESS NOTES
Patient states that he is doing good, pt SO states that he has a slight rash in his back area that goes from his shoulders to his bottom, they noticed it the night before he left the hospital, its red raised bumps, denies any drainage or itchiness, stated the ER nurse said not to do anything to it unless it started to bother him but other than the rash he is doing fine.

## 2024-06-01 ENCOUNTER — HOSPITAL ENCOUNTER (INPATIENT)
Facility: HOSPITAL | Age: 24
LOS: 4 days | Discharge: HOME | DRG: 060 | End: 2024-06-05
Attending: EMERGENCY MEDICINE | Admitting: INTERNAL MEDICINE
Payer: COMMERCIAL

## 2024-06-01 DIAGNOSIS — R47.01 APHASIA: Primary | ICD-10-CM

## 2024-06-01 DIAGNOSIS — G93.40 ENCEPHALOPATHY: ICD-10-CM

## 2024-06-01 DIAGNOSIS — G04.90 ENCEPHALITIS: ICD-10-CM

## 2024-06-01 DIAGNOSIS — G93.2 ELEVATED INTRACRANIAL PRESSURE: ICD-10-CM

## 2024-06-01 DIAGNOSIS — R78.81 BACTEREMIA: ICD-10-CM

## 2024-06-01 DIAGNOSIS — M62.81 MUSCLE WEAKNESS OF RIGHT UPPER EXTREMITY: ICD-10-CM

## 2024-06-01 DIAGNOSIS — G37.81 MYELIN OLIGODENDROCYTE GLYCOPROTEIN ANTIBODY DISORDER (MOGAD): ICD-10-CM

## 2024-06-01 DIAGNOSIS — R29.818 ACUTE FOCAL NEUROLOGICAL DEFICIT: ICD-10-CM

## 2024-06-01 DIAGNOSIS — R56.9 SEIZURES: ICD-10-CM

## 2024-06-01 DIAGNOSIS — E80.20 PORPHYRIA, UNSPECIFIED PORPHYRIA TYPE: ICD-10-CM

## 2024-06-01 LAB
ALBUMIN SERPL-MCNC: 3.8 G/DL (ref 3.5–5)
ALBUMIN/GLOB SERPL: 1.1 RATIO (ref 1.1–2)
ALP SERPL-CCNC: 58 UNIT/L (ref 40–150)
ALT SERPL-CCNC: 19 UNIT/L (ref 0–55)
ANION GAP SERPL CALC-SCNC: 10 MEQ/L
ANION GAP SERPL CALC-SCNC: 16 MMOL/L (ref 8–16)
AST SERPL-CCNC: 13 UNIT/L (ref 5–34)
BASOPHILS # BLD AUTO: 0.02 X10(3)/MCL
BASOPHILS NFR BLD AUTO: 0.2 %
BILIRUB SERPL-MCNC: 0.4 MG/DL
BUN SERPL-MCNC: 8 MG/DL (ref 6–30)
BUN SERPL-MCNC: 9.1 MG/DL (ref 8.9–20.6)
CALCIUM SERPL-MCNC: 9.1 MG/DL (ref 8.4–10.2)
CHLORIDE SERPL-SCNC: 102 MMOL/L (ref 95–110)
CHLORIDE SERPL-SCNC: 105 MMOL/L (ref 98–107)
CHOLEST SERPL-MCNC: 183 MG/DL
CHOLEST/HDLC SERPL: 6 {RATIO} (ref 0–5)
CO2 SERPL-SCNC: 24 MMOL/L (ref 22–29)
CREAT SERPL-MCNC: 0.9 MG/DL (ref 0.5–1.4)
CREAT SERPL-MCNC: 0.94 MG/DL (ref 0.73–1.18)
CREAT/UREA NIT SERPL: 10
EOSINOPHIL # BLD AUTO: 0.09 X10(3)/MCL (ref 0–0.9)
EOSINOPHIL NFR BLD AUTO: 1 %
ERYTHROCYTE [DISTWIDTH] IN BLOOD BY AUTOMATED COUNT: 13.5 % (ref 11.5–17)
GFR SERPLBLD CREATININE-BSD FMLA CKD-EPI: >60 ML/MIN/1.73/M2
GLOBULIN SER-MCNC: 3.5 GM/DL (ref 2.4–3.5)
GLUCOSE SERPL-MCNC: 146 MG/DL (ref 70–110)
GLUCOSE SERPL-MCNC: 147 MG/DL (ref 74–100)
HCT VFR BLD AUTO: 38.7 % (ref 42–52)
HCT VFR BLD CALC: 40 %PCV (ref 36–54)
HDLC SERPL-MCNC: 31 MG/DL (ref 35–60)
HGB BLD-MCNC: 13.1 G/DL (ref 14–18)
HGB BLD-MCNC: 14 G/DL
IMM GRANULOCYTES # BLD AUTO: 0.03 X10(3)/MCL (ref 0–0.04)
IMM GRANULOCYTES NFR BLD AUTO: 0.3 %
INR PPP: 1.1
LDLC SERPL CALC-MCNC: 128 MG/DL (ref 50–140)
LYMPHOCYTES # BLD AUTO: 1.33 X10(3)/MCL (ref 0.6–4.6)
LYMPHOCYTES NFR BLD AUTO: 14.5 %
MCH RBC QN AUTO: 28.9 PG (ref 27–31)
MCHC RBC AUTO-ENTMCNC: 33.9 G/DL (ref 33–36)
MCV RBC AUTO: 85.4 FL (ref 80–94)
MONOCYTES # BLD AUTO: 0.65 X10(3)/MCL (ref 0.1–1.3)
MONOCYTES NFR BLD AUTO: 7.1 %
NEUTROPHILS # BLD AUTO: 7.03 X10(3)/MCL (ref 2.1–9.2)
NEUTROPHILS NFR BLD AUTO: 76.9 %
NRBC BLD AUTO-RTO: 0 %
PLATELET # BLD AUTO: 168 X10(3)/MCL (ref 130–400)
PMV BLD AUTO: 8.8 FL (ref 7.4–10.4)
POC IONIZED CALCIUM: 1.2 MMOL/L (ref 1.06–1.42)
POC PTINR: 1.2 (ref 0.9–1.2)
POC PTWBT: 14.2 SEC (ref 9.7–14.3)
POC TCO2 (MEASURED): 27 MMOL/L (ref 23–29)
POCT GLUCOSE: 169 MG/DL (ref 70–110)
POTASSIUM BLD-SCNC: 3.5 MMOL/L (ref 3.5–5.1)
POTASSIUM SERPL-SCNC: 3.5 MMOL/L (ref 3.5–5.1)
PROT SERPL-MCNC: 7.3 GM/DL (ref 6.4–8.3)
PROTHROMBIN TIME: 14 SECONDS (ref 12.5–14.5)
RBC # BLD AUTO: 4.53 X10(6)/MCL (ref 4.7–6.1)
SAMPLE: ABNORMAL
SAMPLE: NORMAL
SODIUM BLD-SCNC: 140 MMOL/L (ref 136–145)
SODIUM SERPL-SCNC: 139 MMOL/L (ref 136–145)
TRIGL SERPL-MCNC: 120 MG/DL (ref 34–140)
TSH SERPL-ACNC: 1.76 UIU/ML (ref 0.35–4.94)
VLDLC SERPL CALC-MCNC: 24 MG/DL
WBC # SPEC AUTO: 9.15 X10(3)/MCL (ref 4.5–11.5)

## 2024-06-01 PROCEDURE — 99223 1ST HOSP IP/OBS HIGH 75: CPT | Mod: ,,, | Performed by: PSYCHIATRY & NEUROLOGY

## 2024-06-01 PROCEDURE — 93005 ELECTROCARDIOGRAM TRACING: CPT

## 2024-06-01 PROCEDURE — 30233S1 TRANSFUSION OF NONAUTOLOGOUS GLOBULIN INTO PERIPHERAL VEIN, PERCUTANEOUS APPROACH: ICD-10-PCS | Performed by: STUDENT IN AN ORGANIZED HEALTH CARE EDUCATION/TRAINING PROGRAM

## 2024-06-01 PROCEDURE — 63600175 PHARM REV CODE 636 W HCPCS: Mod: JZ,JG | Performed by: PSYCHIATRY & NEUROLOGY

## 2024-06-01 PROCEDURE — 21400001 HC TELEMETRY ROOM

## 2024-06-01 PROCEDURE — 85610 PROTHROMBIN TIME: CPT | Performed by: PHYSICIAN ASSISTANT

## 2024-06-01 PROCEDURE — C9254 INJECTION, LACOSAMIDE: HCPCS | Performed by: NURSE PRACTITIONER

## 2024-06-01 PROCEDURE — 63600175 PHARM REV CODE 636 W HCPCS: Performed by: NURSE PRACTITIONER

## 2024-06-01 PROCEDURE — 84443 ASSAY THYROID STIM HORMONE: CPT | Performed by: PHYSICIAN ASSISTANT

## 2024-06-01 PROCEDURE — 93010 ELECTROCARDIOGRAM REPORT: CPT | Mod: ,,, | Performed by: INTERNAL MEDICINE

## 2024-06-01 PROCEDURE — 80053 COMPREHEN METABOLIC PANEL: CPT | Performed by: PHYSICIAN ASSISTANT

## 2024-06-01 PROCEDURE — 80061 LIPID PANEL: CPT | Performed by: PHYSICIAN ASSISTANT

## 2024-06-01 PROCEDURE — 85025 COMPLETE CBC W/AUTO DIFF WBC: CPT | Performed by: PHYSICIAN ASSISTANT

## 2024-06-01 PROCEDURE — 11000001 HC ACUTE MED/SURG PRIVATE ROOM

## 2024-06-01 PROCEDURE — 80047 BASIC METABLC PNL IONIZED CA: CPT

## 2024-06-01 PROCEDURE — 99285 EMERGENCY DEPT VISIT HI MDM: CPT | Mod: 25

## 2024-06-01 PROCEDURE — 82962 GLUCOSE BLOOD TEST: CPT

## 2024-06-01 PROCEDURE — 25000003 PHARM REV CODE 250: Performed by: NURSE PRACTITIONER

## 2024-06-01 RX ORDER — SODIUM CHLORIDE 9 MG/ML
INJECTION, SOLUTION INTRAVENOUS CONTINUOUS
Status: DISCONTINUED | OUTPATIENT
Start: 2024-06-01 | End: 2024-06-02

## 2024-06-01 RX ORDER — ACETAMINOPHEN 325 MG/1
650 TABLET ORAL EVERY 8 HOURS PRN
Status: DISCONTINUED | OUTPATIENT
Start: 2024-06-01 | End: 2024-06-05 | Stop reason: HOSPADM

## 2024-06-01 RX ORDER — LORAZEPAM 2 MG/ML
2 INJECTION INTRAMUSCULAR EVERY 4 HOURS PRN
Status: DISCONTINUED | OUTPATIENT
Start: 2024-06-01 | End: 2024-06-05 | Stop reason: HOSPADM

## 2024-06-01 RX ORDER — ACETAMINOPHEN 325 MG/1
650 TABLET ORAL EVERY 4 HOURS PRN
Status: DISCONTINUED | OUTPATIENT
Start: 2024-06-01 | End: 2024-06-05 | Stop reason: HOSPADM

## 2024-06-01 RX ORDER — ONDANSETRON HYDROCHLORIDE 2 MG/ML
4 INJECTION, SOLUTION INTRAVENOUS EVERY 8 HOURS PRN
Status: DISCONTINUED | OUTPATIENT
Start: 2024-06-01 | End: 2024-06-05 | Stop reason: HOSPADM

## 2024-06-01 RX ORDER — LEVETIRACETAM 15 MG/ML
1500 INJECTION INTRAVASCULAR EVERY 12 HOURS
Status: DISCONTINUED | OUTPATIENT
Start: 2024-06-02 | End: 2024-06-05 | Stop reason: HOSPADM

## 2024-06-01 RX ORDER — LEVETIRACETAM 500 MG/5ML
1000 INJECTION, SOLUTION, CONCENTRATE INTRAVENOUS EVERY 12 HOURS
Status: DISCONTINUED | OUTPATIENT
Start: 2024-06-01 | End: 2024-06-01

## 2024-06-01 RX ADMIN — LEVETIRACETAM 1000 MG: 100 INJECTION, SOLUTION INTRAVENOUS at 03:06

## 2024-06-01 RX ADMIN — SODIUM CHLORIDE: 9 INJECTION, SOLUTION INTRAVENOUS at 03:06

## 2024-06-01 RX ADMIN — HUMAN IMMUNOGLOBULIN G 40 G: 20 LIQUID INTRAVENOUS at 08:06

## 2024-06-01 RX ADMIN — LACOSAMIDE 150 MG: 10 INJECTION INTRAVENOUS at 04:06

## 2024-06-01 RX ADMIN — ACETAMINOPHEN 325MG 650 MG: 325 TABLET ORAL at 06:06

## 2024-06-01 NOTE — H&P
"[facility]  Hospital Medicine History & Physical Examination       Patient Name: Luis Traylor  MRN: [id number]  Patient Class: IP- Inpatient   Admission Date: 06/01/2024   Admitting Service: Hospital Medicine   Length of Stay: 0  Attending Physician: Treva Azevedo MD  Primary Care Provider: Terrence Craven NP  Face-to-Face encounter date: 06/01/2024  Code Status: Full  Chief Complaint: Medical (Pt fiance reports pt "not acting right" with R sided facial droop, aphasia, R arm weakness. Not following commands. Answers "yes" to all questions. Newly diagnosed seizures. Onset 1230)    Patient information was obtained from patient, patient's family, past medical records and ER records.    HISTORY OF PRESENT ILLNESS:   Luis Traylor is a 23 y.o. male with a PMHx of viral/aseptic meningitis and seizure disorderwho presented to [facility] on 6/1/2024 with c/o right-sided facial droop, right-sided weakness, and aphasia that began prior to arrival.  Patient's significant other reports that he was unable to answer questions.  Code fast caught upon arrival to ED and patient was evaluated by Neurology.  CT head negative for acute intracranial hemorrhage, no findings to suggest large vascular territory acute ischemic insult. Labs essentially unremarkable.  He was admitted to hospital medicine service for further medical management.    Of note, patient was initially hospitalized on 05/07/2024 when he was transferred from [facility] to [facility] for higher level of care due to concern for CVA.    Initial symptoms started as weakness and numbness in the left arm and leg with severe headaches with associated photophobia, nausea, and vomiting X2 weeks.    He was given a partial dose of tPA due to gingival bleeding.    Admitted to ICU here.    Left-sided weakness improved over the course of his admission; however mentation continued to wax and wane.    LP was done on 05/09/2024 with CSF " demonstrating leukocytosis and with lymph node predominance.    Then began having episodes of absence seizures, with staring spells.    Blood cultures from 05/07 and 05/08 were negative.   MRI brain on 05/07/2024 negative for acute intracranial findings.    He was transferred to [facility] for Neuro ICU for possible encephalitis on 05/10/2024. MRI brain with and without contrast on 05/16/2024 negative for acute intracranial findings. MRA and MRV of the Brain were also unremarkable. He was discharged from [facility] on 05/13/2024 with noted return to neurological baseline.  Antiepileptic medications and antimicrobials were discontinued.  Felt to be viral or aseptic meningitis.      Extensive encephalopathy panel was sent and course seemed to favor possible viral or autoimmune/inflammatory etiology; viral meningitis versus aseptic meningitis. Farm animal exposure noted. Notable results include:  MOG FACS positive 1:1000  Coproporphyrin,tetra level of 177    He then again presented to [facility] on 05/15/2024 for worsening headache and slurred speech.    He was admitted to hospitalist service.  Evaluated by Neurology and ID.  EEG noted a right temporal lobe electrographic seizure, right temporal lobe slowing.    Noted to have previous LP opening pressure of 43 cm of H2O.    On 05/21 he began having left flank pain for which CT abdomen was done and demonstrated left ureteral stone, evaluated by Urology, and started on Flomax.    Repeat LP showed persistent elevated opening pressure, remained afebrile  Ophthalmology consulted for evaluation in light of complaints of R eye swelling & conjunctival injection; plan for outpatient F/U.    He was discharged home on 05/25/2024 with neurology and ophthalmology follow-up.  Discharge prescriptions included Keppra  1500 mg p.o. b.I.d., Vimpat 100 mg p.o. every 12 hours and Flomax 0.4 mg p.o. daily      REVIEW OF SYSTEMS:   Except as documented, all other systems reviewed and  negative.    PAST MEDICAL HISTORY:   Viral/Aseptic Meningitis  MOG antibody positive   Seizure disorder  HSV 2    PAST SURGICAL HISTORY:     Past Surgical History:   Procedure Laterality Date    MAGNETIC RESONANCE IMAGING N/A 5/10/2024    Procedure: MRI (Magnetic Resonance Imagine);  Surgeon: Araceli Bergman MD;  Location: [facility];  Service: Anesthesiology;  Laterality: N/A;       FAMILY HISTORY:   Reviewed and negative    SOCIAL HISTORY:   Denied alcohol, tobacco or illicit drug use.     SCREENING FOR SOCIAL DRIVERS FOR HEALTH:   Patient screened for food insecurity, housing instability, transportation needs, utility difficulties, and interpersonal safety (select all that apply as identified as concern):  []Housing or Food  []Transportation Needs  []Utility Difficulties  []Interpersonal safety  [x]None    ALLERGIES:   Patient has no known allergies.    HOME MEDICATIONS:     Prior to Admission medications    Medication Sig Start Date End Date Taking? Authorizing Provider   lacosamide (VIMPAT) 100 mg Tab Take 1 tablet (100 mg total) by mouth every 12 (twelve) hours. 5/24/24 5/24/25  Earnest Santo MD   levETIRAcetam (KEPPRA) 750 MG Tab Take 2 tablets (1,500 mg total) by mouth 2 (two) times daily. 5/24/24 5/24/25  Earnest Santo MD   tamsulosin (FLOMAX) 0.4 mg Cap Take 1 capsule (0.4 mg total) by mouth once daily. 5/25/24 5/25/25  Earnest Santo MD     ________________________________________________________________________  INPATIENT LIST OF MEDICATIONS     Current Facility-Administered Medications:     acetaminophen tablet 650 mg, 650 mg, Oral, Q8H PRN, Sabra Michel AGACNP-BC    acetaminophen tablet 650 mg, 650 mg, Oral, Q4H PRN, Sabra Michel AGACNP-BC    LORazepam injection 2 mg, 2 mg, Intravenous, Q4H PRN, Sabra Michel AGACNP-BC    ondansetron injection 4 mg, 4 mg, Intravenous, Q8H PRN, Sabra Michel, AGACN-BC    Current Outpatient Medications:     lacosamide (VIMPAT) 100 mg Tab, Take 1  tablet (100 mg total) by mouth every 12 (twelve) hours., Disp: 60 tablet, Rfl: 11    levETIRAcetam (KEPPRA) 750 MG Tab, Take 2 tablets (1,500 mg total) by mouth 2 (two) times daily., Disp: 120 tablet, Rfl: 11    tamsulosin (FLOMAX) 0.4 mg Cap, Take 1 capsule (0.4 mg total) by mouth once daily., Disp: 30 capsule, Rfl: 11    Scheduled Meds:  Continuous Infusions:  PRN Meds:.  Current Facility-Administered Medications:     acetaminophen, 650 mg, Oral, Q8H PRN    acetaminophen, 650 mg, Oral, Q4H PRN    lorazepam, 2 mg, Intravenous, Q4H PRN    ondansetron, 4 mg, Intravenous, Q8H PRN    PHYSICAL EXAM:     VITAL SIGNS: 24 HRS MIN & MAX LAST   Temp  Min: 98.1 °F (36.7 °C)  Max: 98.1 °F (36.7 °C) 98.1 °F (36.7 °C)   BP  Min: 122/78  Max: 142/90 129/82   Pulse  Min: 81  Max: 114  81   Resp  Min: 10  Max: 18 10   SpO2  Min: 95 %  Max: 100 % 95 %       General appearance: Well-developed male in no apparent distress.  HENT: Atraumatic head. Moist mucous membranes of oral cavity.  Eyes: Normal extraocular movements.   Neck: Supple.   Lungs: Clear to auscultation bilaterally.   Heart: Regular rate and rhythm. S1 and S2 present. No pedal edema.  Abdomen: Soft, non-distended, non-tender.  Extremities: No cyanosis, clubbing, or edema.  Skin: No Rash.   Neuro:  Patient is awake and alert, slow to respond, expressive and receptive aphasia, moving extremities randomly, not to command   Psych/mental status:  Slow response    Recent Labs   Lab 06/01/24  1340 06/01/24  1341   WBC  --  9.15   RBC  --  4.53*   HGB  --  13.1*   HCT 40 38.7*   MCV  --  85.4   MCH  --  28.9   MCHC  --  33.9   RDW  --  13.5   PLT  --  168   MPV  --  8.8       Recent Labs   Lab 06/01/24  1341      K 3.5      CO2 24   BUN 9.1   CREATININE 0.94   CALCIUM 9.1   ALBUMIN 3.8   ALKPHOS 58   ALT 19   AST 13   BILITOT 0.4       Microbiology Results (last 7 days)       ** No results found for the last 168 hours. **             CT HEAD FOR  "STROKE  EXAMINATION  CT HEAD FOR STROKE    CLINICAL HISTORY  Neuro deficit, acute, stroke suspected;    TECHNIQUE  Non-contrast axial CT images were acquired and multiplanar reconstructions accomplished by a CT technologist at a separate workstation, pushed to PACS for physician review.    COMPARISON  20 May 2024    FINDINGS  Images were reviewed in subdural, brain, soft tissue, and bone windows.    Exam quality: Motion/streak artifact limits assessment of the posterior fossa.    Hemorrhage: No evidence of acute hyperattenuating blood products.    Parenchyma: No discrete mass, localized mass-effect, or CT evidence of an acute territorial cortical-based ischemic insult. Gray-white differentiation is preserved.    Midline shift: None.    CSF spaces: Normal ventricle size and configuration. No masses or expansile fluid collections.    Scalp/Skull: No abnormalities.    Skull base: Mastoid air cells are well aerated. No focal abnormality of the sella.    Vasculature: No hyperdense artery identified. No abnormal densities within the dural sinuses.    Facial structures: Unremarkable.    IMPRESSION  1. No acute intracranial hemorrhage.  2. No findings to suggest large vascular territory acute ischemic insult.  3. Grossly unchanged appearance from the 20 May 2024 head CT.  ==========    In accordance with the "Code FAST" protocol, above findings were reported to Dr. Baer (Emergency Dept) prior to completion of the final report (6/1/2024; 13:32).    RECOMMENDATION(S)  Additional assessment utilizing brain MRI would allow more sensitive evaluation if symptoms persist or there is other cause for elevated clinical concern.    RADIATION DOSE  Automated tube current modulation, weight-based exposure dosing, and/or iterative reconstruction technique utilized to reach lowest reasonably achievable exposure rate.    DLP: 1132 mGy*cm    Electronically signed by: Ata Friend  Date:    06/01/2024  Time:    13:37        ASSESSMENT & " PLAN:   MOGAD Syndrome   Seizure disorder with Prolonged Post-Ictal State    History:  HSV 2    Plan:  Neurology consult, appreciate recommendations  24 hour EEG   Vimpat 150 mg IV q.12 hours and Keppra 1000 mg IV q.12 hours ordered  Seizure precautions   Neuro checks q.4 hours  NS at 75 ml/hr  P.r.n. Ativan for breakthrough seizure activity  NPO until more awake and alert then can be advanced to a regular diet  Resume home medications as deemed appropriate once medication reconciliation is updated  Labs in AM    VTE Prophylaxis:  SCDs    Discharge Planning and Disposition: TBD    I, Sabra Michel, NP have reviewed and discussed the case with Treva Azevedo MD  Please see the attending MD's addendum for further assessment and plan.    Sabra Michel, [facility]  Department of Hospital Medicine   [facility]   06/01/2024    This note was created with the assistance of [employer] Voice Recognition Software. There may be transcription errors as a result of using this technology, however minimal and effort has been made to assure accuracy of transcription, but any obvious errors or omissions should be clarified with the author of the document.    _______________________________________________________________________________  MD Addendum:  Dr. DUGLAS , assumed care of this patient today at --am/pm  For the patient encounter, I performed the substantive portion of the visit, I reviewed the NP/PA documentation, treatment plan, and medical decision making.  I had face to face time with this patient     A. History:    23-year-old  male with complicated medical history.  Patient initially presented to the hospital with stroke-like symptoms early May.  He was encephalopathic.  Patient received TNK given facial droop on left side and left-sided weakness.  Stroke workup was negative.  There were concerns for possible meningitis/encephalitis and underwent lumbar puncture which in fact showed  increased WBC-lymphocytic, but ME panel negative and other extensive infectious workup were also negative.  Patient was initially on appropriate antimicrobials to cover meningitis/encephalitis which was later discontinued There were concerns for new onset seizures.  Patient required ICU stay on Precedex drip for few days.  Mentation improved, back to baseline without focal deficits.  Patient was discharged on antiepileptics-Keppra and Vimpat.  Differential diagnosis was aseptic meningitis versus possible viral meningitis.  All antimicrobials were discontinued upon DC on 05/13.  After discharge patient presented back to the hospital again on 05/15 with dysarthria, staring spells along with worsening headache/photophobia, right eye swelling.  He also had 1 episode of high-grade temperature spike.  Patient was admitted to hospitalist medicine service.  Neurology and Infectious Disease services consulted.  Patient had repeat lumbar puncture which revealed elevated opening pressure, pleocytosis.  HSV PCR came back positive in MA panel but stand-alone HSV testing and 3 consecutive ME panel later was negative.  Patient also was tested for Lyme disease, Q fever, QuantiFERON gold TB, Brucella, Bartonella, was 9, Histoplasma, blastomycosis in all came back negative except for Q fever antibody, but reflects titers in these were negative as well.  Patient was initiated on antimicrobials on admit which were later discontinued once infectious workup came back negative.  Patient is symptomatically improved and was discharged back home on antiepileptics-Keppra and Vimpat and tamsulosin.  Recommended outpatient follow-up with Neurology.  Post DC it was noted that the patient MRG antibody came back positive suggestive of MOGAD  syndrome.  Patient brought back to the ED on 06/01 by his fiancee with complaints of acute onset right-sided facial droop, weakness, expressive aphasia.  Also noted receptive aphasia.  Hemodynamics stable.   Labs essentially unremarkable.  CT head negative.  Hospitalist team consulted for admission    B. Physical exam:  Agree with above    C. Medical decision making:    Seizure disorder with breakthrough seizures-likely accident seizures   Right-sided facial droop/weakness/expressive/receptive aphasia-suspect postictal state/ 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  Acute encephalopathy-likely postictal state   New diagnosis MOGAD syndrome  HISTORY of elevated opening pressure on LP/CSF PLEOCYTOSIS  Prophylaxis    Third inpatient hospitalization since early May  Evaluated by Neurology, Infectious Disease during previous two hospitalization  Extensive workup with MRI brain, extensive infectious workup  Did have elevated WBC in CSF in early May and mid May/CSF pleocytosis and elevated opening pressure  Presentation today consistent with absence seizures with postictal state /Eduardo's paralysis  IV Keppra 1500 mg b.i.d., Vimpat increased to 200 mg b.i.d.  IV fluids-normal saline at 75 cc/hour   NPO until his mentation is back to normal  Seizure precautions  P.r.n.  Neurology consulted   CT head was negative for acute changes   I have ordered 24 hour EEG  Repeat LP at the discretion of Neurology   It is to be noted that patient MOG Antibody has come back positive and his symptoms could be related to MOGAD syndrome  Neurology has ordered a dose of IVIG, monitor response on the same  DVT prophylaxis-bilateral SCDs        All diagnosis and differential diagnosis have been reviewed; assessment and plan has been documented; I have personally reviewed the labs and test results that are presently available; I have reviewed the patients medication list; I have reviewed the consulting providers response and recommendations. I have reviewed or attempted to review medical records based upon their availability.    All of the patient and family questions have been addressed and answered. Patient's is agreeable to the above stated plan. I  will continue to monitor closely and make adjustments to medical management as needed.      06/01/2024

## 2024-06-01 NOTE — ED PROVIDER NOTES
"Encounter Date: 6/1/2024    SCRIBE #1 NOTE: I, Clayton Silva, am scribing for, and in the presence of,  Tariq Baer MD. I have scribed the following portions of the note - Other sections scribed: HPI, ROS, PE.       History     Chief Complaint   Patient presents with    Medical     Pt laura reports pt "not acting right" with R sided facial droop, aphasia, R arm weakness. Not following commands. Answers "yes" to all questions. Newly diagnosed seizures. Onset 1230     22 y/o male with a hx of seizures presents to the ED for aphasia. Pt's laura present with pt states at around 1230 the pt got quiet and began staring off into space. She notes he began having aphasia, right facial droop, and right arm weakness. She also notes he has not been able to answer her questions. Code FAST activated in triage. She notes the pt has had multiple admissions and ER visits recently due to suspected seizures. She states the pt was seen in [place] and was taken off of his seizure medications but he continued to have seizures. Pt seen at [facility] in May and was given TNK. She states the pt was discharged recently. Pt on vimpat and keppra.     Hx and ROS limited due to pt's change in mental status    The history is provided by a significant other. History limited by: Mental status change. No  was used.     Review of patient's allergies indicates:  No Known Allergies  Past Medical History:   Diagnosis Date    Viral or Aseptic meningitis 05/07/2024     Past Surgical History:   Procedure Laterality Date    MAGNETIC RESONANCE IMAGING N/A 5/10/2024    Procedure: MRI (Magnetic Resonance Imagine);  Surgeon: Araceli Bergman MD;  Location: [facility];  Service: Anesthesiology;  Laterality: N/A;     No family history on file.  Social History     Tobacco Use    Smoking status: Never    Smokeless tobacco: Never   Substance Use Topics    Alcohol use: Not Currently    Drug use: Never     Review of Systems   Unable to " perform ROS: Mental status change       Physical Exam     Initial Vitals [06/01/24 1318]   BP Pulse Resp Temp SpO2   122/78 104 18 98.1 °F (36.7 °C) 100 %      MAP       --         Physical Exam    Constitutional: He appears well-developed and well-nourished.   HENT:   Head: Normocephalic and atraumatic.   Cardiovascular:  Normal rate.           Pulmonary/Chest: No respiratory distress. He has no wheezes. He has no rhonchi. He exhibits no tenderness.   Abdominal: Abdomen is soft. He exhibits no distension. There is no abdominal tenderness. There is no rebound and no guarding.   Musculoskeletal:         General: Normal range of motion.     Neurological: He is alert.   Slight right facial droop. Right arm drift. Expressive and recessive aphasia. Does not follow commands.    Skin: Skin is warm and dry.         ED Course   Procedures  Labs Reviewed   COMPREHENSIVE METABOLIC PANEL - Abnormal; Notable for the following components:       Result Value    Glucose 147 (*)     All other components within normal limits   LIPID PANEL - Abnormal; Notable for the following components:    HDL Cholesterol 31 (*)     Cholesterol/HDL Ratio 6 (*)     All other components within normal limits   CBC WITH DIFFERENTIAL - Abnormal; Notable for the following components:    RBC 4.53 (*)     Hgb 13.1 (*)     Hct 38.7 (*)     All other components within normal limits   POCT GLUCOSE - Abnormal; Notable for the following components:    POCT Glucose 169 (*)     All other components within normal limits   ISTAT CHEM8 - Abnormal; Notable for the following components:    POC Glucose 146 (*)     All other components within normal limits   CBC W/ AUTO DIFFERENTIAL    Narrative:     The following orders were created for panel order CBC W/ AUTO DIFFERENTIAL.  Procedure                               Abnormality         Status                     ---------                               -----------         ------                     CBC with  Differential[[id number]]       Abnormal            Final result                 Please view results for these tests on the individual orders.   PROTIME-INR   TSH   POCT GLUCOSE, HAND-HELD DEVICE   ISTAT PROCEDURE        ECG Results              ECG 12 lead (Preliminary result)  Result time 06/01/24 14:36:26      Wet Read by Tariq Baer MD (06/01/24 14:36:26, [facility] - Emergency Dept, Emergency Medicine)    1347 100 beats per minute sinus rhythm no ST elevation or depression                                  Imaging Results              CT HEAD FOR STROKE (Final result)  Result time 06/01/24 13:37:44   Procedure changed from CT Head Without Contrast     Final result by Ata Friend MD (06/01/24 13:37:44)                   Narrative:    EXAMINATION  CT HEAD FOR STROKE    CLINICAL HISTORY  Neuro deficit, acute, stroke suspected;    TECHNIQUE  Non-contrast axial CT images were acquired and multiplanar reconstructions accomplished by a CT technologist at a separate workstation, pushed to PACS for physician review.    COMPARISON  20 May 2024    FINDINGS  Images were reviewed in subdural, brain, soft tissue, and bone windows.    Exam quality: Motion/streak artifact limits assessment of the posterior fossa.    Hemorrhage: No evidence of acute hyperattenuating blood products.    Parenchyma: No discrete mass, localized mass-effect, or CT evidence of an acute territorial cortical-based ischemic insult. Gray-white differentiation is preserved.    Midline shift: None.    CSF spaces: Normal ventricle size and configuration. No masses or expansile fluid collections.    Scalp/Skull: No abnormalities.    Skull base: Mastoid air cells are well aerated. No focal abnormality of the sella.    Vasculature: No hyperdense artery identified. No abnormal densities within the dural sinuses.    Facial structures: Unremarkable.    IMPRESSION  1. No acute intracranial hemorrhage.  2. No findings to suggest large  "vascular territory acute ischemic insult.  3. Grossly unchanged appearance from the 20 May 2024 head CT.  ==========    In accordance with the "Code FAST" protocol, above findings were reported to Dr. Baer (Emergency Dept) prior to completion of the final report (6/1/2024; 13:32).    RECOMMENDATION(S)  Additional assessment utilizing brain MRI would allow more sensitive evaluation if symptoms persist or there is other cause for elevated clinical concern.    RADIATION DOSE  Automated tube current modulation, weight-based exposure dosing, and/or iterative reconstruction technique utilized to reach lowest reasonably achievable exposure rate.    DLP: 1132 mGy*cm      Electronically signed by: Ata Friend  Date:    06/01/2024  Time:    13:37                                     Medications - No data to display  Medical Decision Making  Differential diagnosis includes but is not limited to: stroke, seizures      Amount and/or Complexity of Data Reviewed  Labs: ordered.  Radiology: ordered.  ECG/medicine tests: ordered.    Risk  Decision regarding hospitalization.            Scribe Attestation:   Scribe #1: I performed the above scribed service and the documentation accurately describes the services I performed. I attest to the accuracy of the note.    Attending Attestation:           Physician Attestation for Scribe:  Physician Attestation Statement for Scribe #1: I, Tariq Baer MD, reviewed documentation, as scribed by Clayton Silva in my presence, and it is both accurate and complete.             ED Course as of 06/01/24 1443   Sat Jun 01, 2024   1330 Patient was had multiple admissions recently from 05/07 through 513 of this year where he had encephalopathy confusion seizures elevated intracranial pressure I would negative infectious workup.  Was seen by Ophthalmology for optic nerve swelling believed to be due to elevated intracranial pressure.  Patient had EEG that was negative.  Has a note in the system " from 2 days ago from Neurology they reports positive mi OG antibody raising concern of MOG antibody syndrome. Angle with stroke team knows patient from previuos admissions. Pt had thrombolytics earlier this month so no thrombolytics.  [LF]   1443 Discussed with Fabián.  The hospitalist will admit [LF]      ED Course User Index  [LF] Tariq Baer MD                           Clinical Impression:  Final diagnoses:  [R29.818] Acute focal neurological deficit  [R47.01] Aphasia (Primary)  [M62.81] Muscle weakness of right upper extremity          ED Disposition Condition    Admit Stable                Tariq Baer MD  06/01/24 0549

## 2024-06-01 NOTE — CONSULTS
[facility] - Emergency Dept  Neurology  Consult Note      Luis Traylor is a 23 y.o. male who presented to [facility] on 6/1/2024 with reports of inability to speak. Onset of symptoms was sudden, not related to any specific activity and last known normal 1200 on 6/1 . Stroke risk factors include none. Prior stroke history: none.       Past Medical History:   Diagnosis Date    Viral or Aseptic meningitis 05/07/2024     Past Surgical History:   Procedure Laterality Date    MAGNETIC RESONANCE IMAGING N/A 5/10/2024    Procedure: MRI (Magnetic Resonance Imagine);  Surgeon: Araceli Bergman MD;  Location: [facility];  Service: Anesthesiology;  Laterality: N/A;     No family history on file.  Social History     Tobacco Use    Smoking status: Never    Smokeless tobacco: Never   Substance Use Topics    Alcohol use: Not Currently    Drug use: Never      Review of patient's allergies indicates:  No Known Allergies      STROKE DOCUMENTATION   Acute Stroke Times   Last Known Normal Date: 06/01/24  Last Known Normal Time: 1230  Symptom Onset Date: 06/01/24  Symptom Onset Time: 1200  Stroke Team Called Date: 06/01/24  Stroke Team Called Time: 1320  Stroke Team Arrival Date: 06/01/24  Stroke Team Arrival Time: 1323  Thrombolytic Therapy Recommended: No (recently recieved TNK 5/2024)    NIH Scale:  1a. Level of Consciousness: 0-->Alert, keenly responsive  1b. LOC Questions: 1-->Answers one question correctly  1c. LOC Commands: 1-->Performs one task correctly  2. Best Gaze: 0-->Normal  3. Visual: 0-->No visual loss  4. Facial Palsy: 0-->Normal symmetrical movements  5a. Motor Arm, Left: 0-->No drift, limb holds 90 (or 45) degrees for full 10 secs  5b. Motor Arm, Right: 0-->No drift, limb holds 90 (or 45) degrees for full 10 secs  6a. Motor Leg, Left: 0-->No drift, leg holds 30 degree position for full 5 secs  6b. Motor Leg, Right: 0-->No drift, leg holds 30 degree position for full 5 secs  7. Limb Ataxia:  0-->Absent  8. Sensory: 0-->Normal, no sensory loss  9. Best Language: 0-->No aphasia, normal  10. Dysarthria: 1-->Mild-to-moderate dysarthria, patient slurs at least some words and, at worst, can be understood with some difficulty  11. Extinction and Inattention (formerly Neglect): 0-->No abnormality  Total (NIH Stroke Scale): 3     Modified [place] Score: 2        Neurological Exam:   LOC: alert and attempts to follow some requests  Language: Expressive aphasia, Receptive aphasia  Speech: Aphasic , mild aphasia, able to answer some simple questions  Orientation: Person, Place  Visual Fields (CN II): Full  Pupils (CN III, IV, VI): PERRL  Facial Movement (CN VII): symmetric facial expression  Motor*: Arm Left:  Normal (5/5), Leg Left:   Normal (5/5), Arm Right:   Normal (5/5), Leg Right:   Normal (5/5)        Laboratory:  BMP:   Lab Results   Component Value Date    GLUCOSE 94 05/25/2024     05/25/2024     05/13/2024    K 4.3 05/25/2024    K 4.3 05/13/2024     (H) 05/25/2024     05/13/2024    CO2 26 05/25/2024    CO2 20 (L) 05/13/2024    BUN 16.9 05/25/2024    BUN 14 05/13/2024    CREATININE 0.95 05/25/2024    CREATININE 1.1 05/13/2024    CALCIUM 7.9 (L) 05/25/2024    CALCIUM 9.4 05/13/2024     CBC:   Lab Results   Component Value Date    WBC 17.43 (H) 05/24/2024    WBC 33.17 05/19/2024    WBC 13.39 (H) 05/13/2024    RBC 4.70 05/24/2024    RBC 5.10 05/13/2024    HGB 13.5 (L) 05/24/2024    HGB 14.8 05/13/2024    HCT 40 06/01/2024     05/24/2024     05/13/2024    MCV 83.8 05/24/2024    MCV 84 05/13/2024    MCH 28.7 05/24/2024    MCH 29.0 05/13/2024    MCHC 34.3 05/24/2024    MCHC 34.5 05/13/2024     Lipid Panel:   Lab Results   Component Value Date    CHOL 122 05/07/2024    HDL 37 05/07/2024    TRIG 52 05/07/2024     Coagulation:   Lab Results   Component Value Date    INR 1.2 06/01/2024    APTT 23.3 05/21/2024     Hgb A1C:   Lab Results   Component Value Date    HGBA1C 5.0  05/07/2024     TSH:   Lab Results   Component Value Date    TSH 0.403 05/07/2024       Diagnostic Results  Brain Imaging:   -CTh: negaitve          Discussed with neurologist on call:   Discussed with Dr. Hinojosa on call.  This patient is not a candidate for thrombolytics 2/2 receiving thrombolytics at the beginning of May (5/7/2024).  Strong suspicion for possible seizure with post-ictal presentation particularly in the setting of recent hospitalization for recurrent seizures.       Thrombolysis Candidate? No, received TNK/tPA 5/2024      PLAN:    -not likely stroke   -could consider EEG, possibly extended EEG?  -continue to follow exam  -CT head unremarkable   -home AED: Keppra 1500 mg BID, Bimpat 100 mg BID  -seizure precautions  -ativan for breakthrough seizures  -MOG antibody positive   -Further recommendations to follow from Dr. Hinojosa     Thank you for your consult.   Further recommendations to follow from MD Nemo Maldonado, NP  Neurology  [facility] - Emergency Dept      I have seen/examined the patient.  NP was scribe.  I agree with the findings unless outlined below:    Phong Hinojosa MD  [facility]     There is clearly something going on with this patient  2mos of relapsing, prolonged episodes of seizure and AMS    Multiple admissions for same    Can go about 2 weeks 'normal' than suddenly relapses, as happened today    Exam is encephalopathic, but aware/responsive    Labs reviewed in detail    Notable positives  MOGAD  Porphyrins      Plan  Increase keppra and vimpat to 1500 mg bid and 200 mg bid respectively  Privigen 40 g now  Send off for more detailed porphyrin analysis as recommended in the lab results  Eeg tomorrow    He is presenting as ADEM, and that Is why I am ordering IVIG

## 2024-06-01 NOTE — FIRST PROVIDER EVALUATION
"Medical screening examination initiated.  I have conducted a focused provider triage encounter, findings are as follows:    Chief Complaint   Patient presents with    Medical     Pt fiance reports pt "not acting right" with R sided facial droop, aphasia, R arm weakness. Not following commands. Answers "yes" to all questions. Newly diagnosed seizures. Onset 1230     Brief history of present illness:  23 y.o. male presents to the ED with fiance for aphasia, right facial droop and right arm weakness that started 30 minutes PTA. Admitted to hospital on 5/15 with TIA, encephalitis, elevated intracranial pressure, and seizures; currently on Vimpat and Keppra.     Vitals:    06/01/24 1318   BP: 122/78   Pulse: 104   Resp: 18   Temp: 98.1 °F (36.7 °C)   TempSrc: Oral   SpO2: 100%       Pertinent physical exam:  Awake, ambulatory, non-labored respirations, right facial droop, weakness to RUE, not following commands, aphasic    Brief workup plan:  labs, CT    Preliminary workup initiated; this workup will be continued and followed by the physician or advanced practice provider that is assigned to the patient when roomed.  "

## 2024-06-02 PROBLEM — E80.20 PORPHYRIA: Status: ACTIVE | Noted: 2024-06-02

## 2024-06-02 PROBLEM — R29.818 ACUTE FOCAL NEUROLOGICAL DEFICIT: Status: ACTIVE | Noted: 2024-05-16

## 2024-06-02 LAB
BACTERIA #/AREA URNS AUTO: NORMAL /HPF
BILIRUB UR QL STRIP.AUTO: NEGATIVE
CLARITY UR: CLEAR
COLOR UR AUTO: COLORLESS
GLUCOSE UR QL STRIP: NORMAL
HGB UR QL STRIP: NEGATIVE
KETONES UR QL STRIP: NEGATIVE
LEUKOCYTE ESTERASE UR QL STRIP: NEGATIVE
NITRITE UR QL STRIP: NEGATIVE
OHS QRS DURATION: 80 MS
OHS QTC CALCULATION: 464 MS
PH UR STRIP: 7 [PH]
PROT UR QL STRIP: NEGATIVE
RBC #/AREA URNS AUTO: NORMAL /HPF
SP GR UR STRIP.AUTO: 1.01 (ref 1–1.03)
SQUAMOUS #/AREA URNS LPF: NORMAL /HPF
UROBILINOGEN UR STRIP-ACNC: NORMAL
WBC #/AREA URNS AUTO: NORMAL /HPF

## 2024-06-02 PROCEDURE — 95816 EEG AWAKE AND DROWSY: CPT | Mod: 26,,, | Performed by: PSYCHIATRY & NEUROLOGY

## 2024-06-02 PROCEDURE — 87040 BLOOD CULTURE FOR BACTERIA: CPT | Performed by: STUDENT IN AN ORGANIZED HEALTH CARE EDUCATION/TRAINING PROGRAM

## 2024-06-02 PROCEDURE — C9254 INJECTION, LACOSAMIDE: HCPCS | Performed by: PSYCHIATRY & NEUROLOGY

## 2024-06-02 PROCEDURE — 63600175 PHARM REV CODE 636 W HCPCS: Performed by: PSYCHIATRY & NEUROLOGY

## 2024-06-02 PROCEDURE — 81406 MOPATH PROCEDURE LEVEL 7: CPT | Performed by: PSYCHIATRY & NEUROLOGY

## 2024-06-02 PROCEDURE — 87077 CULTURE AEROBIC IDENTIFY: CPT | Performed by: STUDENT IN AN ORGANIZED HEALTH CARE EDUCATION/TRAINING PROGRAM

## 2024-06-02 PROCEDURE — 99233 SBSQ HOSP IP/OBS HIGH 50: CPT | Mod: ,,, | Performed by: PSYCHIATRY & NEUROLOGY

## 2024-06-02 PROCEDURE — 81405 MOPATH PROCEDURE LEVEL 6: CPT

## 2024-06-02 PROCEDURE — 87154 CUL TYP ID BLD PTHGN 6+ TRGT: CPT | Performed by: STUDENT IN AN ORGANIZED HEALTH CARE EDUCATION/TRAINING PROGRAM

## 2024-06-02 PROCEDURE — 21400001 HC TELEMETRY ROOM

## 2024-06-02 PROCEDURE — 25000003 PHARM REV CODE 250: Performed by: NURSE PRACTITIONER

## 2024-06-02 PROCEDURE — 36415 COLL VENOUS BLD VENIPUNCTURE: CPT | Performed by: STUDENT IN AN ORGANIZED HEALTH CARE EDUCATION/TRAINING PROGRAM

## 2024-06-02 PROCEDURE — 30000890 HC MISC. SEND OUT TEST

## 2024-06-02 PROCEDURE — 25000003 PHARM REV CODE 250: Performed by: PSYCHIATRY & NEUROLOGY

## 2024-06-02 PROCEDURE — 30000890 MAYO GENERIC ORDERABLE: Performed by: PSYCHIATRY & NEUROLOGY

## 2024-06-02 PROCEDURE — 81001 URINALYSIS AUTO W/SCOPE: CPT | Performed by: STUDENT IN AN ORGANIZED HEALTH CARE EDUCATION/TRAINING PROGRAM

## 2024-06-02 RX ADMIN — LEVETIRACETAM INJECTION 1500 MG: 15 INJECTION INTRAVENOUS at 09:06

## 2024-06-02 RX ADMIN — LACOSAMIDE 200 MG: 10 INJECTION INTRAVENOUS at 05:06

## 2024-06-02 RX ADMIN — LACOSAMIDE 200 MG: 10 INJECTION INTRAVENOUS at 04:06

## 2024-06-02 RX ADMIN — ACETAMINOPHEN 325MG 650 MG: 325 TABLET ORAL at 04:06

## 2024-06-02 RX ADMIN — LEVETIRACETAM INJECTION 1500 MG: 15 INJECTION INTRAVENOUS at 11:06

## 2024-06-02 NOTE — PROCEDURES
EEG    Dx: MOGAD, seizure, encephalopathy    Duration: 26 min    Technical: Standard digital EEG was performed at [facility]. The 10/20 international system of electrode placement was used.  Photic   stimulation and hyperventilation were not performed as activating   procedures.    Description: The posterior dominant rhythm was 6 Hz.  Left frontal slowing relative to right was present.  The patient was awake for the duration of the study.  There were   no epileptiform discharges or clinical seizures seen.    Impression: Left sided slowing superimposed on generalized slowing, which is moderate. Indicative of structural or functional distrubance. Can be seen as a postictal finding as well. No ongoing seizure during this evaluation.

## 2024-06-02 NOTE — PROGRESS NOTES
[facility] - [facility] HOSPITAL MEDICINE ~ PROGRESS NOTE    CHIEF COMPLAINT   Hospital follow up for [facility]    HOSPITAL COURSE   23 y.o. male with a PMHx of viral/aseptic meningitis and seizure disorderwho presented to [facility] on 6/1/2024 with c/o right-sided facial droop, right-sided weakness, and aphasia that began prior to arrival.  Patient's significant other reports that he was unable to answer questions.  Code fast caught upon arrival to ED and patient was evaluated by Neurology.  CT head negative for acute intracranial hemorrhage, no findings to suggest large vascular territory acute ischemic insult. Labs essentially unremarkable.  He was admitted to hospital medicine service for further medical management.     Of note, patient was initially hospitalized on 05/07/2024 when he was transferred from [facility] to [facility] for higher level of care due to concern for CVA.    Initial symptoms started as weakness and numbness in the left arm and leg with severe headaches with associated photophobia, nausea, and vomiting X2 weeks.    He was given a partial dose of tPA due to gingival bleeding.    Admitted to ICU here.    Left-sided weakness improved over the course of his admission; however mentation continued to wax and wane.    LP was done on 05/09/2024 with CSF demonstrating leukocytosis and with lymph node predominance.    Then began having episodes of absence seizures, with staring spells.    Blood cultures from 05/07 and 05/08 were negative.   MRI brain on 05/07/2024 negative for acute intracranial findings.     He was transferred to [facility] for Neuro ICU for possible encephalitis on 05/10/2024. MRI brain with and without contrast on 05/16/2024 negative for acute intracranial findings. MRA and MRV of the Brain were also unremarkable. He was discharged from [facility] on 05/13/2024 with noted return to neurological baseline.  Antiepileptic medications and antimicrobials were  discontinued.  Felt to be viral or aseptic meningitis.       Extensive encephalopathy panel was sent and course seemed to favor possible viral or autoimmune/inflammatory etiology; viral meningitis versus aseptic meningitis. Farm animal exposure noted. Notable results include:  MOG FACS positive 1:1000  Coproporphyrin,tetra level of 177     He then again presented to [facility] on 05/15/2024 for worsening headache and slurred speech.    He was admitted to hospitalist service.  Evaluated by Neurology and ID.  EEG noted a right temporal lobe electrographic seizure, right temporal lobe slowing.    Noted to have previous LP opening pressure of 43 cm of H2O.    On 05/21 he began having left flank pain for which CT abdomen was done and demonstrated left ureteral stone, evaluated by Urology, and started on Flomax.    Repeat LP showed persistent elevated opening pressure, remained afebrile  Ophthalmology consulted for evaluation in light of complaints of R eye swelling & conjunctival injection; plan for outpatient F/U.    He was discharged home on 05/25/2024 with neurology and ophthalmology follow-up.  Discharge prescriptions included Keppra  1500 mg p.o. b.I.d., Vimpat 100 mg p.o. every 12 hours and Flomax 0.4 mg p.o. daily    Today  Today evaluated sitting up in bed after undergoing EEG.  Nany at bedside as well as his father.  His nany reports he responds inappropriately on occasion however significantly improved from this morning.  She also describes that his papular rash is new since last time he was at our facility.  No evidence of seizure activity on my exam, no active complaints.  OBJECTIVE/PHYSICAL EXAM     VITAL SIGNS (MOST RECENT):  Temp: 97.8 °F (36.6 °C) (06/02/24 1143)  Pulse: 84 (06/02/24 1143)  Resp: 18 (06/02/24 1143)  BP: 121/73 (06/02/24 1143)  SpO2: 98 % (06/02/24 1143) VITAL SIGNS (24 HOUR RANGE):  Temp:  [97.8 °F (36.6 °C)-101.3 °F (38.5 °C)] 97.8 °F (36.6 °C)  Pulse:  [] 84  Resp:  [10-19]  18  SpO2:  [95 %-100 %] 98 %  BP: (114-142)/(69-90) 121/73   GENERAL: In no acute distress, afebrile  HEENT:PERRLA  CHEST: Clear to auscultation bilaterally  HEART: S1, S2, no appreciable murmur  ABDOMEN: Soft, nontender, BS +  MSK: Warm, no lower extremity edema, no clubbing or cyanosis  NEUROLOGIC: Alert and oriented x4, moving all extremities with good strength   INTEGUMENTARY: thoracic and left flank papular rash  ASSESSMENT/PLAN   Seizure disorder with breakthrough seizures-likely accident seizures   Right-sided facial droop/weakness/expressive/receptive aphasia-suspect postictal state/ 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  Acute encephalopathy-likely postictal state   New diagnosis MOGAD syndrome  HISTORY of elevated opening pressure on LP/CSF PLEOCYTOSIS  Papular Rash   Fever  Prophylaxis     Third inpatient hospitalization since early May  Evaluated by Neurology, Infectious Disease during previous two hospitalization  Extensive workup with MRI brain, extensive infectious workup  Did have elevated WBC in CSF in early May and mid May/CSF pleocytosis and elevated opening pressure  Presentation today consistent with absence seizures with postictal state /Eduardo's paralysis  IV Keppra 1500 mg b.i.d., Vimpat increased to 200 mg b.i.d.  Resume regular diet 6/1 as mentation as normalized   Seizure precautions  CT head was negative for acute changes   EEG demonstrated structural/functional disturbance, no evidence of ongoing seizure  Repeat LP at the discretion of Neurology   It is to be noted that patient MOG Antibody has come back positive and his symptoms could be related to MOGAD syndrome  S/p IVIG 6/1, Dr. Hinojosa is considering another dose on 06/03/2024  Rash secondary to vimpat/keppra?  Pt has episode of fever 6/1 and low grade temp 6/2  UA, blood cultures and CXR obtained 6/2--> follow  Denies cough, dysuria, diarrhea, abdominal pain  DVT prophylaxis-bilateral SCDs    Anticipated discharge and disposition:   Home when medically stable  __________________________________________________________________________    NUTRITIONAL STATUS     Patient meets ASPEN criteria for   malnutrition of   per RD assessment as evidenced by:                       A minimum of two characteristics is recommended for diagnosis of either severe or non-severe malnutrition.     LABS/MICRO/MEDS/DIAGNOSTICS       LABS  Recent Labs     06/01/24  1341      K 3.5   CO2 24   BUN 9.1   CREATININE 0.94   GLUCOSE 147*   CALCIUM 9.1   ALKPHOS 58   AST 13   ALT 19   ALBUMIN 3.8     Recent Labs     06/01/24  1341   WBC 9.15   RBC 4.53*   HCT 38.7*   MCV 85.4          MICROBIOLOGY  Microbiology Results (last 7 days)       Procedure Component Value Units Date/Time    Blood Culture [[id number]] Collected: 06/02/24 1122    Order Status: Resulted Specimen: Blood Updated: 06/02/24 1125    Blood Culture [[id number]] Collected: 06/02/24 1119    Order Status: Resulted Specimen: Blood from Hand, Right Updated: 06/02/24 1125               MEDICATIONS   lacosamide (VIMPAT) IVPB  200 mg Intravenous Q12H    levETIRAcetam (Keppra) IV (PEDS and ADULTS)  1,500 mg Intravenous Q12H         INFUSIONS   sodium chloride 0.9%   Intravenous Continuous 75 mL/hr at 06/01/24 1556 New Bag at 06/01/24 1556          DIAGNOSTIC TESTS  X-Ray Chest 1 View   Final Result      No acute cardiopulmonary abnormality.         Electronically signed by: Francisco Rodney MD   Date:    06/02/2024   Time:    10:39      CT HEAD FOR STROKE   Final Result           No echocardiogram results found for the past 14 days.         Case related differential diagnoses have been reviewed; assessment and plan has been documented. I have personally reviewed the labs and test results that are currently available; I have reviewed the patients medication list. I have reviewed the consulting providers recommendations. I have reviewed or attempted to review medical records based upon their availability.  All of  the patient's and/or family's questions have been addressed and answered to the best of my ability.  I will continue to monitor closely and make adjustments to medical management as needed.  This document was created using M*Modal Fluency Direct.  Transcription errors may have been made.  Please contact me if any questions may rise regarding documentation to clarify transcription.        Thairy G Reyes,    Internal Medicine  Department of [facility] Medicine  [facility] - [facility]

## 2024-06-02 NOTE — NURSING
Nurses Note -- 4 Eyes      6/1/2024   8:04 PM      Skin assessed during: Admit      [] No Altered Skin Integrity Present    []Prevention Measures Documented      [] Yes- Altered Skin Integrity Present or Discovered   [] LDA Added if Not in Epic (Describe Wound)   [] New Altered Skin Integrity was Present on Admit and Documented in LDA   [] Wound Image Taken    Wound Care Consulted? No    Attending Nurse:  Bhavani Galarza RN/Staff Member:  Cory Faith RN

## 2024-06-02 NOTE — PROGRESS NOTES
S  Doing a little better  Still had a sudden onset dysarthria earlier today  No obvious seizure    Vitals:    06/02/24 0722   BP: 114/69   Pulse: 88   Resp: 18   Temp: 99 °F (37.2 °C)     Scheduled Meds:   lacosamide (VIMPAT) IVPB  200 mg Intravenous Q12H    levETIRAcetam (Keppra) IV (PEDS and ADULTS)  1,500 mg Intravenous Q12H     Continuous Infusions:   sodium chloride 0.9%   Intravenous Continuous 75 mL/hr at 06/01/24 1556 New Bag at 06/01/24 1556     PRN Meds:.  Current Facility-Administered Medications:     acetaminophen, 650 mg, Oral, Q8H PRN    acetaminophen, 650 mg, Oral, Q4H PRN    lorazepam, 2 mg, Intravenous, Q4H PRN    ondansetron, 4 mg, Intravenous, Q8H PRN    Got ivig last night    Exam  Dysarthric  Occ nonsensical speech  Motor/cn intact  Some perseveration and difficulty following commands; can mime    imp\  MOGAD   Porphyria  Working up etiologies  Considering another ivig maybe tomorrow  Wife had ? Regarding checking ICP; will reassess tomorrow before getting another LP  Sz contorlled; keppra/vimpat

## 2024-06-03 LAB
ACINETOBACTER CALCOACETICUS-BAUMANNII COMPLEX (OHS): NOT DETECTED
ANION GAP SERPL CALC-SCNC: 9 MEQ/L
APPEARANCE CSF: CLEAR
AV INDEX (PROSTH): 0.77
AV MEAN GRADIENT: 4 MMHG
AV PEAK GRADIENT: 6 MMHG
AV VALVE AREA BY VELOCITY RATIO: 2.64 CM²
AV VALVE AREA: 2.41 CM²
AV VELOCITY RATIO: 0.84
BACTEROIDES FRAGILIS (OHS): NOT DETECTED
BASOPHILS # BLD AUTO: 0.04 X10(3)/MCL
BASOPHILS NFR BLD AUTO: 0.7 %
BUN SERPL-MCNC: 10.3 MG/DL (ref 8.9–20.6)
C AURIS DNA BLD POS QL NAA+NON-PROBE: NOT DETECTED
C GATTII+NEOFOR DNA CSF QL NAA+NON-PROBE: NOT DETECTED
CALCIUM SERPL-MCNC: 8.8 MG/DL (ref 8.4–10.2)
CANDIDA ALBICANS (OHS): NOT DETECTED
CANDIDA GLABRATA (OHS): NOT DETECTED
CANDIDA KRUSEI (OHS): NOT DETECTED
CANDIDA PARAPSILOSIS (OHS): NOT DETECTED
CANDIDA TROPICALIS (OHS): NOT DETECTED
CHLORIDE SERPL-SCNC: 108 MMOL/L (ref 98–107)
CO2 SERPL-SCNC: 23 MMOL/L (ref 22–29)
COLOR CSF: COLORLESS
CREAT SERPL-MCNC: 0.99 MG/DL (ref 0.73–1.18)
CREAT/UREA NIT SERPL: 10
CTX-M (OHS): ABNORMAL
CV ECHO LV RWT: 0.37 CM
DOP CALC AO PEAK VEL: 1.25 M/S
DOP CALC AO VTI: 21.5 CM
DOP CALC LVOT AREA: 3.1 CM2
DOP CALC LVOT DIAMETER: 2 CM
DOP CALC LVOT PEAK VEL: 1.05 M/S
DOP CALC LVOT STROKE VOLUME: 51.81 CM3
DOP CALC MV VTI: 26.5 CM
DOP CALCLVOT PEAK VEL VTI: 16.5 CM
E WAVE DECELERATION TIME: 187 MSEC
E/A RATIO: 1.06
E/E' RATIO: 4.8 M/S
ECHO LV POSTERIOR WALL: 0.95 CM (ref 0.6–1.1)
ENTEROBACTER CLOACAE COMPLEX (OHS): NOT DETECTED
ENTEROBACTERALES (OHS): NOT DETECTED
ENTEROCOCCUS FAECALIS (OHS): NOT DETECTED
ENTEROCOCCUS FAECIUM (OHS): NOT DETECTED
EOSINOPHIL # BLD AUTO: 0.06 X10(3)/MCL (ref 0–0.9)
EOSINOPHIL NFR BLD AUTO: 1.1 %
ERYTHROCYTE [DISTWIDTH] IN BLOOD BY AUTOMATED COUNT: 13.7 % (ref 11.5–17)
ESCHERICHIA COLI (OHS): NOT DETECTED
FRACTIONAL SHORTENING: 26 % (ref 28–44)
GFR SERPLBLD CREATININE-BSD FMLA CKD-EPI: >60 ML/MIN/1.73/M2
GLUCOSE CSF-MCNC: 40 MG/DL (ref 40–70)
GLUCOSE SERPL-MCNC: 96 MG/DL (ref 74–100)
GP B STREP DNA CSF QL NAA+NON-PROBE: NOT DETECTED
HAEM INFLU DNA CSF QL NAA+NON-PROBE: NOT DETECTED
HCT VFR BLD AUTO: 38.4 % (ref 42–52)
HGB BLD-MCNC: 12.5 G/DL (ref 14–18)
IMM GRANULOCYTES # BLD AUTO: 0.02 X10(3)/MCL (ref 0–0.04)
IMM GRANULOCYTES NFR BLD AUTO: 0.4 %
IMP (OHS): ABNORMAL
INTERVENTRICULAR SEPTUM: 0.79 CM (ref 0.6–1.1)
KLEBSIELLA AEROGENES (OHS): NOT DETECTED
KLEBSIELLA OXYTOCA (OHS): NOT DETECTED
KLEBSIELLA PNEUMONIAE GROUP (OHS): NOT DETECTED
KPC (OHS): ABNORMAL
L MONOCYTOG DNA CSF QL NAA+NON-PROBE: NOT DETECTED
LEFT ATRIUM SIZE: 2.6 CM
LEFT ATRIUM VOLUME INDEX MOD: 6.5 ML/M2
LEFT ATRIUM VOLUME MOD: 12.3 CM3
LEFT INTERNAL DIMENSION IN SYSTOLE: 3.78 CM (ref 2.1–4)
LEFT VENTRICLE DIASTOLIC VOLUME INDEX: 64.74 ML/M2
LEFT VENTRICLE DIASTOLIC VOLUME: 123 ML
LEFT VENTRICLE MASS INDEX: 82 G/M2
LEFT VENTRICLE SYSTOLIC VOLUME INDEX: 32.2 ML/M2
LEFT VENTRICLE SYSTOLIC VOLUME: 61.2 ML
LEFT VENTRICULAR INTERNAL DIMENSION IN DIASTOLE: 5.09 CM (ref 3.5–6)
LEFT VENTRICULAR MASS: 155.97 G
LV LATERAL E/E' RATIO: 4 M/S
LV SEPTAL E/E' RATIO: 6 M/S
LVOT MG: 2 MMHG
LVOT MV: 0.67 CM/S
LYMPHOCYTE MANUAL CSF (OHS): 2 %
LYMPHOCYTES # BLD AUTO: 1.11 X10(3)/MCL (ref 0.6–4.6)
LYMPHOCYTES NFR BLD AUTO: 20.1 %
Lab: 2 %
MCH RBC QN AUTO: 28.7 PG (ref 27–31)
MCHC RBC AUTO-ENTMCNC: 32.6 G/DL (ref 33–36)
MCR-1 (OHS): ABNORMAL
MCV RBC AUTO: 88.1 FL (ref 80–94)
MECA/C (OHS): DETECTED
MECA/C AND MREJ (MRSA)(OHS): ABNORMAL
MONOCYTE MANUAL CSF (OHS): 14 %
MONOCYTES # BLD AUTO: 0.64 X10(3)/MCL (ref 0.1–1.3)
MONOCYTES NFR BLD AUTO: 11.6 %
MV MEAN GRADIENT: 2 MMHG
MV PEAK A VEL: 0.68 M/S
MV PEAK E VEL: 0.72 M/S
MV PEAK GRADIENT: 3 MMHG
MV STENOSIS PRESSURE HALF TIME: 48 MS
MV VALVE AREA BY CONTINUITY EQUATION: 1.96 CM2
MV VALVE AREA P 1/2 METHOD: 4.58 CM2
N MEN DNA CSF QL NAA+NON-PROBE: NOT DETECTED
NDM (OHS): ABNORMAL
NEUTROPHILS # BLD AUTO: 3.66 X10(3)/MCL (ref 2.1–9.2)
NEUTROPHILS MAN CSF (OHS): 82 %
NEUTROPHILS NFR BLD AUTO: 66.1 %
NRBC BLD AUTO-RTO: 0 %
OHS LV EJECTION FRACTION SIMPSONS BIPLANE MOD: 57 %
OXA-48-LIKE (OHS): ABNORMAL
PISA TR MAX VEL: 2.02 M/S
PLATELET # BLD AUTO: 182 X10(3)/MCL (ref 130–400)
PMV BLD AUTO: 8.3 FL (ref 7.4–10.4)
POTASSIUM SERPL-SCNC: 4.5 MMOL/L (ref 3.5–5.1)
PROT CSF-MCNC: 91.8 MG/DL (ref 15–45)
PROTEUS SPP. (OHS): NOT DETECTED
PSEUDOMONAS AERUGINOSA (OHS): NOT DETECTED
PV PEAK GRADIENT: 4 MMHG
PV PEAK VELOCITY: 0.98 M/S
RA PRESSURE ESTIMATED: 3 MMHG
RBC # BLD AUTO: 4.36 X10(6)/MCL (ref 4.7–6.1)
RBC # CSF MANUAL: 50 /UL
RV TB RVSP: 5 MMHG
S ENT+BONG DNA STL QL NAA+NON-PROBE: NOT DETECTED
S PNEUM DNA CSF QL NAA+NON-PROBE: NOT DETECTED
SERRATIA MARCESCENS (OHS): NOT DETECTED
SODIUM SERPL-SCNC: 140 MMOL/L (ref 136–145)
SPECIMEN VOL CSF: 4 ML
STAPHYLOCOCCUS AUREUS (OHS): NOT DETECTED
STAPHYLOCOCCUS EPIDERMIDIS (OHS): DETECTED
STAPHYLOCOCCUS LUGDUNENSIS (OHS): NOT DETECTED
STAPHYLOCOCCUS SPP. (OHS): DETECTED
STENOTROPHOMONAS MALTOPHILIA (OHS): NOT DETECTED
STREPTOCOCCUS PYOGENES (GROUP A)(OHS): NOT DETECTED
STREPTOCOCCUS SPP. (OHS): NOT DETECTED
TDI LATERAL: 0.18 M/S
TDI SEPTAL: 0.12 M/S
TDI: 0.15 M/S
TR MAX PG: 16 MMHG
TRICUSPID ANNULAR PLANE SYSTOLIC EXCURSION: 2.52 CM
TV REST PULMONARY ARTERY PRESSURE: 19 MMHG
VANA/B (OHS): ABNORMAL
VIM (OHS): ABNORMAL
WBC # CSF MANUAL: 2929 /UL (ref 0–5)
WBC # SPEC AUTO: 5.53 X10(3)/MCL (ref 4.5–11.5)
Z-SCORE OF LEFT VENTRICULAR DIMENSION IN END DIASTOLE: -0.46
Z-SCORE OF LEFT VENTRICULAR DIMENSION IN END SYSTOLE: 1.11

## 2024-06-03 PROCEDURE — 83916 OLIGOCLONAL BANDS: CPT | Performed by: PSYCHIATRY & NEUROLOGY

## 2024-06-03 PROCEDURE — 009U3ZX DRAINAGE OF SPINAL CANAL, PERCUTANEOUS APPROACH, DIAGNOSTIC: ICD-10-PCS | Performed by: STUDENT IN AN ORGANIZED HEALTH CARE EDUCATION/TRAINING PROGRAM

## 2024-06-03 PROCEDURE — 87070 CULTURE OTHR SPECIMN AEROBIC: CPT | Performed by: HOSPITALIST

## 2024-06-03 PROCEDURE — 36415 COLL VENOUS BLD VENIPUNCTURE: CPT | Performed by: STUDENT IN AN ORGANIZED HEALTH CARE EDUCATION/TRAINING PROGRAM

## 2024-06-03 PROCEDURE — 80048 BASIC METABOLIC PNL TOTAL CA: CPT | Performed by: STUDENT IN AN ORGANIZED HEALTH CARE EDUCATION/TRAINING PROGRAM

## 2024-06-03 PROCEDURE — 63600175 PHARM REV CODE 636 W HCPCS: Performed by: INTERNAL MEDICINE

## 2024-06-03 PROCEDURE — 88185 FLOWCYTOMETRY/TC ADD-ON: CPT

## 2024-06-03 PROCEDURE — 63600175 PHARM REV CODE 636 W HCPCS: Mod: JZ,JG | Performed by: PSYCHIATRY & NEUROLOGY

## 2024-06-03 PROCEDURE — C9254 INJECTION, LACOSAMIDE: HCPCS | Performed by: PSYCHIATRY & NEUROLOGY

## 2024-06-03 PROCEDURE — 25000003 PHARM REV CODE 250: Performed by: INTERNAL MEDICINE

## 2024-06-03 PROCEDURE — 25000003 PHARM REV CODE 250: Performed by: PSYCHIATRY & NEUROLOGY

## 2024-06-03 PROCEDURE — 88184 FLOWCYTOMETRY/ TC 1 MARKER: CPT | Performed by: INTERNAL MEDICINE

## 2024-06-03 PROCEDURE — 87483 CNS DNA AMP PROBE TYPE 12-25: CPT | Performed by: HOSPITALIST

## 2024-06-03 PROCEDURE — 84157 ASSAY OF PROTEIN OTHER: CPT | Performed by: PSYCHIATRY & NEUROLOGY

## 2024-06-03 PROCEDURE — 87801 DETECT AGNT MULT DNA AMPLI: CPT | Performed by: HOSPITALIST

## 2024-06-03 PROCEDURE — 82945 GLUCOSE OTHER FLUID: CPT | Performed by: PSYCHIATRY & NEUROLOGY

## 2024-06-03 PROCEDURE — 21400001 HC TELEMETRY ROOM

## 2024-06-03 PROCEDURE — 83521 IG LIGHT CHAINS FREE EACH: CPT | Performed by: PSYCHIATRY & NEUROLOGY

## 2024-06-03 PROCEDURE — 25000003 PHARM REV CODE 250: Performed by: NURSE PRACTITIONER

## 2024-06-03 PROCEDURE — 99233 SBSQ HOSP IP/OBS HIGH 50: CPT | Mod: ,,, | Performed by: PSYCHIATRY & NEUROLOGY

## 2024-06-03 PROCEDURE — 89051 BODY FLUID CELL COUNT: CPT | Performed by: PSYCHIATRY & NEUROLOGY

## 2024-06-03 PROCEDURE — 85025 COMPLETE CBC W/AUTO DIFF WBC: CPT | Performed by: STUDENT IN AN ORGANIZED HEALTH CARE EDUCATION/TRAINING PROGRAM

## 2024-06-03 RX ADMIN — LEVETIRACETAM INJECTION 1500 MG: 15 INJECTION INTRAVENOUS at 09:06

## 2024-06-03 RX ADMIN — VANCOMYCIN HYDROCHLORIDE 1250 MG: 1.25 INJECTION, POWDER, LYOPHILIZED, FOR SOLUTION INTRAVENOUS at 10:06

## 2024-06-03 RX ADMIN — LACOSAMIDE 200 MG: 10 INJECTION INTRAVENOUS at 05:06

## 2024-06-03 RX ADMIN — HUMAN IMMUNOGLOBULIN G 40 G: 20 LIQUID INTRAVENOUS at 06:06

## 2024-06-03 RX ADMIN — LACOSAMIDE 200 MG: 10 INJECTION INTRAVENOUS at 04:06

## 2024-06-03 RX ADMIN — VANCOMYCIN HYDROCHLORIDE 1750 MG: 500 INJECTION, POWDER, LYOPHILIZED, FOR SOLUTION INTRAVENOUS at 10:06

## 2024-06-03 RX ADMIN — ACETAMINOPHEN 325MG 650 MG: 325 TABLET ORAL at 09:06

## 2024-06-03 NOTE — PROGRESS NOTES
"Pharmacokinetic Initial Assessment: IV Vancomycin    Assessment/Plan:    Initiate intravenous vancomycin with loading dose of 1750 mg once followed by a maintenance dose of vancomycin 1250mg IV every 12 hours  Desired empiric serum trough concentration is 15 to 20 mcg/mL  Draw vancomycin trough level 60 min prior to third dose on 06/04 at approximately 1000.  Pharmacy will continue to follow and monitor vancomycin.      Please contact pharmacy at extension [phone] with any questions regarding this assessment.     Thank you for the consult,   Africa Lopez       Patient brief summary:  Luis Traylor is a 23 y.o. male initiated on antimicrobial therapy with IV Vancomycin for treatment of suspected bacteremia    Drug Allergies:   Review of patient's allergies indicates:  No Known Allergies    Actual Body Weight:   71.2kg    Renal Function:   Estimated Creatinine Clearance: 116.9 mL/min (based on SCr of 0.99 mg/dL).,     Dialysis Method (if applicable):  N/A    CBC (last 72 hours):  Recent Labs   Lab Result Units 06/01/24  1341 06/03/24  0442   WBC x10(3)/mcL 9.15 5.53   Hgb g/dL 13.1* 12.5*   Hct % 38.7* 38.4*   Platelet x10(3)/mcL 168 182   Mono % % 7.1 11.6   Eos % % 1.0 1.1   Basophil % % 0.2 0.7       Metabolic Panel (last 72 hours):  Recent Labs   Lab Result Units 06/01/24  1341 06/02/24  1706 06/03/24  0442   Sodium mmol/L 139  --  140   Potassium mmol/L 3.5  --  4.5   Chloride mmol/L 105  --  108*   CO2 mmol/L 24  --  23   Glucose mg/dL 147*  --  96   Glucose, UA   --  Normal  --    Blood Urea Nitrogen mg/dL 9.1  --  10.3   Creatinine mg/dL 0.94  --  0.99   Albumin g/dL 3.8  --   --    Bilirubin Total mg/dL 0.4  --   --    ALP unit/L 58  --   --    AST unit/L 13  --   --    ALT unit/L 19  --   --        Drug levels (last 3 results):  No results for input(s): "VANCOMYCINRA", "VANCORANDOM", "VANCOMYCINPE", "VANCOPEAK", "VANCOMYCINTR", "VANCOTROUGH" in the last 72 hours.    Microbiologic Results:  Microbiology " Results (last 7 days)       Procedure Component Value Units Date/Time    BCID2 Panel [[id number]]  (Abnormal) Collected: 06/02/24 1119    Order Status: Completed Specimen: Blood from Hand, Right Updated: 06/03/24 0934     CTX-M (ESBL ) N/A     IMP (Cabapenemase ) N/A     KPC resistance gene (Carbapenemase ) N/A     mcr-1 N/A     mecA ID Detected     Comment: Note: Antimicrobial resistance can occur via multiple mechanisms. A Not Detected result for antimicrobial resistance gene(s) does not indicate antimicrobial susceptibility. Subculturing is required for species identification and susceptibility testing of   isolates.        mecA/C and MREJ (MRSA) gene N/A     NDM (Carbapenemase ) N/A     OXA-48-like (Carbapenemase ) N/A     Alexis/B (VRE gene) N/A     VIM (Carbapenemase ) N/A     Enterococcus faecalis Not Detected     Enterococcus faecium Not Detected     Listeria monocytogenes Not Detected     Staphylococcus spp. Detected     Staphylococcus aureus Not Detected     Staphylococcus epidermidis Detected     Staphylococcus lugdunensis Not Detected     Streptococcus spp. Not Detected     Streptococcus agalactiae (Group B) Not Detected     Streptococcus pneumoniae Not Detected     Streptococcus pyogenes (Group A) Not Detected     Acinetobacter calcoaceticus/baumannii complex Not Detected     Bacteroides fragilis Not Detected     Enterobacterales Not Detected     Enterobacter cloacae complex Not Detected     Escherichia coli Not Detected     Klebsiella aerogenes Not Detected     Klebsiella oxytoca Not Detected     Klebsiella pneumoniae group Not Detected     Proteus spp. Not Detected     Salmonella spp. Not Detected     Serratia marcescens Not Detected     Haemophilus influenzae Not Detected     Neisseria meningitidis Not Detected     Pseudomonas aeruginosa Not Detected     Stenotrophomonas maltophilia Not Detected     Candida albicans Not Detected     Candida auris Not  Detected     Candida glabrata Not Detected     Candida krusei Not Detected     Candida parapsilosis Not Detected     Candida tropicalis Not Detected     Cryptococcus neoformans/gattii Not Detected    Narrative:      The [employer] BCID2 Panel is a multiplexed nucleic acid test intended for the use with [employer]® 2.0 or [employer]® [employer]ire[employer] Systems for the simultaneous qualitative detection and identification of multiple bacterial and yeast nucleic acids and select genetic determinants associated with antimicrobial resistance.  The [employer] BCID2 Panel test is performed directly on blood culture samples identified as positive by a continuous monitoring blood culture system.  Results are intended to be interpreted in conjunction with Gram stain results.    Blood Culture [[id number]]  (Abnormal) Collected: 06/02/24 1119    Order Status: Completed Specimen: Blood from Hand, Right Updated: 06/03/24 0924     GRAM STAIN Gram Positive Cocci, probable Staphylococcus      Seen in gram stain of broth only      2 of 2 bottles positive    Blood Culture [[id number]] Collected: 06/02/24 1122    Order Status: Resulted Specimen: Blood Updated: 06/02/24 1125

## 2024-06-03 NOTE — PROGRESS NOTES
S  He is doing a little better  Still somewhat confused  Questions answered  Scheduled Meds:   lacosamide (VIMPAT) IVPB  200 mg Intravenous Q12H    levETIRAcetam (Keppra) IV (PEDS and ADULTS)  1,500 mg Intravenous Q12H    vancomycin (VANCOCIN) IV (PEDS and ADULTS)  1,250 mg Intravenous Q12H     Continuous Infusions:  PRN Meds:.  Current Facility-Administered Medications:     acetaminophen, 650 mg, Oral, Q8H PRN    acetaminophen, 650 mg, Oral, Q4H PRN    lorazepam, 2 mg, Intravenous, Q4H PRN    ondansetron, 4 mg, Intravenous, Q8H PRN    Pharmacy to dose Vancomycin consult, , , Once **AND** vancomycin - pharmacy to dose, , Intravenous, pharmacy to manage frequency  Vitals:    06/03/24 1121   BP: 116/67   Pulse: 79   Resp: 18   Temp: 98.3 °F (36.8 °C)       Exam  Awake  Alert  Follows commands well  Complicated cognitive tasks are impaired  Maew  Cn symmetric    Imp  MOGAD +/- porphyria  Awaiting further testing    Pt  and wife had concerns regarding prior LP and high pressure ; have requested repeat LP to measure pressure and to recheck cells/bands

## 2024-06-03 NOTE — PROGRESS NOTES
[facility] - [facility] HOSPITAL MEDICINE ~ PROGRESS NOTE    CHIEF COMPLAINT   Hospital follow up for [facility]    HOSPITAL COURSE   23 y.o. male with a PMHx of viral/aseptic meningitis and seizure disorderwho presented to [facility] on 6/1/2024 with c/o right-sided facial droop, right-sided weakness, and aphasia that began prior to arrival.  Patient's significant other reports that he was unable to answer questions.  Code fast caught upon arrival to ED and patient was evaluated by Neurology.  CT head negative for acute intracranial hemorrhage, no findings to suggest large vascular territory acute ischemic insult. Labs essentially unremarkable.  He was admitted to hospital medicine service for further medical management.     Of note, patient was initially hospitalized on 05/07/2024 when he was transferred from [facility] to [facility] for higher level of care due to concern for CVA.    Initial symptoms started as weakness and numbness in the left arm and leg with severe headaches with associated photophobia, nausea, and vomiting X2 weeks.    He was given a partial dose of tPA due to gingival bleeding.    Admitted to ICU here.    Left-sided weakness improved over the course of his admission; however mentation continued to wax and wane.    LP was done on 05/09/2024 with CSF demonstrating leukocytosis and with lymph node predominance.    Then began having episodes of absence seizures, with staring spells.    Blood cultures from 05/07 and 05/08 were negative.   MRI brain on 05/07/2024 negative for acute intracranial findings.     He was transferred to [facility] for Neuro ICU for possible encephalitis on 05/10/2024. MRI brain with and without contrast on 05/16/2024 negative for acute intracranial findings. MRA and MRV of the Brain were also unremarkable. [place] [facility] [place] with noted return to neurological baseline.  Antiepileptic medications and antimicrobials were  discontinued.  Felt to be viral or aseptic meningitis.       Extensive encephalopathy panel was sent and course seemed to favor possible viral or autoimmune/inflammatory etiology; viral meningitis versus aseptic meningitis. Farm animal exposure noted. Notable results include:  MOG FACS positive 1:1000  Coproporphyrin,tetra level of 177     He then again presented to [facility] on 05/15/2024 for worsening headache and slurred speech.    He was admitted to hospitalist service.  Evaluated by Neurology and ID.  EEG noted a right temporal lobe electrographic seizure, right temporal lobe slowing.    Noted to have previous LP opening pressure of 43 cm of H2O.    On 05/21 he began having left flank pain for which CT abdomen was done and demonstrated left ureteral stone, evaluated by Urology, and started on Flomax.    Repeat LP showed persistent elevated opening pressure, remained afebrile  Ophthalmology consulted for evaluation in light of complaints of R eye swelling & conjunctival injection; plan for outpatient F/U.    He was discharged home on 05/25/2024 with neurology and ophthalmology follow-up.  Discharge prescriptions included Keppra  1500 mg p.o. b.I.d., Vimpat 100 mg p.o. every 12 hours and Flomax 0.4 mg p.o. daily    Today  Asymptomatic, back to baseline mentation. Fiance reports slight memory deficits.   OBJECTIVE/PHYSICAL EXAM     VITAL SIGNS (MOST RECENT):  Temp: 98.6 °F (37 °C) (06/03/24 0729)  Pulse: 89 (06/03/24 0729)  Resp: 18 (06/03/24 0729)  BP: 123/71 (06/03/24 0729)  SpO2: 98 % (06/03/24 0729) VITAL SIGNS (24 HOUR RANGE):  Temp:  [97 °F (36.1 °C)-98.6 °F (37 °C)] 98.6 °F (37 °C)  Pulse:  [69-89] 89  Resp:  [18] 18  SpO2:  [98 %-99 %] 98 %  BP: (108-123)/(55-73) 123/71   GENERAL: In no acute distress, afebrile  HEENT:PERRLA  CHEST: Clear to auscultation bilaterally  HEART: S1, S2, no appreciable murmur  ABDOMEN: Soft, nontender, BS +  MSK: Warm, no lower extremity edema, no clubbing or cyanosis  NEUROLOGIC:  Alert and oriented x4, moving all extremities with good strength   INTEGUMENTARY: thoracic and left flank papular rash  ASSESSMENT/PLAN   Seizure disorder with breakthrough seizures-likely accident seizures   Right-sided facial droop/weakness/expressive/receptive aphasia-suspect postictal state/ 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  Acute encephalopathy-likely postictal state   New diagnosis MOGAD syndrome  HISTORY of elevated opening pressure on LP/CSF PLEOCYTOSIS  Papular Rash   Fever  Prophylaxis     Third inpatient hospitalization since early May  Evaluated by Neurology, Infectious Disease during previous two hospitalization  Extensive workup with MRI brain, extensive infectious workup  Did have elevated WBC in CSF in early May and mid May/CSF pleocytosis and elevated opening pressure  Presentation today consistent with absence seizures with postictal state /Eduardo's paralysis  IV Keppra 1500 mg b.i.d., Vimpat increased to 200 mg b.i.d.  Resume regular diet 6/1 as mentation as normalized   CT head was negative for acute changes   EEG demonstrated structural/functional disturbance, no evidence of ongoing seizure  Repeat LP at the discretion of Neurology   It is to be noted that patient MOG Antibody has come back positive and his symptoms could be related to MOGAD syndrome  S/p IVIG 6/1, Dr. Hinojosa is considering another dose on 06/03/2024  Pending fecal porphyrins and APGP  Rash secondary to vimpat/keppra?  Pt has episode of fever 6/1 and low grade temp 6/2  Unremarkable UA and CXR obtained 6/2  Blood culture 1/2 + gram positive cocci, follow second blood culture but suspect this is contaminant as pt's exam is not consistent with bacteremia   Denies cough, dysuria, diarrhea, abdominal pain  DVT prophylaxis-bilateral SCDs    Anticipated discharge and disposition:  Home when medically stable  __________________________________________________________________________    NUTRITIONAL STATUS     Patient meets ASPEN  criteria for   malnutrition of   per RD assessment as evidenced by:                       A minimum of two characteristics is recommended for diagnosis of either severe or non-severe malnutrition.     LABS/MICRO/MEDS/DIAGNOSTICS       LABS  Recent Labs     06/01/24  1341 06/03/24  0442    140   K 3.5 4.5   CO2 24 23   BUN 9.1 10.3   CREATININE 0.94 0.99   GLUCOSE 147* 96   CALCIUM 9.1 8.8   ALKPHOS 58  --    AST 13  --    ALT 19  --    ALBUMIN 3.8  --      Recent Labs     06/03/24  0442   WBC 5.53   RBC 4.36*   HCT 38.4*   MCV 88.1          MICROBIOLOGY  Microbiology Results (last 7 days)       Procedure Component Value Units Date/Time    BCID2 Panel [[id number]] Collected: 06/02/24 1119    Order Status: Resulted Specimen: Blood from Hand, Right Updated: 06/03/24 0622    Blood Culture [[id number]]  (Abnormal) Collected: 06/02/24 1119    Order Status: Completed Specimen: Blood from Hand, Right Updated: 06/03/24 0348     GRAM STAIN Gram Positive Cocci, probable Staphylococcus      Seen in gram stain of broth only      1 of 2 Aerobic bottles positive    Blood Culture [[id number]] Collected: 06/02/24 1122    Order Status: Resulted Specimen: Blood Updated: 06/02/24 1125               MEDICATIONS   lacosamide (VIMPAT) IVPB  200 mg Intravenous Q12H    levETIRAcetam (Keppra) IV (PEDS and ADULTS)  1,500 mg Intravenous Q12H         INFUSIONS           DIAGNOSTIC TESTS  X-Ray Chest 1 View   Final Result      No acute cardiopulmonary abnormality.         Electronically signed by: Francisco Rodney MD   Date:    06/02/2024   Time:    10:39      CT HEAD FOR STROKE   Final Result           No echocardiogram results found for the past 14 days.         Case related differential diagnoses have been reviewed; assessment and plan has been documented. I have personally reviewed the labs and test results that are currently available; I have reviewed the patients medication list. I have reviewed the consulting providers  recommendations. I have reviewed or attempted to review medical records based upon their availability.  All of the patient's and/or family's questions have been addressed and answered to the best of my ability.  I will continue to monitor closely and make adjustments to medical management as needed.  This document was created using M*Modal Fluency Direct.  Transcription errors may have been made.  Please contact me if any questions may rise regarding documentation to clarify transcription.        Thairy G Reyes, DO   Internal Medicine  Department of Hospital Medicine  [facility] - [facility] Tele[facility]

## 2024-06-04 LAB
ALBUMIN SERPL-MCNC: 3.4 G/DL (ref 3.5–5)
ALBUMIN/GLOB SERPL: 0.6 RATIO (ref 1.1–2)
ALP SERPL-CCNC: 56 UNIT/L (ref 40–150)
ALT SERPL-CCNC: 13 UNIT/L (ref 0–55)
ANION GAP SERPL CALC-SCNC: 4 MEQ/L
AST SERPL-CCNC: 11 UNIT/L (ref 5–34)
BILIRUB SERPL-MCNC: 0.5 MG/DL
BUN SERPL-MCNC: 8.6 MG/DL (ref 8.9–20.6)
C GATTII+NEOFOR DNA CSF QL NAA+NON-PROBE: NOT DETECTED
CALCIUM SERPL-MCNC: 9 MG/DL (ref 8.4–10.2)
CHLORIDE SERPL-SCNC: 108 MMOL/L (ref 98–107)
CMV DNA CSF QL NAA+NON-PROBE: NOT DETECTED
CO2 SERPL-SCNC: 25 MMOL/L (ref 22–29)
CREAT SERPL-MCNC: 1.06 MG/DL (ref 0.73–1.18)
CREAT/UREA NIT SERPL: 8
E COLI K1 DNA CSF QL NAA+NON-PROBE: NOT DETECTED
EV RNA CSF QL NAA+NON-PROBE: NOT DETECTED
GFR SERPLBLD CREATININE-BSD FMLA CKD-EPI: >60 ML/MIN/1.73/M2
GLOBULIN SER-MCNC: 5.5 GM/DL (ref 2.4–3.5)
GLUCOSE SERPL-MCNC: 81 MG/DL (ref 74–100)
GP B STREP DNA CSF QL NAA+NON-PROBE: NOT DETECTED
HAEM INFLU DNA CSF QL NAA+NON-PROBE: NOT DETECTED
HHV6 DNA CSF QL NAA+NON-PROBE: NOT DETECTED
HSV1 DNA CSF QL NAA+NON-PROBE: NOT DETECTED
HSV2 DNA CSF QL NAA+NON-PROBE: NOT DETECTED
L MONOCYTOG DNA CSF QL NAA+NON-PROBE: NOT DETECTED
N MEN DNA CSF QL NAA+NON-PROBE: NOT DETECTED
PARECHOVIRUS A RNA CSF QL NAA+NON-PROBE: NOT DETECTED
POTASSIUM SERPL-SCNC: 3.9 MMOL/L (ref 3.5–5.1)
PROT SERPL-MCNC: 8.9 GM/DL (ref 6.4–8.3)
S PNEUM DNA CSF QL NAA+NON-PROBE: NOT DETECTED
SODIUM SERPL-SCNC: 137 MMOL/L (ref 136–145)
VANCOMYCIN TROUGH SERPL-MCNC: 12.9 UG/ML (ref 15–20)
VZV DNA CSF QL NAA+NON-PROBE: NOT DETECTED

## 2024-06-04 PROCEDURE — 21400001 HC TELEMETRY ROOM

## 2024-06-04 PROCEDURE — 80202 ASSAY OF VANCOMYCIN: CPT | Performed by: INTERNAL MEDICINE

## 2024-06-04 PROCEDURE — 63600175 PHARM REV CODE 636 W HCPCS: Performed by: INTERNAL MEDICINE

## 2024-06-04 PROCEDURE — 99231 SBSQ HOSP IP/OBS SF/LOW 25: CPT | Mod: ,,, | Performed by: PSYCHIATRY & NEUROLOGY

## 2024-06-04 PROCEDURE — 80053 COMPREHEN METABOLIC PANEL: CPT | Performed by: INTERNAL MEDICINE

## 2024-06-04 PROCEDURE — 99223 1ST HOSP IP/OBS HIGH 75: CPT | Mod: ,,, | Performed by: HOSPITALIST

## 2024-06-04 PROCEDURE — 36415 COLL VENOUS BLD VENIPUNCTURE: CPT | Performed by: STUDENT IN AN ORGANIZED HEALTH CARE EDUCATION/TRAINING PROGRAM

## 2024-06-04 PROCEDURE — 63600175 PHARM REV CODE 636 W HCPCS: Performed by: PSYCHIATRY & NEUROLOGY

## 2024-06-04 PROCEDURE — C9254 INJECTION, LACOSAMIDE: HCPCS | Performed by: PSYCHIATRY & NEUROLOGY

## 2024-06-04 PROCEDURE — 25000003 PHARM REV CODE 250: Performed by: PSYCHIATRY & NEUROLOGY

## 2024-06-04 PROCEDURE — 36415 COLL VENOUS BLD VENIPUNCTURE: CPT | Performed by: INTERNAL MEDICINE

## 2024-06-04 PROCEDURE — 87040 BLOOD CULTURE FOR BACTERIA: CPT | Performed by: STUDENT IN AN ORGANIZED HEALTH CARE EDUCATION/TRAINING PROGRAM

## 2024-06-04 PROCEDURE — 25000003 PHARM REV CODE 250: Performed by: INTERNAL MEDICINE

## 2024-06-04 RX ADMIN — LEVETIRACETAM INJECTION 1500 MG: 15 INJECTION INTRAVENOUS at 08:06

## 2024-06-04 RX ADMIN — LACOSAMIDE 200 MG: 10 INJECTION INTRAVENOUS at 05:06

## 2024-06-04 RX ADMIN — LEVETIRACETAM INJECTION 1500 MG: 15 INJECTION INTRAVENOUS at 09:06

## 2024-06-04 RX ADMIN — VANCOMYCIN HYDROCHLORIDE 1250 MG: 1.25 INJECTION, POWDER, LYOPHILIZED, FOR SOLUTION INTRAVENOUS at 10:06

## 2024-06-04 RX ADMIN — VANCOMYCIN HYDROCHLORIDE 1250 MG: 1.25 INJECTION, POWDER, LYOPHILIZED, FOR SOLUTION INTRAVENOUS at 11:06

## 2024-06-04 NOTE — PROGRESS NOTES
Pharmacokinetic Assessment Follow Up: IV Vancomycin    Vancomycin serum concentration assessment(s):    The trough level was drawn correctly and can be used to guide therapy at this time. The measurement is below the desired definitive target range of 15 to 20 mcg/mL.  ( trough = 12.9 prior to 3rd dose)    Vancomycin Regimen Plan:    Continue regimen to Vancomycin 1250 mg IV every 12 hours with next serum trough concentration measured at 1000 prior to   dose on 06/06.    Drug levels (last 3 results):  Recent Labs   Lab Result Units 06/04/24  1034   Vancomycin Trough ug/ml 12.9*       Pharmacy will continue to follow and monitor vancomycin.    Please contact pharmacy at extension [phone] for questions regarding this assessment.    Thank you for the consult,   Africa Lopez       Patient brief summary:  Luis Traylor is a 23 y.o. male initiated on antimicrobial therapy with IV Vancomycin for treatment of bacteremia    The patient's current regimen is 1250mg q12h.    Drug Allergies:   Review of patient's allergies indicates:  No Known Allergies    Actual Body Weight:   71.2kg    Renal Function:   Estimated Creatinine Clearance: 109.2 mL/min (based on SCr of 1.06 mg/dL).,     Dialysis Method (if applicable):  N/A    CBC (last 72 hours):  Recent Labs   Lab Result Units 06/01/24  1341 06/03/24  0442   WBC x10(3)/mcL 9.15 5.53   Hgb g/dL 13.1* 12.5*   Hct % 38.7* 38.4*   Platelet x10(3)/mcL 168 182   Mono % % 7.1 11.6   Eos % % 1.0 1.1   Basophil % % 0.2 0.7       Metabolic Panel (last 72 hours):  Recent Labs   Lab Result Units 06/01/24  1341 06/02/24  1706 06/03/24  0442 06/03/24  1432 06/04/24  1034   Sodium mmol/L 139  --  140  --  137   Potassium mmol/L 3.5  --  4.5  --  3.9   Chloride mmol/L 105  --  108*  --  108*   CO2 mmol/L 24  --  23  --  25   Glucose mg/dL 147*  --  96  --  81   Glucose CSF  mg/dL  --   --   --  40  --    Glucose, UA   --  Normal  --   --   --    Blood Urea Nitrogen mg/dL 9.1  --  10.3  --   8.6*   Creatinine mg/dL 0.94  --  0.99  --  1.06   Albumin g/dL 3.8  --   --   --  3.4*   Bilirubin Total mg/dL 0.4  --   --   --  0.5   ALP unit/L 58  --   --   --  56   AST unit/L 13  --   --   --  11   ALT unit/L 19  --   --   --  13       Vancomycin Administrations:  vancomycin given in the last 96 hours                     vancomycin 1.25 g in dextrose 5% 250 mL IVPB (ready to mix) (mg) 1,250 mg New Bag 06/03/24 2244    vancomycin 1.75 g in 5 % dextrose 500 mL IVPB (mg) 1,750 mg New Bag 06/03/24 1035                    Microbiologic Results:  Microbiology Results (last 7 days)       Procedure Component Value Units Date/Time    Blood Culture [[id number]]  (Normal) Collected: 06/02/24 1122    Order Status: Completed Specimen: Blood Updated: 06/03/24 1200     Blood Culture No Growth At 24 Hours    BCID2 Panel [[id number]]  (Abnormal) Collected: 06/02/24 1119    Order Status: Completed Specimen: Blood from Hand, Right Updated: 06/03/24 0934     CTX-M (ESBL ) N/A     IMP (Cabapenemase ) N/A     KPC resistance gene (Carbapenemase ) N/A     mcr-1 N/A     mecA ID Detected     Comment: Note: Antimicrobial resistance can occur via multiple mechanisms. A Not Detected result for antimicrobial resistance gene(s) does not indicate antimicrobial susceptibility. Subculturing is required for species identification and susceptibility testing of   isolates.        mecA/C and MREJ (MRSA) gene N/A     NDM (Carbapenemase ) N/A     OXA-48-like (Carbapenemase ) N/A     Alexis/B (VRE gene) N/A     VIM (Carbapenemase ) N/A     Enterococcus faecalis Not Detected     Enterococcus faecium Not Detected     Listeria monocytogenes Not Detected     Staphylococcus spp. Detected     Staphylococcus aureus Not Detected     Staphylococcus epidermidis Detected     Staphylococcus lugdunensis Not Detected     Streptococcus spp. Not Detected     Streptococcus agalactiae (Group B) Not Detected      Streptococcus pneumoniae Not Detected     Streptococcus pyogenes (Group A) Not Detected     Acinetobacter calcoaceticus/baumannii complex Not Detected     Bacteroides fragilis Not Detected     Enterobacterales Not Detected     Enterobacter cloacae complex Not Detected     Escherichia coli Not Detected     Klebsiella aerogenes Not Detected     Klebsiella oxytoca Not Detected     Klebsiella pneumoniae group Not Detected     Proteus spp. Not Detected     Salmonella spp. Not Detected     Serratia marcescens Not Detected     Haemophilus influenzae Not Detected     Neisseria meningitidis Not Detected     Pseudomonas aeruginosa Not Detected     Stenotrophomonas maltophilia Not Detected     Candida albicans Not Detected     Candida auris Not Detected     Candida glabrata Not Detected     Candida krusei Not Detected     Candida parapsilosis Not Detected     Candida tropicalis Not Detected     Cryptococcus neoformans/gattii Not Detected    Narrative:      The [employer] BCID2 Panel is a multiplexed nucleic acid test intended for the use with [employer] or [employer] for the simultaneous qualitative detection and identification of multiple bacterial and yeast nucleic acids and select genetic determinants associated with antimicrobial resistance.  The [employer] BCID2 Panel test is performed directly on blood culture samples identified as positive by a continuous monitoring blood culture system.  Results are intended to be interpreted in conjunction with Gram stain results.    Blood Culture [[id number]]  (Abnormal) Collected: 06/02/24 1119    Order Status: Completed Specimen: Blood from Hand, Right Updated: 06/03/24 0989     GRAM STAIN Gram Positive Cocci, probable Staphylococcus      Seen in gram stain of broth only      2 of 2 bottles positive

## 2024-06-04 NOTE — ASSESSMENT & PLAN NOTE
Continue keppra 1500 mg BID and vimpat 200 mg BID   Give ativan 2 mg PRN for witnessed seizures  Seizure precautions

## 2024-06-04 NOTE — CONSULTS
"              Infectious Disease      Patient ID: Luis Singh Pontiff  23 y.o. male.    Chief Complaint: Medical (Pt fiance reports pt "not acting right" with R sided facial droop, aphasia, R arm weakness. Not following commands. Answers "yes" to all questions. Newly diagnosed seizures. Onset 1230)    Interval HPI:    6/4 - afebrile. Mentation at baseline currently.  Recurrent episodes of encephalopathy with neurological deficits. Extensive infectious work up on prior admissions. CSF with increased pleocytosis, coproporphyrinuria +, defer to primary. CSF cx is negative, pleocytosis is likely reactive. Add on cx and panel. Will send to [facility] for additional PCR testing in interim. Rash is pustular/acneform, can be seen in bacteremia, skin Staph infections. Can use CHG bath 3 x week. Already on IV abx for possible bacteremia. Continue vancomycin at this time.   Assessment and Plan:   1) Encephalitis  - recurrrent episodes of encephalopathy  - CSF 5/9 analysis: clear, colorless, , RBCV 2; L62%, M10%  - CSF 5/17 anlalysis: clear, colorless, , RBC 1020,, L74%, M14   - CSF 5/21 analysis: clear, colorless, WBC 83, , panel negative; L74%, M14%  - CSF analysis 6/3 - clear, colorless, glucose 40, protein - 91, WBC 2929, RBC 50, N92, L2, M14, B2  - CSF 6/3 - ME panel negative   - CSF cx 6/3 add on now  - CT head 6/1 -  No acute intracranial hemorrhage.  No findings to suggest large vascular territory acute ischemic insult.  Grossly unchanged appearance from the 20 May 2024 head CT.   - coproporphyrinuria elevated   - currently on vancomycin, goal 15-20, Rx to dose     2) Bacteremia   - suspected   - BCx 6/2 - Staphylococcus epidermis (mecA detected)  1/2 sets, both bottles  - repeat now   - CXR 6/2 no acute process  - currently on vancomycin, goal 15-20, Rx to dose     3) SSTI  - skin Staph infection/acneform  - not recurrent, 1st time  - chest, back, flanks  - CHG 3 x weekly  - can be seen with concurrent " "bacteremia    Discussed with patient and family at bedside 6/4   Discussed with RN    Lorene Byers MD, MPH  [facility]    Thank you for this consultation. I will follow up with the patient. Please contact via Epic secure chat with any questions.       HPI:   Patient is Luis Traylor a 23 y.o. male admitted on 6/1 with AMS, aphasia and right sided weakness and facial droop. Per history patient was noted by signiciant other to be "staring off into space" and unable to speak fluently with right sided weakness. Of note patient recently evaluated for seziures and AEDs were stopped, however, he continued to have seizure like activity. Prior to this admission patient was admitted twice in May 2024 for AMS and found to have seizures, negative brain imaging and infectious work up. He was readmitted 5/15 for headche and slurred speech and was found to have elevated CSF opening pressure. He as seen by Optho and subsequently discharged ome on AEDs. He was subsequently diagnosed with MOGAD syndrome and is now readmitted with above symptoms.       Patient has known epileps, recent encephalitis  and hx of HSV eruptions.   Infectious diseases consulted for evaluation and management.     Past Medical History:   Diagnosis Date    Viral or Aseptic meningitis 05/07/2024     Past Surgical History:   Procedure Laterality Date    MAGNETIC RESONANCE IMAGING N/A 5/10/2024    Procedure: MRI (Magnetic Resonance Imagine);  Surgeon: Araceli Bergman MD;  Location: [facility];  Service: Anesthesiology;  Laterality: N/A;     Review of patient's allergies indicates:  No Known Allergies  Current Outpatient Medications   Medication Instructions    lacosamide (VIMPAT) 100 mg, Oral, Every 12 hours    levETIRAcetam (KEPPRA) 1,500 mg, Oral, 2 times daily    tamsulosin (FLOMAX) 0.4 mg, Oral, Daily       Current Facility-Administered Medications:     acetaminophen tablet 650 mg, 650 mg, Oral, Q8H PRN, Sabra Michel " CHARLES [place]ENT[place]-BC    acetaminophen tablet 650 mg, 650 mg, Oral, Q4H PRN, Sabra Michel AGACNP-BC, 650 mg at 06/03/24 0926    lacosamide (VIMPAT) 200 mg in sodium chloride 0.9% 100 mL IVPB, 200 mg, Intravenous, Q12H, Phong Hinojosa MD, Stopped at 06/03/24 1716    levETIRAcetam in NaCl (iso-os) IVPB 1,500 mg, 1,500 mg, Intravenous, Q12H, Phong Hinojosa MD, Last Rate: 200 mL/hr at 06/04/24 0922, 1,500 mg at 06/04/24 0922    LORazepam injection 2 mg, 2 mg, Intravenous, Q4H PRN, Sabra Michel AGACNP-BC    ondansetron injection 4 mg, 4 mg, Intravenous, Q8H PRN, Sabra Michel AGACNP-BC    Pharmacy to dose Vancomycin consult, , , Once **AND** vancomycin - pharmacy to dose, , Intravenous, pharmacy to manage frequency, Reyes, Thairy G, DO    vancomycin 1.25 g in dextrose 5% 250 mL IVPB (ready to mix), 1,250 mg, Intravenous, Q12H, Treva Azevedo MD, Stopped at 06/04/24 0014  Review of Systems   Constitutional:  Negative for chills and fever.   HENT:  Negative for congestion, ear discharge, ear pain, facial swelling, mouth sores, postnasal drip, rhinorrhea, sinus pressure, sinus pain, sneezing, sore throat and trouble swallowing.    Eyes:  Negative for discharge, redness and itching.   Respiratory:  Negative for cough, chest tightness, shortness of breath and wheezing.    Cardiovascular:  Negative for chest pain, palpitations and leg swelling.   Gastrointestinal:  Negative for abdominal distention, abdominal pain, diarrhea, nausea and vomiting.   Genitourinary:  Negative for dysuria, flank pain, frequency and urgency.   Musculoskeletal:  Negative for back pain, myalgias and neck stiffness.   Skin:  Negative for rash and wound.   Allergic/Immunologic: Negative for immunocompromised state.   Neurological:  Negative for dizziness, light-headedness and headaches.   Hematological:  Negative for adenopathy.   Psychiatric/Behavioral:  Negative for agitation, confusion and suicidal ideas. The patient is not nervous/anxious.         Objective:   Temp:  [97.8 °F (36.6 °C)-99.5 °F (37.5 °C)] 97.8 °F (36.6 °C)  Pulse:  [69-90] 69  Resp:  [18-20] 20  SpO2:  [93 %-98 %] 97 %  BP: (112-122)/(67-76) 121/71     Physical Exam  Constitutional:       Appearance: Normal appearance. He is well-developed.   HENT:      Head: Normocephalic.      Nose: Nose normal.      Mouth/Throat:      Pharynx: No oropharyngeal exudate.   Eyes:      General: Lids are normal. No scleral icterus.        Right eye: No discharge.      Conjunctiva/sclera: Conjunctivae normal.      Pupils: Pupils are equal, round, and reactive to light.   Neck:      Thyroid: No thyromegaly.      Vascular: No JVD.      Trachea: Trachea normal.   Cardiovascular:      Rate and Rhythm: Normal rate and regular rhythm.      Pulses: Normal pulses.      Heart sounds: Normal heart sounds. No murmur heard.     No friction rub.   Pulmonary:      Effort: Pulmonary effort is normal. No respiratory distress.      Breath sounds: Normal breath sounds. No wheezing.   Chest:      Chest wall: No tenderness.   Abdominal:      General: Bowel sounds are normal. There is no distension.      Palpations: Abdomen is soft.      Tenderness: There is no abdominal tenderness. There is no guarding or rebound.   Musculoskeletal:         General: No tenderness. Normal range of motion.      Cervical back: Full passive range of motion without pain, normal range of motion and neck supple.   Lymphadenopathy:      Cervical: No cervical adenopathy.   Skin:     General: Skin is warm and dry.      Findings: No rash.   Neurological:      Mental Status: He is alert and oriented to person, place, and time.      Cranial Nerves: No cranial nerve deficit.      Sensory: No sensory deficit.   Psychiatric:         Speech: Speech normal.         Behavior: Behavior normal.         Thought Content: Thought content normal.         Judgment: Judgment normal.         Estimated Creatinine Clearance: 116.9 mL/min (based on SCr of 0.99  mg/dL).  Recent Labs   Lab 06/03/24  0442   WBC 5.53        Microbiology Results (last 7 days)       Procedure Component Value Units Date/Time    Blood Culture [[id number]]  (Normal) Collected: 06/02/24 1122    Order Status: Completed Specimen: Blood Updated: 06/03/24 1200     Blood Culture No Growth At 24 Hours    BCID2 Panel [[id number]]  (Abnormal) Collected: 06/02/24 1119    Order Status: Completed Specimen: Blood from Hand, Right Updated: 06/03/24 0934     CTX-M (ESBL ) N/A     IMP (Cabapenemase ) N/A     KPC resistance gene (Carbapenemase ) N/A     mcr-1 N/A     mecA ID Detected     Comment: Note: Antimicrobial resistance can occur via multiple mechanisms. A Not Detected result for antimicrobial resistance gene(s) does not indicate antimicrobial susceptibility. Subculturing is required for species identification and susceptibility testing of   isolates.        mecA/C and MREJ (MRSA) gene N/A     NDM (Carbapenemase ) N/A     OXA-48-like (Carbapenemase ) N/A     Alexis/B (VRE gene) N/A     VIM (Carbapenemase ) N/A     Enterococcus faecalis Not Detected     Enterococcus faecium Not Detected     Listeria monocytogenes Not Detected     Staphylococcus spp. Detected     Staphylococcus aureus Not Detected     Staphylococcus epidermidis Detected     Staphylococcus lugdunensis Not Detected     Streptococcus spp. Not Detected     Streptococcus agalactiae (Group B) Not Detected     Streptococcus pneumoniae Not Detected     Streptococcus pyogenes (Group A) Not Detected     Acinetobacter calcoaceticus/baumannii complex Not Detected     Bacteroides fragilis Not Detected     Enterobacterales Not Detected     Enterobacter cloacae complex Not Detected     Escherichia coli Not Detected     Klebsiella aerogenes Not Detected     Klebsiella oxytoca Not Detected     Klebsiella pneumoniae group Not Detected     Proteus spp. Not Detected     Salmonella spp. Not Detected     Serratia  marcescens Not Detected     Haemophilus influenzae Not Detected     Neisseria meningitidis Not Detected     Pseudomonas aeruginosa Not Detected     Stenotrophomonas maltophilia Not Detected     Candida albicans Not Detected     Candida auris Not Detected     Candida glabrata Not Detected     Candida krusei Not Detected     Candida parapsilosis Not Detected     Candida tropicalis Not Detected     Cryptococcus neoformans/gattii Not Detected    Narrative:      The [employer] BCID2 Panel is a multiplexed nucleic acid test intended for the use with [employer]® 2.0 or [employer]® [employer] for the simultaneous qualitative detection and identification of multiple bacterial and yeast nucleic acids and select genetic determinants associated with antimicrobial resistance.  The [facility] BCID2 Panel test is performed directly on blood culture samples identified as positive by a continuous monitoring blood culture system.  Results are intended to be interpreted in conjunction with Gram stain results.    Blood Culture [[id number]]  (Abnormal) Collected: 06/02/24 1119    Order Status: Completed Specimen: Blood from Hand, Right Updated: 06/03/24 0924     GRAM STAIN Gram Positive Cocci, probable Staphylococcus      Seen in gram stain of broth only      2 of 2 bottles positive            Significant Labs: All pertinent labs within the past 24 hours have been reviewed.    Significant Imaging: I have reviewed all relevant and available imaging results/findings within the past 24 hours.      Plan -- see top of note

## 2024-06-04 NOTE — HPI
23 y.o. male who presented to [facility] on 6/1/2024 with reports of inability to speak. Onset of symptoms was sudden, not related to any specific activity and last known normal 1200 on 6/1 . Stroke risk factors include none. Prior stroke history: none.

## 2024-06-04 NOTE — PROGRESS NOTES
DATE OF PROCEDURE:  02/23/23    PREOPERATIVE DIAGNOSIS: right hip arthritis    POSTOPERATIVE DIAGNOSIS: right hip arthritis    PROCEDURE PERFORMED: right total hip arthroplasty with [employer] Nephew components, anterior modified Matos-Velez approach    IMPLANTS: #54 press-fit R3 cup, 30 screw,  neutral polyethylene liner,  #4 standard offset press-fit Polarstem, +0 x 36 Bio-lox ceramic head    SURGEON: Abhishek Vicente MD    ASSISTANT: Frances Bradley PA-C  (Frances Bradley PA-C was present and necessary for positioning, draping, retraction, instrumentation and closure.)    SPECIMENS: None    IMPLANTS:   Implant Name Type Inv. Item Serial No.  Lot No. LRB No. Used Action   DEV CONTRL TISS STRATAFIX SPIRAL MNCRYL UD 3/0 PLS 60CM - [id number] Implant DEV CONTRL TISS STRATAFIX SPIRAL MNCRYL UD 3/0 PLS 60CM  [employer] J  Right 1 Implanted   DEV CONTRL TISS STRATAFIX SYMM PDS PLUS MONTY CT-1 45CM - [id number] Implant DEV CONTRL TISS STRATAFIX SYMM PDS PLUS MONTY CT-1 45CM  [employer]  [employer]  Right 1 Implanted   SHLL ACET R3 3H STD 56MM - [id number] Implant SHLL ACET R3 3H STD 56MM  [employer] [id number] Right 1 Implanted   SCRW SPH HD REFLECTION 6.5X30MM - [id number] Implant SCRW SPH HD REFLECTION 6.5X30MM  [employer] [id number] Right 1 Implanted   LINER ACET R3 XLPE 0D [id number] - [id number] Implant LINER ACET R3 XLPE 0D [id number]  [employer] [id number] Right 1 Implanted   STEM FEM/HIP POLARSTEM W/COLR STD SZ4 - [id number] Implant STEM FEM/HIP POLARSTEM W/COLR STD SZ4  [employer] [id number] Right 1 Implanted   HD FEM/HIP BIOLOXDELTA R3 12/14 SM 36MM PLS0 - [id number] Implant HD FEM/HIP BIOLOXDELTA R3 12/14 SM 36MM PLS0  [employer] [id number] Right 1 Implanted         ANESTHESIA:  Spinal    STAFF:  Circulator: Aimee Watkins RN; Elli Titus RN  Radiology Technologist: Emigdio Israel RT  Scrub Person: Rodrick Jones  Vendor Representative: Adam  [facility] - [facility] Tele[facility]  Neurology  Progress Note    Patient Name: Luis Traylor  MRN: [id number]  Admission Date: 6/1/2024  Hospital Length of Stay: 3 days  Code Status: Full Code   Attending Provider: Treva Azevedo MD  Primary Care Physician: Terrence Craven NP   Principal Problem:<principal problem not specified>    HPI:   23 y.o. male who presented to [facility] on 6/1/2024 with reports of inability to speak. Onset of symptoms was sudden, not related to any specific activity and last known normal 1200 on 6/1 . Stroke risk factors include none. Prior stroke history: none.        Overview/Hospital Course:  No notes on file        Subjective:     Interval History:   Pt's significant other at bedside reports yesterday around 5:00 p.m. patient suddenly fell asleep and slept until approx 8:00 a.m. this morning, which is unusual for him.  Reports also having episodes of blank staring.    Current Neurological Medications:     Current Facility-Administered Medications   Medication Dose Route Frequency Provider Last Rate Last Admin    acetaminophen tablet 650 mg  650 mg Oral Q8H PRN Sabra Michel AGACNP-BC        acetaminophen tablet 650 mg  650 mg Oral Q4H PRN Sabra Michel AGACNP-BC   650 mg at 06/03/24 0926    lacosamide (VIMPAT) 200 mg in sodium chloride 0.9% 100 mL IVPB  200 mg Intravenous Q12H Phong Hinojosa MD   Stopped at 06/03/24 1716    levETIRAcetam in NaCl (iso-os) IVPB 1,500 mg  1,500 mg Intravenous Q12H Phong Hinojosa MD   Stopped at 06/04/24 0952    LORazepam injection 2 mg  2 mg Intravenous Q4H PRN Sabra Michel AGACNP-BC        ondansetron injection 4 mg  4 mg Intravenous Q8H PRN Sabra Michel AGACNP-BC        vancomycin - pharmacy to dose   Intravenous pharmacy to manage frequency Reyes, Thairy G, DO        vancomycin 1.25 g in dextrose 5% 250 mL IVPB (ready to mix)  1,250 mg Intravenous Q12H Treva Azevedo .7 mL/hr at 06/04/24 1130 1,250 mg  Kings GUARDADO ([employer])  Nursing Assistant: Mayco Whitehead PCT  Assistant: Frances Bradley PA-C    ESTIMATED BLOOD LOSS: 400 mL     COMPLICATIONS: None    PREOPERATIVE ANTIBIOTICS: Ancef 2 g    INDICATIONS: The patient is a 71 y.o. male with a history of debilitating right hip pain secondary to osteoarthritis, that failed to improve in spite of conservative treatment. The patient opted for a right total hip arthroplasty at this time and consented for the procedure. Please see my office notes for details with regard to preoperative counseling and operative rationale.     DESCRIPTION OF PROCEDURE: The patient was positively identified in the preoperative holding area, brought to the operative suite, and placed in a supine position. After adequate spinal anesthetic had been achieved, the patient was placed in the supine position with a well padded folded blanket bolster under the right flank area, leaving the buttock free. After sterile prep and drape of the right hip and lower extremity, as well as draping the non-operative extremity to allow access for limb length assessment, a timeout procedure was performed to confirm the operative site, as well as the other parameters.     After placing a bump under the knee, a skin incision was made over the lateral border of the tensor fascia latae from the level of the anterior superior iliac spine distally on the anterior aspect of the hip for a modified Matos-Velez approach. Following a sharp skin incision, dissection was carried down to the level of the fascia, ensuring clear identification of the interval between the tensor and the fascia laterally.  Fascia was then incised from proximal to distal, leaving a good cuff of tissue for later repair, then working form distal to proximal perforating vessels were identified and cauterized, including the perforating vessels along the anterior edge of the gluteus medius.  With the anterior edge of the gluteus medius  at 06/04/24 1130       Review of Systems  A 14pt ros was reviewed & is negative unless o/w documented in the hpi    Objective:     Vital Signs (Most Recent):  Temp: 97.5 °F (36.4 °C) (06/04/24 1130)  Pulse: 78 (06/04/24 1130)  Resp: 20 (06/04/24 0453)  BP: 117/71 (06/04/24 1130)  SpO2: 99 % (06/04/24 1130) Vital Signs (24h Range):  Temp:  [97.5 °F (36.4 °C)-99.5 °F (37.5 °C)] 97.5 °F (36.4 °C)  Pulse:  [69-90] 78  Resp:  [18-20] 20  SpO2:  [93 %-99 %] 99 %  BP: (112-122)/(68-76) 117/71     Weight: 71.2 kg (156 lb 13.7 oz)  Body mass index is 21.88 kg/m².     Physical Exam      GENERAL: NAD, calm, cooperative, appropriate, awake/alert  MENTAL STATUS: Oriented x4, follows commands reliably  SPEECH/LANGUAGE: Clear, coherent  CN:  EOMI, gaze conjugate, visual fields intact  PERRLA  Motor: no focal motor weakness  Sensory: Normal to tactile stim/vibration  Gait: not observed      Significant Labs:   Recent Lab Results         06/04/24  1034   06/03/24  1650   06/03/24  1432        Albumin/Globulin Ratio 0.6           Albumin 3.4           ALP 56           ALT 13           Anion Gap 4.0           Ao peak bismark   1.25         Ao VTI   21.50         Appear CSF     Clear       AST 11           AV valve area   2.41         HEATHER by Velocity Ratio   2.64         AV mean gradient   4         AV index (prosthetic)   0.77         AV peak gradient   6         AV Velocity Ratio   0.84         Basophil %     2       BILIRUBIN TOTAL 0.5           BUN 8.6           BUN/CREAT RATIO 8           Calcium 9.0           Chloride 108           CO2 25           COLOR CSF     Colorless       Creatinine 1.06           Cryptococcus neoformans/gattii     Not Detected       Left Ventricle Relative Wall Thickness   0.37         Cytomegalovirus (CMV)     Not Detected       E/A ratio   1.06         E/E' ratio   4.80         eGFR >60           Enterovirus (EV)     Not Detected       Escherichia coli K1     Not Detected       E wave deceleration time    "identified, a Cobra retractor was placed on the capsule over the superior aspect of the femoral neck.  After identifying the superior edge of the vastus lateralis distally, a second Cobra retractor was placed over the capsule overlying the inferior femoral neck.  The reflected head of the rectus femoris was identified, and the fascia released enough to allow placement of a single prong acetabular retractor. The knee bump was then removed, leg externally rotated, and anterior capsule excised first laterally then medially, and the labrum incised superiorly.  Cobra retractors were repositioned on the exposed femoral neck both superiorly and inferiorly.  Description of femoral head: Complete eburnation, with osteophytes at the head neck junction.  A neck cut was then made at the head and neck junction with the aid of an oscillating saw blade, followed by a second cut at the base of the femoral neck.  The \"napkin ring\" femoral neck fragment was removed, as well as the femoral head after repositioning the posterior Cobra retractor just outside the hip capsule.    Acetabular exposure was then addressed by repositioning the anterior Cobra retractor between the capsule and the psoas tendon.  The labrum was then removed from the rim of the acetabulum anteriorly, superiorly and posteriorly, preserving the transverse acetabular ligament. The anterior Cobra retractor was replaced over the transverse acetabular ligament, and a Albright retractor placed over the posterior wall of the acetabulum.  Description of acetabulum: Complete wear, with thickening of the labrum posteriorly and inferior acetabular osteophyte. With the transverse acetabular ligament as a reference for cup positioning, the acetabulum was sequentially reamed up to 54 to accommodate a 54 press-fit R3 cup, which had excellent press-fit characteristics.  A single 30 mm screw was placed which had good purchase, followed by a neutral polyethylene liner.    Attention " 187.00         FS   26         Globulin, Total 5.5           Glucose 81           Glucose CSF     40       Haemophilus influenzae     Not Detected       HSV-1     Not Detected       HSV-2     Not Detected       Human Herpesvirus 6 (HHV-6)     Not Detected       Human Parechovirus (HPEV)     Not Detected       IVSd   0.79         LA size   2.60         LA volume   12.30         LA Volume Index (Mod)   6.5         LVOT area   3.1         Listeria monocytogenes     Not Detected       LV LATERAL E/E' RATIO   4.00         LV SEPTAL E/E' RATIO   6.00         LV EDV BP   123.00         LV Diastolic Volume Index   64.74         LVIDd   5.09         LVIDs   3.78         LV mass   155.97         LV Mass Index   82         Left Ventricular Outflow Tract Mean Gradient   2.00         Left Ventricular Outflow Tract Mean Velocity   0.67         LVOT diameter   2.00         LVOT peak liam   1.05         LVOT stroke volume   51.81         LVOT peak VTI   16.50         LV ESV BP   61.20         LV Systolic Volume Index   32.2         Lymphocyte %     2       Mean e'   0.15         Monocyte %     14       MV valve area p 1/2 method   4.58         MV valve area by continuity eq   1.96         MV mean gradient   2         MV peak gradient   3         MV Peak A Liam   0.68         MV Peak E Liam   0.72         MV stenosis pressure 1/2 time   48.00         MV VTI   26.5         Neisseria meningitidis     Not Detected       Neutrophils %     82       Gimenez's Biplane MOD Ejection Fraction   57         Potassium 3.9           Protein CSF      91.8       PROTEIN TOTAL 8.9           PV peak gradient   4         PV PEAK VELOCITY   0.98         Posterior Wall   0.95         Est. RA pres   3         RBC, CSF     50       RV TB RVSP   5         Sodium 137           Streptococcus agalactiae (Group B)     Not Detected       Streptococcus pneumoniae     Not Detected       TAPSE   2.52         TDI SEPTAL   0.12         TDI LATERAL   0.18          Triscuspid Valve Regurgitation Peak Gradient   16         TR Max Liam   2.02         TV resting pulmonary artery pressure   19         Vancomycin-Trough 12.9           Varicella zoster Virus (VZV)     Not Detected       Volume, CSF     4       WBC, CSF     2,929       ZLVIDD   -0.46         ZLVIDS   1.11                 Significant Imaging: I have reviewed all pertinent imaging results/findings within the past 24 hours.  Assessment and Plan:     Seizures  Continue keppra 1500 mg BID and vimpat 200 mg BID   Give ativan 2 mg PRN for witnessed seizures  Seizure precautions      Elevated intracranial pressure  Repeat LP on 6/3: opening pressure 47, CSF WBC 2929  ID consulted        VTE Risk Mitigation (From admission, onward)           Ordered     IP VTE HIGH RISK PATIENT  Once         06/01/24 1513     Place sequential compression device  Until discontinued         06/01/24 1513                    [facility]  Inpatient Neurology  [facility] - [address] [place], [place] [address]etry    I have seen/examined the patient.  NP was scribe.  I agree with the findings unless outlined below:    Phong Hinojosa MD  [facility]     Overall better today  Will get CSF for flow if enough is left   was then redirected towards the femoral aspect. The non-operative limb was then placed on a padded Briggs stand, to allow for appropriate positioning of the operative limb.  Using the bone hook for traction in the calcar region of the proximal femur, the posterior capsule was released adjacent to the greater trochanter to allow for delivery of the proximal femur with a double fang retractor.  With appropriate external rotation of the proximal femur and further adduction of the leg following double fang retractor placement and removal of the single-toothed fang anteriorly, a Albright retractor was placed in the region of the calcar and femoral preparations were made to accommodate the polar stem.  Box osteotome was used to prepare the lateral aspect, followed by the T-handle canal finder, then sequential broaching of the femur to accommodate a #4 broach, standard offset neck, with a +0 x 36 head.      Trial reduction was performed, full arc of motion noted, with appropriate limb lengths after leveling the table.  Intraoperative fluoroscopy showed appropriate implant alignment and leg lengths.  The hip was again dislocated and the final #4 standard offset press-fit Polar stem placed, followed by +0 x 36 ceramic head, with the same reduction characteristics were noted as with the trial with no dislocation throughout the full arc of motion and appropriate limb lengths.      Therefore, the hip was copiously irrigated and attention directed towards closure. Fascia latae was closed with #1 Vicryl in an interrupted figure-of-eight fashion in 3 strategic locations both proximally, distally and in the central portion, followed by oversewing this from distal to proximal with a #1 StrataFix symmetric, which nicely sealed that layer, followed by closure of the subcutaneous layer with 2-0 Vicryl and the skin with 3-0 StrataFix in a running subcuticular fashion.  Adhesive wound closure dressing was applied followed by a sterile  dressing with 4 x 4s secured with micropore tape.  The patient tolerated the procedure well and was brought to the recovery room in good condition.     POSTOPERATIVE PLAN:  1. The patient will begin early range of motion and weight-bearing per the post anterior total hip arthroplasty protocol.   2. I anticipate brief hospitalization for initial rehabilitation and pain control followed by continued rehabilitation home health/outpatient physical therapy setting.  Patient likely ready for discharge later today as long as he is cleared medically and by therapy.  Follow-up in 3 weeks as planned.   3. Postoperative medical management with Dr. Shaw.  4. Postoperative DVT prophylaxis with aspirin.  5. Postoperative IV antibiotics with Ancef.      Abhishek Vicente MD  02/23/23  10:36 EST

## 2024-06-04 NOTE — PROGRESS NOTES
[facility] - [facility] Tele[facility] HOSPITAL MEDICINE ~ PROGRESS NOTE    CHIEF COMPLAINT   Hospital follow up for [facility]    HOSPITAL COURSE   23 y.o. male with a PMHx of viral/aseptic meningitis and seizure disorderwho presented to [facility] on 6/1/2024 with c/o right-sided facial droop, right-sided weakness, and aphasia that began prior to arrival.  Patient's significant other reports that he was unable to answer questions.  Code fast caught upon arrival to ED and patient was evaluated by Neurology.  CT head negative for acute intracranial hemorrhage, no findings to suggest large vascular territory acute ischemic insult. Labs essentially unremarkable.  He was admitted to hospital medicine service for further medical management.     Of note, patient was initially hospitalized on 05/07/2024 when he was transferred from [facility] to [facility] for higher level of care due to concern for CVA.    Initial symptoms started as weakness and numbness in the left arm and leg with severe headaches with associated photophobia, nausea, and vomiting X2 weeks.    He was given a partial dose of tPA due to gingival bleeding.    Admitted to ICU here.    Left-sided weakness improved over the course of his admission; however mentation continued to wax and wane.    LP was done on 05/09/2024 with CSF demonstrating leukocytosis and with lymph node predominance.    Then began having episodes of absence seizures, with staring spells.    Blood cultures from 05/07 and 05/08 were negative.   MRI brain on 05/07/2024 negative for acute intracranial findings.     He was transferred to [facility] for Neuro ICU for possible encephalitis on 05/10/2024. MRI brain with and without contrast on 05/16/2024 negative for acute intracranial findings. MRA and MRV of the Brain were also unremarkable. He was discharged from [facility] on 05/13/2024 with noted return to neurological baseline.  Antiepileptic medications and antimicrobials were  discontinued.  Felt to be viral or aseptic meningitis.       Extensive encephalopathy panel was sent and course seemed to favor possible viral or autoimmune/inflammatory etiology; viral meningitis versus aseptic meningitis. Farm animal exposure noted. Notable results include:  MOG FACS positive 1:1000  Coproporphyrin,tetra level of 177     He then again presented to [facility] on 05/15/2024 for worsening headache and slurred speech.    He was admitted to hospitalist service.  Evaluated by Neurology and ID.  EEG noted a right temporal lobe electrographic seizure, right temporal lobe slowing.    Noted to have previous LP opening pressure of 43 cm of H2O.    On 05/21 he began having left flank pain for which CT abdomen was done and demonstrated left ureteral stone, evaluated by Urology, and started on Flomax.    Repeat LP showed persistent elevated opening pressure, remained afebrile  Ophthalmology consulted for evaluation in light of complaints of R eye swelling & conjunctival injection; plan for outpatient F/U.    He was discharged home on 05/25/2024 with neurology and ophthalmology follow-up.  Discharge prescriptions included Keppra  1500 mg p.o. b.I.d., Vimpat 100 mg p.o. every 12 hours and Flomax 0.4 mg p.o. daily    Today  No complaints   OBJECTIVE/PHYSICAL EXAM     VITAL SIGNS (MOST RECENT):  Temp: 97.5 °F (36.4 °C) (06/04/24 1130)  Pulse: 78 (06/04/24 1130)  Resp: 20 (06/04/24 0453)  BP: 117/71 (06/04/24 1130)  SpO2: 99 % (06/04/24 1130) VITAL SIGNS (24 HOUR RANGE):  Temp:  [97.5 °F (36.4 °C)-99.5 °F (37.5 °C)] 97.5 °F (36.4 °C)  Pulse:  [69-90] 78  Resp:  [18-20] 20  SpO2:  [93 %-99 %] 99 %  BP: (112-122)/(68-76) 117/71   GENERAL: In no acute distress, afebrile  HEENT:PERRLA  CHEST: Clear to auscultation bilaterally  HEART: S1, S2, no appreciable murmur  ABDOMEN: Soft, nontender, BS +  MSK: Warm, no lower extremity edema, no clubbing or cyanosis  NEUROLOGIC: Alert and oriented x4, moving all extremities with good  strength   INTEGUMENTARY: thoracic and left flank papular rash  ASSESSMENT/PLAN   Seizure disorder with breakthrough seizures-likely accident seizures   Right-sided facial droop/weakness/expressive/receptive aphasia-suspect postictal state/ 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 Opening pressure: 47 cm H2O   IV Keppra 1500 mg b.i.d., Vimpat increased to 200 mg b.i.d.  CT head was negative for acute changes   EEG demonstrated structural/functional disturbance, no evidence of ongoing seizure  It is to be noted that patient MOG Antibody has come back positive and his symptoms could be related to MOGAD syndrome  S/p IVIG 6/1, 6/3  Pending fecal porphyrins and APGP  Rash secondary to vimpat/keppra?  Pt has episode of fever 6/1 and low grade temp 6/2  Unremarkable UA and CXR obtained 6/2  Blood culture 2/2+ gram positive cocci in 1 bottole  Second blood culture remains negative   Suspect this is contaminant as pt's exam is not consistent with bacteremia   Repeat blood cultures 6/4, if negative at 24 hours, discontinue vanc  TTEcho from 6/3 normal systolic function with a visually estimated ejection fraction of 55 - 60%. There is normal diastolic function.   Denies cough, dysuria, diarrhea, abdominal pain  DVT prophylaxis-bilateral SCDs    Anticipated discharge and disposition:  Home when medically  stable  __________________________________________________________________________    NUTRITIONAL STATUS     Patient meets ASPEN criteria for   malnutrition of   per RD assessment as evidenced by:                       A minimum of two characteristics is recommended for diagnosis of either severe or non-severe malnutrition.     LABS/MICRO/MEDS/DIAGNOSTICS       LABS  Recent Labs     06/04/24  1034      K 3.9   CO2 25   BUN 8.6*   CREATININE 1.06   GLUCOSE 81   CALCIUM 9.0   ALKPHOS 56   AST 11   ALT 13   ALBUMIN 3.4*     Recent Labs     06/03/24  0442   WBC 5.53   RBC 4.36*   HCT 38.4*   MCV 88.1          MICROBIOLOGY  Microbiology Results (last 7 days)       Procedure Component Value Units Date/Time    Blood Culture [[id number]]     Order Status: Sent Specimen: Blood     Blood Culture [[id number]]     Order Status: Sent Specimen: Blood     Blood Culture [[id number]]  (Normal) Collected: 06/02/24 1122    Order Status: Completed Specimen: Blood Updated: 06/04/24 1200     Blood Culture No Growth At 48 Hours    BCID2 Panel [[id number]]  (Abnormal) Collected: 06/02/24 1119    Order Status: Completed Specimen: Blood from Hand, Right Updated: 06/03/24 0934     CTX-M (ESBL ) N/A     IMP (Cabapenemase ) N/A     KPC resistance gene (Carbapenemase ) N/A     mcr-1 N/A     mecA ID Detected     Comment: Note: Antimicrobial resistance can occur via multiple mechanisms. A Not Detected result for antimicrobial resistance gene(s) does not indicate antimicrobial susceptibility. Subculturing is required for species identification and susceptibility testing of   isolates.        mecA/C and MREJ (MRSA) gene N/A     NDM (Carbapenemase ) N/A     OXA-48-like (Carbapenemase ) N/A     Alexis/B (VRE gene) N/A     VIM (Carbapenemase ) N/A     Enterococcus faecalis Not Detected     Enterococcus faecium Not Detected     Listeria monocytogenes Not Detected     Staphylococcus spp. Detected      Staphylococcus aureus Not Detected     Staphylococcus epidermidis Detected     Staphylococcus lugdunensis Not Detected     Streptococcus spp. Not Detected     Streptococcus agalactiae (Group B) Not Detected     Streptococcus pneumoniae Not Detected     Streptococcus pyogenes (Group A) Not Detected     Acinetobacter calcoaceticus/baumannii complex Not Detected     Bacteroides fragilis Not Detected     Enterobacterales Not Detected     Enterobacter cloacae complex Not Detected     Escherichia coli Not Detected     Klebsiella aerogenes Not Detected     Klebsiella oxytoca Not Detected     Klebsiella pneumoniae group Not Detected     Proteus spp. Not Detected     Salmonella spp. Not Detected     Serratia marcescens Not Detected     Haemophilus influenzae Not Detected     Neisseria meningitidis Not Detected     Pseudomonas aeruginosa Not Detected     Stenotrophomonas maltophilia Not Detected     Candida albicans Not Detected     Candida auris Not Detected     Candida glabrata Not Detected     Candida krusei Not Detected     Candida parapsilosis Not Detected     Candida tropicalis Not Detected     Cryptococcus neoformans/gattii Not Detected    Narrative:      The Smile BCID2 Panel is a multiplexed nucleic acid test intended for the use with Tethis® 2.0 or Tethis® GMG33 Systems for the simultaneous qualitative detection and identification of multiple bacterial and yeast nucleic acids and select genetic determinants associated with antimicrobial resistance.  The Glooplee BCID2 Panel test is performed directly on blood culture samples identified as positive by a continuous monitoring blood culture system.  Results are intended to be interpreted in conjunction with Gram stain results.    Blood Culture [[id number]]  (Abnormal) Collected: 06/02/24 1119    Order Status: Completed Specimen: Blood from Hand, Right Updated: 06/03/24 0924     GRAM STAIN Gram Positive Cocci, probable Staphylococcus       Seen in gram stain of broth only      2 of 2 bottles positive               MEDICATIONS   lacosamide (VIMPAT) IVPB  200 mg Intravenous Q12H    levETIRAcetam (Keppra) IV (PEDS and ADULTS)  1,500 mg Intravenous Q12H    vancomycin (VANCOCIN) IV (PEDS and ADULTS)  1,250 mg Intravenous Q12H         INFUSIONS           DIAGNOSTIC TESTS  FL Lumbar Puncture Therapeutic With Imaging   Final Result      Technically successful fluoroscopic guided lumbar puncture.         Electronically signed by: Aniyah Velazquez   Date:    06/03/2024   Time:    15:49      X-Ray Chest 1 View   Final Result      No acute cardiopulmonary abnormality.         Electronically signed by: Francisco Rodney MD   Date:    06/02/2024   Time:    10:39      CT HEAD FOR STROKE   Final Result           Echo    Result Date: 6/3/2024    Left Ventricle: The left ventricle is normal in size. Normal wall   thickness. There is normal systolic function with a visually estimated   ejection fraction of 55 - 60%. There is normal diastolic function.    Right Ventricle: Normal right ventricular cavity size. Wall thickness   is normal. Systolic function is normal.    Aortic Valve: The aortic valve is a trileaflet valve.    Mitral Valve: There is no stenosis. The mean pressure gradient across   the mitral valve is 2 mmHg at a heart rate of  bpm.    IVC/SVC: Normal venous pressure at 3 mmHg.              Case related differential diagnoses have been reviewed; assessment and plan has been documented. I have personally reviewed the labs and test results that are currently available; I have reviewed the patients medication list. I have reviewed the consulting providers recommendations. I have reviewed or attempted to review medical records based upon their availability.  All of the patient's and/or family's questions have been addressed and answered to the best of my ability.  I will continue to monitor closely and make adjustments to medical management as needed.  This document  was created using M*Modal Fluency Direct.  Transcription errors may have been made.  Please contact me if any questions may rise regarding documentation to clarify transcription.        Thairy G Reyes, DO   Internal Medicine  Department of Hospital Medicine  [facility] - [facility] Telemetry

## 2024-06-04 NOTE — SUBJECTIVE & OBJECTIVE
Subjective:     Interval History:   Pt's significant other at bedside reports yesterday around 5:00 p.m. patient suddenly fell asleep and slept until approx 8:00 a.m. this morning, which is unusual for him.  Reports also having episodes of blank staring.    Current Neurological Medications:     Current Facility-Administered Medications   Medication Dose Route Frequency Provider Last Rate Last Admin    acetaminophen tablet 650 mg  650 mg Oral Q8H PRN Sabra Michel AGACNP-BC        acetaminophen tablet 650 mg  650 mg Oral Q4H PRN Sabra Michel AGACNP-BC   650 mg at 06/03/24 0926    lacosamide (VIMPAT) 200 mg in sodium chloride 0.9% 100 mL IVPB  200 mg Intravenous Q12H Phong Hinojosa MD   Stopped at 06/03/24 1716    levETIRAcetam in NaCl (iso-os) IVPB 1,500 mg  1,500 mg Intravenous Q12H Phong Hinojosa MD   Stopped at 06/04/24 0952    LORazepam injection 2 mg  2 mg Intravenous Q4H PRN Sabra Michel AGACNP-BC        ondansetron injection 4 mg  4 mg Intravenous Q8H PRN Sabra Michel AGACNP-BC        vancomycin - pharmacy to dose   Intravenous pharmacy to manage frequency Reyes, Thairy G, DO        vancomycin 1.25 g in dextrose 5% 250 mL IVPB (ready to mix)  1,250 mg Intravenous Q12H Treva Azevedo .7 mL/hr at 06/04/24 1130 1,250 mg at 06/04/24 1130       Review of Systems  A 14pt ros was reviewed & is negative unless o/w documented in the hpi    Objective:     Vital Signs (Most Recent):  Temp: 97.5 °F (36.4 °C) (06/04/24 1130)  Pulse: 78 (06/04/24 1130)  Resp: 20 (06/04/24 0453)  BP: 117/71 (06/04/24 1130)  SpO2: 99 % (06/04/24 1130) Vital Signs (24h Range):  Temp:  [97.5 °F (36.4 °C)-99.5 °F (37.5 °C)] 97.5 °F (36.4 °C)  Pulse:  [69-90] 78  Resp:  [18-20] 20  SpO2:  [93 %-99 %] 99 %  BP: (112-122)/(68-76) 117/71     Weight: 71.2 kg (156 lb 13.7 oz)  Body mass index is 21.88 kg/m².     Physical Exam      GENERAL: NAD, calm, cooperative, appropriate, awake/alert  MENTAL STATUS: Oriented x4,  follows commands reliably  SPEECH/LANGUAGE: Clear, coherent  CN:  EOMI, gaze conjugate, visual fields intact  PERRLA  Motor: no focal motor weakness  Sensory: Normal to tactile stim/vibration  Gait: not observed      Significant Labs:   Recent Lab Results         06/04/24  1034   06/03/24  1650   06/03/24  1432        Albumin/Globulin Ratio 0.6           Albumin 3.4           ALP 56           ALT 13           Anion Gap 4.0           Ao peak bismark   1.25         Ao VTI   21.50         Appear CSF     Clear       AST 11           AV valve area   2.41         HEATHER by Velocity Ratio   2.64         AV mean gradient   4         AV index (prosthetic)   0.77         AV peak gradient   6         AV Velocity Ratio   0.84         Basophil %     2       BILIRUBIN TOTAL 0.5           BUN 8.6           BUN/CREAT RATIO 8           Calcium 9.0           Chloride 108           CO2 25           COLOR CSF     Colorless       Creatinine 1.06           Cryptococcus neoformans/gattii     Not Detected       Left Ventricle Relative Wall Thickness   0.37         Cytomegalovirus (CMV)     Not Detected       E/A ratio   1.06         E/E' ratio   4.80         eGFR >60           Enterovirus (EV)     Not Detected       Escherichia coli K1     Not Detected       E wave deceleration time   187.00         FS   26         Globulin, Total 5.5           Glucose 81           Glucose CSF     40       Haemophilus influenzae     Not Detected       HSV-1     Not Detected       HSV-2     Not Detected       Human Herpesvirus 6 (HHV-6)     Not Detected       Human Parechovirus (HPEV)     Not Detected       IVSd   0.79         LA size   2.60         LA volume   12.30         LA Volume Index (Mod)   6.5         LVOT area   3.1         Listeria monocytogenes     Not Detected       LV LATERAL E/E' RATIO   4.00         LV SEPTAL E/E' RATIO   6.00         LV EDV BP   123.00         LV Diastolic Volume Index   64.74         LVIDd   5.09         LVIDs   3.78         LV  mass   155.97         LV Mass Index   82         Left Ventricular Outflow Tract Mean Gradient   2.00         Left Ventricular Outflow Tract Mean Velocity   0.67         LVOT diameter   2.00         LVOT peak liam   1.05         LVOT stroke volume   51.81         LVOT peak VTI   16.50         LV ESV BP   61.20         LV Systolic Volume Index   32.2         Lymphocyte %     2       Mean e'   0.15         Monocyte %     14       MV valve area p 1/2 method   4.58         MV valve area by continuity eq   1.96         MV mean gradient   2         MV peak gradient   3         MV Peak A Liam   0.68         MV Peak E Liam   0.72         MV stenosis pressure 1/2 time   48.00         MV VTI   26.5         Neisseria meningitidis     Not Detected       Neutrophils %     82       Gimenez's Biplane MOD Ejection Fraction   57         Potassium 3.9           Protein CSF      91.8       PROTEIN TOTAL 8.9           PV peak gradient   4         PV PEAK VELOCITY   0.98         Posterior Wall   0.95         Est. RA pres   3         RBC, CSF     50       RV TB RVSP   5         Sodium 137           Streptococcus agalactiae (Group B)     Not Detected       Streptococcus pneumoniae     Not Detected       TAPSE   2.52         TDI SEPTAL   0.12         TDI LATERAL   0.18         Triscuspid Valve Regurgitation Peak Gradient   16         TR Max Liam   2.02         TV resting pulmonary artery pressure   19         Vancomycin-Trough 12.9           Varicella zoster Virus (VZV)     Not Detected       Volume, CSF     4       WBC, CSF     2,929       ZLVIDD   -0.46         ZLVIDS   1.11                 Significant Imaging: I have reviewed all pertinent imaging results/findings within the past 24 hours.

## 2024-06-04 NOTE — PLAN OF CARE
06/04/24 1015   Discharge Assessment   Assessment Type Discharge Planning Assessment   Confirmed/corrected address, phone number and insurance Yes   Confirmed Demographics Correct on Facesheet   Source of Information patient   When was your last doctors appointment? 05/06/24   Does patient/caregiver understand observation status Yes   Communicated DAVID with patient/caregiver Yes   Reason For Admission acute focal neurological deficit   People in Home child(christina), dependent;significant other  (fiancee and 8 mth old son live with pt)   Facility Arrived From: home   Do you expect to return to your current living situation? Yes   Do you have help at home or someone to help you manage your care at home? Yes   Who are your caregiver(s) and their phone number(s)? family   Prior to hospitilization cognitive status: Alert/Oriented   Current cognitive status: Alert/Oriented   Walking or Climbing Stairs Difficulty no   Dressing/Bathing Difficulty no   Equipment Currently Used at Home none   Patient currently being followed by outpatient case management? No   Do you currently have service(s) that help you manage your care at home? No   Do you take prescription medications? Yes   Do you have any problems affording any of your prescribed medications? No   Is the patient taking medications as prescribed? yes   Who is going to help you get home at discharge? family   How do you get to doctors appointments? family or friend will provide   Are you on dialysis? No   Do you take coumadin? No   Discharge Plan A Home   Discharge Plan B Home Health   DME Needed Upon Discharge  none   Discharge Plan discussed with: Patient;Spouse/sig other   Transition of Care Barriers None   Housing Stability   In the last 12 months, was there a time when you were not able to pay the mortgage or rent on time? N   At any time in the past 12 months, were you homeless or living in a shelter (including now)? N   Transportation Needs   Has the lack of  transportation kept you from medical appointments, meetings, work or from getting things needed for daily living? No   Food Insecurity   Within the past 12 months, you worried that your food would run out before you got the money to buy more. Never true   Within the past 12 months, the food you bought just didn't last and you didn't have money to get more. Never true   Social Isolation   How often do you feel lonely or isolated from those around you?  Rarely   Alcohol Use   Q2: How many drinks containing alcohol do you have on a typical day when you are drinking? None   Escom   In the past 12 months has the electric, gas, oil, or water company threatened to shut off services in your home? No   Health Literacy   How often do you need to have someone help you when you read instructions, pamphlets, or other written material from your doctor or pharmacy? Sometimes     Pt lives with nany, 8mth old son . He has not used HH or any DME. He is not able to drive due to medical condition. Other needs TBD.

## 2024-06-05 VITALS
DIASTOLIC BLOOD PRESSURE: 69 MMHG | SYSTOLIC BLOOD PRESSURE: 110 MMHG | HEART RATE: 74 BPM | OXYGEN SATURATION: 96 % | TEMPERATURE: 98 F | RESPIRATION RATE: 18 BRPM | BODY MASS INDEX: 21.96 KG/M2 | WEIGHT: 156.88 LBS | HEIGHT: 71 IN

## 2024-06-05 LAB
ANION GAP SERPL CALC-SCNC: 9 MEQ/L
BASOPHILS # BLD AUTO: 0.04 X10(3)/MCL
BASOPHILS NFR BLD AUTO: 0.9 %
BUN SERPL-MCNC: 11.6 MG/DL (ref 8.9–20.6)
CALCIUM SERPL-MCNC: 8.7 MG/DL (ref 8.4–10.2)
CHLORIDE SERPL-SCNC: 110 MMOL/L (ref 98–107)
CO2 SERPL-SCNC: 19 MMOL/L (ref 22–29)
CREAT SERPL-MCNC: 0.89 MG/DL (ref 0.73–1.18)
CREAT/UREA NIT SERPL: 13
EOSINOPHIL # BLD AUTO: 0.09 X10(3)/MCL (ref 0–0.9)
EOSINOPHIL NFR BLD AUTO: 2 %
ERYTHROCYTE [DISTWIDTH] IN BLOOD BY AUTOMATED COUNT: 13.4 % (ref 11.5–17)
GFR SERPLBLD CREATININE-BSD FMLA CKD-EPI: >60 ML/MIN/1.73/M2
GLUCOSE SERPL-MCNC: 85 MG/DL (ref 74–100)
HCT VFR BLD AUTO: 36.7 % (ref 42–52)
HGB BLD-MCNC: 12.3 G/DL (ref 14–18)
IMM GRANULOCYTES # BLD AUTO: 0.01 X10(3)/MCL (ref 0–0.04)
IMM GRANULOCYTES NFR BLD AUTO: 0.2 %
KAPPA LC FREE CSF-MCNC: 0.12 MG/DL
LYMPHOCYTES # BLD AUTO: 1.04 X10(3)/MCL (ref 0.6–4.6)
LYMPHOCYTES NFR BLD AUTO: 23.6 %
M ADDITIONAL SAMPLE FOR REFLEX OLIGS: ABNORMAL
MCH RBC QN AUTO: 29.1 PG (ref 27–31)
MCHC RBC AUTO-ENTMCNC: 33.5 G/DL (ref 33–36)
MCV RBC AUTO: 87 FL (ref 80–94)
MONOCYTES # BLD AUTO: 0.58 X10(3)/MCL (ref 0.1–1.3)
MONOCYTES NFR BLD AUTO: 13.2 %
NEUTROPHILS # BLD AUTO: 2.64 X10(3)/MCL (ref 2.1–9.2)
NEUTROPHILS NFR BLD AUTO: 60.1 %
NRBC BLD AUTO-RTO: 0 %
PLATELET # BLD AUTO: 195 X10(3)/MCL (ref 130–400)
PMV BLD AUTO: 9.1 FL (ref 7.4–10.4)
POTASSIUM SERPL-SCNC: 4.1 MMOL/L (ref 3.5–5.1)
RBC # BLD AUTO: 4.22 X10(6)/MCL (ref 4.7–6.1)
SODIUM SERPL-SCNC: 138 MMOL/L (ref 136–145)
WBC # SPEC AUTO: 4.4 X10(3)/MCL (ref 4.5–11.5)

## 2024-06-05 PROCEDURE — 36415 COLL VENOUS BLD VENIPUNCTURE: CPT | Performed by: STUDENT IN AN ORGANIZED HEALTH CARE EDUCATION/TRAINING PROGRAM

## 2024-06-05 PROCEDURE — 63600175 PHARM REV CODE 636 W HCPCS: Performed by: PSYCHIATRY & NEUROLOGY

## 2024-06-05 PROCEDURE — 25000003 PHARM REV CODE 250: Performed by: PSYCHIATRY & NEUROLOGY

## 2024-06-05 PROCEDURE — 99233 SBSQ HOSP IP/OBS HIGH 50: CPT | Mod: ,,, | Performed by: PSYCHIATRY & NEUROLOGY

## 2024-06-05 PROCEDURE — 99233 SBSQ HOSP IP/OBS HIGH 50: CPT | Mod: ,,, | Performed by: HOSPITALIST

## 2024-06-05 PROCEDURE — C9254 INJECTION, LACOSAMIDE: HCPCS | Performed by: PSYCHIATRY & NEUROLOGY

## 2024-06-05 PROCEDURE — 80048 BASIC METABOLIC PNL TOTAL CA: CPT | Performed by: STUDENT IN AN ORGANIZED HEALTH CARE EDUCATION/TRAINING PROGRAM

## 2024-06-05 PROCEDURE — 85025 COMPLETE CBC W/AUTO DIFF WBC: CPT | Performed by: STUDENT IN AN ORGANIZED HEALTH CARE EDUCATION/TRAINING PROGRAM

## 2024-06-05 RX ORDER — LACOSAMIDE 200 MG/1
200 TABLET ORAL EVERY 12 HOURS
Qty: 60 TABLET | Refills: 11 | Status: SHIPPED | OUTPATIENT
Start: 2024-06-05 | End: 2025-06-05

## 2024-06-05 RX ORDER — LEVETIRACETAM 1000 MG/1
1500 TABLET ORAL 2 TIMES DAILY
Qty: 102 TABLET | Refills: 10 | Status: SHIPPED | OUTPATIENT
Start: 2024-06-05 | End: 2025-06-05

## 2024-06-05 RX ADMIN — LACOSAMIDE 200 MG: 10 INJECTION INTRAVENOUS at 05:06

## 2024-06-05 RX ADMIN — LEVETIRACETAM INJECTION 1500 MG: 15 INJECTION INTRAVENOUS at 08:06

## 2024-06-05 NOTE — SUBJECTIVE & OBJECTIVE
Subjective:     Interval History:   No significant events overnight or this AM. Neurologic exam remains stable.   Plan of care d/w pt and pt's significant other at bedside.     Current Neurological Medications:     Current Facility-Administered Medications   Medication Dose Route Frequency Provider Last Rate Last Admin    acetaminophen tablet 650 mg  650 mg Oral Q8H PRN Sabra Michel AGACNP-BC        acetaminophen tablet 650 mg  650 mg Oral Q4H PRN Sabra Michel AGACNP-BC   650 mg at 06/03/24 0926    lacosamide (VIMPAT) 200 mg in sodium chloride 0.9% 100 mL IVPB  200 mg Intravenous Q12H Phong Hinojosa MD   Stopped at 06/05/24 0606    levETIRAcetam in NaCl (iso-os) IVPB 1,500 mg  1,500 mg Intravenous Q12H Phong Hinojosa  mL/hr at 06/05/24 0852 1,500 mg at 06/05/24 0852    LORazepam injection 2 mg  2 mg Intravenous Q4H PRN Sabra Mihcel AGACNP-BC        ondansetron injection 4 mg  4 mg Intravenous Q8H PRN Sabra Michel AGACNP-BC        vancomycin - pharmacy to dose   Intravenous pharmacy to manage frequency Reyes, Thairy G, DO        vancomycin 1.25 g in dextrose 5% 250 mL IVPB (ready to mix)  1,250 mg Intravenous Q12H Treva Azevedo MD   Stopped at 06/05/24 0026       Review of Systems  A 14pt ros was reviewed & is negative unless o/w documented in the hpi    Objective:     Vital Signs (Most Recent):  Temp: 98.1 °F (36.7 °C) (06/05/24 0732)  Pulse: 71 (06/05/24 0732)  Resp: 18 (06/05/24 0732)  BP: 115/68 (06/05/24 0732)  SpO2: 97 % (06/05/24 0732) Vital Signs (24h Range):  Temp:  [97.5 °F (36.4 °C)-98.7 °F (37.1 °C)] 98.1 °F (36.7 °C)  Pulse:  [64-78] 71  Resp:  [18-20] 18  SpO2:  [97 %-99 %] 97 %  BP: (103-117)/(66-74) 115/68     Weight: 71.2 kg (156 lb 13.7 oz)  Body mass index is 21.88 kg/m².     Physical Exam       GENERAL: NAD, calm, cooperative, appropriate, awake/alert  MENTAL STATUS: Oriented x4, follows commands reliably  SPEECH/LANGUAGE: Clear, coherent  CN:  EOMI, gaze  conjugate, visual fields intact  PERRLA  Motor: no focal motor weakness  Sensory: Normal to tactile stim/vibration  Gait: not observed          Significant Labs:   Recent Lab Results         06/05/24  0447   06/04/24  1034        Albumin/Globulin Ratio   0.6       Albumin   3.4       ALP   56       ALT   13       Anion Gap 9.0   4.0       AST   11       Baso # 0.04         Basophil % 0.9         BILIRUBIN TOTAL   0.5       BUN 11.6   8.6       BUN/CREAT RATIO 13   8       Calcium 8.7   9.0       Chloride 110   108       CO2 19   25       Creatinine 0.89   1.06       eGFR >60   >60       Eos # 0.09         Eos % 2.0         Globulin, Total   5.5       Glucose 85   81       Hematocrit 36.7         Hemoglobin 12.3         Immature Grans (Abs) 0.01         Immature Granulocytes 0.2         Lymph # 1.04         LYMPH % 23.6         MCH 29.1         MCHC 33.5         MCV 87.0         Mono # 0.58         Mono % 13.2         MPV 9.1         Neut # 2.64         Neut % 60.1         nRBC 0.0         Platelet Count 195         Potassium 4.1   3.9       PROTEIN TOTAL   8.9       RBC 4.22         RDW 13.4         Sodium 138   137       Vancomycin-Trough   12.9       WBC 4.40                 Significant Imaging: I have reviewed all pertinent imaging results/findings within the past 24 hours.

## 2024-06-05 NOTE — PLAN OF CARE
06/05/24 1543   Final Note   Assessment Type Final Discharge Note   Anticipated Discharge Disposition Home   Post-Acute Status   Discharge Delays None known at this time     Pt will dc home. [facility]IG info sent to [facility] . I have faxed them and given pt their number.  Also explained insurance auth make take 5-7 days. I advised for pt to call them in 3 days to follow up .

## 2024-06-05 NOTE — PLAN OF CARE
Pt and gf would like to have IVIG at [facility]. I have notified infusion ctr at [phone] and office would like orders, clinicals, sent to fax at [phone].     Info faxed. Office informed me that Insurance auth may take up to 5-7 days. I have informed Sienna NIÑO of this and this is acceptable. I will provide pt/fmly with [facility] number- [phone].     Pt/gf were not interested in HH , home IV infusion

## 2024-06-05 NOTE — PROGRESS NOTES
"              Infectious Disease        Patient ID: Luis Singh Pontiff  23 y.o. male.    Chief Complaint: Medical (Pt fiance reports pt "not acting right" with R sided facial droop, aphasia, R arm weakness. Not following commands. Answers "yes" to all questions. Newly diagnosed seizures. Onset 1230)    Interval HPI:    6/4 - afebrile. Mentation at baseline currently.  Recurrent episodes of encephalopathy with neurological deficits. Extensive infectious work up on prior admissions. CSF with increased pleocytosis, coproporphyrinuria +, defer to primary. CSF cx is negative, pleocytosis is likely reactive. Add on cx and panel. Will send to [place] for additional PCR testing in interim. Rash is pustular/acneform, can be seen in bacteremia, skin Staph infections. Can use CHG bath 3 x week. Already on IV abx for possible bacteremia. Continue vancomycin at this time.   6/5 - afebrile. Repeat blood cx are ngtd. This isolate is likely a contamination. CSF cx ngtd. Okay to discharge home from ID standpoint.   Assessment and Plan:   1) Encephalitis  - recurrrent episodes of encephalopathy  - CSF 5/9 analysis: clear, colorless, , RBCV 2; L62%, M10%  - CSF 5/17 anlalysis: clear, colorless, , RBC 1020,, L74%, M14   - CSF 5/21 analysis: clear, colorless, WBC 83, , panel negative; L74%, M14%  - CSF analysis 6/3 - clear, colorless, glucose 40, protein - 91, WBC 2929, RBC 50, N92, L2, M14, B2  - CSF 6/3 - ME panel negative   - CSF cx 6/3 add on now  - CT head 6/1 -  No acute intracranial hemorrhage.  No findings to suggest large vascular territory acute ischemic insult.  Grossly unchanged appearance from the 20 May 2024 head CT.   - coproporphyrinuria elevated   - currently on vancomycin, goal 15-20, Rx to dose      2) Bacteremia   - suspected   - BCx 6/2 - Staphylococcus epidermis (mecA detected)  + S. hominis1/2 sets, both bottles  - BCx 6/4 - ngtd   - CXR 6/2 no acute process  - currently on vancomycin, goal " "15-20, Rx to dose      3) SSTI  - skin Staph infection/acneform  - not recurrent, 1st time  - chest, back, flanks  - CHG 3 x weekly  - can be seen with concurrent bacteremia     Discussed with patient and family at bedside 6/5  Discussed with RN  Discussed with Hospitalist, Dr Linnea Byers MD, MPH  [facility]     Thank you for this consultation. I will follow up with the patient. Please contact via Epic secure chat with any questions.         HPI:   Patient is Luis Traylor a 23 y.o. male admitted on 6/1 with AMS, aphasia and right sided weakness and facial droop. Per history patient was noted by signiciant other to be "staring off into space" and unable to speak fluently with right sided weakness. Of note patient recently evaluated for seziures and AEDs were stopped, however, he continued to have seizure like activity. Prior to this admission patient was admitted twice in May 2024 for AMS and found to have seizures, negative brain imaging and infectious work up. He was readmitted 5/15 for headche and slurred speech and was found to have elevated CSF opening pressure. He as seen by Optho and subsequently discharged ome on AEDs. He was subsequently diagnosed with MOGAD syndrome and is now readmitted with above symptoms.        Past Medical History:   Diagnosis Date    Viral or Aseptic meningitis 05/07/2024     Past Surgical History:   Procedure Laterality Date    MAGNETIC RESONANCE IMAGING N/A 5/10/2024    Procedure: MRI (Magnetic Resonance Imagine);  Surgeon: Araceli Bergman MD;  Location: [facility];  Service: Anesthesiology;  Laterality: N/A;     Review of patient's allergies indicates:  No Known Allergies  Current Outpatient Medications   Medication Instructions    lacosamide (VIMPAT) 100 mg, Oral, Every 12 hours    levETIRAcetam (KEPPRA) 1,500 mg, Oral, 2 times daily    tamsulosin (FLOMAX) 0.4 mg, Oral, Daily       Current Facility-Administered Medications:     " acetaminophen tablet 650 mg, 650 mg, Oral, Q8H PRN, Sabra Michel AGACNP-BC    acetaminophen tablet 650 mg, 650 mg, Oral, Q4H PRN, Sabra Michel AGACNP-BC, 650 mg at 06/03/24 0926    lacosamide (VIMPAT) 200 mg in sodium chloride 0.9% 100 mL IVPB, 200 mg, Intravenous, Q12H, Phong Hinojosa MD, Stopped at 06/05/24 0606    levETIRAcetam in NaCl (iso-os) IVPB 1,500 mg, 1,500 mg, Intravenous, Q12H, Phong Hinojosa MD, Last Rate: 200 mL/hr at 06/05/24 0852, 1,500 mg at 06/05/24 0852    LORazepam injection 2 mg, 2 mg, Intravenous, Q4H PRN, Sabra Michel AGACNP-BC    ondansetron injection 4 mg, 4 mg, Intravenous, Q8H PRN, Sabra Michel AGACNP-BC    Pharmacy to dose Vancomycin consult, , , Once **AND** vancomycin - pharmacy to dose, , Intravenous, pharmacy to manage frequency, Reyes, Thairy G, DO    vancomycin 1.25 g in dextrose 5% 250 mL IVPB (ready to mix), 1,250 mg, Intravenous, Q12H, Treva Azevedo MD, Stopped at 06/05/24 0026  Review of Systems   Constitutional:  Negative for chills and fever.   HENT:  Negative for congestion, ear discharge, ear pain, facial swelling, mouth sores, postnasal drip, rhinorrhea, sinus pressure, sinus pain, sneezing, sore throat and trouble swallowing.    Eyes:  Negative for discharge, redness and itching.   Respiratory:  Negative for cough, chest tightness, shortness of breath and wheezing.    Cardiovascular:  Negative for chest pain, palpitations and leg swelling.   Gastrointestinal:  Negative for abdominal distention, abdominal pain, diarrhea, nausea and vomiting.   Genitourinary:  Negative for dysuria, flank pain, frequency and urgency.   Musculoskeletal:  Negative for back pain, myalgias and neck stiffness.   Skin:  Negative for rash and wound.   Allergic/Immunologic: Negative for immunocompromised state.   Neurological:  Negative for dizziness, light-headedness and headaches.   Hematological:  Negative for adenopathy.   Psychiatric/Behavioral:  Negative for  agitation, confusion and suicidal ideas. The patient is not nervous/anxious.        Objective:   Temp:  [97.5 °F (36.4 °C)-98.7 °F (37.1 °C)] 98.1 °F (36.7 °C)  Pulse:  [64-78] 71  Resp:  [18-20] 18  SpO2:  [97 %-99 %] 97 %  BP: (103-117)/(66-74) 115/68     Physical Exam  Constitutional:       Appearance: Normal appearance. He is well-developed.   HENT:      Head: Normocephalic.      Nose: Nose normal.      Mouth/Throat:      Pharynx: No oropharyngeal exudate.   Eyes:      General: Lids are normal. No scleral icterus.        Right eye: No discharge.      Conjunctiva/sclera: Conjunctivae normal.      Pupils: Pupils are equal, round, and reactive to light.   Neck:      Thyroid: No thyromegaly.      Vascular: No JVD.      Trachea: Trachea normal.   Cardiovascular:      Rate and Rhythm: Normal rate and regular rhythm.      Pulses: Normal pulses.      Heart sounds: Normal heart sounds. No murmur heard.     No friction rub.   Pulmonary:      Effort: Pulmonary effort is normal. No respiratory distress.      Breath sounds: Normal breath sounds. No wheezing.   Chest:      Chest wall: No tenderness.   Abdominal:      General: Bowel sounds are normal. There is no distension.      Palpations: Abdomen is soft.      Tenderness: There is no abdominal tenderness. There is no guarding or rebound.   Musculoskeletal:         General: No tenderness. Normal range of motion.      Cervical back: Full passive range of motion without pain, normal range of motion and neck supple.   Lymphadenopathy:      Cervical: No cervical adenopathy.   Skin:     General: Skin is warm and dry.      Findings: Erythema and rash present.      Comments: Diffuse acne trunk/arms/chest/back   Neurological:      Mental Status: He is alert and oriented to person, place, and time.      Cranial Nerves: No cranial nerve deficit.      Sensory: No sensory deficit.   Psychiatric:         Speech: Speech normal.         Behavior: Behavior normal.         Thought Content:  Thought content normal.         Judgment: Judgment normal.         Estimated Creatinine Clearance: 130 mL/min (based on SCr of 0.89 mg/dL).  Recent Labs   Lab 06/05/24  0447   WBC 4.40*        Microbiology Results (last 7 days)       Procedure Component Value Units Date/Time    Cerebrospinal Fluid Culture [[id number]] Collected: 06/03/24 1432    Order Status: Completed Specimen: CSF (Spinal Fluid) Updated: 06/05/24 0708     CULTURE, CSF No Growth At 24 Hours     GRAM STAIN Many WBC observed      No bacteria seen    Blood Culture [[id number]] Collected: 06/04/24 1309    Order Status: Resulted Specimen: Blood Updated: 06/04/24 1328    Blood Culture [[id number]] Collected: 06/04/24 1309    Order Status: Resulted Specimen: Blood Updated: 06/04/24 1328    Blood Culture [[id number]]  (Normal) Collected: 06/02/24 1122    Order Status: Completed Specimen: Blood Updated: 06/04/24 1200     Blood Culture No Growth At 48 Hours    BCID2 Panel [[id number]]  (Abnormal) Collected: 06/02/24 1119    Order Status: Completed Specimen: Blood from Hand, Right Updated: 06/03/24 0934     CTX-M (ESBL ) N/A     IMP (Cabapenemase ) N/A     KPC resistance gene (Carbapenemase ) N/A     mcr-1 N/A     mecA ID Detected     Comment: Note: Antimicrobial resistance can occur via multiple mechanisms. A Not Detected result for antimicrobial resistance gene(s) does not indicate antimicrobial susceptibility. Subculturing is required for species identification and susceptibility testing of   isolates.        mecA/C and MREJ (MRSA) gene N/A     NDM (Carbapenemase ) N/A     OXA-48-like (Carbapenemase ) N/A     Alexis/B (VRE gene) N/A     VIM (Carbapenemase ) N/A     Enterococcus faecalis Not Detected     Enterococcus faecium Not Detected     Listeria monocytogenes Not Detected     Staphylococcus spp. Detected     Staphylococcus aureus Not Detected     Staphylococcus epidermidis Detected     Staphylococcus  lugdunensis Not Detected     Streptococcus spp. Not Detected     Streptococcus agalactiae (Group B) Not Detected     Streptococcus pneumoniae Not Detected     Streptococcus pyogenes (Group A) Not Detected     Acinetobacter calcoaceticus/baumannii complex Not Detected     Bacteroides fragilis Not Detected     Enterobacterales Not Detected     Enterobacter cloacae complex Not Detected     Escherichia coli Not Detected     Klebsiella aerogenes Not Detected     Klebsiella oxytoca Not Detected     Klebsiella pneumoniae group Not Detected     Proteus spp. Not Detected     Salmonella spp. Not Detected     Serratia marcescens Not Detected     Haemophilus influenzae Not Detected     Neisseria meningitidis Not Detected     Pseudomonas aeruginosa Not Detected     Stenotrophomonas maltophilia Not Detected     Candida albicans Not Detected     Candida auris Not Detected     Candida glabrata Not Detected     Candida krusei Not Detected     Candida parapsilosis Not Detected     Candida tropicalis Not Detected     Cryptococcus neoformans/gattii Not Detected    Narrative:      The [employer] BCID2 Panel is a multiplexed nucleic acid test intended for the use with [employer]® 2.0 or [employer]® Identropy Systems for the simultaneous qualitative detection and identification of multiple bacterial and yeast nucleic acids and select genetic determinants associated with antimicrobial resistance.  The [employer] BCID2 Panel test is performed directly on blood culture samples identified as positive by a continuous monitoring blood culture system.  Results are intended to be interpreted in conjunction with Gram stain results.    Blood Culture [[id number]]  (Abnormal) Collected: 06/02/24 1119    Order Status: Completed Specimen: Blood from Hand, Right Updated: 06/03/24 0924     GRAM STAIN Gram Positive Cocci, probable Staphylococcus      Seen in gram stain of broth only      2 of 2 bottles positive            Significant Labs: All  pertinent labs within the past 24 hours have been reviewed.    Significant Imaging: I have reviewed all relevant and available imaging results/findings within the past 24 hours.      Plan -- see top of note

## 2024-06-05 NOTE — PROGRESS NOTES
[facility] - [address] Medical Telemetry  Neurology  Progress Note    Patient Name: Luis Traylor  MRN: [id number]  Admission Date: 6/1/2024  Hospital Length of Stay: 4 days  Code Status: Full Code   Attending Provider: Treva Azevedo MD  Primary Care Physician: Terrence Craven NP   Principal Problem:<principal problem not specified>    HPI:   23 y.o. male who presented to [facility] on 6/1/2024 with reports of inability to speak. Onset of symptoms was sudden, not related to any specific activity and last known normal 1200 on 6/1 . Stroke risk factors include none. Prior stroke history: none.        Overview/Hospital Course:  No notes on file        Subjective:     Interval History:   No significant events overnight or this AM. Neurologic exam remains stable.   Plan of care d/w pt and pt's significant other at bedside.     Current Neurological Medications:     Current Facility-Administered Medications   Medication Dose Route Frequency Provider Last Rate Last Admin    acetaminophen tablet 650 mg  650 mg Oral Q8H PRN Sabra Michel AGACNP-BC        acetaminophen tablet 650 mg  650 mg Oral Q4H PRN Sabra Michel AGACNP-BC   650 mg at 06/03/24 0926    lacosamide (VIMPAT) 200 mg in sodium chloride 0.9% 100 mL IVPB  200 mg Intravenous Q12H Phong Hinojosa MD   Stopped at 06/05/24 0606    levETIRAcetam in NaCl (iso-os) IVPB 1,500 mg  1,500 mg Intravenous Q12H Phong Hinojosa  mL/hr at 06/05/24 0852 1,500 mg at 06/05/24 0852    LORazepam injection 2 mg  2 mg Intravenous Q4H PRN Sabra Michel AGACNP-BC        ondansetron injection 4 mg  4 mg Intravenous Q8H PRN Sabra Michel AGACNP-BC        vancomycin - pharmacy to dose   Intravenous pharmacy to manage frequency Reyes, Thairy G, DO        vancomycin 1.25 g in dextrose 5% 250 mL IVPB (ready to mix)  1,250 mg Intravenous Q12H Treva Azevedo MD   Stopped at 06/05/24 0026       Review of Systems  A 14pt ros was reviewed & is negative  unless o/w documented in the hpi    Objective:     Vital Signs (Most Recent):  Temp: 98.1 °F (36.7 °C) (06/05/24 0732)  Pulse: 71 (06/05/24 0732)  Resp: 18 (06/05/24 0732)  BP: 115/68 (06/05/24 0732)  SpO2: 97 % (06/05/24 0732) Vital Signs (24h Range):  Temp:  [97.5 °F (36.4 °C)-98.7 °F (37.1 °C)] 98.1 °F (36.7 °C)  Pulse:  [64-78] 71  Resp:  [18-20] 18  SpO2:  [97 %-99 %] 97 %  BP: (103-117)/(66-74) 115/68     Weight: 71.2 kg (156 lb 13.7 oz)  Body mass index is 21.88 kg/m².     Physical Exam       GENERAL: NAD, calm, cooperative, appropriate, awake/alert  MENTAL STATUS: Oriented x4, follows commands reliably  SPEECH/LANGUAGE: Clear, coherent  CN:  EOMI, gaze conjugate, visual fields intact  PERRLA  Motor: no focal motor weakness  Sensory: Normal to tactile stim/vibration  Gait: not observed          Significant Labs:   Recent Lab Results         06/05/24  0447   06/04/24  1034        Albumin/Globulin Ratio   0.6       Albumin   3.4       ALP   56       ALT   13       Anion Gap 9.0   4.0       AST   11       Baso # 0.04         Basophil % 0.9         BILIRUBIN TOTAL   0.5       BUN 11.6   8.6       BUN/CREAT RATIO 13   8       Calcium 8.7   9.0       Chloride 110   108       CO2 19   25       Creatinine 0.89   1.06       eGFR >60   >60       Eos # 0.09         Eos % 2.0         Globulin, Total   5.5       Glucose 85   81       Hematocrit 36.7         Hemoglobin 12.3         Immature Grans (Abs) 0.01         Immature Granulocytes 0.2         Lymph # 1.04         LYMPH % 23.6         MCH 29.1         MCHC 33.5         MCV 87.0         Mono # 0.58         Mono % 13.2         MPV 9.1         Neut # 2.64         Neut % 60.1         nRBC 0.0         Platelet Count 195         Potassium 4.1   3.9       PROTEIN TOTAL   8.9       RBC 4.22         RDW 13.4         Sodium 138   137       Vancomycin-Trough   12.9       WBC 4.40                 Significant Imaging: I have reviewed all pertinent imaging results/findings within  the past 24 hours.  Assessment and Plan:     Seizures  Continue keppra 1500 mg BID and vimpat 200 mg BID   Give ativan 2 mg PRN for witnessed seizures  Seizure precautions      Elevated intracranial pressure  Repeat LP on 6/3: opening pressure 47, CSF WBC 2929  CSF sent off for flow cytometry yesterday    -defer to neurologist regarding immunosuppression therapy following discharge  -Further recommendations per MD        VTE Risk Mitigation (From admission, onward)           Ordered     IP VTE HIGH RISK PATIENT  Once         06/01/24 1513     Place sequential compression device  Until discontinued         06/01/24 1513                    Sienna Rodney, [facility]-BC  Inpatient Neurology  [facility] - [address] [place], [place] [address]etry    I have seen/examined the patient.  NP was scribe.  I agree with the findings unless outlined below:    Phong Hinojosa MD  [facility]     Clinically he is back to nromal    He has received 2 doses ivig here and AEDs were increased  Awaiting a lot of sendout labs    Recs  Dc home today  IVIG 40 g q2w as outpatient; will ask case mgt to assist  Will need rx for keppra 1500 mg bid and vimpat 200 mg bid  He has a followup with me in a few weeks

## 2024-06-05 NOTE — DISCHARGE INSTRUCTIONS
[facility] - [phone]. Office informed insurance authorization takes 5-7 days. I have faxed your chart information, orders to them.

## 2024-06-06 LAB
BACTERIA BLD CULT: ABNORMAL
BACTERIA BLD CULT: ABNORMAL
FLOW CYTOMETRY SPECIALIST REVIEW: NORMAL
GRAM STN SPEC: ABNORMAL
M REASON FOR REFERRAL: NORMAL
MAYO GENERIC ORDERABLE RESULT: NORMAL
OLIGOCLONAL BANDS CSF IEF-IMP: 0 BANDS
OLIGOCLONAL BANDS CSF IEF: 0 BANDS
OLIGOCLONAL BANDS SERPL IEF: 0 BANDS
PATH REPORT.FINAL DX SPEC: NORMAL
PATH REPORT.MICROSCOPIC SPEC OTHER STN: NORMAL
RELEVANT DIAGNOSTIC TESTS/LABORATORY DATA NOTE: NORMAL
SPECIMEN SOURCE: NORMAL

## 2024-06-07 LAB — BACTERIA BLD CULT: NORMAL

## 2024-06-07 NOTE — DISCHARGE SUMMARY
[facility] Hospital Medicine Discharge Summary    Admit Date: 6/1/2024  Discharge Date and Time: 6/6/20247:50 PM  Admitting Physician: REYNOLD Team  Discharging Physician: Teri Yarbrough DO.  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 male with a PMHx of viral/aseptic meningitis and seizure disorderwho presented to [facility] on 6/1/2024 with c/o right-sided facial droop, right-sided weakness, and aphasia that began prior to arrival.  Patient's significant other reports that he was unable to answer questions.  Code fast caught upon arrival to ED and patient was evaluated by Neurology.  CT head negative for acute intracranial hemorrhage, no findings to suggest large vascular territory acute ischemic insult. Labs essentially unremarkable.  He was admitted to hospital medicine service for further medical management.     Of note, patient was initially hospitalized on 05/07/2024 when he was transferred from [facility] to [facility] for higher level of care due to concern for CVA.    Initial symptoms started as weakness and numbness in the left arm and leg with severe headaches with associated photophobia, nausea, and vomiting X2 weeks.    He was given a partial dose of tPA due to gingival bleeding.    Admitted to ICU here.    Left-sided weakness improved over the course of his admission; however mentation continued to wax and wane.    LP was done on 05/09/2024 with CSF  demonstrating leukocytosis and with lymph node predominance.    Then began having episodes of absence seizures, with staring spells.    Blood cultures from 05/07 and 05/08 were negative.   MRI brain on 05/07/2024 negative for acute intracranial findings.     He was transferred to [facility] Neuro ICU for possible encephalitis on 05/10/2024. MRI brain with and without contrast on 05/16/2024 negative for acute intracranial findings. MRA and MRV of the Brain were also unremarkable. [place] [facility] [place] with noted return to neurological baseline.  Antiepileptic medications and antimicrobials were discontinued.  Felt to be viral or aseptic meningitis.       Extensive encephalopathy panel was sent and course seemed to favor possible viral or autoimmune/inflammatory etiology; viral meningitis versus aseptic meningitis. Farm animal exposure noted. Notable results include:  MOG FACS positive 1:1000  Coproporphyrin,tetra level of 177     He then again presented to [facility] on 05/15/2024 for worsening headache and slurred speech.    He was admitted to hospitalist service.  Evaluated by Neurology and ID.  EEG noted a right temporal lobe electrographic seizure, right temporal lobe slowing.    Noted to have previous LP opening pressure of 43 cm of H2O.    On 05/21 he began having left flank pain for which CT abdomen was done and demonstrated left ureteral stone, evaluated by Urology, and started on Flomax.    Repeat LP showed persistent elevated opening pressure, remained afebrile  Ophthalmology consulted for evaluation in light of complaints of R eye swelling & conjunctival injection; plan for outpatient F/U.    He was discharged home on 05/25/2024 with neurology and ophthalmology follow-up.  Discharge prescriptions included Keppra  1500 mg p.o. b.I.d., Vimpat 100 mg p.o. every 12 hours and Flomax 0.4 mg p.o. daily    Patient received 2 doses of IVIG during hospitalization and antiepileptic agents  were increased.  We are awaiting a lot of send out labs.  Neurology was ready for discharge and stated that patient will need IVIG 40 g every 2 weeks as outpatient.  We case management was consulted to assist with that.  Patient will follow up with Neurology in a few weeks.  Patient's repeat blood cultures were no showed no growth today.  Isolate was likely a contamination.  CSF culture ulcer showed no growth to date.  Infectious Disease discontinue vancomycin and was okay for discharge for ID standpoint.  Patient will follow up with Neurology and PCP closely after discharge.  Patient had fiancee had no other acute concerns at this time.    Pt was seen and examined on the day of discharge  Vitals:  VITAL SIGNS: 24 HRS MIN & MAX LAST   No data recorded 97.6 °F (36.4 °C)   No data recorded 110/69   No data recorded  74   No data recorded 18   No data recorded 96 %       Physical Exam:  GENERAL: In no acute distress, afebrile  HEENT:PERRLA  CHEST: Clear to auscultation bilaterally  HEART: S1, S2, no appreciable murmur  ABDOMEN: Soft, nontender, BS +  MSK: Warm, no lower extremity edema, no clubbing or cyanosis  NEUROLOGIC: Alert and oriented x4, moving all extremities with good strength   INTEGUMENTARY: thoracic and left flank papular rash    Procedures Performed: No admission procedures for hospital encounter.     Significant Diagnostic Studies: See Full reports for all details    Recent Labs   Lab 06/01/24  1341 06/03/24  0442 06/05/24  0447   WBC 9.15 5.53 4.40*   RBC 4.53* 4.36* 4.22*   HGB 13.1* 12.5* 12.3*   HCT 38.7* 38.4* 36.7*   MCV 85.4 88.1 87.0   MCH 28.9 28.7 29.1   MCHC 33.9 32.6* 33.5   RDW 13.5 13.7 13.4    182 195   MPV 8.8 8.3 9.1       Recent Labs   Lab 06/01/24  1341 06/03/24  0442 06/04/24  1034 06/05/24 0447    140 137 138   K 3.5 4.5 3.9 4.1    108* 108* 110*   CO2 24 23 25 19*   BUN 9.1 10.3 8.6* 11.6   CREATININE 0.94 0.99 1.06 0.89   CALCIUM 9.1 8.8 9.0 8.7   ALBUMIN 3.8   --  3.4*  --    ALKPHOS 58  --  56  --    ALT 19  --  13  --    AST 13  --  11  --    BILITOT 0.4  --  0.5  --         Microbiology Results (last 7 days)       Procedure Component Value Units Date/Time    BCID2 Panel [[id number]]  (Abnormal) Collected: 06/02/24 1119    Order Status: Completed Specimen: Blood from Hand, Right Updated: 06/03/24 0934     CTX-M (ESBL ) N/A     IMP (Cabapenemase ) N/A     KPC resistance gene (Carbapenemase ) N/A     mcr-1 N/A     mecA ID Detected     Comment: Note: Antimicrobial resistance can occur via multiple mechanisms. A Not Detected result for antimicrobial resistance gene(s) does not indicate antimicrobial susceptibility. Subculturing is required for species identification and susceptibility testing of   isolates.        mecA/C and MREJ (MRSA) gene N/A     NDM (Carbapenemase ) N/A     OXA-48-like (Carbapenemase ) N/A     Alexis/B (VRE gene) N/A     VIM (Carbapenemase ) N/A     Enterococcus faecalis Not Detected     Enterococcus faecium Not Detected     Listeria monocytogenes Not Detected     Staphylococcus spp. Detected     Staphylococcus aureus Not Detected     Staphylococcus epidermidis Detected     Staphylococcus lugdunensis Not Detected     Streptococcus spp. Not Detected     Streptococcus agalactiae (Group B) Not Detected     Streptococcus pneumoniae Not Detected     Streptococcus pyogenes (Group A) Not Detected     Acinetobacter calcoaceticus/baumannii complex Not Detected     Bacteroides fragilis Not Detected     Enterobacterales Not Detected     Enterobacter cloacae complex Not Detected     Escherichia coli Not Detected     Klebsiella aerogenes Not Detected     Klebsiella oxytoca Not Detected     Klebsiella pneumoniae group Not Detected     Proteus spp. Not Detected     Salmonella spp. Not Detected     Serratia marcescens Not Detected     Haemophilus influenzae Not Detected     Neisseria meningitidis Not Detected      "Pseudomonas aeruginosa Not Detected     Stenotrophomonas maltophilia Not Detected     Candida albicans Not Detected     Candida auris Not Detected     Candida glabrata Not Detected     Candida krusei Not Detected     Candida parapsilosis Not Detected     Candida tropicalis Not Detected     Cryptococcus neoformans/gattii Not Detected    Narrative:      The [employer] BCID2 Panel is a multiplexed nucleic acid test intended for the use with [employer] | [employer]® 2.0 or [employer]® Film[employer]® [employer] Systems for the simultaneous qualitative detection and identification of multiple bacterial and yeast nucleic acids and select genetic determinants associated with antimicrobial resistance.  The [employer] BCID2 Panel test is performed directly on blood culture samples identified as positive by a continuous monitoring blood culture system.  Results are intended to be interpreted in conjunction with Gram stain results.             Echo    Left Ventricle: The left ventricle is normal in size. Normal wall   thickness. There is normal systolic function with a visually estimated   ejection fraction of 55 - 60%. There is normal diastolic function.    Right Ventricle: Normal right ventricular cavity size. Wall thickness   is normal. Systolic function is normal.    Aortic Valve: The aortic valve is a trileaflet valve.    Mitral Valve: There is no stenosis. The mean pressure gradient across   the mitral valve is 2 mmHg at a heart rate of  bpm.    IVC/SVC: Normal venous pressure at 3 mmHg.  FL Lumbar Puncture Therapeutic With Imaging  Narrative: EXAMINATION:  FL LUMBAR PUNCTURE THERAPEUTIC WITH IMAGING    CLINICAL HISTORY:  Benign intracranial hypertension ICP surveillance;    TECHNIQUE:  A preprocedure "Time-out" was performed by the radiologist and radiology technologist confirming patient identification and procedure location. The patient was brought into the interventional radiology suite and placed in the prone position on the fluoroscopy " table. A saundra was placed one the patient's back overlying the left L4-L5 intralaminar space. The patient's back was then prepped and draped in the usual aseptic fashion. 1% plain lidocaine was used to anesthetize the skin and subcutaneous tissues to deep to the previously made saundra. Under fluoroscopic guidance, a 22 gauge spinal needle was used to access the thecal sac on the first pass with immediate return of clear  CSF. Approximately 20 ml of CSF was drained and submitted to the laboratory for analysis. The needle was removed, and the puncture site was aseptically bandage. There were no immediate complaints or complications.    Opening pressure: 47 cm H2O    Closing pressure: 21 cm H2O    Total fluoroscopic time: 8  seconds.    Dose: 1.07 mGy  Impression: Technically successful fluoroscopic guided lumbar puncture.    Electronically signed by: Aniyah Velazquez  Date:    06/03/2024  Time:    15:49         Medication List        CHANGE how you take these medications      lacosamide 200 mg Tab tablet  Commonly known as: VIMPAT  Take 1 tablet (200 mg total) by mouth every 12 (twelve) hours.  What changed:   medication strength  how much to take     levETIRAcetam 1000 MG tablet  Commonly known as: KEPPRA  Take 1.5 tablets (1,500 mg total) by mouth 2 (two) times daily.  What changed: medication strength            CONTINUE taking these medications      tamsulosin 0.4 mg Cap  Commonly known as: FLOMAX  Take 1 capsule (0.4 mg total) by mouth once daily.               Where to Get Your Medications        These medications were sent to [facility] - [address]  [address] [address]      Phone: [phone]   lacosamide 200 mg Tab tablet  levETIRAcetam 1000 MG tablet          Explained in detail to the patient about the discharge plan, medications, and follow-up visits. Pt understands and agrees with the treatment plan  Discharge  Disposition: Home or Self Care   Discharged Condition: stable  Diet-    Medications Per DC med rec  Activities as tolerated   Follow-up Information       Terrence Craven, NP Follow up.    Specialty: Family Medicine  Why: The office will call the patient with an apoointment date and time.  Contact information:  [address]  [place] [address]  [phone]                           For further questions contact hospitalist office    Discharge time 33 minutes    For worsening symptoms, chest pain, shortness of breath, increased abdominal pain, high grade fever, stroke or stroke like symptoms, immediately go to the nearest Emergency Room or call 911 as soon as possible.    Teri Yarbrough DO  Department of Hospital Medicine  [facility]  06/06/2024

## 2024-06-09 LAB
BACTERIA BLD CULT: NORMAL
BACTERIA BLD CULT: NORMAL
BACTERIA CSF CULT: NORMAL
GRAM STN SPEC: NORMAL
GRAM STN SPEC: NORMAL

## 2024-06-10 ENCOUNTER — TELEPHONE (OUTPATIENT)
Dept: INFECTIOUS DISEASES | Facility: CLINIC | Age: 24
End: 2024-06-10
Payer: COMMERCIAL

## 2024-06-10 ENCOUNTER — PATIENT MESSAGE (OUTPATIENT)
Dept: ADMINISTRATIVE | Facility: OTHER | Age: 24
End: 2024-06-10
Payer: COMMERCIAL

## 2024-06-10 ENCOUNTER — OFFICE VISIT (OUTPATIENT)
Dept: INFECTIOUS DISEASES | Facility: CLINIC | Age: 24
End: 2024-06-10
Payer: COMMERCIAL

## 2024-06-10 DIAGNOSIS — R56.9 SEIZURES: ICD-10-CM

## 2024-06-10 DIAGNOSIS — G04.90 ENCEPHALITIS: ICD-10-CM

## 2024-06-10 DIAGNOSIS — G37.81 MYELIN OLIGODENDROCYTE GLYCOPROTEIN ANTIBODY DISORDER (MOGAD): Primary | ICD-10-CM

## 2024-06-10 DIAGNOSIS — R51.9 INTRACTABLE EPISODIC HEADACHE, UNSPECIFIED HEADACHE TYPE: ICD-10-CM

## 2024-06-10 LAB — FUNGUS SPEC CULT: NORMAL

## 2024-06-10 PROCEDURE — 99215 OFFICE O/P EST HI 40 MIN: CPT | Mod: 95,,, | Performed by: INTERNAL MEDICINE

## 2024-06-10 PROCEDURE — 1111F DSCHRG MED/CURRENT MED MERGE: CPT | Mod: CPTII,95,, | Performed by: INTERNAL MEDICINE

## 2024-06-10 NOTE — PROGRESS NOTES
The patient location is: [place], [place]  The chief complaint leading to consultation is: hospital f/u    Visit type: audiovisual    Face to Face time with patient: 20  60 minutes of total time spent on the encounter, which includes face to face time and non-face to face time preparing to see the patient (eg, review of tests), Obtaining and/or reviewing separately obtained history, Documenting clinical information in the electronic or other health record, Independently interpreting results (not separately reported) and communicating results to the patient/family/caregiver, or Care coordination (not separately reported).         Each patient to whom he or she provides medical services by telemedicine is:  (1) informed of the relationship between the physician and patient and the respective role of any other health care provider with respect to management of the patient; and (2) notified that he or she may decline to receive medical services by telemedicine and may withdraw from such care at any time.    Notes:      Subjective     Patient ID: Luis Traylor is a 23 y.o. male.    Chief Complaint:No chief complaint on file.      History of Present Illness    Has been hospitalized at [facility] and [place] [facility] [place].  Has had extensive ID and neurologic workup.  I have reviewed all the studies, notes, d/c summaries and ID assessments and lab values.    Primary issue appears to be neurologic, working diagnosis of MOGAD.  Is on Keppra for seizures and has started on IVIG q 2 weeks with 1 year of therapy planned.    Review of Systems   All other systems reviewed and are negative.     Objective   Physical Exam  Constitutional:       Appearance: Normal appearance.   Neurological:      Mental Status: He is alert.        Assessment and Plan     1. Encephalitis    2. Intractable episodic headache, unspecified headache type    3. Seizures        Plan:  Proceed with neurological treatment plan, no need for ID follow  up unless some other infectious symptoms develop.    Time: 60 minutes   50% of time spent on face-to-face counseling and coordination of care. Counseling included review of test results, diagnosis, and treatment plan with patient and/or family.    (laura present on monitor)

## 2024-06-10 NOTE — TELEPHONE ENCOUNTER
----- Message from Delphine Martinez sent at 6/10/2024  1:00 PM CDT -----  Regarding: appt  Contact: pt@ [phone]  Pt is calling to speak to someone in the office to r/s her appt that she is currently scheduled for 6/10; no available appts in Epic. Please call to advise.[email or web address]@ [phone] Thanks.

## 2024-06-11 DIAGNOSIS — G37.81 MYELIN OLIGODENDROCYTE GLYCOPROTEIN ANTIBODY DISORDER (MOGAD): Primary | ICD-10-CM

## 2024-06-11 DIAGNOSIS — G04.00 ADEM (ACUTE DISSEMINATED ENCEPHALOMYELITIS): ICD-10-CM

## 2024-06-11 LAB — MAYO GENERIC ORDERABLE RESULT: NORMAL

## 2024-06-14 ENCOUNTER — OFFICE VISIT (OUTPATIENT)
Dept: NEUROLOGY | Facility: CLINIC | Age: 24
End: 2024-06-14
Payer: COMMERCIAL

## 2024-06-14 DIAGNOSIS — G04.00 ADEM (ACUTE DISSEMINATED ENCEPHALOMYELITIS): Primary | ICD-10-CM

## 2024-06-14 PROCEDURE — 1111F DSCHRG MED/CURRENT MED MERGE: CPT | Mod: CPTII,95,, | Performed by: PSYCHIATRY & NEUROLOGY

## 2024-06-14 PROCEDURE — 99213 OFFICE O/P EST LOW 20 MIN: CPT | Mod: 95,,, | Performed by: PSYCHIATRY & NEUROLOGY

## 2024-06-14 PROCEDURE — 3044F HG A1C LEVEL LT 7.0%: CPT | Mod: CPTII,95,, | Performed by: PSYCHIATRY & NEUROLOGY

## 2024-06-14 RX ORDER — ACETAMINOPHEN 325 MG/1
325 TABLET ORAL EVERY 4 HOURS PRN
OUTPATIENT
Start: 2024-06-14 | End: 2024-06-15

## 2024-06-14 RX ORDER — SODIUM CHLORIDE 0.9 % (FLUSH) 0.9 %
10 SYRINGE (ML) INJECTION
OUTPATIENT
Start: 2024-06-14

## 2024-06-14 RX ORDER — HEPARIN 100 UNIT/ML
500 SYRINGE INTRAVENOUS
OUTPATIENT
Start: 2024-06-14

## 2024-06-14 NOTE — PROGRESS NOTES
ENCOUNTER TO ESTABLISH OUTPATIENT IVIG FOR ADEM/MOGAD    DOING WELL AFTER HOSPITALIZATION    CSF LYMPHOMA EVAL NEGATIVE  MISSENSE HETERZYGOUS PORPHYRIA    EXAM IS NONFOCAL    OGBS PENDING    PLAN  IVIG Q2W

## 2024-06-16 NOTE — DISCHARGE SUMMARY
[facility]  Hospital Medicine Discharge Summary    Admit Date: 5/15/2024  Discharge Date and Time: 05/25/2024  Admitting Physician:  Team  Discharging Physician: Earnest Santo MD.  Primary Care Physician: Terrence Craven NP  Consults: Hospital Medicine    Discharge Diagnoses:  ? 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  He initially was thought to be having a stroke and was given a partial dose of TNK, but ultimately underwent a lumbar puncture which showed over a 400 WBCs with a lymphocyte predominance presumably viral/aseptic meningitis.  He was transferred to [facility] for neuro critical Care and was seen by infectious disease at that facility.  He was discharged 05/13/2024 with the consensus being he had aseptic meningitis and all antibiotics were discontinued due to his clinical rapid improvement.  He presents to the ER tonight with worsening headache and reported slurred speech prior to arrival. He also had a seizure like episode that he does not remember.      He arrived afebrile hemodynamically stable maintaining normal sats on room air.  Laboratory work showed leukocytosis of 24 K and CT head showed no acute process.  Hospitalist was consulted for further evaluation.      Patient has mild headache but was afebrile. 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 He was started on Keppra. His clinical picture was worrisome for encephalitis. 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 He had LP done this visit was opening pressure was elevated. CSF showed elevated WBC and protein. He  was started on IV antivirals by ID and IV steroids x 5 days by Neurology for possible autoimmune encephalitis.      On 5/21/24 he started to have sudden onset of left flank pain, CT abdomen was done and showed left ureteral stone. Seen by urology team and advised to start on flomax and IV hydration. No acute interventions was recommended. Pain controlled with IV PRN Toradol.      Rpt LP showed persistent elevated opening pressure. He was afebrile. Infectious disease work up was all negative.  Per neurology note, patient's exam improved but still not at baseline.  Option for  shunt discussed by Neurology but thought to be premature and aggressive given its permanent in case high opening pressure on multiple LPs is due to transient infection/inflammation.  Pleocytosis has also improved on subsequent LP with persistence of opening pressure. Ophthalmology consulted last week for evaluation in light of complaints of R eye swelling & conjunctival injection; plan for outpatient F/U.    Pt was seen and examined on the day of discharge. He stated he was doing well and had no new complaints. Renal indices improved with IVF. Planned for DC.    Vitals:  VITAL SIGNS: 24 HRS MIN & MAX LAST   No data recorded 98.1 °F (36.7 °C)   No data recorded 125/84   No data recorded  61   No data recorded 18   No data recorded 100 %     Physical Exam:  General: alert male lying comfortably in bed, in no acute distress  HENT: oral and oropharyngeal mucosa moist, pink, with no erythema or exudates, no ear pain or discharge  Neck: normal neck movement, no lymph nodes or swellings, no JVD or Carotid bruit  Respiratory: clear breathing sounds bilaterally, no crackles, rales, ronchi or wheezes  Cardiovascular: clear S1 and S2, no murmurs, rubs or gallops  Peripheral Vascular: no lesions, ulcers or erosions, normal peripheral pulses and no pedal edema  Gastrointestinal: soft, non-tender, non-distended abdomen, no guarding, rigidity or rebound  "tenderness, normal bowel sounds  Integumentary: normal skin color, no rashes or lesions  Neuro: AAO x 3; motor strength 5/5 in B/L UEs & LEs; sensation intact to gross and fine touch B/L; CN II-XII grossly intact     Procedures Performed: No admission procedures for hospital encounter.     Significant Diagnostic Studies: See Full reports for all details    No results for input(s): "WBC", "RBC", "HGB", "HCT", "MCV", "MCH", "MCHC", "RDW", "PLT", "MPV", "GRAN", "LYMPH", "MONO", "BASO", "NRBC" in the last 168 hours.    No results for input(s): "NA", "K", "CL", "CO2", "ANIONGAP", "BUN", "CREATININE", "GLU", "CALCIUM", "PH", "MG", "ALBUMIN", "PROT", "ALKPHOS", "ALT", "AST", "BILITOT" in the last 168 hours.     Microbiology Results (last 7 days)       Procedure Component Value Units Date/Time    Cerebrospinal Fluid Culture [[id number]] Collected: 05/17/24 1110    Order Status: Completed Specimen: CSF (Spinal Fluid) Updated: 05/22/24 0919     CULTURE, CSF No Growth     GRAM STAIN Many WBC observed      No bacteria seen    Blood culture #1 **CANNOT BE ORDERED STAT** [[id number]]  (Normal) Collected: 05/15/24 2014    Order Status: Completed Specimen: Blood Updated: 05/20/24 2201     Blood Culture No Growth at 5 days    Blood culture #2 **CANNOT BE ORDERED STAT** [[id number]]  (Normal) Collected: 05/15/24 2014    Order Status: Completed Specimen: Blood Updated: 05/20/24 2201     Blood Culture No Growth at 5 days             Echo    Left Ventricle: The left ventricle is normal in size. Normal wall   thickness. There is normal systolic function with a visually estimated   ejection fraction of 55 - 60%. There is normal diastolic function.    Right Ventricle: Normal right ventricular cavity size. Wall thickness   is normal. Systolic function is normal.    Aortic Valve: The aortic valve is a trileaflet valve.    Mitral Valve: There is no stenosis. The mean pressure gradient across   the mitral valve is 2 mmHg at a heart rate of  " "bpm.    IVC/SVC: Normal venous pressure at 3 mmHg.  FL Lumbar Puncture Therapeutic With Imaging  Narrative: EXAMINATION:  FL LUMBAR PUNCTURE THERAPEUTIC WITH IMAGING    CLINICAL HISTORY:  Benign intracranial hypertension ICP surveillance;    TECHNIQUE:  A preprocedure "Time-out" was performed by the radiologist and radiology technologist confirming patient identification and procedure location. The patient was brought into the interventional radiology suite and placed in the prone position on the fluoroscopy table. A saundra was placed one the patient's back overlying the left L4-L5 intralaminar space. The patient's back was then prepped and draped in the usual aseptic fashion. 1% plain lidocaine was used to anesthetize the skin and subcutaneous tissues to deep to the previously made saundra. Under fluoroscopic guidance, a 22 gauge spinal needle was used to access the thecal sac on the first pass with immediate return of clear  CSF. Approximately 20 ml of CSF was drained and submitted to the laboratory for analysis. The needle was removed, and the puncture site was aseptically bandage. There were no immediate complaints or complications.    Opening pressure: 47 cm H2O    Closing pressure: 21 cm H2O    Total fluoroscopic time: 8  seconds.    Dose: 1.07 mGy  Impression: Technically successful fluoroscopic guided lumbar puncture.    Electronically signed by: Aniyah Velazquez  Date:    06/03/2024  Time:    15:49         Medication List        START taking these medications      tamsulosin 0.4 mg Cap  Commonly known as: FLOMAX  Take 1 capsule (0.4 mg total) by mouth once daily.            ASK your doctor about these medications      potassium chloride SA 20 MEQ tablet  Commonly known as: K-DUR,KLOR-CON  Take 2 tablets (40 mEq total) by mouth once daily. for 2 days  Ask about: Should I take this medication?               Where to Get Your Medications        You can get these medications from any pharmacy    Bring a paper " prescription for each of these medications  potassium chloride SA 20 MEQ tablet  tamsulosin 0.4 mg Cap          Explained in detail to the patient about the discharge plan, medications, and follow-up visits. Pt understands and agrees with the treatment plan  Discharge Disposition: Home or Self Care   Discharged Condition: stable  Diet-    Medications Per DC med rec  Activities as tolerated   Follow-up Information       Jessica Molina MD. Schedule an appointment as soon as possible for a visit in 1 week(s).    Specialty: Neurology  Contact information:  [address] [place] [address]  [address]  [facility] [id number] [phone]               Araceli Hinojosa MD Follow up in 1 week(s).    Specialty: Ophthalmology  Why: f/u in 1 week  Contact information:  [address] [facility] [place] [address]  [facility] [address] [phone]               Genna Tuttle MD Follow up on 6/13/2024.    Specialty: Neurosurgery  Why: at 9 am  Contact information:  [address]  [facility] [address] [phone]                           For further questions contact hospitalist office    Discharge time 33 minutes    For worsening symptoms, chest pain, shortness of breath, increased abdominal pain, high grade fever, stroke or stroke like symptoms, immediately go to the nearest Emergency Room or call 911 as soon as possible.      Earnest Collado M.D.

## 2024-06-19 ENCOUNTER — OFFICE VISIT (OUTPATIENT)
Dept: NEUROLOGY | Facility: CLINIC | Age: 24
End: 2024-06-19
Payer: COMMERCIAL

## 2024-06-19 ENCOUNTER — PATIENT MESSAGE (OUTPATIENT)
Dept: NEUROLOGY | Facility: CLINIC | Age: 24
End: 2024-06-19

## 2024-06-19 VITALS
HEIGHT: 71 IN | DIASTOLIC BLOOD PRESSURE: 70 MMHG | WEIGHT: 156 LBS | SYSTOLIC BLOOD PRESSURE: 110 MMHG | BODY MASS INDEX: 21.84 KG/M2

## 2024-06-19 DIAGNOSIS — G37.81 MYELIN OLIGODENDROCYTE GLYCOPROTEIN ANTIBODY DISORDER (MOGAD): Primary | ICD-10-CM

## 2024-06-19 PROCEDURE — 3008F BODY MASS INDEX DOCD: CPT | Mod: CPTII,S$GLB,, | Performed by: PSYCHIATRY & NEUROLOGY

## 2024-06-19 PROCEDURE — 1159F MED LIST DOCD IN RCRD: CPT | Mod: CPTII,S$GLB,, | Performed by: PSYCHIATRY & NEUROLOGY

## 2024-06-19 PROCEDURE — 3044F HG A1C LEVEL LT 7.0%: CPT | Mod: CPTII,S$GLB,, | Performed by: PSYCHIATRY & NEUROLOGY

## 2024-06-19 PROCEDURE — 99999 PR PBB SHADOW E&M-EST. PATIENT-LVL III: CPT | Mod: PBBFAC,,, | Performed by: PSYCHIATRY & NEUROLOGY

## 2024-06-19 PROCEDURE — 99213 OFFICE O/P EST LOW 20 MIN: CPT | Mod: S$GLB,,, | Performed by: PSYCHIATRY & NEUROLOGY

## 2024-06-19 PROCEDURE — 1111F DSCHRG MED/CURRENT MED MERGE: CPT | Mod: CPTII,S$GLB,, | Performed by: PSYCHIATRY & NEUROLOGY

## 2024-06-19 PROCEDURE — 3078F DIAST BP <80 MM HG: CPT | Mod: CPTII,S$GLB,, | Performed by: PSYCHIATRY & NEUROLOGY

## 2024-06-19 PROCEDURE — 3074F SYST BP LT 130 MM HG: CPT | Mod: CPTII,S$GLB,, | Performed by: PSYCHIATRY & NEUROLOGY

## 2024-06-19 NOTE — PROGRESS NOTES
He is here to fill out disability paperwork  Ivig outpatient orders are still pending  I spent 22 minutes filling out papers for him and billing will be based on that time    He has a minor rash on his face that wife is watching  On vimpat for seizure; none reported  No AMSreported since last seen    Appt next month

## 2024-06-21 ENCOUNTER — PATIENT MESSAGE (OUTPATIENT)
Dept: NEUROLOGY | Facility: CLINIC | Age: 24
End: 2024-06-21
Payer: COMMERCIAL

## 2024-06-24 ENCOUNTER — INFUSION (OUTPATIENT)
Dept: INFUSION THERAPY | Facility: HOSPITAL | Age: 24
End: 2024-06-24
Attending: PSYCHIATRY & NEUROLOGY
Payer: COMMERCIAL

## 2024-06-24 VITALS
SYSTOLIC BLOOD PRESSURE: 113 MMHG | HEART RATE: 72 BPM | DIASTOLIC BLOOD PRESSURE: 71 MMHG | WEIGHT: 161 LBS | TEMPERATURE: 97 F | RESPIRATION RATE: 18 BRPM | OXYGEN SATURATION: 100 % | HEIGHT: 71 IN | BODY MASS INDEX: 22.54 KG/M2

## 2024-06-24 DIAGNOSIS — G37.81 MYELIN OLIGODENDROCYTE GLYCOPROTEIN ANTIBODY DISORDER (MOGAD): ICD-10-CM

## 2024-06-24 DIAGNOSIS — G04.00 ADEM (ACUTE DISSEMINATED ENCEPHALOMYELITIS): Primary | ICD-10-CM

## 2024-06-24 PROCEDURE — 96375 TX/PRO/DX INJ NEW DRUG ADDON: CPT

## 2024-06-24 PROCEDURE — 96366 THER/PROPH/DIAG IV INF ADDON: CPT

## 2024-06-24 PROCEDURE — 96365 THER/PROPH/DIAG IV INF INIT: CPT

## 2024-06-24 PROCEDURE — 63600175 PHARM REV CODE 636 W HCPCS: Mod: JZ,JG | Performed by: PSYCHIATRY & NEUROLOGY

## 2024-06-24 PROCEDURE — 25000003 PHARM REV CODE 250: Performed by: PSYCHIATRY & NEUROLOGY

## 2024-06-24 RX ORDER — SODIUM CHLORIDE 0.9 % (FLUSH) 0.9 %
10 SYRINGE (ML) INJECTION
OUTPATIENT
Start: 2024-06-24

## 2024-06-24 RX ORDER — HEPARIN 100 UNIT/ML
500 SYRINGE INTRAVENOUS
OUTPATIENT
Start: 2024-06-24

## 2024-06-24 RX ORDER — ACETAMINOPHEN 325 MG/1
325 TABLET ORAL EVERY 4 HOURS PRN
Status: DISCONTINUED | OUTPATIENT
Start: 2024-06-24 | End: 2024-06-24 | Stop reason: HOSPADM

## 2024-06-24 RX ORDER — HEPARIN 100 UNIT/ML
500 SYRINGE INTRAVENOUS
Status: DISCONTINUED | OUTPATIENT
Start: 2024-06-24 | End: 2024-06-24 | Stop reason: HOSPADM

## 2024-06-24 RX ORDER — SODIUM CHLORIDE 0.9 % (FLUSH) 0.9 %
10 SYRINGE (ML) INJECTION
Status: DISCONTINUED | OUTPATIENT
Start: 2024-06-24 | End: 2024-06-24 | Stop reason: HOSPADM

## 2024-06-24 RX ORDER — ACETAMINOPHEN 325 MG/1
325 TABLET ORAL EVERY 4 HOURS PRN
OUTPATIENT
Start: 2024-06-24 | End: 2024-06-25

## 2024-06-24 RX ADMIN — HUMAN IMMUNOGLOBULIN G 40 G: 40 LIQUID INTRAVENOUS at 11:06

## 2024-06-24 RX ADMIN — DIPHENHYDRAMINE HYDROCHLORIDE 25 MG: 50 INJECTION INTRAMUSCULAR; INTRAVENOUS at 11:06

## 2024-06-24 RX ADMIN — SODIUM CHLORIDE: 9 INJECTION, SOLUTION INTRAVENOUS at 11:06

## 2024-06-24 RX ADMIN — ACETAMINOPHEN 325 MG: 325 TABLET ORAL at 11:06

## 2024-06-24 NOTE — PLAN OF CARE
Pt received 1/2 ivig via iv. Pt tolerated well. Max rate 200ml/hr. Pt discharged in stable condition. Pt handed appt on paper.

## 2024-07-01 ENCOUNTER — PATIENT MESSAGE (OUTPATIENT)
Dept: NEUROLOGY | Facility: CLINIC | Age: 24
End: 2024-07-01
Payer: COMMERCIAL

## 2024-07-02 ENCOUNTER — TELEPHONE (OUTPATIENT)
Dept: NEUROLOGY | Facility: CLINIC | Age: 24
End: 2024-07-02
Payer: COMMERCIAL

## 2024-07-02 NOTE — TELEPHONE ENCOUNTER
[phone] Ext [phone]  Order, Clinic notes, med list, and face sheet sent to [facility].  @ Fax [phone]      [employer]  Auth : [id number]  2024 - 2025      ----- Message from Luh Aly LPN sent at 2024  8:54 AM CDT -----  Regarding: IVIG  [employer] covering 2 infusion @ [facility]  Will need other out pt infusion ctr      ----- Message -----  From: Vanita Massey  Sent: 2024   8:09 AM CDT  To: #  Subject: IV injection PA                                  24 02:38p TAKEN    To:          Office When in  From:        Velvet  Phone:       [phone]  Patient:     Luis Traylor  :         2000  Hospital:    [facility]  Room:          RegDr:      Hinojosa  Ref:         Calling about patient's approval for IV injections and PA. Please call    Subject:          Hospital Call  ClrID:    [id number]

## 2024-07-08 ENCOUNTER — INFUSION (OUTPATIENT)
Dept: INFUSION THERAPY | Facility: HOSPITAL | Age: 24
End: 2024-07-08
Attending: PSYCHIATRY & NEUROLOGY
Payer: COMMERCIAL

## 2024-07-08 VITALS — SYSTOLIC BLOOD PRESSURE: 123 MMHG | HEART RATE: 75 BPM | DIASTOLIC BLOOD PRESSURE: 75 MMHG

## 2024-07-08 DIAGNOSIS — G04.00 ADEM (ACUTE DISSEMINATED ENCEPHALOMYELITIS): Primary | ICD-10-CM

## 2024-07-08 DIAGNOSIS — G37.81 MYELIN OLIGODENDROCYTE GLYCOPROTEIN ANTIBODY DISORDER (MOGAD): ICD-10-CM

## 2024-07-08 PROCEDURE — 63600175 PHARM REV CODE 636 W HCPCS: Performed by: PSYCHIATRY & NEUROLOGY

## 2024-07-08 PROCEDURE — 96366 THER/PROPH/DIAG IV INF ADDON: CPT

## 2024-07-08 PROCEDURE — 96365 THER/PROPH/DIAG IV INF INIT: CPT

## 2024-07-08 PROCEDURE — 96367 TX/PROPH/DG ADDL SEQ IV INF: CPT

## 2024-07-08 PROCEDURE — 25000003 PHARM REV CODE 250: Performed by: PSYCHIATRY & NEUROLOGY

## 2024-07-08 RX ORDER — SODIUM CHLORIDE 0.9 % (FLUSH) 0.9 %
10 SYRINGE (ML) INJECTION
OUTPATIENT
Start: 2024-07-08

## 2024-07-08 RX ORDER — ACETAMINOPHEN 325 MG/1
325 TABLET ORAL EVERY 4 HOURS PRN
OUTPATIENT
Start: 2024-07-08 | End: 2024-07-09

## 2024-07-08 RX ORDER — HEPARIN 100 UNIT/ML
500 SYRINGE INTRAVENOUS
Status: DISCONTINUED | OUTPATIENT
Start: 2024-07-08 | End: 2024-07-08 | Stop reason: HOSPADM

## 2024-07-08 RX ORDER — HEPARIN 100 UNIT/ML
500 SYRINGE INTRAVENOUS
OUTPATIENT
Start: 2024-07-08

## 2024-07-08 RX ORDER — ACETAMINOPHEN 325 MG/1
325 TABLET ORAL EVERY 4 HOURS PRN
Status: DISCONTINUED | OUTPATIENT
Start: 2024-07-08 | End: 2024-07-08 | Stop reason: HOSPADM

## 2024-07-08 RX ORDER — SODIUM CHLORIDE 0.9 % (FLUSH) 0.9 %
10 SYRINGE (ML) INJECTION
Status: DISCONTINUED | OUTPATIENT
Start: 2024-07-08 | End: 2024-07-08 | Stop reason: HOSPADM

## 2024-07-08 RX ADMIN — DIPHENHYDRAMINE HYDROCHLORIDE 25 MG: 50 INJECTION INTRAMUSCULAR; INTRAVENOUS at 11:07

## 2024-07-08 RX ADMIN — HUMAN IMMUNOGLOBULIN G 40 G: 40 LIQUID INTRAVENOUS at 11:07

## 2024-07-22 ENCOUNTER — PATIENT MESSAGE (OUTPATIENT)
Dept: NEUROLOGY | Facility: CLINIC | Age: 24
End: 2024-07-22
Payer: COMMERCIAL

## 2024-07-24 ENCOUNTER — OFFICE VISIT (OUTPATIENT)
Dept: NEUROLOGY | Facility: CLINIC | Age: 24
End: 2024-07-24
Payer: COMMERCIAL

## 2024-07-24 VITALS — SYSTOLIC BLOOD PRESSURE: 128 MMHG | DIASTOLIC BLOOD PRESSURE: 81 MMHG

## 2024-07-24 DIAGNOSIS — G04.00 ADEM (ACUTE DISSEMINATED ENCEPHALOMYELITIS): ICD-10-CM

## 2024-07-24 DIAGNOSIS — G37.81 MYELIN OLIGODENDROCYTE GLYCOPROTEIN ANTIBODY DISORDER (MOGAD): Primary | ICD-10-CM

## 2024-07-24 PROCEDURE — 3079F DIAST BP 80-89 MM HG: CPT | Mod: CPTII,S$GLB,, | Performed by: PSYCHIATRY & NEUROLOGY

## 2024-07-24 PROCEDURE — 1159F MED LIST DOCD IN RCRD: CPT | Mod: CPTII,S$GLB,, | Performed by: PSYCHIATRY & NEUROLOGY

## 2024-07-24 PROCEDURE — 3074F SYST BP LT 130 MM HG: CPT | Mod: CPTII,S$GLB,, | Performed by: PSYCHIATRY & NEUROLOGY

## 2024-07-24 PROCEDURE — 99214 OFFICE O/P EST MOD 30 MIN: CPT | Mod: S$GLB,,, | Performed by: PSYCHIATRY & NEUROLOGY

## 2024-07-24 PROCEDURE — 3044F HG A1C LEVEL LT 7.0%: CPT | Mod: CPTII,S$GLB,, | Performed by: PSYCHIATRY & NEUROLOGY

## 2024-07-24 PROCEDURE — 99999 PR PBB SHADOW E&M-EST. PATIENT-LVL II: CPT | Mod: PBBFAC,,, | Performed by: PSYCHIATRY & NEUROLOGY

## 2024-07-24 NOTE — PROGRESS NOTES
Subjective     Patient ID: Luis Traylor is a 23 y.o. male.    Chief Complaint: encephalopathy    HPI  Overall doing great now  Ivig q2w; no issues/side effects  No seizures or auras  On keppra/vimpat  Tolerating heat well  Going back to work next week  Review of Systems  The remainder of the 14 system ROS is noncontributory or negative unless mentioned/reviewed above.       Objective     Physical Exam  Mental Status: Alert and oriented x3. Language is fluent with good comprehension.    Cranial Nerve: Pupils are equal, round, and reactive to light. Visual fields are intact to confrontation. Normal fundi. Ocular movements are intact. Face is symmetric at rest and with activation with intact sensation throughout. Hearing intact to finger rub bilaterally. Muscles of tongue and palate activate symmetrically. No dysarthria. Strength is full in sternocleidomastoid and trapezius bilaterally.    Motor: Muscle bulk and tone are normal. Strength is 5/5 in all four extremities both proximally and distally. Intact fine motor movements bilaterally. There is no pronator drift or satelliting on arm roll.    Sensory: Sensation is intact to light touch, pinprick, vibration, and proprioception throughout. Romberg is negative.    Reflexes: 2+ and symmetric at the biceps, triceps, brachioradialis, patella, and Achilles bilaterally. Plantar response is flexor bilaterally.    Coordination: No dysmetria on finger-nose-finger or heel-knee-shin. Normal rapid alternating movements. Fast finger tapping with normal amplitude and speed.    Gait: Narrow based with normal stride length and good arm swing bilaterally. Able to walk on heels, toes, and in tandem.       Assessment and Plan         Had more [employer] paperwork to fill out  Continue current meds/ivig         Follow up in about 3 months (around 10/24/2024).

## 2024-08-19 PROBLEM — G45.9 TIA (TRANSIENT ISCHEMIC ATTACK): Status: RESOLVED | Noted: 2024-05-16 | Resolved: 2024-08-19

## 2024-10-24 ENCOUNTER — OFFICE VISIT (OUTPATIENT)
Dept: NEUROLOGY | Facility: CLINIC | Age: 24
End: 2024-10-24
Payer: COMMERCIAL

## 2024-10-24 VITALS
WEIGHT: 169 LBS | SYSTOLIC BLOOD PRESSURE: 110 MMHG | DIASTOLIC BLOOD PRESSURE: 70 MMHG | HEIGHT: 71 IN | BODY MASS INDEX: 23.66 KG/M2

## 2024-10-24 DIAGNOSIS — G04.00 ADEM (ACUTE DISSEMINATED ENCEPHALOMYELITIS): ICD-10-CM

## 2024-10-24 DIAGNOSIS — G40.109 LOCALIZATION-RELATED EPILEPSY: ICD-10-CM

## 2024-10-24 DIAGNOSIS — G37.81 MYELIN OLIGODENDROCYTE GLYCOPROTEIN ANTIBODY DISORDER (MOGAD): Primary | ICD-10-CM

## 2024-10-24 PROCEDURE — 3078F DIAST BP <80 MM HG: CPT | Mod: CPTII,S$GLB,, | Performed by: PSYCHIATRY & NEUROLOGY

## 2024-10-24 PROCEDURE — 3074F SYST BP LT 130 MM HG: CPT | Mod: CPTII,S$GLB,, | Performed by: PSYCHIATRY & NEUROLOGY

## 2024-10-24 PROCEDURE — 1159F MED LIST DOCD IN RCRD: CPT | Mod: CPTII,S$GLB,, | Performed by: PSYCHIATRY & NEUROLOGY

## 2024-10-24 PROCEDURE — 99999 PR PBB SHADOW E&M-EST. PATIENT-LVL III: CPT | Mod: PBBFAC,,, | Performed by: PSYCHIATRY & NEUROLOGY

## 2024-10-24 PROCEDURE — 3044F HG A1C LEVEL LT 7.0%: CPT | Mod: CPTII,S$GLB,, | Performed by: PSYCHIATRY & NEUROLOGY

## 2024-10-24 PROCEDURE — 99214 OFFICE O/P EST MOD 30 MIN: CPT | Mod: S$GLB,,, | Performed by: PSYCHIATRY & NEUROLOGY

## 2024-10-24 PROCEDURE — 3008F BODY MASS INDEX DOCD: CPT | Mod: CPTII,S$GLB,, | Performed by: PSYCHIATRY & NEUROLOGY

## 2024-10-24 RX ORDER — LACOSAMIDE 200 MG/1
200 TABLET ORAL EVERY 12 HOURS
Qty: 180 TABLET | Refills: 1 | Status: SHIPPED | OUTPATIENT
Start: 2024-10-24 | End: 2025-10-24

## 2024-10-24 RX ORDER — LEVETIRACETAM 1000 MG/1
1500 TABLET ORAL 2 TIMES DAILY
Qty: 270 TABLET | Refills: 4 | Status: SHIPPED | OUTPATIENT
Start: 2024-10-24 | End: 2025-10-24

## 2024-10-24 NOTE — PROGRESS NOTES
Subjective     Patient ID: Luis Traylor is a 24 y.o. male.    Chief Complaint: encephalopathy     HPI  Feels great  No sx  No sz  On ivig q2w  On keppra/vimpat  Discussed various options, did some research, etc  Plans to have more kids  Review of Systems  The remainder of the 14 system ROS is noncontributory or negative unless mentioned/reviewed above.       Objective     Physical Exam  Mental Status: Alert and oriented x3. Language is fluent with good comprehension.    Cranial Nerve: Pupils are equal, round, and reactive to light. Visual fields are intact to confrontation. Normal fundi. Ocular movements are intact. Face is symmetric at rest and with activation with intact sensation throughout. Hearing intact to finger rub bilaterally. Muscles of tongue and palate activate symmetrically. No dysarthria. Strength is full in sternocleidomastoid and trapezius bilaterally.    Motor: Muscle bulk and tone are normal. Strength is 5/5 in all four extremities both proximally and distally. Intact fine motor movements bilaterally. There is no pronator drift or satelliting on arm roll.    Sensory: Sensation is intact to light touch, pinprick, vibration, and proprioception throughout. Romberg is negative.    Reflexes: 2+ and symmetric at the biceps, triceps, brachioradialis, patella, and Achilles bilaterally. Plantar response is flexor bilaterally.    Coordination: No dysmetria on finger-nose-finger or heel-knee-shin. Normal rapid alternating movements. Fast finger tapping with normal amplitude and speed.    Gait: Narrow based with normal stride length and good arm swing bilaterally. Able to walk on heels, toes, and in tandem.       Assessment and Plan         Change ivig to monthly  Offered azathioprine; defers right now  Continue keppra/vimpat for at least a couple more years  6 mos         Follow up in about 6 months (around 4/24/2025).

## 2024-12-20 ENCOUNTER — TELEPHONE (OUTPATIENT)
Dept: NEUROLOGY | Facility: CLINIC | Age: 24
End: 2024-12-20
Payer: COMMERCIAL

## 2024-12-20 NOTE — TELEPHONE ENCOUNTER
Rtn call to Pharmacist  Pt transferred Keppra from [facility], unable to transfer Vimpat (class IV)  Verbal order provided.  Verbalized appreciation

## 2024-12-20 NOTE — TELEPHONE ENCOUNTER
----- Message from El[place]ira sent at 2024  8:14 AM CST -----  Regarding: rx refill  Thu 19-Dec-24 04:13p TAKEN    To:          Office  From:        [facility]  Phone:       [phone]  Patient:     Luis Traylor  :         2000  RegDr:      Dr Phong Hinojosa  Ref:         Needs refill for this pt    Subject:          Patient Calls  ClrID:    [phone]

## 2025-04-24 ENCOUNTER — OFFICE VISIT (OUTPATIENT)
Dept: NEUROLOGY | Facility: CLINIC | Age: 25
End: 2025-04-24
Payer: COMMERCIAL

## 2025-04-24 VITALS
DIASTOLIC BLOOD PRESSURE: 70 MMHG | WEIGHT: 169 LBS | HEIGHT: 71 IN | BODY MASS INDEX: 23.66 KG/M2 | SYSTOLIC BLOOD PRESSURE: 128 MMHG

## 2025-04-24 DIAGNOSIS — G40.109 LOCALIZATION-RELATED EPILEPSY: ICD-10-CM

## 2025-04-24 DIAGNOSIS — G37.81 MYELIN OLIGODENDROCYTE GLYCOPROTEIN ANTIBODY DISORDER (MOGAD): Primary | ICD-10-CM

## 2025-04-24 PROCEDURE — 3074F SYST BP LT 130 MM HG: CPT | Mod: CPTII,S$GLB,, | Performed by: PSYCHIATRY & NEUROLOGY

## 2025-04-24 PROCEDURE — 1159F MED LIST DOCD IN RCRD: CPT | Mod: CPTII,S$GLB,, | Performed by: PSYCHIATRY & NEUROLOGY

## 2025-04-24 PROCEDURE — 3008F BODY MASS INDEX DOCD: CPT | Mod: CPTII,S$GLB,, | Performed by: PSYCHIATRY & NEUROLOGY

## 2025-04-24 PROCEDURE — 99214 OFFICE O/P EST MOD 30 MIN: CPT | Mod: S$GLB,,, | Performed by: PSYCHIATRY & NEUROLOGY

## 2025-04-24 PROCEDURE — 3078F DIAST BP <80 MM HG: CPT | Mod: CPTII,S$GLB,, | Performed by: PSYCHIATRY & NEUROLOGY

## 2025-04-24 PROCEDURE — 99999 PR PBB SHADOW E&M-EST. PATIENT-LVL III: CPT | Mod: PBBFAC,,, | Performed by: PSYCHIATRY & NEUROLOGY

## 2025-04-24 RX ORDER — HEPARIN 100 UNIT/ML
500 SYRINGE INTRAVENOUS
OUTPATIENT
Start: 2025-04-24

## 2025-04-24 RX ORDER — LACOSAMIDE 200 MG/1
200 TABLET ORAL EVERY 12 HOURS
Qty: 180 TABLET | Refills: 1 | Status: SHIPPED | OUTPATIENT
Start: 2025-04-24 | End: 2026-04-24

## 2025-04-24 RX ORDER — ACETAMINOPHEN 325 MG/1
325 TABLET ORAL EVERY 4 HOURS PRN
OUTPATIENT
Start: 2025-04-24 | End: 2025-04-25

## 2025-04-24 RX ORDER — SODIUM CHLORIDE 0.9 % (FLUSH) 0.9 %
10 SYRINGE (ML) INJECTION
OUTPATIENT
Start: 2025-04-24

## 2025-04-24 NOTE — PROGRESS NOTES
Subjective     Patient ID: Luis Traylor is a 24 y.o. male.    Chief Complaint: encephalopathy    HPI  No sz  No HA  Last sz 1 year ago  No AMS  Feels back to normal  On monthly ivig; did not take imuran; has another baby on the way in 10 weeks  No active c/o    Reviewed mri brain images from may 2024  Review of Systems  The remainder of the 14 system ROS is noncontributory or negative unless mentioned/reviewed above.       Objective     Physical Exam  Mental Status: Alert and oriented x3. Language is fluent with good comprehension.    Cranial Nerve: Pupils are equal, round, and reactive to light. Visual fields are intact to confrontation. Normal fundi. Ocular movements are intact. Face is symmetric at rest and with activation with intact sensation throughout. Hearing intact to finger rub bilaterally. Muscles of tongue and palate activate symmetrically. No dysarthria. Strength is full in sternocleidomastoid and trapezius bilaterally.    Motor: Muscle bulk and tone are normal. Strength is 5/5 in all four extremities both proximally and distally. Intact fine motor movements bilaterally. There is no pronator drift or satelliting on arm roll.    Sensory: Sensation is intact to light touch, pinprick, vibration, and proprioception throughout. Romberg is negative.    Reflexes: 2+ and symmetric at the biceps, triceps, brachioradialis, patella, and Achilles bilaterally. Plantar response is flexor bilaterally.    Coordination: No dysmetria on finger-nose-finger or heel-knee-shin. Normal rapid alternating movements. Fast finger tapping with normal amplitude and speed.    Gait: Narrow based with normal stride length and good arm swing bilaterally. Able to walk on heels, toes, and in tandem.       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  Dispense: 180 tablet; Refill:  1    2. Localization-related epilepsy  -     lacosamide (VIMPAT) 200 mg Tab tablet; Take 1 tablet (200 mg total) by mouth every 12 (twelve) hours.  Dispense: 180 tablet; Refill: 1    Other orders  -     diphenhydrAMINE (BENADRYL) 25 mg in 0.9% NaCl 50 mL IVPB  -     acetaminophen tablet 325 mg  -     Immune Globulin G (IGG)-PRO-IGA 10 % injection (Privigen) 10 % injection 40 g  -     0.9% NaCl 500 mL flush bag  -     sodium chloride 0.9% flush 10 mL  -     heparin, porcine (PF) 100 unit/mL injection flush 500 Units  -     alteplase injection 2 mg        Change ivig to q2 mos  Sz-free; meds refilled; vimpat/keppra    OK to be reevaluated for CDL.  He is under active medical care and his condition is in remission.         Follow up in about 6 months (around 10/24/2025).

## 2025-06-17 DIAGNOSIS — G40.109 LOCALIZATION-RELATED EPILEPSY: ICD-10-CM

## 2025-06-17 DIAGNOSIS — G37.81 MYELIN OLIGODENDROCYTE GLYCOPROTEIN ANTIBODY DISORDER (MOGAD): ICD-10-CM

## 2025-06-17 RX ORDER — LACOSAMIDE 200 MG/1
200 TABLET ORAL EVERY 12 HOURS
Qty: 180 TABLET | Refills: 1 | Status: SHIPPED | OUTPATIENT
Start: 2025-06-17 | End: 2026-06-17

## 2025-06-17 NOTE — TELEPHONE ENCOUNTER
6 mth  rqst Vimpat  Set to print for signature  Fax rqst'd  [facility]  (Not set up to accept eRx)

## 2025-06-18 DIAGNOSIS — G40.109 LOCALIZATION-RELATED EPILEPSY: Primary | ICD-10-CM

## 2025-06-18 RX ORDER — LEVETIRACETAM 1000 MG/1
1500 TABLET ORAL 2 TIMES DAILY
Qty: 270 TABLET | Refills: 1 | Status: SHIPPED | OUTPATIENT
Start: 2025-06-18 | End: 2026-06-18

## 2025-08-18 ENCOUNTER — PATIENT MESSAGE (OUTPATIENT)
Dept: NEUROLOGY | Facility: CLINIC | Age: 25
End: 2025-08-18
Payer: COMMERCIAL

## 2025-08-25 ENCOUNTER — PATIENT MESSAGE (OUTPATIENT)
Dept: NEUROLOGY | Facility: CLINIC | Age: 25
End: 2025-08-25
Payer: COMMERCIAL